# Patient Record
Sex: MALE | Race: WHITE | Employment: OTHER | URBAN - METROPOLITAN AREA
[De-identification: names, ages, dates, MRNs, and addresses within clinical notes are randomized per-mention and may not be internally consistent; named-entity substitution may affect disease eponyms.]

---

## 2020-10-19 ENCOUNTER — HOSPITAL ENCOUNTER (INPATIENT)
Age: 75
LOS: 16 days | Discharge: NURSING, REHAB OR LONG-TERM CARE | DRG: 870 | End: 2020-11-04
Attending: FAMILY MEDICINE | Admitting: FAMILY MEDICINE
Payer: MEDICARE

## 2020-10-19 ENCOUNTER — APPOINTMENT (OUTPATIENT)
Dept: CT IMAGING | Age: 75
DRG: 870 | End: 2020-10-19
Attending: FAMILY MEDICINE
Payer: MEDICARE

## 2020-10-19 DIAGNOSIS — R06.02 SHORTNESS OF BREATH: ICD-10-CM

## 2020-10-19 DIAGNOSIS — Z71.89 GOALS OF CARE, COUNSELING/DISCUSSION: ICD-10-CM

## 2020-10-19 DIAGNOSIS — R53.81 DEBILITY: ICD-10-CM

## 2020-10-19 DIAGNOSIS — E11.65 TYPE 2 DIABETES MELLITUS WITH HYPERGLYCEMIA, WITHOUT LONG-TERM CURRENT USE OF INSULIN (HCC): ICD-10-CM

## 2020-10-19 DIAGNOSIS — I21.4 NON-STEMI (NON-ST ELEVATED MYOCARDIAL INFARCTION) (HCC): ICD-10-CM

## 2020-10-19 PROBLEM — D72.829 LEUKOCYTOSIS: Status: ACTIVE | Noted: 2020-10-19

## 2020-10-19 PROBLEM — N39.0 UTI (URINARY TRACT INFECTION): Status: ACTIVE | Noted: 2020-10-19

## 2020-10-19 PROBLEM — N13.30 HYDRONEPHROSIS OF RIGHT KIDNEY: Status: ACTIVE | Noted: 2020-10-19

## 2020-10-19 PROBLEM — R00.0 TACHYCARDIA: Status: ACTIVE | Noted: 2020-10-19

## 2020-10-19 PROBLEM — A41.9 SEPSIS (HCC): Status: ACTIVE | Noted: 2020-10-19

## 2020-10-19 LAB
ABO + RH BLD: NORMAL
ALBUMIN SERPL-MCNC: 3.3 G/DL (ref 3.5–5)
ALBUMIN/GLOB SERPL: 0.8 {RATIO} (ref 1.1–2.2)
ALP SERPL-CCNC: 139 U/L (ref 45–117)
ALT SERPL-CCNC: 28 U/L (ref 12–78)
ANION GAP SERPL CALC-SCNC: 10 MMOL/L (ref 5–15)
APPEARANCE UR: ABNORMAL
APTT PPP: 27.3 SEC (ref 22.1–32)
AST SERPL-CCNC: 34 U/L (ref 15–37)
ATRIAL RATE: 120 BPM
BACTERIA URNS QL MICRO: NEGATIVE /HPF
BASOPHILS # BLD: 0 K/UL (ref 0–0.1)
BASOPHILS NFR BLD: 0 % (ref 0–1)
BILIRUB SERPL-MCNC: 0.6 MG/DL (ref 0.2–1)
BILIRUB UR QL: NEGATIVE
BLOOD GROUP ANTIBODIES SERPL: NORMAL
BUN SERPL-MCNC: 37 MG/DL (ref 6–20)
BUN/CREAT SERPL: 14 (ref 12–20)
CALCIUM SERPL-MCNC: 9.5 MG/DL (ref 8.5–10.1)
CALCULATED R AXIS, ECG10: 81 DEGREES
CALCULATED T AXIS, ECG11: -114 DEGREES
CHLORIDE SERPL-SCNC: 107 MMOL/L (ref 97–108)
CO2 SERPL-SCNC: 19 MMOL/L (ref 21–32)
COLOR UR: ABNORMAL
CREAT SERPL-MCNC: 2.59 MG/DL (ref 0.7–1.3)
DIAGNOSIS, 93000: NORMAL
DIFFERENTIAL METHOD BLD: ABNORMAL
EOSINOPHIL # BLD: 0 K/UL (ref 0–0.4)
EOSINOPHIL NFR BLD: 0 % (ref 0–7)
EPITH CASTS URNS QL MICRO: ABNORMAL /LPF
ERYTHROCYTE [DISTWIDTH] IN BLOOD BY AUTOMATED COUNT: 14.8 % (ref 11.5–14.5)
GLOBULIN SER CALC-MCNC: 4.1 G/DL (ref 2–4)
GLUCOSE BLD STRIP.AUTO-MCNC: 236 MG/DL (ref 65–100)
GLUCOSE BLD STRIP.AUTO-MCNC: 238 MG/DL (ref 65–100)
GLUCOSE BLD STRIP.AUTO-MCNC: 283 MG/DL (ref 65–100)
GLUCOSE BLD STRIP.AUTO-MCNC: 310 MG/DL (ref 65–100)
GLUCOSE SERPL-MCNC: 278 MG/DL (ref 65–100)
GLUCOSE UR STRIP.AUTO-MCNC: 500 MG/DL
HCT VFR BLD AUTO: 41.5 % (ref 36.6–50.3)
HGB BLD-MCNC: 13.5 G/DL (ref 12.1–17)
HGB UR QL STRIP: ABNORMAL
HYALINE CASTS URNS QL MICRO: ABNORMAL /LPF (ref 0–5)
IMM GRANULOCYTES # BLD AUTO: 0 K/UL
IMM GRANULOCYTES NFR BLD AUTO: 0 %
INR PPP: 1 (ref 0.9–1.1)
KETONES UR QL STRIP.AUTO: ABNORMAL MG/DL
LACTATE SERPL-SCNC: 2 MMOL/L (ref 0.4–2)
LEUKOCYTE ESTERASE UR QL STRIP.AUTO: ABNORMAL
LYMPHOCYTES # BLD: 1 K/UL (ref 0.8–3.5)
LYMPHOCYTES NFR BLD: 2 % (ref 12–49)
MAGNESIUM SERPL-MCNC: 1.7 MG/DL (ref 1.6–2.4)
MCH RBC QN AUTO: 27.6 PG (ref 26–34)
MCHC RBC AUTO-ENTMCNC: 32.5 G/DL (ref 30–36.5)
MCV RBC AUTO: 84.7 FL (ref 80–99)
MONOCYTES # BLD: 1.5 K/UL (ref 0–1)
MONOCYTES NFR BLD: 3 % (ref 5–13)
NEUTS BAND NFR BLD MANUAL: 1 % (ref 0–6)
NEUTS SEG # BLD: 46.6 K/UL (ref 1.8–8)
NEUTS SEG NFR BLD: 94 % (ref 32–75)
NITRITE UR QL STRIP.AUTO: NEGATIVE
NRBC # BLD: 0 K/UL (ref 0–0.01)
NRBC BLD-RTO: 0 PER 100 WBC
P-R INTERVAL, ECG05: 186 MS
PATH REV BLD -IMP: ABNORMAL
PH UR STRIP: 5.5 [PH] (ref 5–8)
PLATELET # BLD AUTO: 256 K/UL (ref 150–400)
PMV BLD AUTO: 10.8 FL (ref 8.9–12.9)
POTASSIUM SERPL-SCNC: 4.3 MMOL/L (ref 3.5–5.1)
PROT SERPL-MCNC: 7.4 G/DL (ref 6.4–8.2)
PROT UR STRIP-MCNC: 100 MG/DL
PROTHROMBIN TIME: 10.9 SEC (ref 9–11.1)
Q-T INTERVAL, ECG07: 288 MS
QRS DURATION, ECG06: 118 MS
QTC CALCULATION (BEZET), ECG08: 407 MS
RBC # BLD AUTO: 4.9 M/UL (ref 4.1–5.7)
RBC #/AREA URNS HPF: ABNORMAL /HPF (ref 0–5)
RBC MORPH BLD: ABNORMAL
SERVICE CMNT-IMP: ABNORMAL
SODIUM SERPL-SCNC: 136 MMOL/L (ref 136–145)
SP GR UR REFRACTOMETRY: 1.02 (ref 1–1.03)
SPECIMEN EXP DATE BLD: NORMAL
THERAPEUTIC RANGE,PTTT: NORMAL SECS (ref 58–77)
UROBILINOGEN UR QL STRIP.AUTO: 1 EU/DL (ref 0.2–1)
VENTRICULAR RATE, ECG03: 120 BPM
WBC # BLD AUTO: 49.1 K/UL (ref 4.1–11.1)
WBC URNS QL MICRO: >100 /HPF (ref 0–4)

## 2020-10-19 PROCEDURE — 87040 BLOOD CULTURE FOR BACTERIA: CPT

## 2020-10-19 PROCEDURE — 85610 PROTHROMBIN TIME: CPT

## 2020-10-19 PROCEDURE — 74011000250 HC RX REV CODE- 250: Performed by: NURSE PRACTITIONER

## 2020-10-19 PROCEDURE — 94664 DEMO&/EVAL PT USE INHALER: CPT

## 2020-10-19 PROCEDURE — 74011250636 HC RX REV CODE- 250/636: Performed by: FAMILY MEDICINE

## 2020-10-19 PROCEDURE — 82962 GLUCOSE BLOOD TEST: CPT

## 2020-10-19 PROCEDURE — 74011636637 HC RX REV CODE- 636/637: Performed by: FAMILY MEDICINE

## 2020-10-19 PROCEDURE — 74011000250 HC RX REV CODE- 250: Performed by: FAMILY MEDICINE

## 2020-10-19 PROCEDURE — 65660000000 HC RM CCU STEPDOWN

## 2020-10-19 PROCEDURE — 81001 URINALYSIS AUTO W/SCOPE: CPT

## 2020-10-19 PROCEDURE — 85730 THROMBOPLASTIN TIME PARTIAL: CPT

## 2020-10-19 PROCEDURE — 83605 ASSAY OF LACTIC ACID: CPT

## 2020-10-19 PROCEDURE — C9113 INJ PANTOPRAZOLE SODIUM, VIA: HCPCS | Performed by: NURSE PRACTITIONER

## 2020-10-19 PROCEDURE — 74176 CT ABD & PELVIS W/O CONTRAST: CPT

## 2020-10-19 PROCEDURE — 85025 COMPLETE CBC W/AUTO DIFF WBC: CPT

## 2020-10-19 PROCEDURE — 80053 COMPREHEN METABOLIC PANEL: CPT

## 2020-10-19 PROCEDURE — 74011250636 HC RX REV CODE- 250/636: Performed by: NURSE PRACTITIONER

## 2020-10-19 PROCEDURE — 74011250637 HC RX REV CODE- 250/637: Performed by: INTERNAL MEDICINE

## 2020-10-19 PROCEDURE — 74011250637 HC RX REV CODE- 250/637: Performed by: FAMILY MEDICINE

## 2020-10-19 PROCEDURE — 93005 ELECTROCARDIOGRAM TRACING: CPT

## 2020-10-19 PROCEDURE — 94640 AIRWAY INHALATION TREATMENT: CPT

## 2020-10-19 PROCEDURE — 87086 URINE CULTURE/COLONY COUNT: CPT

## 2020-10-19 PROCEDURE — 36415 COLL VENOUS BLD VENIPUNCTURE: CPT

## 2020-10-19 PROCEDURE — 86900 BLOOD TYPING SEROLOGIC ABO: CPT

## 2020-10-19 PROCEDURE — 74011250637 HC RX REV CODE- 250/637: Performed by: NURSE PRACTITIONER

## 2020-10-19 PROCEDURE — 83735 ASSAY OF MAGNESIUM: CPT

## 2020-10-19 PROCEDURE — 74011000258 HC RX REV CODE- 258: Performed by: FAMILY MEDICINE

## 2020-10-19 PROCEDURE — 99232 SBSQ HOSP IP/OBS MODERATE 35: CPT | Performed by: CLINICAL NURSE SPECIALIST

## 2020-10-19 RX ORDER — FENTANYL CITRATE 50 UG/ML
25 INJECTION, SOLUTION INTRAMUSCULAR; INTRAVENOUS
Status: DISCONTINUED | OUTPATIENT
Start: 2020-10-19 | End: 2020-10-19

## 2020-10-19 RX ORDER — INSULIN LISPRO 100 [IU]/ML
INJECTION, SOLUTION INTRAVENOUS; SUBCUTANEOUS EVERY 6 HOURS
Status: DISCONTINUED | OUTPATIENT
Start: 2020-10-19 | End: 2020-10-19

## 2020-10-19 RX ORDER — DOXYCYCLINE HYCLATE 100 MG
100 TABLET ORAL EVERY 12 HOURS
Status: DISCONTINUED | OUTPATIENT
Start: 2020-10-19 | End: 2020-10-24

## 2020-10-19 RX ORDER — SODIUM CHLORIDE 0.9 % (FLUSH) 0.9 %
5-40 SYRINGE (ML) INJECTION EVERY 8 HOURS
Status: DISCONTINUED | OUTPATIENT
Start: 2020-10-19 | End: 2020-10-30 | Stop reason: SDUPTHER

## 2020-10-19 RX ORDER — ASPIRIN 325 MG
325 TABLET ORAL DAILY
COMMUNITY

## 2020-10-19 RX ORDER — ACETAMINOPHEN 325 MG/1
650 TABLET ORAL
Status: DISCONTINUED | OUTPATIENT
Start: 2020-10-19 | End: 2020-11-04 | Stop reason: HOSPADM

## 2020-10-19 RX ORDER — DEXTROSE MONOHYDRATE 100 MG/ML
0-250 INJECTION, SOLUTION INTRAVENOUS AS NEEDED
Status: DISCONTINUED | OUTPATIENT
Start: 2020-10-19 | End: 2020-10-26

## 2020-10-19 RX ORDER — METOPROLOL SUCCINATE 50 MG/1
50 TABLET, EXTENDED RELEASE ORAL DAILY
COMMUNITY
End: 2020-11-04

## 2020-10-19 RX ORDER — TRAMADOL HYDROCHLORIDE 50 MG/1
50 TABLET ORAL
Status: DISCONTINUED | OUTPATIENT
Start: 2020-10-19 | End: 2020-11-04 | Stop reason: HOSPADM

## 2020-10-19 RX ORDER — ERGOCALCIFEROL 1.25 MG/1
50000 CAPSULE ORAL
COMMUNITY

## 2020-10-19 RX ORDER — SODIUM CHLORIDE 9 MG/ML
125 INJECTION, SOLUTION INTRAVENOUS CONTINUOUS
Status: DISCONTINUED | OUTPATIENT
Start: 2020-10-19 | End: 2020-10-20

## 2020-10-19 RX ORDER — OXYCODONE AND ACETAMINOPHEN 5; 325 MG/1; MG/1
1 TABLET ORAL
COMMUNITY

## 2020-10-19 RX ORDER — INSULIN GLARGINE 100 [IU]/ML
0.2 INJECTION, SOLUTION SUBCUTANEOUS
Status: DISCONTINUED | OUTPATIENT
Start: 2020-10-19 | End: 2020-11-04 | Stop reason: HOSPADM

## 2020-10-19 RX ORDER — METOPROLOL TARTRATE 25 MG/1
50 TABLET, FILM COATED ORAL EVERY 12 HOURS
Status: DISCONTINUED | OUTPATIENT
Start: 2020-10-19 | End: 2020-10-20

## 2020-10-19 RX ORDER — SODIUM CHLORIDE 0.9 % (FLUSH) 0.9 %
5-40 SYRINGE (ML) INJECTION AS NEEDED
Status: DISCONTINUED | OUTPATIENT
Start: 2020-10-19 | End: 2020-11-04 | Stop reason: HOSPADM

## 2020-10-19 RX ORDER — INSULIN LISPRO 100 [IU]/ML
INJECTION, SOLUTION INTRAVENOUS; SUBCUTANEOUS
Status: DISCONTINUED | OUTPATIENT
Start: 2020-10-19 | End: 2020-10-19

## 2020-10-19 RX ORDER — HYDRALAZINE HYDROCHLORIDE 20 MG/ML
10 INJECTION INTRAMUSCULAR; INTRAVENOUS
Status: DISCONTINUED | OUTPATIENT
Start: 2020-10-19 | End: 2020-11-04 | Stop reason: HOSPADM

## 2020-10-19 RX ORDER — MAGNESIUM SULFATE 100 %
4 CRYSTALS MISCELLANEOUS AS NEEDED
Status: DISCONTINUED | OUTPATIENT
Start: 2020-10-19 | End: 2020-11-04 | Stop reason: HOSPADM

## 2020-10-19 RX ORDER — ROSUVASTATIN CALCIUM 40 MG/1
40 TABLET, COATED ORAL
Status: DISCONTINUED | OUTPATIENT
Start: 2020-10-19 | End: 2020-11-04 | Stop reason: HOSPADM

## 2020-10-19 RX ORDER — INSULIN LISPRO 100 [IU]/ML
INJECTION, SOLUTION INTRAVENOUS; SUBCUTANEOUS
Status: DISCONTINUED | OUTPATIENT
Start: 2020-10-19 | End: 2020-10-21

## 2020-10-19 RX ORDER — ACETAMINOPHEN 650 MG/1
650 SUPPOSITORY RECTAL
Status: DISCONTINUED | OUTPATIENT
Start: 2020-10-19 | End: 2020-11-04 | Stop reason: HOSPADM

## 2020-10-19 RX ORDER — IPRATROPIUM BROMIDE AND ALBUTEROL SULFATE 2.5; .5 MG/3ML; MG/3ML
3 SOLUTION RESPIRATORY (INHALATION)
Status: DISCONTINUED | OUTPATIENT
Start: 2020-10-19 | End: 2020-10-20

## 2020-10-19 RX ORDER — AMLODIPINE BESYLATE 10 MG/1
10 TABLET ORAL DAILY
COMMUNITY

## 2020-10-19 RX ORDER — POLYETHYLENE GLYCOL 3350 17 G/17G
17 POWDER, FOR SOLUTION ORAL DAILY PRN
Status: DISCONTINUED | OUTPATIENT
Start: 2020-10-19 | End: 2020-10-26

## 2020-10-19 RX ORDER — FENTANYL CITRATE 50 UG/ML
12.5 INJECTION, SOLUTION INTRAMUSCULAR; INTRAVENOUS
Status: COMPLETED | OUTPATIENT
Start: 2020-10-19 | End: 2020-10-19

## 2020-10-19 RX ORDER — OXYCODONE HYDROCHLORIDE 5 MG/1
5 TABLET ORAL
Status: DISCONTINUED | OUTPATIENT
Start: 2020-10-19 | End: 2020-11-04 | Stop reason: HOSPADM

## 2020-10-19 RX ADMIN — IPRATROPIUM BROMIDE AND ALBUTEROL SULFATE 3 ML: .5; 3 SOLUTION RESPIRATORY (INHALATION) at 20:55

## 2020-10-19 RX ADMIN — IPRATROPIUM BROMIDE AND ALBUTEROL SULFATE 3 ML: .5; 3 SOLUTION RESPIRATORY (INHALATION) at 12:40

## 2020-10-19 RX ADMIN — INSULIN GLARGINE 17 UNITS: 100 INJECTION, SOLUTION SUBCUTANEOUS at 22:44

## 2020-10-19 RX ADMIN — SODIUM CHLORIDE 40 MG: 9 INJECTION, SOLUTION INTRAMUSCULAR; INTRAVENOUS; SUBCUTANEOUS at 09:16

## 2020-10-19 RX ADMIN — DOXYCYCLINE HYCLATE 100 MG: 100 TABLET, COATED ORAL at 21:09

## 2020-10-19 RX ADMIN — OXYCODONE 5 MG: 5 TABLET ORAL at 17:01

## 2020-10-19 RX ADMIN — INSULIN GLARGINE 17 UNITS: 100 INJECTION, SOLUTION SUBCUTANEOUS at 06:30

## 2020-10-19 RX ADMIN — ACETAMINOPHEN 650 MG: 325 TABLET ORAL at 02:23

## 2020-10-19 RX ADMIN — METOPROLOL TARTRATE 50 MG: 25 TABLET, FILM COATED ORAL at 13:07

## 2020-10-19 RX ADMIN — Medication 10 ML: at 02:23

## 2020-10-19 RX ADMIN — DILTIAZEM HYDROCHLORIDE 10 MG/HR: 5 INJECTION INTRAVENOUS at 16:29

## 2020-10-19 RX ADMIN — INSULIN LISPRO 4 UNITS: 100 INJECTION, SOLUTION INTRAVENOUS; SUBCUTANEOUS at 17:01

## 2020-10-19 RX ADMIN — ROSUVASTATIN 40 MG: 40 TABLET, FILM COATED ORAL at 21:09

## 2020-10-19 RX ADMIN — METOPROLOL TARTRATE 50 MG: 25 TABLET, FILM COATED ORAL at 21:09

## 2020-10-19 RX ADMIN — OXYCODONE 5 MG: 5 TABLET ORAL at 22:44

## 2020-10-19 RX ADMIN — FENTANYL CITRATE 12.5 MCG: 50 INJECTION, SOLUTION INTRAMUSCULAR; INTRAVENOUS at 08:21

## 2020-10-19 RX ADMIN — DOXYCYCLINE HYCLATE 100 MG: 100 TABLET, COATED ORAL at 11:43

## 2020-10-19 RX ADMIN — FENTANYL CITRATE 12.5 MCG: 50 INJECTION, SOLUTION INTRAMUSCULAR; INTRAVENOUS at 12:35

## 2020-10-19 RX ADMIN — OXYCODONE 5 MG: 5 TABLET ORAL at 13:06

## 2020-10-19 RX ADMIN — IPRATROPIUM BROMIDE AND ALBUTEROL SULFATE 3 ML: .5; 3 SOLUTION RESPIRATORY (INHALATION) at 15:33

## 2020-10-19 RX ADMIN — TRAMADOL HYDROCHLORIDE 50 MG: 50 TABLET ORAL at 09:57

## 2020-10-19 RX ADMIN — DILTIAZEM HYDROCHLORIDE 5 MG/HR: 5 INJECTION INTRAVENOUS at 06:10

## 2020-10-19 RX ADMIN — TRAMADOL HYDROCHLORIDE 50 MG: 50 TABLET ORAL at 15:20

## 2020-10-19 RX ADMIN — CEFTRIAXONE SODIUM 1 G: 1 INJECTION, POWDER, FOR SOLUTION INTRAMUSCULAR; INTRAVENOUS at 22:44

## 2020-10-19 RX ADMIN — INSULIN LISPRO 1 UNITS: 100 INJECTION, SOLUTION INTRAVENOUS; SUBCUTANEOUS at 22:44

## 2020-10-19 RX ADMIN — INSULIN LISPRO 3 UNITS: 100 INJECTION, SOLUTION INTRAVENOUS; SUBCUTANEOUS at 06:31

## 2020-10-19 RX ADMIN — TRAMADOL HYDROCHLORIDE 50 MG: 50 TABLET ORAL at 21:09

## 2020-10-19 RX ADMIN — INSULIN LISPRO 2 UNITS: 100 INJECTION, SOLUTION INTRAVENOUS; SUBCUTANEOUS at 12:35

## 2020-10-19 RX ADMIN — DILTIAZEM HYDROCHLORIDE 10 MG/HR: 5 INJECTION INTRAVENOUS at 07:06

## 2020-10-19 RX ADMIN — FENTANYL CITRATE 25 MCG: 50 INJECTION, SOLUTION INTRAMUSCULAR; INTRAVENOUS at 03:03

## 2020-10-19 RX ADMIN — SODIUM CHLORIDE 125 ML/HR: 900 INJECTION, SOLUTION INTRAVENOUS at 02:22

## 2020-10-19 NOTE — PROGRESS NOTES
10/19/20 0600   Vital Signs   Temp 99.4 °F (37.4 °C)   Temp Source Oral   Pulse (Heart Rate) (!) 115   Heart Rate Source Monitor   Cardiac Rhythm A Fib   Resp Rate 22   O2 Sat (%) (!) 89 %   Level of Consciousness Alert   /78   MAP (Calculated) 98   BP 1 Method Automatic   BP 1 Location Right arm   BP Patient Position At rest   MEWS Score 4     MD aware of HR. Patient on cardizem gtt now. Patient given 2L NC for O2 support.

## 2020-10-19 NOTE — ROUTINE PROCESS
Bedside shift change report given to [facility] (oncoming nurse) by Phillip Mota (offgoing nurse). Report included the following information SBAR, Kardex and Cardiac Rhythm Afib.

## 2020-10-19 NOTE — PROGRESS NOTES
Asked to see pt for preop cardiology consult. Pt reports that his cardiologist is Dr. Laury Lee with [facility]. Will redirect to [facility].

## 2020-10-19 NOTE — PROGRESS NOTES
Bedside and Verbal shift change report given to Michelle Leal RN (oncoming nurse) by Shay Jacobs RN (offgoing nurse). Report included the following information SBAR, Kardex, MAR, Recent Results, Cardiac Rhythm NSR and Dual Neuro Assessment.

## 2020-10-19 NOTE — PROGRESS NOTES
[facility] Adult  Hospitalist Group                                                                                          Hospitalist Progress Note  Jesu Khalil NP  Answering service: [phone] OR [phone] from in house phone        Date of Service:  10/19/2020  NAME:  Love Carrera  :  1945  MRN:  [id number]      Admission Summary:   Love Carrera is a 70 y.o. male with a PMH of CAD, CABG, HTN, AAA repair, Femoral-Femoral Bypass with Chronic LLE paralysis and HLD presented as a direct admission/ transfer from [facility] ED to [facility]) with chief complaint of pain in right lower abdomen. Symptom onset reportedly began 10/18 at 1 pm, remained constant, sharp and severe, currently rated 9/10 (after pain medication prior to transfer; was more intense), without specific aggravating / alleviating factors, non-radiating. Patient admits to some nausea but no vomiting. He is able to void urine and last bowel movement was yesterday. He initially went to [facility] ED where workup included CT abdomen/ pelvis without contrast showing right hydronephrosis. UA revealed UTI, WBC = 15.4. BUN = 33. Creatinine = 2.31. Per ED physician verbal report, patient was given a dose of Morphine but he became diaphoretic and tachycardic, no other doses were given. There were no reports of itching, rash, hives, SOB, or other signs of anaphylaxis. As there was no urologist at [facility], patient requested transfer to [facility], upon arrival to [facility] complaining of 9/10 RLQ pain. There were no reports of new onset syncope, loss of consciousness, headache, neck pain, visual disturbance, numbness, paresthesias, focal weakness, chest pain, palpitations, diarrhea, dysuria, hematuria, calf pain, increased leg swelling/ edema, fever, chills, rash, or known COVID 19 exposure. Interval history / Subjective:    Follow-up for Hydronephrosis of right kidney, UTI, Sepsis, ARF, RLQ pain, Leukocytosis,  Hx CAD/ s/p CABG, T2DM with hyperglycemia, Chronic LLE paralysis and Hypertension.   -Patient seen and examined, no c/o's. Assessment & Plan:     Hydronephrosis of right kidney:  -telemetry  -Consult urologist      UTI:  -Rocephin 1 gram IV q 24 hours  -UA and urine culture     Sepsis: with noted tachycardia, leukocytosis and UTI. -blood cultures. -IVF  -continue Rocephin  -EKG  -CT abd/pelv: Patchy airspace and interstitial opacities right lung base could be  infectious/inflammatory or asymmetric edema.  -Duonebs, Doxycycline added    ARF: elevated creatinine= 2.31, BUN= 33  -monitor renal funtion  -IVF  -CT abd/pelv: Right hydronephrosis and perinephric stranding with no obstructing calculus. Several punctate calculi in the right kidney but no ureteral calculus identified. Diffuse bladder wall thickening. Right lower quadrant pain:  -avoid Morphine due to reported diaphoresis and tachycardia.  -Fentanyl 12.5 mcg IV q 4 hours prn for pain  -NPO for now and continue IVF     Leukocytosis: monitor CBC. Hx CAD/ s/p CABG: Cardiology consult. T2DM with hyperglycemia: Basal and ISS, HgA1c, accuchecks. Chronic LLE paralysis: fall precautions. HTN: monitor BP, prn hydralazine.        DVTppx: SCDs  Gippx: Protonix  Code Status: Full Code  Diet: Cardiology  Activity: OOB to chair TID and PRN  Discharge: likely return home >48hrs       Hospital Problems  Never Reviewed          Codes Class Noted POA    Leukocytosis ICD-10-CM: J45.715  ICD-9-CM: 288.60  10/19/2020 Unknown        UTI (urinary tract infection) ICD-10-CM: N39.0  ICD-9-CM: 599.0  10/19/2020 Unknown        Tachycardia ICD-10-CM: R00.0  ICD-9-CM: 785.0  10/19/2020 Unknown        Sepsis ([address] ICD-10-CM: A41.9  ICD-9-CM: 038.9, 995.91  10/19/2020 Unknown        Hydronephrosis of right kidney ICD-10-CM: N13.30  ICD-9-CM: 898  10/19/2020 Unknown                Review of Systems:   A comprehensive review of systems was negative except for that written in the HPI. Vital Signs:    Last 24hrs VS reviewed since prior progress note. Most recent are:  Visit Vitals  /78 (BP 1 Location: Right arm, BP Patient Position: At rest)   Pulse (!) 115   Temp 99.4 °F (37.4 °C)   Resp 22   Wt 83.7 kg (184 lb 9.6 oz)   SpO2 (!) 89%       No intake or output data in the 24 hours ending 10/19/20 0736     Physical Examination:     Constitutional:  No acute distress, cooperative, pleasant    ENT:  Oral mucosa moist.    Resp:  CTA bilaterally. No wheezing/rhonchi/rales. No accessory muscle use. CV:  Regular rhythm, normal rate, no murmurs, gallops, rubs. /GI:  Soft, non distended, non tender, no guarding, BS present. Musculoskeletal:  No edema, warm, 2+ pulses throughout. Neurologic:  Moves all extremities. AAOx3, CN II-XII reviewed. Skin:  Good turgor, no rashes or ulcers  Psych:  Good insight, Not anxious nor agitated. Data Review:    Review and/or order of clinical lab test      Labs:     Recent Labs     10/19/20  0351   WBC 49.1*   HGB 13.5   HCT 41.5        Recent Labs     10/19/20  0351      K 4.3      CO2 19*   BUN 37*   CREA 2.59*   *   CA 9.5   MG 1.7     Recent Labs     10/19/20  0351   ALT 28   *   TBILI 0.6   TP 7.4   ALB 3.3*   GLOB 4.1*     Recent Labs     10/19/20  0351   INR 1.0   PTP 10.9   APTT 27.3      No results for input(s): FE, TIBC, PSAT, FERR in the last 72 hours. No results found for: FOL, RBCF   No results for input(s): PH, PCO2, PO2 in the last 72 hours. No results for input(s): CPK, CKNDX, TROIQ in the last 72 hours.     No lab exists for component: CPKMB  No results found for: CHOL, CHOLX, CHLST, CHOLV, HDL, HDLP, LDL, LDLC, DLDLP, TGLX, TRIGL, TRIGP, CHHD, CHHDX  Lab Results   Component Value Date/Time    Glucose (POC) 283 (H) 10/19/2020 06:22 AM     Lab Results   Component Value Date/Time    Color YELLOW/STRAW 10/19/2020 03:51 AM    Appearance CLOUDY (A) 10/19/2020 03:51 AM Specific gravity 1.017 10/19/2020 03:51 AM    pH (UA) 5.5 10/19/2020 03:51 AM    Protein 100 (A) 10/19/2020 03:51 AM    Glucose 500 (A) 10/19/2020 03:51 AM    Ketone TRACE (A) 10/19/2020 03:51 AM    Bilirubin Negative 10/19/2020 03:51 AM    Urobilinogen 1.0 10/19/2020 03:51 AM    Nitrites Negative 10/19/2020 03:51 AM    Leukocyte Esterase MODERATE (A) 10/19/2020 03:51 AM    Epithelial cells FEW 10/19/2020 03:51 AM    Bacteria Negative 10/19/2020 03:51 AM    WBC >100 (H) 10/19/2020 03:51 AM    RBC 5-10 10/19/2020 03:51 AM         Medications Reviewed:     Current Facility-Administered Medications   Medication Dose Route Frequency    sodium chloride (NS) flush 5-40 mL  5-40 mL IntraVENous Q8H    sodium chloride (NS) flush 5-40 mL  5-40 mL IntraVENous PRN    acetaminophen (TYLENOL) tablet 650 mg  650 mg Oral Q6H PRN    Or    acetaminophen (TYLENOL) suppository 650 mg  650 mg Rectal Q6H PRN    polyethylene glycol (MIRALAX) packet 17 g  17 g Oral DAILY PRN    cefTRIAXone (ROCEPHIN) 1 g in 0.9% sodium chloride (MBP/ADV) 50 mL  1 g IntraVENous Q24H    0.9% sodium chloride infusion  125 mL/hr IntraVENous CONTINUOUS    fentaNYL citrate (PF) injection 12.5 mcg  12.5 mcg IntraVENous Q4H PRN    dilTIAZem (CARDIZEM) 125 mg in dextrose 5% 125 mL infusion  0-15 mg/hr IntraVENous TITRATE    glucose chewable tablet 16 g  4 Tab Oral PRN    glucagon (GLUCAGEN) injection 1 mg  1 mg IntraMUSCular PRN    dextrose 10% infusion 0-250 mL  0-250 mL IntraVENous PRN    insulin glargine (LANTUS) injection 17 Units  0.2 Units/kg SubCUTAneous QHS    insulin lispro (HUMALOG) injection   SubCUTAneous Q6H     ______________________________________________________________________  EXPECTED LENGTH OF STAY: - - -  ACTUAL LENGTH OF STAY:          0                 Ingrid Bullock NP

## 2020-10-19 NOTE — CONSULTS
CARDIOLOGY CONSULT                  Subjective:    Date of  Admission: 10/19/2020  1:23 AM     Admission type:Urgent    Gustavo Gaitan is a 70 y.o. male admitted for Hydronephrosis of right kidney [N13.30]  UTI (urinary tract infection) [N39.0]  Sepsis ([address] [A41.9]  Tachycardia [R00.0]  Leukocytosis [D72.829]. Has a history of CAD s/p MI, CABG, HTN and multiple peripheral proceures. Seen by Dr. Sandra Jordan a few weeks and echo showed a moderately reduced LVEF of 40% and a stress test showed a mainly fixed defect related to his MI. Transferred here from [facility] with sepsis, pyelonephritis for urology evaluation. In AF per report and converted to NSR with diltiazem    Patient Active Problem List    Diagnosis Date Noted    Leukocytosis 10/19/2020    UTI (urinary tract infection) 10/19/2020    Tachycardia 10/19/2020    Sepsis ([facility] [address] 10/19/2020    Hydronephrosis of right kidney 10/19/2020      Unknown, Provider  No past medical history on file. No past surgical history on file. No Known Allergies   Reviewed all relevant histories    No family history on file.    Current Facility-Administered Medications   Medication Dose Route Frequency    sodium chloride (NS) flush 5-40 mL  5-40 mL IntraVENous Q8H    sodium chloride (NS) flush 5-40 mL  5-40 mL IntraVENous PRN    acetaminophen (TYLENOL) tablet 650 mg  650 mg Oral Q6H PRN    Or    acetaminophen (TYLENOL) suppository 650 mg  650 mg Rectal Q6H PRN    polyethylene glycol (MIRALAX) packet 17 g  17 g Oral DAILY PRN    cefTRIAXone (ROCEPHIN) 1 g in 0.9% sodium chloride (MBP/ADV) 50 mL  1 g IntraVENous Q24H    0.9% sodium chloride infusion  125 mL/hr IntraVENous CONTINUOUS    fentaNYL citrate (PF) injection 12.5 mcg  12.5 mcg IntraVENous Q4H PRN    dilTIAZem (CARDIZEM) 125 mg in dextrose 5% 125 mL infusion  0-15 mg/hr IntraVENous TITRATE    glucose chewable tablet 16 g  4 Tab Oral PRN    glucagon (GLUCAGEN) injection 1 mg  1 mg IntraMUSCular PRN    dextrose 10% infusion 0-250 mL  0-250 mL IntraVENous PRN    insulin glargine (LANTUS) injection 17 Units  0.2 Units/kg SubCUTAneous QHS    pantoprazole (PROTONIX) 40 mg in 0.9% sodium chloride 10 mL injection  40 mg IntraVENous DAILY    hydrALAZINE (APRESOLINE) 20 mg/mL injection 10 mg  10 mg IntraVENous Q6H PRN    albuterol-ipratropium (DUO-NEB) 2.5 MG-0.5 MG/3 ML  3 mL Nebulization QID RT    traMADoL (ULTRAM) tablet 50 mg  50 mg Oral Q4H PRN    doxycycline (VIBRA-TABS) tablet 100 mg  100 mg Oral Q12H    insulin lispro (HUMALOG) injection   SubCUTAneous AC&HS         Review of Symptoms:  Gen - no F/C/S  Eyes - no vision changes  ENT - no sore throat, rhinorrhea, otalgia  CV - no CP, no palpitations, no orthopnea, no PND, no KATHLEEN  Resp no cough, no SOB/MADERA  GI - + AP, no n/v/d/c   - no dysuria, no hematuria  MSK - no abnormal joint pains  Skin - no rashes  Neuro - no HA, no numbness, no weakness, no slurred speech  Psych - no change in mood         Physical Exam    Visit Vitals  BP (!) 151/67 (BP 1 Location: Right arm, BP Patient Position: Sitting)   Pulse 97   Temp 98.5 °F (36.9 °C)   Resp 22   Wt 184 lb 9.6 oz (83.7 kg)   SpO2 94%     Uncomfortable  Skin warm and dry  Nl conjunctiva  Oropharynx without exudate. Neck supple  Lungs clear  Irreg  Abdomen soft and non tender  Pulses 2+ radials  Neuro:  Grossly intact  Appropriate      Cardiographics    Telemetry: normal sinus rhythm  ECG: normal sinus rhythm  Echocardiogram: revewed    Labs:   Recent Results (from the past 24 hour(s))   LACTIC ACID    Collection Time: 10/19/20  3:50 AM   Result Value Ref Range    Lactic acid 2.0 0.4 - 2.0 MMOL/L   TYPE & SCREEN    Collection Time: 10/19/20  3:50 AM   Result Value Ref Range    Crossmatch Expiration 10/22/2020     ABO/Rh(D) Jorge Kumars POSITIVE     Antibody screen NEG    CBC WITH AUTOMATED DIFF    Collection Time: 10/19/20  3:51 AM   Result Value Ref Range    WBC 49.1 (H) 4.1 - 11.1 K/uL    RBC 4.90 4. 10 - 5.70 M/uL    HGB 13.5 12.1 - 17.0 g/dL    HCT 41.5 36.6 - 50.3 %    MCV 84.7 80.0 - 99.0 FL    MCH 27.6 26.0 - 34.0 PG    MCHC 32.5 30.0 - 36.5 g/dL    RDW 14.8 (H) 11.5 - 14.5 %    PLATELET 830 538 - 945 K/uL    MPV 10.8 8.9 - 12.9 FL    NRBC 0.0 0  WBC    ABSOLUTE NRBC 0.00 0.00 - 0.01 K/uL    NEUTROPHILS 94 (H) 32 - 75 %    BAND NEUTROPHILS 1 0 - 6 %    LYMPHOCYTES 2 (L) 12 - 49 %    MONOCYTES 3 (L) 5 - 13 %    EOSINOPHILS 0 0 - 7 %    BASOPHILS 0 0 - 1 %    IMMATURE GRANULOCYTES 0 %    ABS. NEUTROPHILS 46.6 (H) 1.8 - 8.0 K/UL    ABS. LYMPHOCYTES 1.0 0.8 - 3.5 K/UL    ABS. MONOCYTES 1.5 (H) 0.0 - 1.0 K/UL    ABS. EOSINOPHILS 0.0 0.0 - 0.4 K/UL    ABS. BASOPHILS 0.0 0.0 - 0.1 K/UL    ABS. IMM. GRANS. 0.0 K/UL    DF MANUAL      RBC COMMENTS NORMOCYTIC, NORMOCHROMIC     METABOLIC PANEL, COMPREHENSIVE    Collection Time: 10/19/20  3:51 AM   Result Value Ref Range    Sodium 136 136 - 145 mmol/L    Potassium 4.3 3.5 - 5.1 mmol/L    Chloride 107 97 - 108 mmol/L    CO2 19 (L) 21 - 32 mmol/L    Anion gap 10 5 - 15 mmol/L    Glucose 278 (H) 65 - 100 mg/dL    BUN 37 (H) 6 - 20 MG/DL    Creatinine 2.59 (H) 0.70 - 1.30 MG/DL    BUN/Creatinine ratio 14 12 - 20      GFR est AA 30 (L) >60 ml/min/1.73m2    GFR est non-AA 24 (L) >60 ml/min/1.73m2    Calcium 9.5 8.5 - 10.1 MG/DL    Bilirubin, total 0.6 0.2 - 1.0 MG/DL    ALT (SGPT) 28 12 - 78 U/L    AST (SGOT) 34 15 - 37 U/L    Alk.  phosphatase 139 (H) 45 - 117 U/L    Protein, total 7.4 6.4 - 8.2 g/dL    Albumin 3.3 (L) 3.5 - 5.0 g/dL    Globulin 4.1 (H) 2.0 - 4.0 g/dL    A-G Ratio 0.8 (L) 1.1 - 2.2     PROTHROMBIN TIME + INR    Collection Time: 10/19/20  3:51 AM   Result Value Ref Range    INR 1.0 0.9 - 1.1      Prothrombin time 10.9 9.0 - 11.1 sec   PTT    Collection Time: 10/19/20  3:51 AM   Result Value Ref Range    aPTT 27.3 22.1 - 32.0 sec    aPTT, therapeutic range     58.0 - 77.0 SECS   URINALYSIS W/ RFLX MICROSCOPIC    Collection Time: 10/19/20  3:51 AM   Result Value Ref Range    Color YELLOW/STRAW      Appearance CLOUDY (A) CLEAR      Specific gravity 1.017 1.003 - 1.030      pH (UA) 5.5 5.0 - 8.0      Protein 100 (A) NEG mg/dL    Glucose 500 (A) NEG mg/dL    Ketone TRACE (A) NEG mg/dL    Bilirubin Negative NEG      Blood SMALL (A) NEG      Urobilinogen 1.0 0.2 - 1.0 EU/dL    Nitrites Negative NEG      Leukocyte Esterase MODERATE (A) NEG      WBC >100 (H) 0 - 4 /hpf    RBC 5-10 0 - 5 /hpf    Epithelial cells FEW FEW /lpf    Bacteria Negative NEG /hpf    Hyaline cast 0-2 0 - 5 /lpf   MAGNESIUM    Collection Time: 10/19/20  3:51 AM   Result Value Ref Range    Magnesium 1.7 1.6 - 2.4 mg/dL   EKG, 12 LEAD, INITIAL    Collection Time: 10/19/20  5:37 AM   Result Value Ref Range    Ventricular Rate 120 BPM    Atrial Rate 120 BPM    P-R Interval 186 ms    QRS Duration 118 ms    Q-T Interval 288 ms    QTC Calculation (Bezet) 407 ms    Calculated R Axis 81 degrees    Calculated T Axis -114 degrees    Diagnosis       Sinus tachycardia  Incomplete left bundle branch block  ST & T wave abnormality, consider inferolateral ischemia  No previous ECGs available  Confirmed by Onel Bhandari M.D., Juancho Marrufo ([id number]) on 10/19/2020 10:28:08 AM     GLUCOSE, POC    Collection Time: 10/19/20  6:22 AM   Result Value Ref Range    Glucose (POC) 283 (H) 65 - 100 mg/dL    Performed by Danika Retana    GLUCOSE, POC    Collection Time: 10/19/20 11:52 AM   Result Value Ref Range    Glucose (POC) 236 (H) 65 - 100 mg/dL    Performed by Pablo Cardozo and Plan: This is a 70 yom with MMP here with sepsis, pyelonephritis and consulted for AF which has resolved and likely due to high adrenergic tone from admitting issues.     Started home metoprolol, statin  Holding home ASA in event surgical procedure required  Cont on dilitiazem for now, will have pt maintain for now to prevent recurrence  Labs in AM  No specific pre-operative cardiac evaluation needed given very recent echo and stress    Please call with questions     Assessment:

## 2020-10-19 NOTE — CONSULTS
Requesting Provider: Katiuska Yip MD - Reason for Consultation: \"right hydronephrosis\"  Pre-existing [facility] Patient:   No                Patient: Eddie Covarrubias MRN: [id number]  SSN: [id number]    YOB: 1945  Age: 70 y.o. Sex: male     Location: [facility] [place]       Code Status: Full Code   PCP: Unknown, Provider  - None   Emergency Contact:  Primary Emergency Contact: Claudia Rodriguez, Home Phone: [phone]   Race/Denominational/Language: [facility] / Britta Chen / Mary Jo Schaeffer   Payor: Payor: Manuel Laws / Plan: [address] / Product Type: Medicare /    Prior Admission Data:         Hospitalized:  Hospital Day: 1 - Admitted 10/19/2020  1:23 AM   POD # * No surgery found *  by * Surgery not found * - Blood Loss: * No surgery found * * Surgery not found *     CONSULTANTS  IP CONSULT TO UROLOGY  IP CONSULT TO CARDIOLOGY   ADMISSION DIAGNOSES  No diagnosis found. Assessment/Plan:       · Sepsis  · Pyelonephritis, Right  · Moderate hydroureteronephrosis, right   · Punctate renal calculi, Right    - Okay to eat  - Continue IV abx, follow urine culture and treat accordingly   - Monitor WBC and Cr, Labs in AM  - Pain management  - Will continue to follow    Supervising MD, Dr. Makenna Delgado      CC: No chief complaint on file. HPI: He is a 70 y.o. male past medical history of CAD, CABG, hypertension, AAA repair, femoral-femoral bypass with chronic left lower extremity paralysis, and hyperlipidemia presented as a direct admission/ transfer from [facility] ED to [facility]) on 10/19/20 with cc of pain in right lower abdomen. Symptom onset reportedly began yesterday at ~ 1 pm, remained constant, sharp, severe, currently rated 9/10 (after pain medication prior to transfer; was more intense), without specific aggravating / alleviating factors, non-radiating. Patient admits to some nausea but no vomiting.   He is able to void urine and last bowel movement was yesterday. Pt alert and oriented, sitting up in bed. Pt continues to report RLQ pain, 9/10. Denies fevers, nausea, or vomiting. He is voiding independently with frequency, denies dysuria or hematuria. Urine clear yellow. AF Tmax 99.4, , O2 sats 89% on 2L NC  WBC 49.1  Cr 2.59  UA +leuks, >100 WBCs, 5-10 RBCs  UCx pending  BCx pending   +rocephin  + diltiazem gtt     CT abd and pelvis W/o 10/19/20:    KIDNEYS: Right perinephric stranding with moderate hydroureteronephrosis but no  obstructing calculus identified. No left hydronephrosis. Several punctate right  renal calculi. URINARY BLADDER: Thickened but incompletely distended. IMPRESSION:     1. Right hydronephrosis and perinephric stranding with no obstructing calculus. Several punctate calculi in the right kidney but no ureteral calculus  identified. Diffuse bladder wall thickening. 2. Patchy airspace and interstitial opacities right lung base could be  infectious/inflammatory or asymmetric edema. Problem: flank pain, renal colic; Location: right; Quality:colicky, Severity: 0/67; Timing:constant, Context: see above in HPI; Better/Worse: unchanged, Associated s/s:frequency      Temp (24hrs), Av.1 °F (37.3 °C), Min:98.7 °F (37.1 °C), Max:99.4 °F (37.4 °C)       Creatinine   Date/Time Value Ref Range Status   10/19/2020 03:51 AM 2.59 (H) 0.70 - 1.30 MG/DL Final     Current Antimicrobial Therapy (168h ago, onward)    Ordered     Start Stop    10/19/20 0152  cefTRIAXone (ROCEPHIN) 1 g in 0.9% sodium chloride (MBP/ADV) 50 mL  1 g,   IntraVENous,   EVERY 24 HOURS      10/19/20 2200 10/29/20 2159        Key Anti-Platelet Anticoagulant Meds             aspirin (ASPIRIN) 325 mg tablet (Taking) Take 325 mg by mouth daily.         Diet: DIET CARDIAC Regular  DIET ONE TIME MESSAGE -       Labs     Lab Results   Component Value Date/Time    Lactic acid 2.0 10/19/2020 03:50 AM    WBC 49.1 (H) 10/19/2020 03:51 AM    HCT 41.5 10/19/2020 03:51 AM PLATELET 809 89/05/3052 03:51 AM    Sodium 136 10/19/2020 03:51 AM    Potassium 4.3 10/19/2020 03:51 AM    Chloride 107 10/19/2020 03:51 AM    CO2 19 (L) 10/19/2020 03:51 AM    BUN 37 (H) 10/19/2020 03:51 AM    Creatinine 2.59 (H) 10/19/2020 03:51 AM    Glucose 278 (H) 10/19/2020 03:51 AM    Calcium 9.5 10/19/2020 03:51 AM    Magnesium 1.7 10/19/2020 03:51 AM    INR 1.0 10/19/2020 03:51 AM     UA:   Lab Results   Component Value Date/Time    Color YELLOW/STRAW 10/19/2020 03:51 AM    Appearance CLOUDY (A) 10/19/2020 03:51 AM    Specific gravity 1.017 10/19/2020 03:51 AM    pH (UA) 5.5 10/19/2020 03:51 AM    Protein 100 (A) 10/19/2020 03:51 AM    Glucose 500 (A) 10/19/2020 03:51 AM    Ketone TRACE (A) 10/19/2020 03:51 AM    Bilirubin Negative 10/19/2020 03:51 AM    Urobilinogen 1.0 10/19/2020 03:51 AM    Nitrites Negative 10/19/2020 03:51 AM    Leukocyte Esterase MODERATE (A) 10/19/2020 03:51 AM    Epithelial cells FEW 10/19/2020 03:51 AM    Bacteria Negative 10/19/2020 03:51 AM    WBC >100 (H) 10/19/2020 03:51 AM    RBC 5-10 10/19/2020 03:51 AM     Imaging     Results for orders placed during the hospital encounter of 10/19/20   CT ABD PELV WO CONT    Narrative INDICATION: severe RLQ pain. right hydronephrosis; need further imaging. COMPARISON: None    TECHNIQUE:   Noncontrast thin axial images were obtained through the abdomen and pelvis. Coronal and sagittal reconstructions were generated. CT dose reduction was  achieved through use of a standardized protocol tailored for this examination  and automatic exposure control for dose modulation. FINDINGS:   LUNG BASES: Patchy airspace disease and interstitial thickening right lung base. Minimal atelectasis anterior left lower lobe partly visualized. LIVER: No mass or biliary dilatation. GALLBLADDER: Surgically absent. SPLEEN: No enlargement or lesion. PANCREAS: No mass or ductal dilatation. ADRENALS: No mass.   KIDNEYS: Right perinephric stranding with moderate hydroureteronephrosis but no  obstructing calculus identified. No left hydronephrosis. Several punctate right  renal calculi. GI TRACT:  No bowel obstruction. Difficult to assess bowel wall thickening given  lack of oral contrast material.  PERITONEUM: No free air or free fluid. APPENDIX: Unremarkable. RETROPERITONEUM: No aortic aneurysm. LYMPH NODES:  None enlarged. ADDITIONAL COMMENTS: N/A.    URINARY BLADDER: Thickened but incompletely distended. REPRODUCTIVE ORGANS: Unremarkable. LYMPH NODES:  None enlarged. FREE FLUID:  None. BONES: No destructive bone lesion. ADDITIONAL COMMENTS: Bypass graft from the aortic bifurcation to the right  common femoral artery and femoral to femoral bypass graft. Impression IMPRESSION:    1. Right hydronephrosis and perinephric stranding with no obstructing calculus. Several punctate calculi in the right kidney but no ureteral calculus  identified. Diffuse bladder wall thickening. 2. Patchy airspace and interstitial opacities right lung base could be  infectious/inflammatory or asymmetric edema. US Results (most recent):  No results found for this or any previous visit.     Cultures     All Micro Results     Procedure Component Value Units Date/Time    CULTURE, URINE [[id number]] Collected:  10/19/20 0351    Order Status:  Completed Specimen:  Voided Urine Updated:  10/19/20 0907    CULTURE, BLOOD, PAIRED [[id number]] Collected:  10/19/20 0350    Order Status:  Completed Specimen:  Blood Updated:  10/19/20 0645           Past History: (Complete 2+/3 areas)   No Known Allergies   Current Facility-Administered Medications   Medication Dose Route Frequency    sodium chloride (NS) flush 5-40 mL  5-40 mL IntraVENous Q8H    sodium chloride (NS) flush 5-40 mL  5-40 mL IntraVENous PRN    acetaminophen (TYLENOL) tablet 650 mg  650 mg Oral Q6H PRN    Or    acetaminophen (TYLENOL) suppository 650 mg  650 mg Rectal Q6H PRN    polyethylene glycol (MIRALAX) packet 17 g  17 g Oral DAILY PRN    cefTRIAXone (ROCEPHIN) 1 g in 0.9% sodium chloride (MBP/ADV) 50 mL  1 g IntraVENous Q24H    0.9% sodium chloride infusion  125 mL/hr IntraVENous CONTINUOUS    fentaNYL citrate (PF) injection 12.5 mcg  12.5 mcg IntraVENous Q4H PRN    dilTIAZem (CARDIZEM) 125 mg in dextrose 5% 125 mL infusion  0-15 mg/hr IntraVENous TITRATE    glucose chewable tablet 16 g  4 Tab Oral PRN    glucagon (GLUCAGEN) injection 1 mg  1 mg IntraMUSCular PRN    dextrose 10% infusion 0-250 mL  0-250 mL IntraVENous PRN    insulin glargine (LANTUS) injection 17 Units  0.2 Units/kg SubCUTAneous QHS    insulin lispro (HUMALOG) injection   SubCUTAneous Q6H    pantoprazole (PROTONIX) 40 mg in 0.9% sodium chloride 10 mL injection  40 mg IntraVENous DAILY    hydrALAZINE (APRESOLINE) 20 mg/mL injection 10 mg  10 mg IntraVENous Q6H PRN    Prior to Admission medications    Medication Sig Start Date End Date Taking? Authorizing Provider   aspirin (ASPIRIN) 325 mg tablet Take 325 mg by mouth daily. Yes Provider, Historical   amLODIPine (NORVASC) 10 mg tablet Take 10 mg by mouth daily. Indications: high blood pressure   Yes Provider, Historical   ergocalciferol (ERGOCALCIFEROL) 1,250 mcg (50,000 unit) capsule Take 50,000 Units by mouth every seven (7) days. Yes Provider, Historical   multivitamin, tx-iron-ca-min (THERA-M w/ IRON) 9 mg iron-400 mcg tab tablet Take 1 Tab by mouth daily. Yes Provider, Historical   oxyCODONE-acetaminophen (Percocet) 5-325 mg per tablet Take 1 Tab by mouth every six (6) hours as needed for Pain. Yes Provider, Historical   metoprolol succinate (TOPROL-XL) 50 mg XL tablet Take 50 mg by mouth daily. Indications: high blood pressure   Yes Provider, Historical        PMHx:  has no past medical history on file. PSurgHx:  has no past surgical history on file. PSocHx:         Physical Exam: (Comprehesive - 8+ 1995 Systems)     (1) Constitutional:  FIO2:   on SpO2: O2 Sat (%):  (!) 89 %  O2 Device: Nasal cannula O2 Flow Rate (L/min): 2 l/min  Patient Vitals for the past 24 hrs:   BP Temp Pulse Resp SpO2 Weight   10/19/20 0600 137/78 99.4 °F (37.4 °C) (!) 115 22 (!) 89 %    10/19/20 0200 (!) 160/86 98.7 °F (37.1 °C) (!) 117 14 97 % 83.7 kg (184 lb 9.6 oz)          (2)      (3)      (4)      (5)      (6)      (7)      (8)      (9)         Signed By: Rick Damon NP  - October 19, 2020

## 2020-10-19 NOTE — CONSULTS
[facility]  CLINICAL NURSE SPECIALIST CONSULT  [facility]    INITIAL NOTE    Presentation   Bharath Becerra is a 70 y.o. male admitted  with right lower abdominal quadrant pain. Initial work up revealed hydrornephorsis and UTI. HX: PMH: CAD w/ MI and subsequent CABG 1999/ s/p AAA repair with left leg nephropathy/ fem to fem bypass/ HLD/ HTN/ DM2-A1C pending    DX: CT scan-hydronephrosis      Current clinical course has been complicated by hyperglycemia. Diabetes: Patient has known Type 2 diabetes, treated with humalog per pauln report. No insulin or PO diabetic medications on PTA. Consulted by Provider for advanced diabetes nursing assessment and care, specifically related to   [] Transitioning off Philemon Valderrama   [x] Inpatient management strategy  [x] Home management assessment  [] Survival skill education    Diabetes-related medical history  Acute complications  hyperglycemia  Neurological complications  NONE  Microvascular disease  Nephropathy  Macrovascular disease  CAD and Myocardial infarction  Other associated conditions     HTN/ HLD/    Diabetes medication history  Drug class Currently in use Discontinued Never used   Biguanide  Metformin    DDP-4 inhibitor       Sulfonylurea      Thiazolidinedione      GLP-1 RA      SGLT-2 inhibitors      Basal insulin      Fixed Dose  Combinations      Bolus insulin  Humalog can't recall dosing, but took himself off due to symptoms of hypoglycemia      Subjective   Mr. Petty Turk reports having DM2 for \"awhile now\". Was previously on Metformin but was taken off because \"it was bad for my kidneys\". He stated he was also taking Humalog but it made him have \"chills and shaking\" and took himself off. He states his PCP, Dr. Mala Singh, just put him on \"something else\" but can't remember what it is. He has paralysis, neuropathy in left leg and gets around ok by himself with his ex wife who looks after him.    Patient reports the following home diabetes self-care practices:  Eating pattern    Physical activity pattern-gets along well. Monitoring pattern-TBD    Taking medications pattern  [] Consistent administration  [] Affordable      Objective   Physical exam  General Alert, oriented and inacute distress/reports abdominal pain . Conversant and cooperative. Vital Signs   Visit Vitals  BP (!) 151/67 (BP 1 Location: Right arm, BP Patient Position: Sitting)   Pulse 97   Temp 98.5 °F (36.9 °C)   Resp 22   Wt 83.7 kg (184 lb 9.6 oz)   SpO2 94%     Skin  Warm and dry. Heart   Regular rate and rhythm. No murmurs, rubs or gallops  Lungs  Clear to auscultation without rales or rhonchi  Extremities No foot wounds    Diabetic foot exam:    Left Foot     Visual Exam: normal    Pulse DP: 1+ (weak)   Filament test: absent sensation      Right Foot   Visual Exam: normal    Pulse DP: 2+ (normal)   Filament test: normal sensation    Vibratory sensation: normal DP & PT pulses +2. Laboratory  No results found for: HBA1C, HGBE8, WWM4YDFJ, YKT1CMFQ  No results found for: LDL, LDLC, DLDLP  Lab Results   Component Value Date/Time    Creatinine 2.59 (H) 10/19/2020 03:51 AM     Lab Results   Component Value Date/Time    Sodium 136 10/19/2020 03:51 AM    Potassium 4.3 10/19/2020 03:51 AM    Chloride 107 10/19/2020 03:51 AM    CO2 19 (L) 10/19/2020 03:51 AM    Anion gap 10 10/19/2020 03:51 AM    Glucose 278 (H) 10/19/2020 03:51 AM    BUN 37 (H) 10/19/2020 03:51 AM    Creatinine 2.59 (H) 10/19/2020 03:51 AM    BUN/Creatinine ratio 14 10/19/2020 03:51 AM    GFR est AA 30 (L) 10/19/2020 03:51 AM    GFR est non-AA 24 (L) 10/19/2020 03:51 AM    Calcium 9.5 10/19/2020 03:51 AM    Bilirubin, total 0.6 10/19/2020 03:51 AM    Alk.  phosphatase 139 (H) 10/19/2020 03:51 AM    Protein, total 7.4 10/19/2020 03:51 AM    Albumin 3.3 (L) 10/19/2020 03:51 AM    Globulin 4.1 (H) 10/19/2020 03:51 AM    A-G Ratio 0.8 (L) 10/19/2020 03:51 AM    ALT (SGPT) 28 10/19/2020 03:51 AM     Lab Results Component Value Date/Time    ALT (SGPT) 28 10/19/2020 03:51 AM       Factors affecting BG pattern  Factor Dose Comments   Nutrition:  Carb-controlled meals     60 grams/meal    Drugs:  Other: for atrial fib? Caredizem infusion    Pain Abdominal pain    Infection UTI/Hydronephrosis      Assessment and Plan   Nursing Diagnosis Risk for unstable blood glucose pattern   Nursing Intervention Domain 7481 Decision-making Support   Nursing Interventions Examined current inpatient diabetes control   Explored factors facilitating and impeding inpatient management  Identified self-management practices impeding diabetes control  Explored corrective strategies with patient and responsible inpatient provider   Informed patient of rational for insulin strategy while hospitalized     Evaluation   Mr. Roseline Tatum, has a long history  Type 2 diabetes- no recent A1C on file. Lab drawn this morning and result pending. He has co-morbid conditions in addition to his DM2. Since admission BG trends >200mg/dl which indicates the need for INITIATION of insulin subcutaneous order set (3639). Inpatient blood glucose management has been impacted by  [x] Kidney dysfunction -JAMAL   [x] Erratic meal consumption -poor appetite today      Recommendations   1. CONTINUE  Basal insulin   [x] 0.2 units/kg/D=17 units Lantus daily      2. CONTINUE Corrective insulin  [x] Normal sensitivity        3. Cardiac diet modified to ADD carbohydrate consistent diet options. Discharge Planning   1. Will make recs closer to discharge. Billing Code(s)   I personally reviewed chart, notes, data and current medications in the medical record, and examined the patient at bedside before making care recommendations. Thank you for including us in their care. I spent 40 minutes in direct patient care today for this patient.   Time includes chart review, face to face with patient and collaboration with interdisciplinary care team.     Harman Dawkins CNS  [facility]  Access via [facility]  [phone]

## 2020-10-19 NOTE — H&P
History & Physical    Date of admission: 10/19/2020    Patient name: Aileen Rivera  MRN: [id number]  YOB: 1945  Age: 70 y.o. Primary care provider:  Unknown, Provider     Source of Information: patient, ED and paper transfer medical records                                 Chief complaint:  Pain in right lower abdomen    History of present illness  Aileen Rivera is a 70 y.o. male with past medical history of CAD, CABG, hypertension, AAA repair, femoral-femoral bypass with chronic left lower extremity paralysis, and hyperlipidemia presented as a direct admission/ transfer from [facility] ED to [facility] with chief complaint of pain in right lower abdomen. Symptom onset reportedly began yesterday at ~ 1 pm, remained constant, sharp, severe, currently rated 9/10 (after pain medication prior to transfer; was more intense), without specific aggravating / alleviating factors, non-radiating. Patient admits to some nausea but no vomiting. He is able to void urine and last bowel movement was yesterday. He initially went to [facility] ED where workup included CT abdomen/ pelvis without contrast showing right hydronephrosis. UA revealed UTI. WBC = 15.4. BUN = 33. Creatinine = 2.31. Per ED physician verbal report, patient was given a dose of Morphine but he became diaphoretic and tachycardic; no other doses were given. There were no reports of itching, rash, hives, SOB, or other signs of anaphylaxis. As there was no urologist at [facility], patient requested transfer to [facility] facility. Patient is now seen on arrival to [address] [place] [address] complaining of 9/10 RLQ pain.   There were no reports of new onset syncope, loss of consciousness, headache, neck pain, visual disturbance, numbness, paresthesias, focal weakness, chest pain, palpitations, diarrhea, dysuria, hematuria, calf pain, increased leg swelling/ edema, fever, chills, rash, or known COVID 19 exposure. PAST MEDICAL HISTORY:  CAD  Myocardial infarction - in 1999  Type 2 diabetes mellitus  AAA  Chronic paralysis left lower extremity- s/p AAA   Gait abnormality  Hypertension  Hyperlipidemia    PAST SURGICAL HISTORY:  CABG  AAA repair  Femoral  Femoral bypass    MEDICATIONS:  Prior to Admission medications    Medication Sig Start Date End Date Taking? Authorizing Provider   aspirin (ASPIRIN) 325 mg tablet Take 325 mg by mouth daily. Yes Provider, Historical   amLODIPine (NORVASC) 10 mg tablet Take 10 mg by mouth daily. Indications: high blood pressure   Yes Provider, Historical   ergocalciferol (ERGOCALCIFEROL) 1,250 mcg (50,000 unit) capsule Take 50,000 Units by mouth every seven (7) days. Yes Provider, Historical   multivitamin, tx-iron-ca-min (THERA-M w/ IRON) 9 mg iron-400 mcg tab tablet Take 1 Tab by mouth daily. Yes Provider, Historical   oxyCODONE-acetaminophen (Percocet) 5-325 mg per tablet Take 1 Tab by mouth every six (6) hours as needed for Pain. Yes Provider, Historical   metoprolol succinate (TOPROL-XL) 50 mg XL tablet Take 50 mg by mouth daily. Indications: high blood pressure   Yes Provider, Historical     ALLERGIES:  NO KNOWN DRUG ALLERGIES     Social history  Patient resides  X  Independently                Ambulates  x  Independently    x  drags left leg to walk due to paralysis of left lower extremity            Alcohol history   x  denies           Smoking history  x  denies               Code status    Full code            Code status was discussed with the patient. Full Code    Review of systems  Pertinent positives as noted in HPI. All other systems were reviewed and were negative.     Physical Examination   Visit Vitals  BP (!) 160/86 (BP 1 Location: Right arm, BP Patient Position: At rest)   Pulse (!) 117   Temp 98.7 °F (37.1 °C)   Resp 14   Wt 83.7 kg (184 lb 9.6 oz)   SpO2 97%               General:  Patient in no acute respiratory distress   Head:  Normocephalic, without obvious abnormality, atraumatic   Eyes:  Conjunctivae/corneas clear. PERRL, EOMs intact   E/N/M/T: Nares normal. Septum midline. No nasal drainage or sinus tenderness  Lips, mucosa, and tongue normal   Clear oropharynx   Neck: Normal appearance and movements, symmetrical, trachea midline  No palpable adenopathy  No thyroid enlargement, tenderness or nodules  No carotid bruit   No JVD   Lungs:   Symmetrical chest expansion and respiratory effort  Clear to auscultation bilaterally   Chest wall:  No tenderness or deformity   Heart:  Tachycardia  Regular rhythm   Sounds normal; no murmur, click, rub or gallop   Abdomen:   Soft, tenderness in RLQ/ flank  Voluntary guarding   No rebound or rigidity  Bowel sounds normal  No masses or hepatosplenomegaly  No hernias present   Back: No CVA tenderness   Extremities: Extremities normal, atraumatic  No cyanosis or edema     Pulses 2+ radial/ 1+ DP bilateral pulses   Skin: No rashes or ulcers  Warm/ dry   Musculo-      skeletal: Gait not tested     Neuro: GCS 15. Moves BUE/ RLE and able to wiggles toes of left foot; unable to lift left leg. No slurred speech. No facial droop. Sensation grossly intact. Psych: Alert, oriented x 3         Data Review      24 Hour Results:  No results found for this or any previous visit (from the past 24 hour(s)). No results for input(s): WBC, HGB, HCT, PLT, HGBEXT, HCTEXT, PLTEXT in the last 72 hours. No results for input(s): NA, K, CL, CO2, GLU, BUN, CREA, CA, MG, PHOS, ALB, TBIL, TBILI, ALT, INR, INREXT in the last 72 hours. No lab exists for component: SGOT    Imaging  CT abdomen/ pelvis without IV contrast (Imaging disk is not available for review.   I reviewed radiologist written report, summarized as follows):  - moderate right hydronephrosis  - right perinephric fat stranding  - tiny left kidney stones; tiny right kidney stone  - mildly dilated ureter  - bladder wall thickening      Assessment and Plan   1. Hydronephrosis of right kidney        - Admit to telemetry        - consult urologist for further recommendations / treatments        - keep NPO pending any possible surgery    2.  UTI (urinary tract infection)       - Rocephin 1 gram IV q 24 hours       - check UA and urine culture. Will request records from [facility]    3. Sepsis       - with noted tachycardia, leukocytosis and UTI       - order blood cultures. - ordered IV fluids but not fluid challenge as patient had some fluids at transferring hospital       - continue Rocephin for now    4. Right lower quadrant pain       - will avoid Morphine due to reported diaphoresis and tachycardia reported by ER physician       - ordered Fentanyl 12.5 mcg IV q 4 hours prn for pain       - keep NPO for now and continue IV fluid hydration    5. Leukocytosis       - repeat CBC this a.m.    6.  Tachycardia        - order 12 lead EKG       - continue telemetry monitoring    7. History of CAD/ s/p CABG       - plan as above. 8.  Type 2 DM with hyperglycemia       - Order Humalog insulin correctional coverage, scheduled blood glucose checks and check hemoglobin A1c level. 9.  Chronic paralysis of left lower extremity       - place on fall precautions    10. Hypertension        - monitor BP closely and provide anti-hypertensive therapy as needed. Currently NPO as such, no oral medications were ordered.     11.  Acute renal insufficiency        - with elevated creatinine = 2.31; BUN = 33        - ordered repeat renal panel this a.m.        - continue IV fluids    VTE prophylaxis:  SCDs to BLEs    CODE STATUS:  FULL CODE             Signed by: Jacquelyn Cerda MD    October 19, 2020 at 3:04 AM

## 2020-10-19 NOTE — PROGRESS NOTES
Problem: Falls - Risk of  Goal: *Absence of Falls  Description: Document Reyna Robles Fall Risk and appropriate interventions in the flowsheet.   Outcome: Progressing Towards Goal  Note: Fall Risk Interventions:  Mobility Interventions: Patient to call before getting OOB         Medication Interventions: Patient to call before getting OOB                   Problem: Urinary Tract Infection - Adult  Goal: *Absence of infection signs and symptoms  Outcome: Progressing Towards Goal     Problem: Patient Education: Go to Patient Education Activity  Goal: Patient/Family Education  Outcome: Progressing Towards Goal     Problem: Pain  Goal: *Control of Pain  Outcome: Progressing Towards Goal     Problem: Patient Education: Go to Patient Education Activity  Goal: Patient/Family Education  Outcome: Progressing Towards Goal

## 2020-10-20 ENCOUNTER — APPOINTMENT (OUTPATIENT)
Dept: ULTRASOUND IMAGING | Age: 75
DRG: 870 | End: 2020-10-20
Attending: NURSE PRACTITIONER
Payer: MEDICARE

## 2020-10-20 LAB
ALBUMIN SERPL-MCNC: 2.9 G/DL (ref 3.5–5)
ALBUMIN/GLOB SERPL: 0.6 {RATIO} (ref 1.1–2.2)
ALP SERPL-CCNC: 125 U/L (ref 45–117)
ALT SERPL-CCNC: 24 U/L (ref 12–78)
ANION GAP SERPL CALC-SCNC: 15 MMOL/L (ref 5–15)
AST SERPL-CCNC: 39 U/L (ref 15–37)
BACTERIA SPEC CULT: NORMAL
BASOPHILS # BLD: 0 K/UL (ref 0–0.1)
BASOPHILS NFR BLD: 0 % (ref 0–1)
BILIRUB SERPL-MCNC: 0.5 MG/DL (ref 0.2–1)
BUN SERPL-MCNC: 50 MG/DL (ref 6–20)
BUN/CREAT SERPL: 14 (ref 12–20)
CALCIUM SERPL-MCNC: 9.3 MG/DL (ref 8.5–10.1)
CHLORIDE SERPL-SCNC: 101 MMOL/L (ref 97–108)
CO2 SERPL-SCNC: 17 MMOL/L (ref 21–32)
CREAT SERPL-MCNC: 3.51 MG/DL (ref 0.7–1.3)
DIFFERENTIAL METHOD BLD: ABNORMAL
EOSINOPHIL # BLD: 0 K/UL (ref 0–0.4)
EOSINOPHIL NFR BLD: 0 % (ref 0–7)
ERYTHROCYTE [DISTWIDTH] IN BLOOD BY AUTOMATED COUNT: 15.4 % (ref 11.5–14.5)
EST. AVERAGE GLUCOSE BLD GHB EST-MCNC: 157 MG/DL
GLOBULIN SER CALC-MCNC: 4.5 G/DL (ref 2–4)
GLUCOSE BLD STRIP.AUTO-MCNC: 191 MG/DL (ref 65–100)
GLUCOSE BLD STRIP.AUTO-MCNC: 193 MG/DL (ref 65–100)
GLUCOSE BLD STRIP.AUTO-MCNC: 200 MG/DL (ref 65–100)
GLUCOSE BLD STRIP.AUTO-MCNC: 214 MG/DL (ref 65–100)
GLUCOSE BLD STRIP.AUTO-MCNC: 220 MG/DL (ref 65–100)
GLUCOSE BLD STRIP.AUTO-MCNC: 222 MG/DL (ref 65–100)
GLUCOSE SERPL-MCNC: 245 MG/DL (ref 65–100)
HBA1C MFR BLD: 7.1 % (ref 4–5.6)
HCT VFR BLD AUTO: 40.5 % (ref 36.6–50.3)
HGB BLD-MCNC: 12.9 G/DL (ref 12.1–17)
IMM GRANULOCYTES # BLD AUTO: 0.4 K/UL (ref 0–0.04)
IMM GRANULOCYTES NFR BLD AUTO: 1 % (ref 0–0.5)
LYMPHOCYTES # BLD: 1.1 K/UL (ref 0.8–3.5)
LYMPHOCYTES NFR BLD: 3 % (ref 12–49)
MAGNESIUM SERPL-MCNC: 1.9 MG/DL (ref 1.6–2.4)
MCH RBC QN AUTO: 27.3 PG (ref 26–34)
MCHC RBC AUTO-ENTMCNC: 31.9 G/DL (ref 30–36.5)
MCV RBC AUTO: 85.8 FL (ref 80–99)
MONOCYTES # BLD: 1.4 K/UL (ref 0–1)
MONOCYTES NFR BLD: 4 % (ref 5–13)
NEUTS SEG # BLD: 33 K/UL (ref 1.8–8)
NEUTS SEG NFR BLD: 92 % (ref 32–75)
NRBC # BLD: 0 K/UL (ref 0–0.01)
NRBC BLD-RTO: 0 PER 100 WBC
PLATELET # BLD AUTO: 235 K/UL (ref 150–400)
PMV BLD AUTO: 11.5 FL (ref 8.9–12.9)
POTASSIUM SERPL-SCNC: 4.8 MMOL/L (ref 3.5–5.1)
PROT SERPL-MCNC: 7.4 G/DL (ref 6.4–8.2)
RBC # BLD AUTO: 4.72 M/UL (ref 4.1–5.7)
RBC MORPH BLD: ABNORMAL
SERVICE CMNT-IMP: ABNORMAL
SERVICE CMNT-IMP: NORMAL
SODIUM SERPL-SCNC: 133 MMOL/L (ref 136–145)
WBC # BLD AUTO: 35.9 K/UL (ref 4.1–11.1)

## 2020-10-20 PROCEDURE — 36415 COLL VENOUS BLD VENIPUNCTURE: CPT

## 2020-10-20 PROCEDURE — 74011250636 HC RX REV CODE- 250/636: Performed by: NURSE PRACTITIONER

## 2020-10-20 PROCEDURE — 74011000258 HC RX REV CODE- 258: Performed by: FAMILY MEDICINE

## 2020-10-20 PROCEDURE — C9113 INJ PANTOPRAZOLE SODIUM, VIA: HCPCS | Performed by: NURSE PRACTITIONER

## 2020-10-20 PROCEDURE — 5A1955Z RESPIRATORY VENTILATION, GREATER THAN 96 CONSECUTIVE HOURS: ICD-10-PCS | Performed by: INTERNAL MEDICINE

## 2020-10-20 PROCEDURE — 83036 HEMOGLOBIN GLYCOSYLATED A1C: CPT

## 2020-10-20 PROCEDURE — 74011000250 HC RX REV CODE- 250: Performed by: FAMILY MEDICINE

## 2020-10-20 PROCEDURE — 74011250637 HC RX REV CODE- 250/637: Performed by: INTERNAL MEDICINE

## 2020-10-20 PROCEDURE — 82962 GLUCOSE BLOOD TEST: CPT

## 2020-10-20 PROCEDURE — 0BH17EZ INSERTION OF ENDOTRACHEAL AIRWAY INTO TRACHEA, VIA NATURAL OR ARTIFICIAL OPENING: ICD-10-PCS | Performed by: INTERNAL MEDICINE

## 2020-10-20 PROCEDURE — 74011250636 HC RX REV CODE- 250/636: Performed by: FAMILY MEDICINE

## 2020-10-20 PROCEDURE — 94640 AIRWAY INHALATION TREATMENT: CPT

## 2020-10-20 PROCEDURE — 74011636637 HC RX REV CODE- 636/637: Performed by: FAMILY MEDICINE

## 2020-10-20 PROCEDURE — 74011000250 HC RX REV CODE- 250: Performed by: NURSE PRACTITIONER

## 2020-10-20 PROCEDURE — 83735 ASSAY OF MAGNESIUM: CPT

## 2020-10-20 PROCEDURE — 99231 SBSQ HOSP IP/OBS SF/LOW 25: CPT | Performed by: CLINICAL NURSE SPECIALIST

## 2020-10-20 PROCEDURE — 85025 COMPLETE CBC W/AUTO DIFF WBC: CPT

## 2020-10-20 PROCEDURE — 74011250637 HC RX REV CODE- 250/637: Performed by: NURSE PRACTITIONER

## 2020-10-20 PROCEDURE — 74011250636 HC RX REV CODE- 250/636

## 2020-10-20 PROCEDURE — 92950 HEART/LUNG RESUSCITATION CPR: CPT

## 2020-10-20 PROCEDURE — 65610000006 HC RM INTENSIVE CARE

## 2020-10-20 PROCEDURE — 80053 COMPREHEN METABOLIC PANEL: CPT

## 2020-10-20 PROCEDURE — 31500 INSERT EMERGENCY AIRWAY: CPT

## 2020-10-20 PROCEDURE — 94002 VENT MGMT INPAT INIT DAY: CPT

## 2020-10-20 PROCEDURE — 76770 US EXAM ABDO BACK WALL COMP: CPT

## 2020-10-20 RX ORDER — SODIUM CHLORIDE 0.9 % (FLUSH) 0.9 %
5-40 SYRINGE (ML) INJECTION AS NEEDED
Status: DISCONTINUED | OUTPATIENT
Start: 2020-10-20 | End: 2020-10-30 | Stop reason: SDUPTHER

## 2020-10-20 RX ORDER — POLYETHYLENE GLYCOL 3350 17 G/17G
17 POWDER, FOR SOLUTION ORAL DAILY PRN
Status: DISCONTINUED | OUTPATIENT
Start: 2020-10-20 | End: 2020-11-04 | Stop reason: HOSPADM

## 2020-10-20 RX ORDER — ALBUTEROL SULFATE 0.83 MG/ML
5 SOLUTION RESPIRATORY (INHALATION)
Status: ACTIVE | OUTPATIENT
Start: 2020-10-20 | End: 2020-10-21

## 2020-10-20 RX ORDER — METOPROLOL TARTRATE 25 MG/1
100 TABLET, FILM COATED ORAL EVERY 12 HOURS
Status: DISCONTINUED | OUTPATIENT
Start: 2020-10-20 | End: 2020-10-24

## 2020-10-20 RX ORDER — FUROSEMIDE 10 MG/ML
80 INJECTION INTRAMUSCULAR; INTRAVENOUS ONCE
Status: COMPLETED | OUTPATIENT
Start: 2020-10-21 | End: 2020-10-20

## 2020-10-20 RX ORDER — SODIUM CHLORIDE 0.9 % (FLUSH) 0.9 %
5-40 SYRINGE (ML) INJECTION EVERY 8 HOURS
Status: DISCONTINUED | OUTPATIENT
Start: 2020-10-21 | End: 2020-11-04 | Stop reason: HOSPADM

## 2020-10-20 RX ORDER — PROPOFOL 10 MG/ML
0-50 VIAL (ML) INTRAVENOUS
Status: DISCONTINUED | OUTPATIENT
Start: 2020-10-21 | End: 2020-10-26

## 2020-10-20 RX ORDER — PROPOFOL 10 MG/ML
INJECTION, EMULSION INTRAVENOUS
Status: COMPLETED
Start: 2020-10-20 | End: 2020-10-20

## 2020-10-20 RX ORDER — IPRATROPIUM BROMIDE AND ALBUTEROL SULFATE 2.5; .5 MG/3ML; MG/3ML
3 SOLUTION RESPIRATORY (INHALATION)
Status: DISCONTINUED | OUTPATIENT
Start: 2020-10-20 | End: 2020-11-04 | Stop reason: HOSPADM

## 2020-10-20 RX ORDER — ONDANSETRON 2 MG/ML
4 INJECTION INTRAMUSCULAR; INTRAVENOUS
Status: DISCONTINUED | OUTPATIENT
Start: 2020-10-20 | End: 2020-11-04 | Stop reason: HOSPADM

## 2020-10-20 RX ORDER — FUROSEMIDE 10 MG/ML
INJECTION INTRAMUSCULAR; INTRAVENOUS
Status: COMPLETED
Start: 2020-10-20 | End: 2020-10-20

## 2020-10-20 RX ADMIN — FUROSEMIDE 80 MG: 10 INJECTION, SOLUTION INTRAMUSCULAR; INTRAVENOUS at 23:40

## 2020-10-20 RX ADMIN — SODIUM CHLORIDE 40 MG: 9 INJECTION, SOLUTION INTRAMUSCULAR; INTRAVENOUS; SUBCUTANEOUS at 09:02

## 2020-10-20 RX ADMIN — CEFTRIAXONE SODIUM 1 G: 1 INJECTION, POWDER, FOR SOLUTION INTRAMUSCULAR; INTRAVENOUS at 22:21

## 2020-10-20 RX ADMIN — METOPROLOL TARTRATE 100 MG: 25 TABLET, FILM COATED ORAL at 22:23

## 2020-10-20 RX ADMIN — IPRATROPIUM BROMIDE AND ALBUTEROL SULFATE 3 ML: .5; 3 SOLUTION RESPIRATORY (INHALATION) at 19:40

## 2020-10-20 RX ADMIN — FUROSEMIDE 80 MG: 10 INJECTION INTRAMUSCULAR; INTRAVENOUS at 23:40

## 2020-10-20 RX ADMIN — METOPROLOL TARTRATE 100 MG: 25 TABLET, FILM COATED ORAL at 09:04

## 2020-10-20 RX ADMIN — IPRATROPIUM BROMIDE AND ALBUTEROL SULFATE 3 ML: .5; 3 SOLUTION RESPIRATORY (INHALATION) at 08:26

## 2020-10-20 RX ADMIN — DOXYCYCLINE HYCLATE 100 MG: 100 TABLET, COATED ORAL at 09:02

## 2020-10-20 RX ADMIN — DILTIAZEM HYDROCHLORIDE 2.5 MG/HR: 5 INJECTION INTRAVENOUS at 02:01

## 2020-10-20 RX ADMIN — TRAMADOL HYDROCHLORIDE 50 MG: 50 TABLET ORAL at 02:00

## 2020-10-20 RX ADMIN — PROPOFOL 20 MCG/KG/MIN: 10 INJECTION, EMULSION INTRAVENOUS at 23:35

## 2020-10-20 RX ADMIN — IPRATROPIUM BROMIDE AND ALBUTEROL SULFATE 3 ML: .5; 3 SOLUTION RESPIRATORY (INHALATION) at 17:28

## 2020-10-20 RX ADMIN — EPINEPHRINE 1 MG: 0.1 INJECTION INTRACARDIAC; INTRAVENOUS at 22:57

## 2020-10-20 RX ADMIN — INSULIN LISPRO 2 UNITS: 100 INJECTION, SOLUTION INTRAVENOUS; SUBCUTANEOUS at 22:21

## 2020-10-20 RX ADMIN — Medication 10 ML: at 02:00

## 2020-10-20 RX ADMIN — INSULIN GLARGINE 17 UNITS: 100 INJECTION, SOLUTION SUBCUTANEOUS at 22:00

## 2020-10-20 RX ADMIN — SODIUM BICARBONATE 50 MEQ: 84 INJECTION, SOLUTION INTRAVENOUS at 22:57

## 2020-10-20 RX ADMIN — DOXYCYCLINE HYCLATE 100 MG: 100 TABLET, COATED ORAL at 22:21

## 2020-10-20 RX ADMIN — ROSUVASTATIN 40 MG: 40 TABLET, FILM COATED ORAL at 22:21

## 2020-10-20 RX ADMIN — ONDANSETRON 4 MG: 2 INJECTION INTRAMUSCULAR; INTRAVENOUS at 18:45

## 2020-10-20 RX ADMIN — INSULIN LISPRO 2 UNITS: 100 INJECTION, SOLUTION INTRAVENOUS; SUBCUTANEOUS at 12:54

## 2020-10-20 RX ADMIN — Medication 10 ML: at 07:25

## 2020-10-20 RX ADMIN — SODIUM CHLORIDE 125 ML/HR: 900 INJECTION, SOLUTION INTRAVENOUS at 18:30

## 2020-10-20 RX ADMIN — IPRATROPIUM BROMIDE AND ALBUTEROL SULFATE 3 ML: .5; 3 SOLUTION RESPIRATORY (INHALATION) at 12:42

## 2020-10-20 RX ADMIN — Medication 10 ML: at 14:00

## 2020-10-20 NOTE — PROGRESS NOTES
[address] [facility] Adult  Hospitalist Group                                                                                          Hospitalist Progress Note  Lina Donato NP  Answering service: [phone] from in house phone        Date of Service:  10/20/2020  NAME:  Ortiz Fallon  :  1945  MRN:  [id number]      Admission Summary:   Emilio Funez a 70 y. o. male with a PMH of CAD, CABG, HTN, AAA repair, Femoral-Femoral Bypass with Chronic LLE paralysis and HLD presented as a direct admission/ transfer from [facility] ED to [facility]) with chief complaint of pain in right lower abdomen. Symptom onset reportedly began 10/18 at 1 pm, remained constant, sharp and severe, currently rated 9/10 (after pain medication prior to transfer; was more intense), without specific aggravating / alleviating factors, non-radiating. Patient admits to some nausea but no vomiting. He is able to void urine and last bowel movement was yesterday. He initially went to [facility] ED where workup included CT abdomen/ pelvis without contrast showing right hydronephrosis. UA revealed UTI, WBC = 15.4.  BUN = 33.  Creatinine = 2.31. Per ED physician verbal report, patient was given a dose of Morphine but he became diaphoretic and tachycardic, no other doses were given. There were no reports of itching, rash, hives, SOB, or other signs of anaphylaxis. As there was no urologist at [facility] patient requested transfer to [facility] facility, upon arrival to [facility] complaining of 9/10 RLQ pain. There were no reports of new onset syncope, loss of consciousness, headache, neck pain, visual disturbance, numbness, paresthesias, focal weakness, chest pain, palpitations, diarrhea, dysuria, hematuria, calf pain, increased leg swelling/ edema, fever, chills, rash, or known COVID 19 exposure. Interval history / Subjective:    Follow-up for Hydronephrosis of right kidney, UTI, Sepsis, ARF, RLQ pain, Leukocytosis,  Hx CAD/ s/p CABG, T2DM with hyperglycemia, Chronic LLE paralysis and Hypertension.   -Patient seen and examined, no c/o's. Noted worsening renal function, updated patient and his wife on treatment plan. Assessment & Plan:     Hydronephrosis of right kidney:  -telemetry  -Consult urologist   -Nephrology consult  -US kidneys    UTI:  -Rocephin 1 gram IV q 24 hours  -UA and urine culture     Sepsis: with noted tachycardia, leukocytosis and UTI. -blood cultures.    -IVF  -continue Rocephin  -EKG  -CT abd/pelv: Patchy airspace and interstitial opacities right lung base could be  infectious/inflammatory or asymmetric edema.  -Duonebs, Doxycycline added     ARF: elevated creatinine  -monitor renal function  -Nephrology consult  -US kidneys  -IVF  -CT abd/pelv: Right hydronephrosis and perinephric stranding with no obstructing calculus. Several punctate calculi in the right kidney but no ureteral calculus identified. Diffuse bladder wall thickening.     Right lower quadrant pain:  -avoid Morphine due to reported diaphoresis and tachycardia.  -Fentanyl 12.5 mcg IV q 4 hours prn for pain  -NPO for now and continue IVF     Leukocytosis: monitor CBC. Hx CAD/ s/p CABG: Cardiology following: (tachycardia-resolved) cardizem gt d/c'd, home meds resumed  T2DM with hyperglycemia: Basal and ISS, HgA1c, accuchecks. Chronic LLE paralysis: fall precautions.   HTN: monitor BP, prn hydralazine.         DVTppx: SCDs  Gippx: Protonix  Code Status: Full Code  Diet: Cardiology  Activity: OOB to chair TID and PRN  Discharge: likely return home >48hrs     Hospital Problems  Never Reviewed          Codes Class Noted POA    Leukocytosis ICD-10-CM: B96.656  ICD-9-CM: 288.60  10/19/2020 Unknown        UTI (urinary tract infection) ICD-10-CM: N39.0  ICD-9-CM: 599.0  10/19/2020 Unknown        Tachycardia ICD-10-CM: R00.0  ICD-9-CM: 785.0  10/19/2020 Unknown        Sepsis ([address] ICD-10-CM: A41.9  ICD-9-CM: 038.9, 995.91 10/19/2020 Unknown        Hydronephrosis of right kidney ICD-10-CM: N13.30  ICD-9-CM: 497  10/19/2020 Unknown                Review of Systems:   A comprehensive review of systems was negative except for that written in the HPI. Vital Signs:    Last 24hrs VS reviewed since prior progress note. Most recent are:  Visit Vitals  BP (!) 127/56 (BP 1 Location: Right arm, BP Patient Position: At rest)   Pulse 75   Temp 97.9 °F (36.6 °C)   Resp 19   Wt 84 kg (185 lb 3.2 oz)   SpO2 (!) 88%         Intake/Output Summary (Last 24 hours) at 10/20/2020 1402  Last data filed at 10/20/2020 0445  Gross per 24 hour   Intake 240.17 ml   Output    Net 240.17 ml        Physical Examination:             Constitutional:  No acute distress, cooperative, pleasant    ENT:  Oral mucosa moist.    Resp:  CTA bilaterally. No wheezing/rhonchi/rales. No accessory muscle use. CV:  Regular rhythm, normal rate, no murmurs, gallops, rubs. /GI:  Soft, non distended, non tender, no guarding, BS present. Musculoskeletal:  No edema, warm, 2+ pulses throughout. Neurologic:  Moves all extremities. AAOx3, CN II-XII reviewed. Skin:  Good turgor, no rashes or ulcers  Psych:  Good insight, Not anxious nor agitated. Data Review:    Review and/or order of clinical lab test      Labs:     Recent Labs     10/20/20  0202 10/19/20  0351   WBC 35.9* 49.1*   HGB 12.9 13.5   HCT 40.5 41.5    256     Recent Labs     10/20/20  0202 10/19/20  0351   * 136   K 4.8 4.3    107   CO2 17* 19*   BUN 50* 37*   CREA 3.51* 2.59*   * 278*   CA 9.3 9.5   MG 1.9 1.7     Recent Labs     10/20/20  0202 10/19/20  0351   ALT 24 28   * 139*   TBILI 0.5 0.6   TP 7.4 7.4   ALB 2.9* 3.3*   GLOB 4.5* 4.1*     Recent Labs     10/19/20  0351   INR 1.0   PTP 10.9   APTT 27.3      No results for input(s): FE, TIBC, PSAT, FERR in the last 72 hours.    No results found for: FOL, RBCF   No results for input(s): PH, PCO2, PO2 in the last 72 hours. No results for input(s): CPK, CKNDX, TROIQ in the last 72 hours.     No lab exists for component: CPKMB  No results found for: CHOL, CHOLX, CHLST, CHOLV, HDL, HDLP, LDL, LDLC, DLDLP, TGLX, TRIGL, TRIGP, CHHD, CHHDX  Lab Results   Component Value Date/Time    Glucose (POC) 200 (H) 10/20/2020 12:40 PM    Glucose (POC) 193 (H) 10/20/2020 07:23 AM    Glucose (POC) 238 (H) 10/19/2020 09:55 PM    Glucose (POC) 310 (H) 10/19/2020 04:31 PM    Glucose (POC) 236 (H) 10/19/2020 11:52 AM     Lab Results   Component Value Date/Time    Color YELLOW/STRAW 10/19/2020 03:51 AM    Appearance CLOUDY (A) 10/19/2020 03:51 AM    Specific gravity 1.017 10/19/2020 03:51 AM    pH (UA) 5.5 10/19/2020 03:51 AM    Protein 100 (A) 10/19/2020 03:51 AM    Glucose 500 (A) 10/19/2020 03:51 AM    Ketone TRACE (A) 10/19/2020 03:51 AM    Bilirubin Negative 10/19/2020 03:51 AM    Urobilinogen 1.0 10/19/2020 03:51 AM    Nitrites Negative 10/19/2020 03:51 AM    Leukocyte Esterase MODERATE (A) 10/19/2020 03:51 AM    Epithelial cells FEW 10/19/2020 03:51 AM    Bacteria Negative 10/19/2020 03:51 AM    WBC >100 (H) 10/19/2020 03:51 AM    RBC 5-10 10/19/2020 03:51 AM         Medications Reviewed:     Current Facility-Administered Medications   Medication Dose Route Frequency    metoprolol tartrate (LOPRESSOR) tablet 100 mg  100 mg Oral Q12H    sodium chloride (NS) flush 5-40 mL  5-40 mL IntraVENous Q8H    sodium chloride (NS) flush 5-40 mL  5-40 mL IntraVENous PRN    acetaminophen (TYLENOL) tablet 650 mg  650 mg Oral Q6H PRN    Or    acetaminophen (TYLENOL) suppository 650 mg  650 mg Rectal Q6H PRN    polyethylene glycol (MIRALAX) packet 17 g  17 g Oral DAILY PRN    cefTRIAXone (ROCEPHIN) 1 g in 0.9% sodium chloride (MBP/ADV) 50 mL  1 g IntraVENous Q24H    0.9% sodium chloride infusion  125 mL/hr IntraVENous CONTINUOUS    glucose chewable tablet 16 g  4 Tab Oral PRN    glucagon (GLUCAGEN) injection 1 mg  1 mg IntraMUSCular PRN    dextrose 10% infusion 0-250 mL  0-250 mL IntraVENous PRN    insulin glargine (LANTUS) injection 17 Units  0.2 Units/kg SubCUTAneous QHS    pantoprazole (PROTONIX) 40 mg in 0.9% sodium chloride 10 mL injection  40 mg IntraVENous DAILY    hydrALAZINE (APRESOLINE) 20 mg/mL injection 10 mg  10 mg IntraVENous Q6H PRN    albuterol-ipratropium (DUO-NEB) 2.5 MG-0.5 MG/3 ML  3 mL Nebulization QID RT    traMADoL (ULTRAM) tablet 50 mg  50 mg Oral Q4H PRN    doxycycline (VIBRA-TABS) tablet 100 mg  100 mg Oral Q12H    insulin lispro (HUMALOG) injection   SubCUTAneous AC&HS    rosuvastatin (CRESTOR) tablet 40 mg  40 mg Oral QHS    oxyCODONE IR (ROXICODONE) tablet 5 mg  5 mg Oral Q4H PRN    influenza vaccine 2020-21 (6 mos+)(PF) (FLUARIX/FLULAVAL/FLUZONE QUAD) injection 0.5 mL  0.5 mL IntraMUSCular PRIOR TO DISCHARGE     ______________________________________________________________________  EXPECTED LENGTH OF STAY: 3d 14h  ACTUAL LENGTH OF STAY:          1                 Ingrid Bullock NP

## 2020-10-20 NOTE — DIABETES MGMT
[facility]  CLINICAL NURSE SPECIALIST CONSULT  [facility] DI[facility]ES HEALTH    FOLLOW UP NOTE    Presentation   El Mode is a 70 y.o. male admitted  with right lower abdominal quadrant pain. Initial work up revealed hydrornephorsis and UTI. HX: PMH: CAD w/ MI and subsequent CABG 1999/ s/p AAA repair with left leg nephropathy/ fem to fem bypass/ HLD/ HTN/ DM2-A1C pending    DX: CT scan-hydronephrosis      Current clinical course has been complicated by hyperglycemia. Diabetes: Patient has known Type 2 diabetes, treated with humalog per pauln report. No insulin or PO diabetic medications on PTA. Consulted by Provider for advanced diabetes nursing assessment and care, specifically related to   [] Transitioning off [employer]   [x] Inpatient management strategy  [x] Home management assessment  [] Survival skill education    Diabetes-related medical history  Acute complications  hyperglycemia  Neurological complications  NONE  Microvascular disease  Nephropathy  Macrovascular disease  CAD and Myocardial infarction  Other associated conditions     HTN/ HLD/    Diabetes medication history  Drug class Currently in use Discontinued Never used   Biguanide  Metformin    DDP-4 inhibitor       Sulfonylurea Glipizide 5mg daily     Thiazolidinedione      GLP-1 RA      SGLT-2 inhibitors      Basal insulin      Fixed Dose  Combinations      Bolus insulin  Humalog can't recall dosing, but took himself off due to symptoms of hypoglycemia      Subjective   Mr. Zeb Ruiz reports having DM2 for \"awhile now\". Was previously on Metformin but was taken off because \"it was bad for my kidneys\". He stated he was also taking Humalog but it made him have \"chills and shaking\" and took himself off. He states his PCP, Dr. Rosa Elena Stout, just put him on \"something else\" but can't remember what it is. He was recently put on Glipizide 5mg daily.     He has paralysis, neuropathy in left leg and gets around ok by himself with his ex wife who looks after him. Wife at bedside today. Patient stated he had emesis x1 today and no appetite. Patient reports the following home diabetes self-care practices:  Eating pattern    Physical activity pattern-gets along well. Monitoring pattern-checked BG once daily-    Taking medications pattern  [] Consistent administration  [] Affordable      Objective   Physical exam  General Alert, oriented and inacute distress/reports abdominal pain . Conversant and cooperative. Vital Signs   Visit Vitals  BP (!) 127/56 (BP 1 Location: Right arm, BP Patient Position: At rest)   Pulse 75   Temp 97.9 °F (36.6 °C)   Resp 19   Wt 84 kg (185 lb 3.2 oz)   SpO2 (!) 88%     Skin  Warm and dry. Heart   Regular rate and rhythm. No murmurs, rubs or gallops  Lungs  Clear to auscultation without rales or rhonchi  Extremities No foot wounds    Diabetic foot exam:    Left Foot     Visual Exam: normal    Pulse DP: 1+ (weak)   Filament test: absent sensation      Right Foot   Visual Exam: normal    Pulse DP: 2+ (normal)   Filament test: normal sensation    Vibratory sensation: normal DP & PT pulses +2. Laboratory  No results found for: HBA1C, HGBE8, ERY1VXAP, XBL9USXR, KTU7ELIH  No results found for: LDL, LDLC, DLDLP  Lab Results   Component Value Date/Time    Creatinine 3.51 (H) 10/20/2020 02:02 AM     Lab Results   Component Value Date/Time    Sodium 133 (L) 10/20/2020 02:02 AM    Potassium 4.8 10/20/2020 02:02 AM    Chloride 101 10/20/2020 02:02 AM    CO2 17 (L) 10/20/2020 02:02 AM    Anion gap 15 10/20/2020 02:02 AM    Glucose 245 (H) 10/20/2020 02:02 AM    BUN 50 (H) 10/20/2020 02:02 AM    Creatinine 3.51 (H) 10/20/2020 02:02 AM    BUN/Creatinine ratio 14 10/20/2020 02:02 AM    GFR est AA 21 (L) 10/20/2020 02:02 AM    GFR est non-AA 17 (L) 10/20/2020 02:02 AM    Calcium 9.3 10/20/2020 02:02 AM    Bilirubin, total 0.5 10/20/2020 02:02 AM    Alk.  phosphatase 125 (H) 10/20/2020 02:02 AM    Protein, total 7.4 10/20/2020 02:02 AM    Albumin 2.9 (L) 10/20/2020 02:02 AM    Globulin 4.5 (H) 10/20/2020 02:02 AM    A-G Ratio 0.6 (L) 10/20/2020 02:02 AM    ALT (SGPT) 24 10/20/2020 02:02 AM     Lab Results   Component Value Date/Time    ALT (SGPT) 24 10/20/2020 02:02 AM       Factors affecting BG pattern  Factor Dose Comments   Nutrition:  Carb-controlled meals     60 grams/meal    Drugs:  Other: for atrial fib? Caredizem infusion    Pain Abdominal pain    Infection UTI/Hydronephrosis  WBC 35.9   Lactic 2.0      Assessment and Plan   Nursing Diagnosis Risk for unstable blood glucose pattern   Nursing Intervention Domain 5255 Decision-making Support   Nursing Interventions Examined current inpatient diabetes control   Explored factors facilitating and impeding inpatient management  Identified self-management practices impeding diabetes control  Explored corrective strategies with patient and responsible inpatient provider   Informed patient of rational for insulin strategy while hospitalized     Evaluation   Mr. Paulette Joseph, has a long history  Type 2 diabetes- no recent A1C on file. Lab drawn this morning and result pending. He has co-morbid conditions in addition to his DM2. Since admission BG trends >200mg/dl which indicates the need for INITIATION of insulin subcutaneous order set (8000). Per recent medication list it looks as if he was started on low dose Glipizide by his PCP on 10/14/2020. In light of his low GFR/ and elevated Cr+, would recommend NOT re-starting this medication at discharge until JAMAL resolves. Inpatient blood glucose management has been impacted by  [x] Kidney dysfunction -JAMAL / GFR 17/ Cr+3.51 -nephrology consulted. [x] Erratic meal consumption -poor appetite today  Recommendations   1. CONTINUE  Basal insulin   [x] 0.2 units/kg/D=17 units Lantus daily    2. CONTINUE Corrective insulin  [x] Normal sensitivity     3. No A1C resulted or drawn. Order is active.  Called lab to add to blood already in lab from this morning. Discharge Planning   1. Will make recs closer to discharge. Billing Code(s)   I personally reviewed chart, notes, data and current medications in the medical record, and examined the patient at bedside before making care recommendations. Thank you for including us in their care. I spent 15 minutes in direct patient care today for this patient.   Time includes chart review, face to face with patient and collaboration with interdisciplinary care team.     TONNY Michele  [facility] Diabetes [facility]  Access via [address] [place] [address]  [phone]

## 2020-10-20 NOTE — PROGRESS NOTES
Problem: Falls - Risk of  Goal: *Absence of Falls  Description: Document Albino Messing Fall Risk and appropriate interventions in the flowsheet.   Outcome: Progressing Towards Goal  Note: Fall Risk Interventions:  Mobility Interventions: Communicate number of staff needed for ambulation/transfer, Patient to call before getting OOB         Medication Interventions: Patient to call before getting OOB                   Problem: Breathing Pattern - Ineffective  Goal: *Use of effective breathing techniques  Outcome: Progressing Towards Goal

## 2020-10-20 NOTE — PROGRESS NOTES
Cardiology Progress Note  10/20/2020     Admit Date: 10/19/2020  Admit Diagnosis: Hydronephrosis of right kidney [N13.30]  UTI (urinary tract infection) [N39.0]  Sepsis ([address] [A41.9]  Tachycardia [R00.0]  Leukocytosis [D72.829]  CC: none currently    Assessment:   Active Problems:    Leukocytosis (10/19/2020)      UTI (urinary tract infection) (10/19/2020)      Tachycardia (10/19/2020)      Sepsis ([address] (10/19/2020)      Hydronephrosis of right kidney (10/19/2020)      Plan:     Increased metoprolol  Recent echo and stress test reviewed  Stop IV diltiazem  No further IP cardiac plans, please call with questions    Subjective:      Evin Sandhu remains in NSR, no c/o; feels much better    Objective:    Physical Exam:  Overall VSSAF;    Visit Vitals  BP (!) 127/56 (BP 1 Location: Right arm, BP Patient Position: At rest)   Pulse 75   Temp 97.9 °F (36.6 °C)   Resp 19   Wt 185 lb 3.2 oz (84 kg)   SpO2 99%     Temp (24hrs), Av °F (36.7 °C), Min:97.4 °F (36.3 °C), Max:98.5 °F (36.9 °C)    Patient Vitals for the past 8 hrs:   Pulse   10/20/20 1002 75   10/20/20 0904 74   10/20/20 0600 67    Patient Vitals for the past 8 hrs:   Resp   10/20/20 1002 19   10/20/20 0600 14    Patient Vitals for the past 8 hrs:   BP   10/20/20 1002 (!) 127/56   10/20/20 0904 125/60   10/20/20 0600 (!) 131/55      10/18 190 - 10/20 0700  In: 240.2 [I.V.:240.2]  Out: -       General Appearance: Well developed, well nourished, no acute distress. Ears/Nose/Mouth/Throat:   Normal MM; anicteric. JVP: WNL   Resp:   Lungs clear to auscultation bilaterally. Nl resp effort. Cardiovascular:  RRR, S1, S2 normal, no new murmur. No gallop or rub. Abdomen:   Soft, non-tender, bowel sounds are present. Extremities: No edema bilaterally. Skin:  Neuro: Warm and dry.   A/O x3, grossly nonfocal                         Data Review:     Telemetry independently reviewed :   normal sinus rhythm         Labs:   Recent Results (from the past 24 hour(s))   GLUCOSE, POC    Collection Time: 10/19/20 11:52 AM   Result Value Ref Range    Glucose (POC) 236 (H) 65 - 100 mg/dL    Performed by Dwayne Zhu    GLUCOSE, POC    Collection Time: 10/19/20  4:31 PM   Result Value Ref Range    Glucose (POC) 310 (H) 65 - 100 mg/dL    Performed by Kaila Witt    GLUCOSE, POC    Collection Time: 10/19/20  9:55 PM   Result Value Ref Range    Glucose (POC) 238 (H) 65 - 100 mg/dL    Performed by Kaila Witt    METABOLIC PANEL, COMPREHENSIVE    Collection Time: 10/20/20  2:02 AM   Result Value Ref Range    Sodium 133 (L) 136 - 145 mmol/L    Potassium 4.8 3.5 - 5.1 mmol/L    Chloride 101 97 - 108 mmol/L    CO2 17 (L) 21 - 32 mmol/L    Anion gap 15 5 - 15 mmol/L    Glucose 245 (H) 65 - 100 mg/dL    BUN 50 (H) 6 - 20 MG/DL    Creatinine 3.51 (H) 0.70 - 1.30 MG/DL    BUN/Creatinine ratio 14 12 - 20      GFR est AA 21 (L) >60 ml/min/1.73m2    GFR est non-AA 17 (L) >60 ml/min/1.73m2    Calcium 9.3 8.5 - 10.1 MG/DL    Bilirubin, total 0.5 0.2 - 1.0 MG/DL    ALT (SGPT) 24 12 - 78 U/L    AST (SGOT) 39 (H) 15 - 37 U/L    Alk.  phosphatase 125 (H) 45 - 117 U/L    Protein, total 7.4 6.4 - 8.2 g/dL    Albumin 2.9 (L) 3.5 - 5.0 g/dL    Globulin 4.5 (H) 2.0 - 4.0 g/dL    A-G Ratio 0.6 (L) 1.1 - 2.2     MAGNESIUM    Collection Time: 10/20/20  2:02 AM   Result Value Ref Range    Magnesium 1.9 1.6 - 2.4 mg/dL   CBC WITH AUTOMATED DIFF    Collection Time: 10/20/20  2:02 AM   Result Value Ref Range    WBC 35.9 (H) 4.1 - 11.1 K/uL    RBC 4.72 4.10 - 5.70 M/uL    HGB 12.9 12.1 - 17.0 g/dL    HCT 40.5 36.6 - 50.3 %    MCV 85.8 80.0 - 99.0 FL    MCH 27.3 26.0 - 34.0 PG    MCHC 31.9 30.0 - 36.5 g/dL    RDW 15.4 (H) 11.5 - 14.5 %    PLATELET 298 226 - 740 K/uL    MPV 11.5 8.9 - 12.9 FL    NRBC 0.0 0  WBC    ABSOLUTE NRBC 0.00 0.00 - 0.01 K/uL    NEUTROPHILS 92 (H) 32 - 75 %    LYMPHOCYTES 3 (L) 12 - 49 %    MONOCYTES 4 (L) 5 - 13 %    EOSINOPHILS 0 0 - 7 %    BASOPHILS 0 0 - 1 %    IMMATURE GRANULOCYTES 1 (H) 0.0 - 0.5 %    ABS. NEUTROPHILS 33.0 (H) 1.8 - 8.0 K/UL    ABS. LYMPHOCYTES 1.1 0.8 - 3.5 K/UL    ABS. MONOCYTES 1.4 (H) 0.0 - 1.0 K/UL    ABS. EOSINOPHILS 0.0 0.0 - 0.4 K/UL    ABS. BASOPHILS 0.0 0.0 - 0.1 K/UL    ABS. IMM.  GRANS. 0.4 (H) 0.00 - 0.04 K/UL    DF SMEAR SCANNED      RBC COMMENTS ANISOCYTOSIS  1+       GLUCOSE, POC    Collection Time: 10/20/20  7:23 AM   Result Value Ref Range    Glucose (POC) 193 (H) 65 - 100 mg/dL    Performed by Sidra Vernon       Current medications reviewed       Vinay Magdaleno MD

## 2020-10-20 NOTE — CONSULTS
Came to see patient who is off the floor. At Radiology dept for [address]  D/w patient's wife his medical history. Chart reviewed. Patient will be seen when back.

## 2020-10-20 NOTE — CONSULTS
[address]    Name:  Helena Colon  MR#:  [id number]  :  1945  ACCOUNT #:  [de-identified]  DATE OF SERVICE:  10/20/2020      IN-HOSPITAL NEPHROLOGY CONSULTATION    REFERRING PROVIDER:  Enrico Velázquez, nurse practitioner. REASON FOR CONSULTATION:  Advanced chronic kidney disease for management. HISTORY OF PRESENT ILLNESS:  The patient is a very pleasant 68-year-old  man who has an extensive past medical history including coronary artery disease, status post coronary artery bypass grafting surgery; hypertension; abdominal aortic aneurysm repair; femoral bypass; chronic left lower extremity paralysis; and hyperlipidemia. The patient was initially presented at [facility] with chief complaint of pain in the right lower abdomen. The symptoms onset reported to begin on . The pain was constant, sharp, and severe. The patient's pain was non-radiating. He did not have any gross hematuria. He believes he did not pass a kidney stone. The patient was taking a high amount of ibuprofen, up to five pills a day for the pain, and he was taking ibuprofen before at the same dose for back pain. The patient reports nocturia one to two times per night. In the emergency room at [facility], he had a CT scan done, which revealed right hydronephrosis. Due to the lack of Urology services, the [facility]  He is being already evaluated by Urology for potential blockage. The patient admits that he was notified in  by Dr. Massiel Luther of Cardiology that his renal function is abnormal, but he never saw a nephrologist, and he was never evaluated formally. PAST MEDICAL HISTORY:  As outlined in history of present illness. Additionally:  1. Type 2 diabetes mellitus. 2.  Gait abnormality. PAST SURGICAL HISTORY:  1. Femoral-femoral bypass. 2.  AAA repair. 3.  CABG.     ALLERGIES:  NO KNOWN MEDICAL ALLERGIES. MEDICATION LIST PRIOR TO ADMISSION:  Includes:  1. Aspirin 321 once a day. 2.  Norvasc 10.  3.  Ergocalciferol 50,000 units. 4.  Multivitamins. 5.  Percocet. 6.  Metoprolol. 7.  He takes over-the-counter ibuprofen five tablets a day. It is not clear if this ibuprofen is 200 mg or 800 mg. SOCIAL HISTORY:  The patient lives with his family. He was independent in his daily life activities. He denies alcohol, tobacco, or illicit drug abuse. FAMILY HISTORY:  Negative for renal disease. REVIEW OF SYSTEMS:  CONSTITUTIONAL:  No chills or fever. INTEGUMENT:  No pruritus. HEENT:  No visual or auditory disturbances. NECK:  No stiffness or odynophagia. RESPIRATORY:  The patient reports shortness of breath on exertion. No cough, no sputum production, hemoptysis. GASTROINTESTINAL:  No nausea, vomiting, abdominal pain, diarrhea. ENDOCRINOLOGY:  No heat or cold intolerance. PHYSICAL EXAMINATION:  GENERAL:  Elderly white man in no apparent distress. He is awake, alert, oriented, pleasant. VITAL SIGNS:  Blood pressure is 127/56, heart rate is 75, temperature is 97.9. HEENT:  Head is normocephalic. Eyes with anicteric sclerae. The pupils are reactive. Ears and nose without abnormal discharge. Mouth with moist oral mucosa. NECK:  Supple with no increased JVP, carotid bruit, or thyromegaly. CHEST:  Symmetrical.  LUNGS:  Fairly clear to auscultation with no wheezes, rales, or rhonchi. HEART:  With S1 and S2 with regular rate and rhythm. ABDOMEN:  Protuberant, not tender, soft with positive bowel sounds. No guarding. No CVA tenderness. EXTREMITIES:  With no edema, cyanosis, or clubbing. Joints are without effusion. NEUROLOGIC:  The patient is awake, alert, oriented. LABORATORY DATA:  Sodium is 133, potassium is 4.8, CO2 is 17, BUN is 50, creatinine is 5.31. White blood count is 35.9, hemoglobin 12.9. Urinalysis with glucose and protein. IMPRESSION:  1.   Chronic kidney disease with unknown baseline GFR. The patient is aware of having chronic kidney disease for at least nine years. The etiology most probably is somewhat related to diabetic kidney disease or hypertensive nephrosclerosis. 2.  Acute kidney injury of multifactorial region. The patient is taking a large dose of nonsteroidal anti-inflammatory medications. He has evidence of obstruction on the right side and urinary tract infection with possibly urosepsis. In support of that is the increased white blood count to 35.9 and appearance of his urine. The patient may have obstructive stone. Urology is managing. 3.  History of hypertension. Blood pressure is at target. 4.  Diabetes mellitus with unknown control. RECOMMENDATIONS:  1. No immediate need for renal replacement therapy at this time, but the patient is at risk of further deterioration of his GFR. It is noted that his serum creatinine has been rising gradually. 2.  Monitor closely GFR. Monitor urine output on a daily basis. 3.  Avoid any unnecessary nephrotoxic agents and adjust all new medications for GFR of less than 20 mL/minute. 4.  Screen for secondary hyperparathyroidism with PTH, phosphorus, and calcium level. 5.  Treat urinary tract infection empirically and wait for results of cultures. 6.  Manage acidosis of acute kidney injury on chronic kidney disease. Start sodium bicarbonate. 7.  Check urine protein-to-creatinine ratio when the infection is completely resolved because during UTI the patient may have pseudoproteinuria. Thanks very much for the opportunity to be a part of this patient's care. Our service will follow up with you closely. Case discussed with the patient and his wife.       Kevan Rivera MD      [id number]  D:  10/20/2020 16:02  T:  10/20/2020 19:28  JOB #:  [id number]

## 2020-10-20 NOTE — PROGRESS NOTES
Physician Progress Note      PATIENT:               Elena Mayorga  CSN #:                  [id number]  :                       1945  ADMIT DATE:       10/19/2020 1:23 AM  DISCH DATE:  RESPONDING  PROVIDER #:        [id number] [facility] NP          QUERY TEXT:    Dear Attending,    Pt admitted with Sepsis, UTI and hydronephrosis  of R kidney. Patient is on antibiotics. Pt noted to have CT abd and pelvis finding of patchy airspace and interstitial opacities right lung base could be infectious/inflammatory or asymmetric edema and had RA saturation of 89% that responded to 2 L O2 via NC. Patient has ordered neb treatments q 4 hours. If possible, please document in the progress notes and discharge summary if you are evaluating and/or treating any of the following: The medical record reflects the following:    Risk Factors: 75M pmhx CAD s/p CABG, HTN, s/p AA repair, s/p Fem-Fem Bypass, Chronic LLE paralysis, HLD being treated for sepsis, hydronephrosis R kidney, UTI    Clinical Indicators:    10/19 CT Abd and Pelvis  2. Patchy airspace and interstitial opacities right lung base could be  infectious/inflammatory or asymmetric edema.     10/19 WBC 49.1 Lactic Acid 2.0  10/20 WBC 35.9    10/19 99.4F 115 22 137/78 89% RA  10/19 98.5 97 22 151/67 94% 2L O2 NC  10/19 97.5 64 17 115/59 91% 2L O2 NC  10/20 97.9 67 14 131/55 93% RA    Treatment: Rocephin 1g IV q 24hrs 10/19-10/29, Doxycycline 100mg PO q 12 hours 10/19-10/29, Duo-Neb 4 times daily 10/19-10/24, NS  ml/hr 10/19, supplemental O2 prn    Thank you    Angeles Guerra BSN RN CRCR  Clinical Documentation Improvement  Office Phone   Options provided:  -- Gram negative pneumonia  -- Gram positive pneumonia  -- Bacterial pneumonia  -- Viral pneumonia  -- Aspiration pneumonia  -- Other - I will add my own diagnosis  -- Disagree - Not applicable / Not valid  -- Disagree - Clinically unable to determine / Unknown  -- Refer to Clinical Documentation Reviewer    PROVIDER RESPONSE TEXT:    This patient has bacterial pneumonia.     Query created by: Ksenia Li on 10/20/2020 9:33 AM      Electronically signed by:  Blayne Gauthier NP 10/20/2020 11:03 AM

## 2020-10-20 NOTE — PROGRESS NOTES
Patient: aPtel Sandoval MRN: [id number]  SSN: [id number]    YOB: 1945  Age: 70 y.o. Sex: male        ADMITTED: 10/19/2020 to Rachelle Xiao MD by Trinity Washington MD for Hydronephrosis of right kidney [N13.30]  UTI (urinary tract infection) [N39.0]  Sepsis ([address] [A41.9]  Tachycardia [R00.0]  Leukocytosis [D72.829]    Patient alert, sitting in bed. Denies fevers or chills. He reports that his pain is resolved. Admits abdominal bloating, slight distension noted, last BM 10/18/20, denies N/V. He reports urinary frequency with minimal output per void, dysuria resolved. Bladder scan ordered. Clear yellow urine. He is on O2 NC today with mild SOB at rest. He states that he only requires oxygen when in the hospital.     AFVSS  WBC 35.9 from 49.1 on 10/19  Hgb 12.9  Cr 3.51 from 2.59 on 10/19, Nephrology consulted   UA: >100 WBC, mod leuks  UCx NGTD   BCx NGTD  + rocephin  + doxycycline  + diltiazem, NSR on monitor, Cardiology following. Vitals: Temp (24hrs), Av °F (36.7 °C), Min:97.4 °F (36.3 °C), Max:98.5 °F (36.9 °C)    Blood pressure (!) 131/55, pulse 67, temperature 97.9 °F (36.6 °C), resp. rate 14, weight 84 kg (185 lb 3.2 oz), SpO2 93 %. Intake and Output:  10/18 1901 - 10/20 0700  In: 240.2 [I.V.:240.2]  Out: -   No intake/output data recorded.       Labs:  CBC:   Lab Results   Component Value Date/Time    WBC 35.9 (H) 10/20/2020 02:02 AM    HCT 40.5 10/20/2020 02:02 AM    PLATELET 231  02:02 AM     BMP:   Lab Results   Component Value Date/Time    Glucose 245 (H) 10/20/2020 02:02 AM    Sodium 133 (L) 10/20/2020 02:02 AM    Potassium 4.8 10/20/2020 02:02 AM    Chloride 101 10/20/2020 02:02 AM    CO2 17 (L) 10/20/2020 02:02 AM    BUN 50 (H) 10/20/2020 02:02 AM    Creatinine 3.51 (H) 10/20/2020 02:02 AM    Calcium 9.3 10/20/2020 02:02 AM       Assessment/Plan:     · Sepsis  · Leukocytosis  · Pyelonephritis, Right  · Moderate hydroureteronephrosis, right · JAMAL  · Punctate renal calculi, Right    - Agree with nephrology consult   - Bladder scan and document findings please, inset Erickson if >350 ml.   - Strict I/Os   - Continue IV abx, follow urine culture and treat accordingly   - Monitor WBC and Cr, Labs in AM  - Pain management  - Recommend stool softeners PRN for constipation   - Will continue to follow     Supervising MD, Dr. Isis West     Signed By: Omar Ramirez NP - October 20, 2020

## 2020-10-21 ENCOUNTER — APPOINTMENT (OUTPATIENT)
Dept: NON INVASIVE DIAGNOSTICS | Age: 75
DRG: 870 | End: 2020-10-21
Attending: INTERNAL MEDICINE
Payer: MEDICARE

## 2020-10-21 ENCOUNTER — APPOINTMENT (OUTPATIENT)
Dept: GENERAL RADIOLOGY | Age: 75
DRG: 870 | End: 2020-10-21
Attending: NURSE PRACTITIONER
Payer: MEDICARE

## 2020-10-21 LAB
ALBUMIN SERPL-MCNC: 2.4 G/DL (ref 3.5–5)
ALBUMIN SERPL-MCNC: 2.5 G/DL (ref 3.5–5)
ALBUMIN SERPL-MCNC: 2.8 G/DL (ref 3.5–5)
ALBUMIN/GLOB SERPL: 0.7 {RATIO} (ref 1.1–2.2)
ALP SERPL-CCNC: 159 U/L (ref 45–117)
ALT SERPL-CCNC: 82 U/L (ref 12–78)
ANION GAP SERPL CALC-SCNC: 12 MMOL/L (ref 5–15)
ANION GAP SERPL CALC-SCNC: 12 MMOL/L (ref 5–15)
ANION GAP SERPL CALC-SCNC: 15 MMOL/L (ref 5–15)
APPEARANCE UR: ABNORMAL
APTT PPP: 30.2 SEC (ref 22.1–32)
APTT PPP: 44.4 SEC (ref 22.1–32)
AST SERPL-CCNC: 130 U/L (ref 15–37)
ATRIAL RATE: 78 BPM
BACTERIA URNS QL MICRO: NEGATIVE /HPF
BASOPHILS # BLD: 0 K/UL (ref 0–0.1)
BASOPHILS NFR BLD: 0 % (ref 0–1)
BILIRUB SERPL-MCNC: 0.7 MG/DL (ref 0.2–1)
BILIRUB UR QL: NEGATIVE
BNP SERPL-MCNC: ABNORMAL PG/ML
BUN SERPL-MCNC: 53 MG/DL (ref 6–20)
BUN SERPL-MCNC: 67 MG/DL (ref 6–20)
BUN SERPL-MCNC: 71 MG/DL (ref 6–20)
BUN/CREAT SERPL: 16 (ref 12–20)
BUN/CREAT SERPL: 17 (ref 12–20)
BUN/CREAT SERPL: 18 (ref 12–20)
CALCIUM SERPL-MCNC: 8.7 MG/DL (ref 8.5–10.1)
CALCIUM SERPL-MCNC: 8.8 MG/DL (ref 8.5–10.1)
CALCIUM SERPL-MCNC: 8.9 MG/DL (ref 8.5–10.1)
CALCIUM SERPL-MCNC: 9.1 MG/DL (ref 8.5–10.1)
CALCULATED P AXIS, ECG09: 72 DEGREES
CALCULATED R AXIS, ECG10: 74 DEGREES
CALCULATED T AXIS, ECG11: 152 DEGREES
CHLORIDE SERPL-SCNC: 100 MMOL/L (ref 97–108)
CHLORIDE SERPL-SCNC: 102 MMOL/L (ref 97–108)
CHLORIDE SERPL-SCNC: 99 MMOL/L (ref 97–108)
CO2 SERPL-SCNC: 17 MMOL/L (ref 21–32)
CO2 SERPL-SCNC: 20 MMOL/L (ref 21–32)
CO2 SERPL-SCNC: 24 MMOL/L (ref 21–32)
COLOR UR: ABNORMAL
CREAT SERPL-MCNC: 2.98 MG/DL (ref 0.7–1.3)
CREAT SERPL-MCNC: 4.29 MG/DL (ref 0.7–1.3)
CREAT SERPL-MCNC: 4.32 MG/DL (ref 0.7–1.3)
DIAGNOSIS, 93000: NORMAL
DIFFERENTIAL METHOD BLD: ABNORMAL
ECHO AV PEAK GRADIENT: 9.52 MMHG
ECHO AV PEAK VELOCITY: 154.29 CM/S
ECHO LVOT PEAK GRADIENT: 5.16 MMHG
ECHO LVOT PEAK VELOCITY: 113.59 CM/S
ECHO MV A VELOCITY: 85.28 CM/S
ECHO MV E VELOCITY: 81.62 CM/S
ECHO MV E/A RATIO: 0.96
EOSINOPHIL # BLD: 0 K/UL (ref 0–0.4)
EOSINOPHIL NFR BLD: 0 % (ref 0–7)
EPITH CASTS URNS QL MICRO: ABNORMAL /LPF
ERYTHROCYTE [DISTWIDTH] IN BLOOD BY AUTOMATED COUNT: 15.6 % (ref 11.5–14.5)
ERYTHROCYTE [DISTWIDTH] IN BLOOD BY AUTOMATED COUNT: 15.9 % (ref 11.5–14.5)
GLOBULIN SER CALC-MCNC: 4.3 G/DL (ref 2–4)
GLUCOSE BLD STRIP.AUTO-MCNC: 116 MG/DL (ref 65–100)
GLUCOSE BLD STRIP.AUTO-MCNC: 121 MG/DL (ref 65–100)
GLUCOSE BLD STRIP.AUTO-MCNC: 128 MG/DL (ref 65–100)
GLUCOSE BLD STRIP.AUTO-MCNC: 131 MG/DL (ref 65–100)
GLUCOSE BLD STRIP.AUTO-MCNC: 143 MG/DL (ref 65–100)
GLUCOSE BLD STRIP.AUTO-MCNC: 161 MG/DL (ref 65–100)
GLUCOSE BLD STRIP.AUTO-MCNC: 184 MG/DL (ref 65–100)
GLUCOSE SERPL-MCNC: 180 MG/DL (ref 65–100)
GLUCOSE SERPL-MCNC: 200 MG/DL (ref 65–100)
GLUCOSE SERPL-MCNC: 252 MG/DL (ref 65–100)
GLUCOSE UR STRIP.AUTO-MCNC: NEGATIVE MG/DL
HCT VFR BLD AUTO: 35.2 % (ref 36.6–50.3)
HCT VFR BLD AUTO: 36.3 % (ref 36.6–50.3)
HGB BLD-MCNC: 11.7 G/DL (ref 12.1–17)
HGB BLD-MCNC: 11.8 G/DL (ref 12.1–17)
HGB UR QL STRIP: ABNORMAL
IMM GRANULOCYTES # BLD AUTO: 0.5 K/UL (ref 0–0.04)
IMM GRANULOCYTES NFR BLD AUTO: 2 % (ref 0–0.5)
INR PPP: 1.1 (ref 0.9–1.1)
INR PPP: 1.1 (ref 0.9–1.1)
KETONES UR QL STRIP.AUTO: NEGATIVE MG/DL
LACTATE SERPL-SCNC: 1.5 MMOL/L (ref 0.4–2)
LACTATE SERPL-SCNC: 2.5 MMOL/L (ref 0.4–2)
LACTATE SERPL-SCNC: 4.6 MMOL/L (ref 0.4–2)
LEUKOCYTE ESTERASE UR QL STRIP.AUTO: ABNORMAL
LYMPHOCYTES # BLD: 0.8 K/UL (ref 0.8–3.5)
LYMPHOCYTES NFR BLD: 3 % (ref 12–49)
MAGNESIUM SERPL-MCNC: 2 MG/DL (ref 1.6–2.4)
MAGNESIUM SERPL-MCNC: 2.4 MG/DL (ref 1.6–2.4)
MAGNESIUM SERPL-MCNC: 2.4 MG/DL (ref 1.6–2.4)
MCH RBC QN AUTO: 27.5 PG (ref 26–34)
MCH RBC QN AUTO: 27.6 PG (ref 26–34)
MCHC RBC AUTO-ENTMCNC: 32.5 G/DL (ref 30–36.5)
MCHC RBC AUTO-ENTMCNC: 33.2 G/DL (ref 30–36.5)
MCV RBC AUTO: 83 FL (ref 80–99)
MCV RBC AUTO: 84.6 FL (ref 80–99)
MONOCYTES # BLD: 1 K/UL (ref 0–1)
MONOCYTES NFR BLD: 4 % (ref 5–13)
NEUTS SEG # BLD: 23.7 K/UL (ref 1.8–8)
NEUTS SEG NFR BLD: 91 % (ref 32–75)
NITRITE UR QL STRIP.AUTO: NEGATIVE
NRBC # BLD: 0 K/UL (ref 0–0.01)
NRBC # BLD: 0 K/UL (ref 0–0.01)
NRBC BLD-RTO: 0 PER 100 WBC
NRBC BLD-RTO: 0 PER 100 WBC
P-R INTERVAL, ECG05: 264 MS
PH UR STRIP: 5 [PH] (ref 5–8)
PHOSPHATE SERPL-MCNC: 3.9 MG/DL (ref 2.6–4.7)
PHOSPHATE SERPL-MCNC: 6.7 MG/DL (ref 2.6–4.7)
PHOSPHATE SERPL-MCNC: 8.6 MG/DL (ref 2.6–4.7)
PLATELET # BLD AUTO: 169 K/UL (ref 150–400)
PLATELET # BLD AUTO: 228 K/UL (ref 150–400)
PLATELET COMMENTS,PCOM: ABNORMAL
PMV BLD AUTO: 11.1 FL (ref 8.9–12.9)
PMV BLD AUTO: 11.2 FL (ref 8.9–12.9)
POTASSIUM SERPL-SCNC: 3.5 MMOL/L (ref 3.5–5.1)
POTASSIUM SERPL-SCNC: 4.7 MMOL/L (ref 3.5–5.1)
POTASSIUM SERPL-SCNC: 6.5 MMOL/L (ref 3.5–5.1)
PROT SERPL-MCNC: 7.1 G/DL (ref 6.4–8.2)
PROT UR STRIP-MCNC: 100 MG/DL
PROTHROMBIN TIME: 11.4 SEC (ref 9–11.1)
PROTHROMBIN TIME: 11.5 SEC (ref 9–11.1)
PTH-INTACT SERPL-MCNC: 399.8 PG/ML (ref 18.4–88)
Q-T INTERVAL, ECG07: 386 MS
QRS DURATION, ECG06: 112 MS
QTC CALCULATION (BEZET), ECG08: 440 MS
RBC # BLD AUTO: 4.24 M/UL (ref 4.1–5.7)
RBC # BLD AUTO: 4.29 M/UL (ref 4.1–5.7)
RBC #/AREA URNS HPF: ABNORMAL /HPF (ref 0–5)
RBC MORPH BLD: ABNORMAL
SERVICE CMNT-IMP: ABNORMAL
SODIUM SERPL-SCNC: 128 MMOL/L (ref 136–145)
SODIUM SERPL-SCNC: 135 MMOL/L (ref 136–145)
SODIUM SERPL-SCNC: 138 MMOL/L (ref 136–145)
SP GR UR REFRACTOMETRY: 1.02 (ref 1–1.03)
THERAPEUTIC RANGE,PTTT: ABNORMAL SECS (ref 58–77)
THERAPEUTIC RANGE,PTTT: NORMAL SECS (ref 58–77)
TROPONIN I SERPL-MCNC: 10.3 NG/ML
TROPONIN I SERPL-MCNC: 14.5 NG/ML
TROPONIN I SERPL-MCNC: 18 NG/ML
TROPONIN I SERPL-MCNC: 9.86 NG/ML
UROBILINOGEN UR QL STRIP.AUTO: 0.2 EU/DL (ref 0.2–1)
VENTRICULAR RATE, ECG03: 78 BPM
WBC # BLD AUTO: 21.7 K/UL (ref 4.1–11.1)
WBC # BLD AUTO: 26 K/UL (ref 4.1–11.1)
WBC URNS QL MICRO: ABNORMAL /HPF (ref 0–4)

## 2020-10-21 PROCEDURE — 74011000250 HC RX REV CODE- 250: Performed by: NURSE PRACTITIONER

## 2020-10-21 PROCEDURE — 74011000250 HC RX REV CODE- 250: Performed by: INTERNAL MEDICINE

## 2020-10-21 PROCEDURE — 74011636637 HC RX REV CODE- 636/637: Performed by: NURSE PRACTITIONER

## 2020-10-21 PROCEDURE — 80069 RENAL FUNCTION PANEL: CPT

## 2020-10-21 PROCEDURE — 74018 RADEX ABDOMEN 1 VIEW: CPT

## 2020-10-21 PROCEDURE — 85025 COMPLETE CBC W/AUTO DIFF WBC: CPT

## 2020-10-21 PROCEDURE — 85730 THROMBOPLASTIN TIME PARTIAL: CPT

## 2020-10-21 PROCEDURE — 83735 ASSAY OF MAGNESIUM: CPT

## 2020-10-21 PROCEDURE — 74011000258 HC RX REV CODE- 258: Performed by: FAMILY MEDICINE

## 2020-10-21 PROCEDURE — 74011000250 HC RX REV CODE- 250

## 2020-10-21 PROCEDURE — 84484 ASSAY OF TROPONIN QUANT: CPT

## 2020-10-21 PROCEDURE — 81001 URINALYSIS AUTO W/SCOPE: CPT

## 2020-10-21 PROCEDURE — 99231 SBSQ HOSP IP/OBS SF/LOW 25: CPT | Performed by: CLINICAL NURSE SPECIALIST

## 2020-10-21 PROCEDURE — 2709999900 HC NON-CHARGEABLE SUPPLY

## 2020-10-21 PROCEDURE — 74011250636 HC RX REV CODE- 250/636: Performed by: FAMILY MEDICINE

## 2020-10-21 PROCEDURE — 74011250636 HC RX REV CODE- 250/636

## 2020-10-21 PROCEDURE — C8929 TTE W OR WO FOL WCON,DOPPLER: HCPCS

## 2020-10-21 PROCEDURE — 74011250636 HC RX REV CODE- 250/636: Performed by: NURSE PRACTITIONER

## 2020-10-21 PROCEDURE — 84100 ASSAY OF PHOSPHORUS: CPT

## 2020-10-21 PROCEDURE — 83970 ASSAY OF PARATHORMONE: CPT

## 2020-10-21 PROCEDURE — 80053 COMPREHEN METABOLIC PANEL: CPT

## 2020-10-21 PROCEDURE — 05H633Z INSERTION OF INFUSION DEVICE INTO LEFT SUBCLAVIAN VEIN, PERCUTANEOUS APPROACH: ICD-10-PCS | Performed by: INTERNAL MEDICINE

## 2020-10-21 PROCEDURE — 5A1D90Z PERFORMANCE OF URINARY FILTRATION, CONTINUOUS, GREATER THAN 18 HOURS PER DAY: ICD-10-PCS | Performed by: INTERNAL MEDICINE

## 2020-10-21 PROCEDURE — 83605 ASSAY OF LACTIC ACID: CPT

## 2020-10-21 PROCEDURE — C9113 INJ PANTOPRAZOLE SODIUM, VIA: HCPCS | Performed by: NURSE PRACTITIONER

## 2020-10-21 PROCEDURE — 74011250637 HC RX REV CODE- 250/637: Performed by: INTERNAL MEDICINE

## 2020-10-21 PROCEDURE — 85610 PROTHROMBIN TIME: CPT

## 2020-10-21 PROCEDURE — 74011000258 HC RX REV CODE- 258: Performed by: NURSE PRACTITIONER

## 2020-10-21 PROCEDURE — 74011000250 HC RX REV CODE- 250: Performed by: EMERGENCY MEDICINE

## 2020-10-21 PROCEDURE — 90945 DIALYSIS ONE EVALUATION: CPT

## 2020-10-21 PROCEDURE — 87086 URINE CULTURE/COLONY COUNT: CPT

## 2020-10-21 PROCEDURE — 85027 COMPLETE CBC AUTOMATED: CPT

## 2020-10-21 PROCEDURE — 74011250636 HC RX REV CODE- 250/636: Performed by: INTERNAL MEDICINE

## 2020-10-21 PROCEDURE — 71045 X-RAY EXAM CHEST 1 VIEW: CPT

## 2020-10-21 PROCEDURE — 83880 ASSAY OF NATRIURETIC PEPTIDE: CPT

## 2020-10-21 PROCEDURE — 65610000006 HC RM INTENSIVE CARE

## 2020-10-21 PROCEDURE — 82962 GLUCOSE BLOOD TEST: CPT

## 2020-10-21 PROCEDURE — 74011250637 HC RX REV CODE- 250/637: Performed by: NURSE PRACTITIONER

## 2020-10-21 PROCEDURE — 82803 BLOOD GASES ANY COMBINATION: CPT

## 2020-10-21 PROCEDURE — 74011250636 HC RX REV CODE- 250/636: Performed by: EMERGENCY MEDICINE

## 2020-10-21 PROCEDURE — 36415 COLL VENOUS BLD VENIPUNCTURE: CPT

## 2020-10-21 PROCEDURE — 77030018798 HC PMP KT ENTRL FED COVD -A

## 2020-10-21 PROCEDURE — 93005 ELECTROCARDIOGRAM TRACING: CPT

## 2020-10-21 PROCEDURE — 74011250637 HC RX REV CODE- 250/637: Performed by: EMERGENCY MEDICINE

## 2020-10-21 RX ORDER — SODIUM POLYSTYRENE SULFONATE 15 G/60ML
30 SUSPENSION ORAL; RECTAL EVERY 4 HOURS
Status: COMPLETED | OUTPATIENT
Start: 2020-10-21 | End: 2020-10-21

## 2020-10-21 RX ORDER — SODIUM BICARBONATE 84 MG/ML
200 INJECTION, SOLUTION INTRAVENOUS
Status: DISCONTINUED | OUTPATIENT
Start: 2020-10-21 | End: 2020-10-21

## 2020-10-21 RX ORDER — ALBUTEROL SULFATE 0.83 MG/ML
20 SOLUTION RESPIRATORY (INHALATION) ONCE
Status: DISPENSED | OUTPATIENT
Start: 2020-10-21 | End: 2020-10-21

## 2020-10-21 RX ORDER — HEPARIN SODIUM 1000 [USP'U]/ML
3400 INJECTION, SOLUTION INTRAVENOUS; SUBCUTANEOUS
Status: DISCONTINUED | OUTPATIENT
Start: 2020-10-21 | End: 2020-11-04 | Stop reason: HOSPADM

## 2020-10-21 RX ORDER — DEXTROSE MONOHYDRATE 100 MG/ML
250 INJECTION, SOLUTION INTRAVENOUS ONCE
Status: COMPLETED | OUTPATIENT
Start: 2020-10-21 | End: 2020-10-21

## 2020-10-21 RX ORDER — FENTANYL CITRATE 50 UG/ML
50-100 INJECTION, SOLUTION INTRAMUSCULAR; INTRAVENOUS
Status: DISCONTINUED | OUTPATIENT
Start: 2020-10-21 | End: 2020-10-26

## 2020-10-21 RX ORDER — MIDAZOLAM HYDROCHLORIDE 1 MG/ML
INJECTION, SOLUTION INTRAMUSCULAR; INTRAVENOUS
Status: COMPLETED
Start: 2020-10-21 | End: 2020-10-21

## 2020-10-21 RX ORDER — DEXTROSE MONOHYDRATE 100 MG/ML
125-250 INJECTION, SOLUTION INTRAVENOUS AS NEEDED
Status: DISCONTINUED | OUTPATIENT
Start: 2020-10-21 | End: 2020-11-04 | Stop reason: HOSPADM

## 2020-10-21 RX ORDER — HEPARIN SODIUM 1000 [USP'U]/ML
2000 INJECTION, SOLUTION INTRAVENOUS; SUBCUTANEOUS ONCE
Status: COMPLETED | OUTPATIENT
Start: 2020-10-21 | End: 2020-10-21

## 2020-10-21 RX ORDER — SODIUM BICARBONATE 84 MG/ML
50 INJECTION, SOLUTION INTRAVENOUS
Status: COMPLETED | OUTPATIENT
Start: 2020-10-21 | End: 2020-10-21

## 2020-10-21 RX ORDER — SODIUM BICARBONATE 1 MEQ/ML
SYRINGE (ML) INTRAVENOUS
Status: COMPLETED | OUTPATIENT
Start: 2020-10-20 | End: 2020-10-20

## 2020-10-21 RX ORDER — POTASSIUM CHLORIDE 750 MG/1
20 TABLET, FILM COATED, EXTENDED RELEASE ORAL
Status: COMPLETED | OUTPATIENT
Start: 2020-10-21 | End: 2020-10-21

## 2020-10-21 RX ORDER — HEPARIN SODIUM 10000 [USP'U]/100ML
11-25 INJECTION, SOLUTION INTRAVENOUS
Status: DISCONTINUED | OUTPATIENT
Start: 2020-10-21 | End: 2020-10-24

## 2020-10-21 RX ORDER — MIDODRINE HYDROCHLORIDE 5 MG/1
10 TABLET ORAL EVERY 8 HOURS
Status: DISCONTINUED | OUTPATIENT
Start: 2020-10-21 | End: 2020-10-22

## 2020-10-21 RX ORDER — MIDAZOLAM HYDROCHLORIDE 1 MG/ML
2 INJECTION, SOLUTION INTRAMUSCULAR; INTRAVENOUS ONCE
Status: ACTIVE | OUTPATIENT
Start: 2020-10-21 | End: 2020-10-21

## 2020-10-21 RX ORDER — INSULIN LISPRO 100 [IU]/ML
INJECTION, SOLUTION INTRAVENOUS; SUBCUTANEOUS EVERY 6 HOURS
Status: DISCONTINUED | OUTPATIENT
Start: 2020-10-21 | End: 2020-10-28

## 2020-10-21 RX ORDER — EPINEPHRINE 0.1 MG/ML
INJECTION INTRACARDIAC; INTRAVENOUS
Status: COMPLETED | OUTPATIENT
Start: 2020-10-20 | End: 2020-10-20

## 2020-10-21 RX ORDER — NOREPINEPHRINE BITARTRATE/D5W 8 MG/250ML
.5-3 PLASTIC BAG, INJECTION (ML) INTRAVENOUS
Status: DISCONTINUED | OUTPATIENT
Start: 2020-10-21 | End: 2020-10-26

## 2020-10-21 RX ORDER — SODIUM BICARBONATE 84 MG/ML
100 INJECTION, SOLUTION INTRAVENOUS
Status: COMPLETED | OUTPATIENT
Start: 2020-10-21 | End: 2020-10-21

## 2020-10-21 RX ORDER — BUMETANIDE 0.25 MG/ML
2 INJECTION INTRAMUSCULAR; INTRAVENOUS ONCE
Status: COMPLETED | OUTPATIENT
Start: 2020-10-21 | End: 2020-10-21

## 2020-10-21 RX ORDER — SODIUM BICARBONATE 84 MG/ML
INJECTION, SOLUTION INTRAVENOUS
Status: COMPLETED
Start: 2020-10-21 | End: 2020-10-21

## 2020-10-21 RX ORDER — ALBUTEROL SULFATE 0.83 MG/ML
20 SOLUTION RESPIRATORY (INHALATION) ONCE
Status: DISCONTINUED | OUTPATIENT
Start: 2020-10-21 | End: 2020-10-21

## 2020-10-21 RX ORDER — SODIUM BICARBONATE 84 MG/ML
150 INJECTION, SOLUTION INTRAVENOUS
Status: COMPLETED | OUTPATIENT
Start: 2020-10-21 | End: 2020-10-21

## 2020-10-21 RX ORDER — ALBUMIN HUMAN 50 G/1000ML
25 SOLUTION INTRAVENOUS ONCE
Status: DISPENSED | OUTPATIENT
Start: 2020-10-21 | End: 2020-10-21

## 2020-10-21 RX ORDER — MIDAZOLAM HYDROCHLORIDE 1 MG/ML
2 INJECTION, SOLUTION INTRAMUSCULAR; INTRAVENOUS ONCE
Status: COMPLETED | OUTPATIENT
Start: 2020-10-21 | End: 2020-10-21

## 2020-10-21 RX ORDER — ASPIRIN 325 MG
325 TABLET ORAL
Status: COMPLETED | OUTPATIENT
Start: 2020-10-21 | End: 2020-10-21

## 2020-10-21 RX ORDER — GUAIFENESIN 100 MG/5ML
81 LIQUID (ML) ORAL DAILY
Status: DISCONTINUED | OUTPATIENT
Start: 2020-10-22 | End: 2020-11-04 | Stop reason: HOSPADM

## 2020-10-21 RX ADMIN — BUMETANIDE 2 MG: 0.25 INJECTION INTRAMUSCULAR; INTRAVENOUS at 04:52

## 2020-10-21 RX ADMIN — CALCIUM CHLORIDE, MAGNESIUM CHLORIDE, DEXTROSE MONOHYDRATE, LACTIC ACID, SODIUM CHLORIDE, SODIUM BICARBONATE AND POTASSIUM CHLORIDE 2275 ML/HR: 5.15; 2.03; 22; 5.4; 6.46; 3.09; .157 INJECTION INTRAVENOUS at 13:47

## 2020-10-21 RX ADMIN — HEPARIN SODIUM 2000 UNITS: 1000 INJECTION INTRAVENOUS; SUBCUTANEOUS at 18:09

## 2020-10-21 RX ADMIN — METOPROLOL TARTRATE 100 MG: 25 TABLET, FILM COATED ORAL at 21:32

## 2020-10-21 RX ADMIN — CALCIUM CHLORIDE, MAGNESIUM CHLORIDE, DEXTROSE MONOHYDRATE, LACTIC ACID, SODIUM CHLORIDE, SODIUM BICARBONATE AND POTASSIUM CHLORIDE 2275 ML/HR: 5.15; 2.03; 22; 5.4; 6.46; 3.09; .157 INJECTION INTRAVENOUS at 20:49

## 2020-10-21 RX ADMIN — MIDAZOLAM HYDROCHLORIDE 2 MG: 1 INJECTION, SOLUTION INTRAMUSCULAR; INTRAVENOUS at 06:30

## 2020-10-21 RX ADMIN — DOXYCYCLINE HYCLATE 100 MG: 100 TABLET, COATED ORAL at 08:59

## 2020-10-21 RX ADMIN — CALCIUM CHLORIDE, MAGNESIUM CHLORIDE, DEXTROSE MONOHYDRATE, LACTIC ACID, SODIUM CHLORIDE, SODIUM BICARBONATE AND POTASSIUM CHLORIDE 2275 ML/HR: 5.15; 2.03; 22; 5.4; 6.46; 3.09; .157 INJECTION INTRAVENOUS at 22:51

## 2020-10-21 RX ADMIN — Medication 10 ML: at 05:26

## 2020-10-21 RX ADMIN — Medication 10 ML: at 21:55

## 2020-10-21 RX ADMIN — CALCIUM CHLORIDE, MAGNESIUM CHLORIDE, DEXTROSE MONOHYDRATE, LACTIC ACID, SODIUM CHLORIDE, SODIUM BICARBONATE AND POTASSIUM CHLORIDE 2275 ML/HR: 5.15; 2.03; 22; 5.4; 6.46; 3.09; .157 INJECTION INTRAVENOUS at 11:17

## 2020-10-21 RX ADMIN — PROPOFOL 25 MCG/KG/MIN: 10 INJECTION, EMULSION INTRAVENOUS at 16:09

## 2020-10-21 RX ADMIN — SODIUM BICARBONATE 150 MEQ: 84 INJECTION, SOLUTION INTRAVENOUS at 04:18

## 2020-10-21 RX ADMIN — Medication 10 ML: at 01:00

## 2020-10-21 RX ADMIN — MIDODRINE HYDROCHLORIDE 10 MG: 5 TABLET ORAL at 08:59

## 2020-10-21 RX ADMIN — ROSUVASTATIN 40 MG: 40 TABLET, FILM COATED ORAL at 21:33

## 2020-10-21 RX ADMIN — SODIUM BICARBONATE 50 MEQ: 84 INJECTION, SOLUTION INTRAVENOUS at 02:49

## 2020-10-21 RX ADMIN — DEXTROSE MONOHYDRATE 7 MCG/MIN: 5 INJECTION, SOLUTION INTRAVENOUS at 03:30

## 2020-10-21 RX ADMIN — DOXYCYCLINE HYCLATE 100 MG: 100 TABLET, COATED ORAL at 21:32

## 2020-10-21 RX ADMIN — HUMAN INSULIN 10 UNITS: 100 INJECTION, SOLUTION SUBCUTANEOUS at 02:47

## 2020-10-21 RX ADMIN — INSULIN LISPRO 4 UNITS: 100 INJECTION, SOLUTION INTRAVENOUS; SUBCUTANEOUS at 06:44

## 2020-10-21 RX ADMIN — PROPOFOL 25 MCG/KG/MIN: 10 INJECTION, EMULSION INTRAVENOUS at 21:48

## 2020-10-21 RX ADMIN — SODIUM CHLORIDE 1.5 ML: 9 INJECTION INTRAMUSCULAR; INTRAVENOUS; SUBCUTANEOUS at 14:00

## 2020-10-21 RX ADMIN — SODIUM BICARBONATE 100 MEQ: 84 INJECTION, SOLUTION INTRAVENOUS at 08:58

## 2020-10-21 RX ADMIN — SODIUM POLYSTYRENE SULFONATE 30 G: 15 SUSPENSION ORAL; RECTAL at 09:34

## 2020-10-21 RX ADMIN — MIDAZOLAM 2 MG: 1 INJECTION INTRAMUSCULAR; INTRAVENOUS at 06:30

## 2020-10-21 RX ADMIN — POTASSIUM CHLORIDE 20 MEQ: 750 TABLET, FILM COATED, EXTENDED RELEASE ORAL at 21:33

## 2020-10-21 RX ADMIN — CALCIUM CHLORIDE, MAGNESIUM CHLORIDE, DEXTROSE MONOHYDRATE, LACTIC ACID, SODIUM CHLORIDE, SODIUM BICARBONATE AND POTASSIUM CHLORIDE 2275 ML/HR: 5.15; 2.03; 22; 5.4; 6.46; 3.09; .157 INJECTION INTRAVENOUS at 15:33

## 2020-10-21 RX ADMIN — Medication 10 ML: at 21:56

## 2020-10-21 RX ADMIN — DEXTROSE MONOHYDRATE 250 ML: 10 INJECTION, SOLUTION INTRAVENOUS at 02:48

## 2020-10-21 RX ADMIN — Medication 50 MCG/HR: at 21:38

## 2020-10-21 RX ADMIN — ASPIRIN 325 MG: 325 TABLET, FILM COATED ORAL at 09:41

## 2020-10-21 RX ADMIN — Medication 10 ML: at 14:54

## 2020-10-21 RX ADMIN — CALCIUM CHLORIDE, MAGNESIUM CHLORIDE, DEXTROSE MONOHYDRATE, LACTIC ACID, SODIUM CHLORIDE, SODIUM BICARBONATE AND POTASSIUM CHLORIDE 2275 ML/HR: 5.15; 2.03; 22; 5.4; 6.46; 3.09; .157 INJECTION INTRAVENOUS at 17:14

## 2020-10-21 RX ADMIN — FENTANYL CITRATE 100 MCG: 50 INJECTION, SOLUTION INTRAMUSCULAR; INTRAVENOUS at 06:24

## 2020-10-21 RX ADMIN — CEFTRIAXONE SODIUM 1 G: 1 INJECTION, POWDER, FOR SOLUTION INTRAMUSCULAR; INTRAVENOUS at 21:33

## 2020-10-21 RX ADMIN — CALCIUM GLUCONATE 1 G: 94 INJECTION, SOLUTION INTRAVENOUS at 02:51

## 2020-10-21 RX ADMIN — PROPOFOL 30 MCG/KG/MIN: 10 INJECTION, EMULSION INTRAVENOUS at 06:12

## 2020-10-21 RX ADMIN — BUMETANIDE 2 MG: 0.25 INJECTION INTRAMUSCULAR; INTRAVENOUS at 02:49

## 2020-10-21 RX ADMIN — SODIUM POLYSTYRENE SULFONATE 30 G: 15 SUSPENSION ORAL; RECTAL at 04:25

## 2020-10-21 RX ADMIN — CALCIUM CHLORIDE, MAGNESIUM CHLORIDE, DEXTROSE MONOHYDRATE, LACTIC ACID, SODIUM CHLORIDE, SODIUM BICARBONATE AND POTASSIUM CHLORIDE 2275 ML/HR: 5.15; 2.03; 22; 5.4; 6.46; 3.09; .157 INJECTION INTRAVENOUS at 19:08

## 2020-10-21 RX ADMIN — SODIUM CHLORIDE 40 MG: 9 INJECTION, SOLUTION INTRAMUSCULAR; INTRAVENOUS; SUBCUTANEOUS at 08:46

## 2020-10-21 RX ADMIN — HEPARIN SODIUM 11 UNITS/KG/HR: 10000 INJECTION, SOLUTION INTRAVENOUS at 09:31

## 2020-10-21 NOTE — PROGRESS NOTES
Patient was seen again today after the CRT was started. Patient is asleep, remains on vent  BP- 105/57 The rest of the PE is unchanged    1.  JAMAL- on CRT- the treatment is progressing well  Will monitor electrolytes and urine output

## 2020-10-21 NOTE — PROGRESS NOTES
[facility]    ICU TEAM Progress Note    Name: Jose Ugarte   : 1945   MRN: [id number]   Date: 10/21/2020      Assessment:     - Cardiac arrest  - JAMAL on CKD  - R sided hydronephrosis  - hyperglycemia  - Acute hypoxic respiratory failure  - leukocytosis    ICU Comprehensive Plan of Care:   Analgesia/sedation with opioids and propofol as needed, early mobility as tolerated, delirium prevention strategies    Came off pressors this morning, keep maps above 65, continue midodrine therapy, lactate levels better, impressively elevated troponins-Per cardiology?   ACS-now on aspirin and heparin therapy, continue Crestor, will resume beta-blockade once blood pressure is better, follow-up cardiology recommendations    Continue lung protective strategies on the ventilator, inhomogenous bilateral opacities on chest x-ray, daily weaning, hopefully we can extubate within the next 24 to 48 hours    Transaminitis-likely shock liver-trend, Protonix for GI prophylaxis, start trickle feeds    Now on CRRT, follow-up nephrology recommendations, follow-up urology recommendations for right hydronephrosis, monitor urine output closely, dose medication renally, avoid nephrotoxic agents, correct electrolyte derangements as needed    Monitor for bleeding while on a heparin drip    As a UTI-send urine culture, perinephric stranding-use ceftriaxone for now, continue doxycycline for now, white count getting better    Keep glucose less than 180 with subcutaneous insulin as needed      F - Feeding:  Yes   A - Analgesia: Fentanyl  S - Sedation: Propofol  T - DVT Prophylaxis: Heparin   H - Head of Bed: > 30 Degrees  U - Ulcer Prophylaxis: Protonix (pantoprazole)   G - Glycemic Control: Insulin  S - Spontaneous Breathing Trial: Yes  B - Bowel Regimen: Senna  I - Indwelling Catheter:   Tubes: ETT  Lines: Peripheral IV  Drains: Erickson Catheter  D - De-escalation of Antibiotics: n    Subjective:     Overnight events    Still intubated, came off pressors this morning, now on CRRT    Active Problem List:     Problem List  Never Reviewed          Codes Class    Leukocytosis ICD-10-CM: Q45.798  ICD-9-CM: 288.60         UTI (urinary tract infection) ICD-10-CM: N39.0  ICD-9-CM: 599.0         Tachycardia ICD-10-CM: R00.0  ICD-9-CM: 785.0         Sepsis ([address] ICD-10-CM: A41.9  ICD-9-CM: 038.9, 995.91         Hydronephrosis of right kidney ICD-10-CM: N13.30  ICD-9-CM: 615               Past Medical History:      has no past medical history on file. Past Surgical History:      has no past surgical history on file. Home Medications:     Prior to Admission medications    Medication Sig Start Date End Date Taking? Authorizing Provider   aspirin (ASPIRIN) 325 mg tablet Take 325 mg by mouth daily. Yes Provider, Historical   amLODIPine (NORVASC) 10 mg tablet Take 10 mg by mouth daily. Indications: high blood pressure   Yes Provider, Historical   ergocalciferol (ERGOCALCIFEROL) 1,250 mcg (50,000 unit) capsule Take 50,000 Units by mouth every seven (7) days. Yes Provider, Historical   multivitamin, tx-iron-ca-min (THERA-M w/ IRON) 9 mg iron-400 mcg tab tablet Take 1 Tab by mouth daily. Yes Provider, Historical   oxyCODONE-acetaminophen (Percocet) 5-325 mg per tablet Take 1 Tab by mouth every six (6) hours as needed for Pain. Yes Provider, Historical   metoprolol succinate (TOPROL-XL) 50 mg XL tablet Take 50 mg by mouth daily. Indications: high blood pressure   Yes Provider, Historical       Allergies/Social/Family History:     No Known Allergies   Social History     Tobacco Use    Smoking status: Not on file   Substance Use Topics    Alcohol use: Not on file      No family history on file.            Objective:   Vital Signs:  Visit Vitals  BP (!) 113/59   Pulse 79   Temp 99.2 °F (37.3 °C)   Resp 15   Ht 5' 10\" (1.778 m)   Wt 90.9 kg (200 lb 6.4 oz)   SpO2 95%   BMI 28.75 kg/m²    O2 Flow Rate (L/min): 15 l/min O2 Device: Endotracheal tube, Ventilator Temp (24hrs), Av.6 °F (36.4 °C), Min:96.1 °F (35.6 °C), Max:99.2 °F (37.3 °C)           Intake/Output:     Intake/Output Summary (Last 24 hours) at 10/21/2020 1522  Last data filed at 10/21/2020 1500  Gross per 24 hour   Intake 496.42 ml   Output 990 ml   Net -493.58 ml       Physical Exam:  General-intubated, sedated  Neuro-following simple commands  Cardiac-RRR  Lungs-clear  Abdomen-soft, nontender, nondistended  Extremities-warm    LABS AND  DATA: Personally reviewed  Recent Labs     10/21/20  0601 10/20/20  020   WBC 26.0* 35.9*   HGB 11.8* 12.9   HCT 36.3* 40.5    235     Recent Labs     10/21/20  0602 10/21/20  0601 10/21/20  005   NA  --  135* 128*   K  --  4.7 6.5*   CL  --  100 99   CO2  --  20* 17*   BUN  --  71* 67*   CREA  --  4.29* 4.32*   GLU  --  200* 252*   CA 8.9 8.8 8.7   MG  --  2.4 2.4   PHOS  --  6.7* 8.6*     Recent Labs     10/21/20  0601 10/21/20  0059 10/20/20  0202   AP  --  159* 125*   TP  --  7.1 7.4   ALB 2.4* 2.8* 2.9*   GLOB  --  4.3* 4.5*     Recent Labs     10/21/20  0926 10/21/20  0601 10/21/20  0059 10/19/20  0351   INR  --  1.1 1.1 1.0   PTP  --  11.5* 11.4* 10.9   APTT 30.2  --   --  27.3      No results for input(s): PHI, PCO2I, PO2I, FIO2I in the last 72 hours. Recent Labs     10/21/20  1249 10/21/20  06   TROIQ 14.50* 10.30*       Hemodynamics:   PAP:   CO:     Wedge:   CI:     CVP:    SVR:       PVR:       Ventilator Settings:  Mode Rate Tidal Volume Pressure FiO2 PEEP   Assist control   550 ml    50 % 8 cm H20     Peak airway pressure: 22 cm H2O    Minute ventilation: 12.6 l/min        MEDS: Reviewed    Chest X-Ray:  CXR Results  (Last 48 hours)               10/21/20 0806  XR CHEST PORT Final result    Impression:  IMPRESSION:    1. Right IJ hemodialysis catheter terminates at the confluence of the   brachiocephalic veins. 2. Diffuse airspace disease may represent edema or pneumonia.        Narrative:  PORTABLE CHEST RADIOGRAPH/S: 10/21/2020 8:06 AM       INDICATION: Dialysis catheter placement. COMPARISON: 10/21/2020. TECHNIQUE: Portable frontal semiupright radiograph/s of the chest.       FINDINGS:    A right IJ hemodialysis catheter and left subclavian central line terminates at   the confluence of the cephalic veins. An ET tube and NG tube are in appropriate   position. The lungs are hypoinflated. Diffuse patchy airspace disease may represent edema   or pneumonia. The central airways are patent. No pneumothorax or pleural   effusion. 10/21/20 0406  XR CHEST PORT Final result    Impression:  IMPRESSION:   Life-support lines and tubes as described. Diffuse bilateral airspace disease. No pneumothorax. Narrative:  INDICATION: central line placement       EXAMINATION:  AP CHEST, PORTABLE       COMPARISON: None       FINDINGS: Single AP portable view of the chest demonstrates endotracheal tube   tip at the thoracic inlet satisfactory position. Nasogastric tube tip overlies   the stomach. Left subclavian catheter tip overlies the mid superior vena cava. Prior median sternotomy. Heart size upper normal. Patchy bilateral, diffuse   airspace disease with elevation left hemidiaphragm. No pneumothorax. Lucency   left upper quadrant likely represents air in the dilated stomach. There is no   new airspace disease. Multidisciplinary Rounds Completed:  y    ABCDEF Bundle/Checklist Completed:  Yes    CRITICAL CARE CONSULTANT NOTE  I had a face to face encounter with the patient, reviewed and interpreted patient data including clinical events, labs, images, vital signs, I/O's, and examined patient.   I have discussed the case and the plan and management of the patient's care with the consulting services, the bedside nurses and the respiratory therapist.      NOTE OF PERSONAL INVOLVEMENT IN CARE   This patient has a high probability of imminent, clinically significant deterioration, which requires the highest level of preparedness to intervene urgently. I participated in the decision-making and personally managed or directed the management of the following life and organ supporting interventions that required my frequent assessment to treat or prevent imminent deterioration. I personally spent 30 minutes of critical care time. This is time spentactively involved in patient care as well as the coordination of care. This does not include any procedural time.     Signed By: Franca Herrera MD     October 21, 2020

## 2020-10-21 NOTE — DIABETES MGMT
TERESA DELUCA  CLINICAL NURSE SPECIALIST CONSULT  PROGRAM FOR DIABETES HEALTH    FOLLOW UP NOTE    Presentation   Franny Watson is a 70 y.o. male admitted  with right lower abdominal quadrant pain. Initial work up revealed hydrornephorsis and UTI. HX: PMH: CAD w/ MI and subsequent CABG 1999/ s/p AAA repair with left leg nephropathy/ fem to fem bypass/ HLD/ HTN/ DM2-A1C pending    DX: CT scan-hydronephrosis      Current clinical course has been complicated by hyperglycemia. Diabetes: Patient has known Type 2 diabetes, treated with humalog per silvino report. No insulin or PO diabetic medications on PTA. Consulted by Provider for advanced diabetes nursing assessment and care, specifically related to   [] Transitioning off Excell [facility]   [x] Inpatient management strategy  [x] Home management assessment  [] Survival skill education    Diabetes-related medical history  Acute complications  hyperglycemia  Neurological complications  NONE  Microvascular disease  Nephropathy  Macrovascular disease  CAD and Myocardial infarction  Other associated conditions     HTN/ HLD/    Diabetes medication history  Drug class Currently in use Discontinued Never used   Biguanide  Metformin    DDP-4 inhibitor       Sulfonylurea Glipizide 5mg daily     Thiazolidinedione      GLP-1 RA      SGLT-2 inhibitors      Basal insulin      Fixed Dose  Combinations      Bolus insulin  Humalog can't recall dosing, but took himself off due to symptoms of hypoglycemia      Subjective   Mr. Rayne Greene deteriorated last evening with worsening hypoxia followed by a code blue with CPR. ROSC and subsequently intubated and transferred to a higher level of care. CRRT required due to worsening kidney function and no urine output. Troponin elevated 9.86  NT pro-BNP grossly elevated  CXR results : Diffuse bilateral airspace disease. No pneumothorax.     Patient reports the following home diabetes self-care practices:  Eating pattern    Physical activity pattern-gets along well. Monitoring pattern-checked BG once daily-    Taking medications pattern  [] Consistent administration  [] Affordable      Objective   Physical exam  General Intubated on ventilator  Vital Signs   Visit Vitals  BP (!) 108/56   Pulse 86   Temp 99 °F (37.2 °C)   Resp 18   Wt 90.9 kg (200 lb 6.4 oz)   SpO2 96%     Skin  Warm and dry. Heart   Regular rate and rhythm. No murmurs, rubs or gallops  Lungs  Clear to auscultation without rales or rhonchi  Extremities No foot wounds    Diabetic foot exam:    Left Foot     Visual Exam: normal    Pulse DP: 1+ (weak)   Filament test: absent sensation      Right Foot   Visual Exam: normal    Pulse DP: 2+ (normal)   Filament test: normal sensation    Vibratory sensation: normal DP & PT pulses +2. Laboratory  Lab Results   Component Value Date/Time    Hemoglobin A1c 7.1 (H) 10/20/2020 02:02 AM     No results found for: LDL, LDLC, DLDLP  Lab Results   Component Value Date/Time    Creatinine 4.29 (H) 10/21/2020 06:01 AM     Lab Results   Component Value Date/Time    Sodium 135 (L) 10/21/2020 06:01 AM    Potassium 4.7 10/21/2020 06:01 AM    Chloride 100 10/21/2020 06:01 AM    CO2 20 (L) 10/21/2020 06:01 AM    Anion gap 15 10/21/2020 06:01 AM    Glucose 200 (H) 10/21/2020 06:01 AM    BUN 71 (H) 10/21/2020 06:01 AM    Creatinine 4.29 (H) 10/21/2020 06:01 AM    BUN/Creatinine ratio 17 10/21/2020 06:01 AM    GFR est AA 17 (L) 10/21/2020 06:01 AM    GFR est non-AA 14 (L) 10/21/2020 06:01 AM    Calcium 8.9 10/21/2020 06:02 AM    Bilirubin, total 0.7 10/21/2020 12:59 AM    Alk.  phosphatase 159 (H) 10/21/2020 12:59 AM    Protein, total 7.1 10/21/2020 12:59 AM    Albumin 2.4 (L) 10/21/2020 06:01 AM    Globulin 4.3 (H) 10/21/2020 12:59 AM    A-G Ratio 0.7 (L) 10/21/2020 12:59 AM    ALT (SGPT) 82 (H) 10/21/2020 12:59 AM     Lab Results   Component Value Date/Time    ALT (SGPT) 82 (H) 10/21/2020 12:59 AM       Factors affecting BG pattern  Factor Dose Comments   Nutrition:  Carb-controlled meals     60 grams/meal    Drugs:  Other: for atrial fib? Heparin/Propofol/bicarb    Pain Abdominal pain    Infection UTI/Hydronephrosis  WBC 26   Lactic 4.6--> 1.5      Assessment and Plan   Nursing Diagnosis Risk for unstable blood glucose pattern   Nursing Intervention Domain 0802 Decision-making Support   Nursing Interventions Examined current inpatient diabetes control   Explored factors facilitating and impeding inpatient management  Identified self-management practices impeding diabetes control  Explored corrective strategies with patient and responsible inpatient provider   Informed patient of rational for insulin strategy while hospitalized     Evaluation   Mr. Cristian Andujar, has a long history  Type 2 diabetes- no recent A1C on file. Lab drawn this morning and result pending. He has co-morbid conditions in addition to his DM2. Since admission BG trends >200mg/dl which indicates the need for INITIATION of insulin subcutaneous order set (8360). BG trends during code were stable without lows. No changes made to basal dosing. Inpatient blood glucose management has been impacted by  [x] Kidney dysfunction -JAMAL / GFR 17/ Cr+3.51 -nephrology consulted. [x] Erratic meal consumption -poor appetite today  Recommendations   1. CONTINUE  Basal insulin   [x] 0.2 units/kg/D=17 units Lantus daily: IF AM BG <100mg/dl, consider decreasing basal dose by 10-20%. 2.  CONTINUE Corrective insulin  [x] Normal sensitivity    Discharge Planning   1. Will make recs closer to discharge. Billing Code(s)   I personally reviewed chart, notes, data and current medications in the medical record, and examined the patient at bedside before making care recommendations. Thank you for including us in their care. I spent 15 minutes in direct patient care today for this patient.   Time includes chart review, face to face with patient and collaboration with interdisciplinary care team.     Willis Molina, [facility]  Program for Diabetes Health  Access via [address] [place] [address]  [phone]

## 2020-10-21 NOTE — PROCEDURES
[facility]      Procedure Note - Intubation:   Performed by Dana Law MD .     Immediately prior to the procedure, the patient was reevaluated and found suitable for the planned procedure and any planned medications. Immediately prior to the procedure a time out was called to verify the correct patient, procedure, equipment, staff, and marking as appropriate. Medications given were None. A number 7.5 cuffed   ETT was placed to 22 cm at the teeth. Placement was evaluated by noting bilateral, symmetric breath sounds, good end-tidal CO2 detector color change , no breath sounds over stomach and bulb aspirator expands promptly. Attempts required: 1. Complications: none. Dentures were removed prior to procedure. .  The procedure was tolerated well.

## 2020-10-21 NOTE — PROGRESS NOTES
Rapid Response for desats and hypotension. Unable to get a SAT reading. Attempted ABG, but pt became pulseless CPR was initiated. BMV started. Intubated by MD.  After ROSC achieved, pt was transported to ICU via [facility] and placed on Ventilator.

## 2020-10-21 NOTE — PROGRESS NOTES
Cardiology Progress Note  10/21/2020     Admit Date: 10/19/2020  Admit Diagnosis: Hydronephrosis of right kidney [N13.30]  UTI (urinary tract infection) [N39.0]  Sepsis ([address] [A41.9]  Tachycardia [R00.0]  Leukocytosis [D72.829]  CC: none currently    Assessment:   Active Problems:    Leukocytosis (10/19/2020)      UTI (urinary tract infection) (10/19/2020)      Tachycardia (10/19/2020)      Sepsis ([address] (10/19/2020)      Hydronephrosis of right kidney (10/19/2020)      Plan:     Start heparin drip, restart ASA  Echo ordered  Cont to trend troponin  Holding metoprolol as on vasopressors, may start dobutamine if unable to wean given ischemic CM  May need ischemic evaluation at some point    Subjective:      Angella Fleeting events noted.  Currently sedated, on ventilator    Objective:    Physical Exam:  Overall VSSAF;    Visit Vitals  /61   Pulse 92   Temp 98.5 °F (36.9 °C)   Resp 24   Wt 200 lb 6.4 oz (90.9 kg)   SpO2 97%     Temp (24hrs), Av.3 °F (36.3 °C), Min:96.1 °F (35.6 °C), Max:98.5 °F (36.9 °C)    Patient Vitals for the past 8 hrs:   Pulse   10/21/20 0715 92   10/21/20 0700 90   10/21/20 0645 95   10/21/20 0630 82   10/21/20 0615 84   10/21/20 0600 84   10/21/20 0545 84   10/21/20 0530 84   10/21/20 0515 90   10/21/20 0500 83   10/21/20 0445 82   10/21/20 0430 83   10/21/20 0415 66   10/21/20 0400 63   10/21/20 0345 63   10/21/20 0330 (!) 58   10/21/20 0315 60   10/21/20 0300 (!) 56   10/21/20 0245 (!) 54   10/21/20 0230 (!) 56   10/21/20 0215 (!) 57   10/21/20 0200 62   10/21/20 0145 63    Patient Vitals for the past 8 hrs:   Resp   10/21/20 0841 24   10/21/20 0715 20   10/21/20 0700 22   10/21/20 0645 22   10/21/20 0630 22   10/21/20 0615 19   10/21/20 0600 22   10/21/20 0545 19   10/21/20 0530 21   10/21/20 0515 15   10/21/20 0500 21   10/21/20 0445 19   10/21/20 0430 21   10/21/20 0415 16   10/21/20 0400 18   10/21/20 0345 18   10/21/20 0330 19   10/21/20 0315 18   10/21/20 0300 20   10/21/20 0245 23   10/21/20 0230 21   10/21/20 0215 20   10/21/20 0200 21   10/21/20 0145 21    Patient Vitals for the past 8 hrs:   BP   10/21/20 0715 111/61   10/21/20 0700 105/62   10/21/20 0645 (!) 99/58   10/21/20 0630 (!) 99/55   10/21/20 0615 113/61   10/21/20 0600 107/64   10/21/20 0545 105/62   10/21/20 0530 114/60   10/21/20 0500 99/70   10/21/20 0445 98/62   10/21/20 0430 106/63   10/21/20 0415 96/78   10/21/20 0400 98/62   10/21/20 0315 (!) 93/48   10/21/20 0300 (!) 100/50   10/21/20 0245 (!) 91/54   10/21/20 0230 (!) 96/47   10/21/20 0200 101/61   10/21/20 0145 (!) 107/50      10/19 1901 - 10/21 0700  In: 567.4 [I.V.:567.4]  Out: 50 [Urine:50]      General Appearance: Intubated   Ears/Nose/Mouth/Throat:   Normal MM; anicteric. JVP: WNL   Resp:   Lungs clear to auscultation bilaterally. Nl resp effort. Cardiovascular:  RRR, S1, S2 normal, no new murmur. No gallop or rub. Abdomen:   Soft, non-tender, bowel sounds are present. Extremities: No edema bilaterally. Skin:  Neuro: Warm and dry.   Sedated                         Data Review:     Telemetry independently reviewed :   normal sinus rhythm         Labs:   Recent Results (from the past 24 hour(s))   GLUCOSE, POC    Collection Time: 10/20/20 12:40 PM   Result Value Ref Range    Glucose (POC) 200 (H) 65 - 100 mg/dL    Performed by Dwayne Zhu    GLUCOSE, POC    Collection Time: 10/20/20  5:06 PM   Result Value Ref Range    Glucose (POC) 191 (H) 65 - 100 mg/dL    Performed by Unique AMIN    GLUCOSE, POC    Collection Time: 10/20/20  9:07 PM   Result Value Ref Range    Glucose (POC) 222 (H) 65 - 100 mg/dL    Performed by Allyssa Segura    GLUCOSE, POC    Collection Time: 10/20/20 10:44 PM   Result Value Ref Range    Glucose (POC) 214 (H) 65 - 100 mg/dL    Performed by Allyssa Segura    GLUCOSE, POC    Collection Time: 10/20/20 10:52 PM   Result Value Ref Range    Glucose (POC) 220 (H) 65 - 100 mg/dL    Performed by Allyssa Segura    METABOLIC PANEL, COMPREHENSIVE    Collection Time: 10/21/20 12:59 AM   Result Value Ref Range    Sodium 128 (L) 136 - 145 mmol/L    Potassium 6.5 (H) 3.5 - 5.1 mmol/L    Chloride 99 97 - 108 mmol/L    CO2 17 (L) 21 - 32 mmol/L    Anion gap 12 5 - 15 mmol/L    Glucose 252 (H) 65 - 100 mg/dL    BUN 67 (H) 6 - 20 MG/DL    Creatinine 4.32 (H) 0.70 - 1.30 MG/DL    BUN/Creatinine ratio 16 12 - 20      GFR est AA 16 (L) >60 ml/min/1.73m2    GFR est non-AA 14 (L) >60 ml/min/1.73m2    Calcium 8.7 8.5 - 10.1 MG/DL    Bilirubin, total 0.7 0.2 - 1.0 MG/DL    ALT (SGPT) 82 (H) 12 - 78 U/L    AST (SGOT) 130 (H) 15 - 37 U/L    Alk.  phosphatase 159 (H) 45 - 117 U/L    Protein, total 7.1 6.4 - 8.2 g/dL    Albumin 2.8 (L) 3.5 - 5.0 g/dL    Globulin 4.3 (H) 2.0 - 4.0 g/dL    A-G Ratio 0.7 (L) 1.1 - 2.2     TROPONIN I    Collection Time: 10/21/20 12:59 AM   Result Value Ref Range    Troponin-I, Qt. 9.86 (H) <0.05 ng/mL   LACTIC ACID    Collection Time: 10/21/20 12:59 AM   Result Value Ref Range    Lactic acid 4.6 (HH) 0.4 - 2.0 MMOL/L   NT-PRO BNP    Collection Time: 10/21/20 12:59 AM   Result Value Ref Range    NT pro-BNP >35,000 (H) <125 PG/ML   PHOSPHORUS    Collection Time: 10/21/20 12:59 AM   Result Value Ref Range    Phosphorus 8.6 (H) 2.6 - 4.7 MG/DL   MAGNESIUM    Collection Time: 10/21/20 12:59 AM   Result Value Ref Range    Magnesium 2.4 1.6 - 2.4 mg/dL   PROTHROMBIN TIME + INR    Collection Time: 10/21/20 12:59 AM   Result Value Ref Range    INR 1.1 0.9 - 1.1      Prothrombin time 11.4 (H) 9.0 - 11.1 sec   URINALYSIS W/ RFLX MICROSCOPIC    Collection Time: 10/21/20 12:59 AM   Result Value Ref Range    Color YELLOW/STRAW      Appearance CLOUDY (A) CLEAR      Specific gravity 1.018 1.003 - 1.030      pH (UA) 5.0 5.0 - 8.0      Protein 100 (A) NEG mg/dL    Glucose Negative NEG mg/dL    Ketone Negative NEG mg/dL    Bilirubin Negative NEG      Blood SMALL (A) NEG      Urobilinogen 0.2 0.2 - 1.0 EU/dL    Nitrites Negative NEG      Leukocyte Esterase MODERATE (A) NEG      WBC 10-20 0 - 4 /hpf    RBC 0-5 0 - 5 /hpf    Epithelial cells FEW FEW /lpf    Bacteria Negative NEG /hpf   GLUCOSE, POC    Collection Time: 10/21/20  3:35 AM   Result Value Ref Range    Glucose (POC) 161 (H) 65 - 100 mg/dL    Performed by [address] [place] [address], POC    Collection Time: 10/21/20  4:59 AM   Result Value Ref Range    Glucose (POC) 143 (H) 65 - 100 mg/dL    Performed by Niki England    RENAL FUNCTION PANEL    Collection Time: 10/21/20  6:01 AM   Result Value Ref Range    Sodium 135 (L) 136 - 145 mmol/L    Potassium 4.7 3.5 - 5.1 mmol/L    Chloride 100 97 - 108 mmol/L    CO2 20 (L) 21 - 32 mmol/L    Anion gap 15 5 - 15 mmol/L    Glucose 200 (H) 65 - 100 mg/dL    BUN 71 (H) 6 - 20 MG/DL    Creatinine 4.29 (H) 0.70 - 1.30 MG/DL    BUN/Creatinine ratio 17 12 - 20      GFR est AA 17 (L) >60 ml/min/1.73m2    GFR est non-AA 14 (L) >60 ml/min/1.73m2    Calcium 8.8 8.5 - 10.1 MG/DL    Phosphorus 6.7 (H) 2.6 - 4.7 MG/DL    Albumin 2.4 (L) 3.5 - 5.0 g/dL   CBC WITH AUTOMATED DIFF    Collection Time: 10/21/20  6:01 AM   Result Value Ref Range    WBC 26.0 (H) 4.1 - 11.1 K/uL    RBC 4.29 4. 10 - 5.70 M/uL    HGB 11.8 (L) 12.1 - 17.0 g/dL    HCT 36.3 (L) 36.6 - 50.3 %    MCV 84.6 80.0 - 99.0 FL    MCH 27.5 26.0 - 34.0 PG    MCHC 32.5 30.0 - 36.5 g/dL    RDW 15.9 (H) 11.5 - 14.5 %    PLATELET 903 527 - 065 K/uL    MPV 11.1 8.9 - 12.9 FL    NRBC 0.0 0  WBC    ABSOLUTE NRBC 0.00 0.00 - 0.01 K/uL    NEUTROPHILS 91 (H) 32 - 75 %    LYMPHOCYTES 3 (L) 12 - 49 %    MONOCYTES 4 (L) 5 - 13 %    EOSINOPHILS 0 0 - 7 %    BASOPHILS 0 0 - 1 %    IMMATURE GRANULOCYTES 2 (H) 0.0 - 0.5 %    ABS. NEUTROPHILS 23.7 (H) 1.8 - 8.0 K/UL    ABS. LYMPHOCYTES 0.8 0.8 - 3.5 K/UL    ABS. MONOCYTES 1.0 0.0 - 1.0 K/UL    ABS. EOSINOPHILS 0.0 0.0 - 0.4 K/UL    ABS. BASOPHILS 0.0 0.0 - 0.1 K/UL    ABS. IMM.  GRANS. 0.5 (H) 0.00 - 0.04 K/UL    DF SMEAR SCANNED      PLATELET COMMENTS Large Platelets      RBC COMMENTS ANISOCYTOSIS  1+        RBC COMMENTS POLYCHROMASIA  1+        RBC COMMENTS TOXIC GRANULATION     MAGNESIUM    Collection Time: 10/21/20  6:01 AM   Result Value Ref Range    Magnesium 2.4 1.6 - 2.4 mg/dL   LACTIC ACID    Collection Time: 10/21/20  6:01 AM   Result Value Ref Range    Lactic acid 2.5 (HH) 0.4 - 2.0 MMOL/L   TROPONIN I    Collection Time: 10/21/20  6:01 AM   Result Value Ref Range    Troponin-I, Qt. 10.30 (H) <0.05 ng/mL   PROTHROMBIN TIME + INR    Collection Time: 10/21/20  6:01 AM   Result Value Ref Range    INR 1.1 0.9 - 1.1      Prothrombin time 11.5 (H) 9.0 - 11.1 sec   PTH INTACT    Collection Time: 10/21/20  6:02 AM   Result Value Ref Range    Calcium 8.9 8.5 - 10.1 MG/DL    PTH, Intact 399.8 (H) 18.4 - 88.0 pg/mL   GLUCOSE, POC    Collection Time: 10/21/20  6:38 AM   Result Value Ref Range    Glucose (POC) 184 (H) 65 - 100 mg/dL    Performed by Roscoe Fishman       Current medications reviewed       Owen Madera MD

## 2020-10-21 NOTE — PROGRESS NOTES
[facility]    ICU TEAM Progress Note    Name: Anusha Farias   : 1945   MRN: [id number]   Date: 10/20/2020      Subjective:   Progress Note: 10/20/2020      Reason for ICU Admission: Cardiac arrest      HPI: 69M w/ hx of CAD, s/p CABG, Fem/Fem bypass presented with lower abdominal pain, transferred to [facility] for urology services, patient was admitted under the hospitalist service, on 10/20/2020 evening, RRT called for acute hypoxia, patient arrested shortly after the RRT, code blue called, ACLS started, patient received 2 rounds of epinephrine, intubated. Prior to hypoxic episode patient was alert and awake. Wife at bedside, updated, patient transferring to ICU. POD:* No surgery found *    S/P:     Active Problem List:     Problem List  Never Reviewed          Codes Class    Leukocytosis ICD-10-CM: Z96.268  ICD-9-CM: 288.60         UTI (urinary tract infection) ICD-10-CM: N39.0  ICD-9-CM: 599.0         Tachycardia ICD-10-CM: R00.0  ICD-9-CM: 785.0         Sepsis ([address] ICD-10-CM: A41.9  ICD-9-CM: 038.9, 995.91         Hydronephrosis of right kidney ICD-10-CM: N13.30  ICD-9-CM: 818               Past Medical History:      has no past medical history on file. Past Surgical History:      has no past surgical history on file. Home Medications:     Prior to Admission medications    Medication Sig Start Date End Date Taking? Authorizing Provider   aspirin (ASPIRIN) 325 mg tablet Take 325 mg by mouth daily. Yes Provider, Historical   amLODIPine (NORVASC) 10 mg tablet Take 10 mg by mouth daily. Indications: high blood pressure   Yes Provider, Historical   ergocalciferol (ERGOCALCIFEROL) 1,250 mcg (50,000 unit) capsule Take 50,000 Units by mouth every seven (7) days. Yes Provider, Historical   multivitamin, tx-iron-ca-min (THERA-M w/ IRON) 9 mg iron-400 mcg tab tablet Take 1 Tab by mouth daily.    Yes Provider, Historical   oxyCODONE-acetaminophen (Percocet) 5-325 mg per tablet Take 1 Tab by mouth every six (6) hours as needed for Pain. Yes Provider, Historical   metoprolol succinate (TOPROL-XL) 50 mg XL tablet Take 50 mg by mouth daily. Indications: high blood pressure   Yes Provider, Historical       Allergies/Social/Family History:     No Known Allergies   Social History     Tobacco Use    Smoking status: Not on file   Substance Use Topics    Alcohol use: Not on file      No family history on file. Review of Systems:     Review of systems not obtained due to patient factors. Objective:   Vital Signs:  Visit Vitals  BP (!) 97/57   Pulse (!) 54   Temp 98.3 °F (36.8 °C)   Resp 19   Wt 84 kg (185 lb 3.2 oz)   SpO2 98%    O2 Flow Rate (L/min): 3 l/min O2 Device: Nasal cannula Temp (24hrs), Av.9 °F (36.6 °C), Min:97.1 °F (36.2 °C), Max:98.5 °F (36.9 °C)           Intake/Output:     Intake/Output Summary (Last 24 hours) at 10/20/2020 2312  Last data filed at 10/20/2020 0445  Gross per 24 hour   Intake 190.17 ml   Output    Net 190.17 ml       Physical Exam:    General:  Unresponsive    Eyes:  Sclera anicteric. Pupils equal, round, reactive to light. Mouth/Throat: Orotracheal tube in place. Neck: Supple. Lungs:   Diminished breath sounds bilaterally    Cardiovascular:  RR, sinus    Abdomen:   Soft, non-tender, bowel sounds normal, non-distended. Extremities: No cyanosis or edema. Skin: No acute rash or lesions.    Musculoskeletal:  No swelling or deformity   Psychiatric: Unresponsive          LABS AND  DATA: Personally reviewed  Recent Labs     10/20/20  0202 10/19/20  0351   WBC 35.9* 49.1*   HGB 12.9 13.5   HCT 40.5 41.5    256     Recent Labs     10/20/20  0202 10/19/20  0351   * 136   K 4.8 4.3    107   CO2 17* 19*   BUN 50* 37*   CREA 3.51* 2.59*   * 278*   CA 9.3 9.5   MG 1.9 1.7     Recent Labs     10/20/20  0202 10/19/20  0351   * 139*   TP 7.4 7.4   ALB 2.9* 3.3*   GLOB 4.5* 4.1*     Recent Labs     10/19/20  0351   INR 1.0   PTP 10.9   APTT 27.3      No results for input(s): PHI, PCO2I, PO2I, FIO2I in the last 72 hours. No results for input(s): CPK, CKMB, TROIQ, BNPP in the last 72 hours. Hemodynamics:   PAP:   CO:     Wedge:   CI:     CVP:    SVR:       PVR:       Ventilator Settings:  Mode Rate Tidal Volume Pressure FiO2 PEEP                    Peak airway pressure:      Minute ventilation:          MEDS: Reviewed    Chest X-Ray:  CXR Results  (Last 48 hours)    None          Assessment:     ICU Problems:  - Cardiac arrest  - JAMAL on CKD  - R sided hydronephrosis  - hyperglycemia  - Acute hypoxic respiratory failure  - leukocytosis    ICU Comprehensive Plan of Care:   Plans for this Shift:   1. Propofol for sedation, maintain RASS -1 to -2  2. Fentanyl PRN for pain  3. Continue on mechanical ventilation with PEEP, suspect hypoxic respiratory failure as reason that contributed to cardiac arrest  4. HFrEF, poor EF on POCUS, formal ECHO in AM, suspect volume overload leading to decompensated HF, diurese with Furosemide, will increase dose if ineffective, up to 1.5mg/kg   5. Cardiology consult in AM  6. Monitor UOP  7. Continue abx at this time  8. Labs Q6h for 24 hrs  9. Plan for diuresing, awakening trial in AM and SBT if able to achieve effective diuresis, patient may require CRRT for  Volume removal if unable to diurese  10. Nephrology following    Multidisciplinary Rounds Completed: Yes    ABCDEF Bundle/Checklist  Pain Medications: Acetaminophen  Target RASS: -2 - Light Sedation - Briefly awakens to voice (eyes open & contact <10 sec)  Sedation Medications: Propofol  CAM-ICU:  unable to assess  Mobility: Bedrest  PT/OT: N/A   Restraints: Soft wrist restraints  Discussed Plan of Care (goals of care):  Yes  Addressed Code Status: Full Code    CARDIOVASCULAR  Cardiac Gtts: None  SBP Goal of: > 90 mmHg  MAP Goal of: > 65 mmHg  Transfusion Trigger (Hgb): <7 g/dL    RESPIRATORY  Vent Goals:   Chlorhexidine   Optimize PEEP/Ventilation/Oxygenation  Goal Tidal Volume 6 cc/kg based on IBW  Aim for lung protective ventilation  Head of bed > 30 degrees  Aggressive bronchopulmonary hygiene  Incentive spirometry  DVT Prophylaxis (if no, list reason): Heparin   SPO2 Goal: > 92%  Pulmonary toilet: Incentive Spirometry     GI/  Erickson Catheter Present: Yes  GI Prophylaxis: Protonix (pantoprazole)   Nutrition: No NPO  IVFs: None  Bowel Movement: No  Bowel Regimen: None needed at this time  Insulin: Sliding Scale    ANTIBIOTICS  Antibiotics:  Ceftriaxone    T/L/D  Tubes: ETT  Lines: Peripheral IV  Drains: Erickson Catheter    SPECIAL EQUIPMENT  None    DISPOSITION  Stay in ICU    CRITICAL CARE CONSULTANT NOTE  I had a face to face encounter with the patient, reviewed and interpreted patient data including clinical events, labs, images, vital signs, I/O's, and examined patient. I have discussed the case and the plan and management of the patient's care with the consulting services, the bedside nurses and the respiratory therapist.      NOTE OF PERSONAL INVOLVEMENT IN CARE   This patient has a high probability of imminent, clinically significant deterioration, which requires the highest level of preparedness to intervene urgently. I participated in the decision-making and personally managed or directed the management of the following life and organ supporting interventions that required my frequent assessment to treat or prevent imminent deterioration. I personally spent 90 minutes of critical care time. This is time spent at this critically ill patient's bedside actively involved in patient care as well as the coordination of care and discussions with the patient's family. This does not include any procedural time which has been billed separately.     Favio Alex MD  Staff [facility]  10/20/2020

## 2020-10-21 NOTE — PROGRESS NOTES
Problem: Falls - Risk of  Goal: *Absence of Falls  Description: Document Adalberto Andino Fall Risk and appropriate interventions in the flowsheet. Outcome: Progressing Towards Goal  Note: Fall Risk Interventions:  Mobility Interventions: Communicate number of staff needed for ambulation/transfer, Patient to call before getting OOB         Medication Interventions: Patient to call before getting OOB, Teach patient to arise slowly    Elimination Interventions: Bed/chair exit alarm, Call light in reach, Toileting schedule/hourly rounds              Problem: Urinary Tract Infection - Adult  Goal: *Absence of infection signs and symptoms  Outcome: Progressing Towards Goal     Problem: Pain  Goal: *Control of Pain  Outcome: Progressing Towards Goal     Problem: Breathing Pattern - Ineffective  Goal: *Absence of hypoxia  Outcome: Progressing Towards Goal  Goal: *Use of effective breathing techniques  Outcome: Progressing Towards Goal     Problem: Ventilator Management  Goal: *Adequate oxygenation and ventilation  Outcome: Progressing Towards Goal  Goal: *Patient maintains clear airway/free of aspiration  Outcome: Progressing Towards Goal  Goal: *Absence of infection signs and symptoms  Outcome: Progressing Towards Goal  Goal: *Normal spontaneous ventilation  Outcome: Progressing Towards Goal     Problem: Pressure Injury - Risk of  Goal: *Prevention of pressure injury  Description: Document Luis Felipe Scale and appropriate interventions in the flowsheet. Outcome: Progressing Towards Goal  Note: Pressure Injury Interventions:  Sensory Interventions: Assess changes in LOC, Assess need for specialty bed, Check visual cues for pain, Discuss PT/OT consult with provider, Float heels, Keep linens dry and wrinkle-free, Minimize linen layers, Maintain/enhance activity level, Monitor skin under medical devices, Pad between skin to skin, Pressure redistribution bed/mattress (bed type), Turn and reposition approx.  every two hours (pillows and wedges if needed)    Moisture Interventions: Absorbent underpads, Check for incontinence Q2 hours and as needed, Assess need for specialty bed, Apply protective barrier, creams and emollients, Internal/External urinary devices, Minimize layers, Moisture barrier    Activity Interventions: Assess need for specialty bed, Pressure redistribution bed/mattress(bed type), PT/OT evaluation    Mobility Interventions: Assess need for specialty bed, Float heels, Pressure redistribution bed/mattress (bed type), Turn and reposition approx.  every two hours(pillow and wedges), PT/OT evaluation    Nutrition Interventions: Document food/fluid/supplement intake, Discuss nutritional consult with provider    Friction and Shear Interventions: Apply protective barrier, creams and emollients, Lift sheet, Minimize layers                Problem: Non-Violent Restraints  Goal: *Removal from restraints as soon as assessed to be safe  Outcome: Progressing Towards Goal  Goal: *No harm/injury to patient while restraints in use  Outcome: Progressing Towards Goal  Goal: *Patient's dignity will be maintained  Outcome: Progressing Towards Goal  Goal: Non-violent Restaints:Standard Interventions  Outcome: Progressing Towards Goal  Goal: Non-violent Restraints:Patient Interventions  Outcome: Progressing Towards Goal  Goal: Patient/Family Education  Outcome: Progressing Towards Goal

## 2020-10-21 NOTE — DIALYSIS
Davita Acutes: CRRT     Order:  Mode:CVVHD  BFR:200mL/min  Temp:37C  Dose:25mL/kg/hr  Priming solution:NS  Factor:50mL/hr  Prismasol:2K;3.5Ca     Order,labs,consent,hep B status,code status, and notes reviewed.     Hep B unknown-ordered     Access: RIJ CVC (10/21/20) Dressing C/D/I No s/s of infection. Each catheter limb disinfected per p&p, caps removed, hubs disinfected per p&p. Aspirated and discarded 2mL of fluid from each port. Lines flushed well with NS. Arterial pressure difficulties. Lines reversed.  Tx initiated without further incident.      BP:133/62  HR:81  Start time:1145  BFR:200mL/min  Dialysate:2250mL/hr  AP:-73  RP:45  PD:52  TMP:11

## 2020-10-21 NOTE — PROGRESS NOTES
RN received in report that the patient had periodic SOB and that they were struggling to get a good oxygen saturation reading all day. 2221: RN went into pts room to give night medications. Pt took medications. Then sat up on the edge of the bed to use the urinal. Pt suddenly became short of breath. RN bumped up the oxygen from 3L to 6L nasal cannula. RN still unable to get a good oxygen reading, pt placed on nonrebreather 15L, RR called @ 2241. During RR pt began to decline and having difficulty maintaining his airway. Code blue called @ 61-41-66-40. CCU RN began bagging pt. Pt then loss pulses. CPR started.

## 2020-10-21 NOTE — PROCEDURES
[facility]      Procedure Note - Arterial Access:   Performed by Taisha Freeman NP . Immediately prior to the procedure, the patient was reevaluated and found suitable for the planned procedure and any planned medications. Immediately prior to the procedure a time out was called to verify the correct patient, procedure, equipment, staff, and marking as appropriate. Insertion Date: 10/21/20 [id number]   Procedure Location:  ICU. Condition: Emergency. Consent:  YES. Method: Seldinger technique. Site Prep: ChloraPrep. Procedure: Arterial Catheter Insertion in Left, Radial Artery   Catheter inserted into a new site. Number of Attempts:  1 Indication: Monitoring and Blood Drawing. Complication None. Performed By:performed the above procedure myself. The procedure was tolerated well.

## 2020-10-21 NOTE — PROGRESS NOTES
[facility] Adult  Hospitalist Group     ICU Transfer/Accept Summary     This patient is being transferred INTO THE ICU  DATE OF TRANSFER: 10/20/2020       PATIENT ID: Mike Andre  MRN: [id number]   YOB: 1945    PRIMARY CARE PROVIDER: Unknown, Provider   DATE OF ADMISSION: 10/19/2020  1:23 AM    ATTENDING PHYSICIAN: Bee Trinidad NP  CONSULTATIONS:   IP CONSULT TO UROLOGY  IP CONSULT TO NEPHROLOGY  IP CONSULT TO CARDIOLOGY    PROCEDURES/SURGERIES:   * No surgery found *    REASON FOR ADMISSION: <principal problem not specified>     HOSPITAL PROBLEM LIST:  Patient Active Problem List   Diagnosis Code    Leukocytosis D72.829    UTI (urinary tract infection) N39.0    Tachycardia R00.0    Sepsis ([address] A41.9    Hydronephrosis of right kidney N13.30         Brief HPI and Hospital Course:    Mike Andre is a 70 y.o. male with a past medical history significant for CAD, DM, CABG, HTN, AAA repair, fem-fem bypass with chronic left lower extremity paralysis and hyperlipidemia admitted 10/19/2020 for pain in the right lower abdomen, UTI, acute renal insufficiency and sepsis. CT of the abdomen and pelvis showed moderate right hydronephrosis bilateral nephrolithiasis. He was doing well until approximately 10:30 PM tonight when he developed shortness of breath while at rest.  Initially, he was found on 15 L via NRB, but he quickly became unresponsive and pulseless. A CODE BLUE was called for intubation. CPR was initiated and ROSC was achieved. Assessment and Plan:    Right hydroureteronephrosis  · Urology following    Sepsis  UTI  · CT abd/pelv: Patchy airspace and interstitial opacities right lung base could be infectious/inflammatory or asymmetric edema   · Rocephin, doxy, DuoNebs  · Track cultures    Acute renal failure  · CT abd/pelv: Right hydronephrosis and perinephric stranding with no obstructing calculus.   Several punctate calculi in the right kidney but no ureteral calculus identified. Diffuse bladder wall thickening. · Nephrology following    RLQ pain  · Avoid morphine due to reported history of diaphoresis and tachycardia  · As needed fentanyl  · N.p.o.    PMH: CAD, status post CABG, T2DM, chronic LLE paralysis, HTN                        PHYSICAL EXAMINATION:  Visit Vitals  BP (!) 154/69   Pulse 84   Temp 98.3 °F (36.8 °C)   Resp 30   Wt 90.9 kg (200 lb 6.4 oz)   SpO2 98%       General:           Somnolent difficult to arouse  HEENT:           Atraumatic, MMM            Neck:               Supple, symmetrical,  thyroid: non tender  Lungs:              Diminished throughout. No Wheezing or Rhonchi. No rales. Heart:              Regular  rhythm,  No  murmur   No edema  Abdomen:       Soft, Not distended. Bowel sounds normal  Extremities:     No cyanosis. No clubbing,  +2 distal pulses  Skin:                Not pale.   Not Jaundiced  No rashes   Psych:           Unable to assess  Neurologic:       PERRLA    CODE STATUS:  x Full Code    DNR    Partial    Comfort Care     d/w Dr. Mignon Lilly       Signed:   Mary Carmen Mcdaniel NP  Date of Service:  10/20/2020  11:40 PM

## 2020-10-21 NOTE — PROGRESS NOTES
0200: Bedside and Verbal shift change report given to Garo De Leon RN (oncoming nurse) by Victor Manuel Belcher RN (offgoing nurse). Report included the following information SBAR, Kardex, Intake/Output, MAR, Accordion, Recent Results, Cardiac Rhythm NSR, afib, cardiac arrest and Alarm Parameters . Primary Nurse Kamille Giordano and Shruthi Brooke RN performed a dual skin assessment on this patient No impairment noted  Luis Felipe score is 10      Shift Summary: Pt received total of 4 mg Bumex, 80 mg Lasix yielding 50 cc UOP. K 6.5, Lactic 4.6, troponin 9.86. Intensivist notified. Received orders for 10 units IV insulin, 250cc D10, kayexalate. Repeat K 7.7. Lactic repeat 2.5. Upon ABG, pH 7.05, CO2 57.9, HCO3 15.8. Received orders for 3 amps bicarb, RT to titrate vent rate to 22 from 14 per intensivist.  L subclavian central line, R IJ dialysis catheter, and L radial art line placed by intensivist. Pt on levo gtt, propofol gtt.  Received

## 2020-10-21 NOTE — PROGRESS NOTES
Music Therapy Assessment  Alfredo Brennan [id number]     1945  70 y.o.  male    Patient Telephone Number: [phone] (home)   Religion Affiliation: Maurilio Mathur   Language: English   Patient Active Problem List    Diagnosis Date Noted    Leukocytosis 10/19/2020    UTI (urinary tract infection) 10/19/2020    Tachycardia 10/19/2020    Sepsis ([address] 10/19/2020    Hydronephrosis of right kidney 10/19/2020        Date: 10/21/2020            Total Time (in minutes): 34          [facility] 7S2 INTENSIVE CARE    Mental Status:   [  ] Alert [  ] Jones Knows [  ]  Confused  [x] Minimally responsive  [  ] Sleeping    Communication Status: [  ] Impaired Speech [  ] Nonverbal -N/A    Physical Status:  [x] Oxygen in use - Ventilator [  ] Hard of Hearing [  ] Vision Impaired  [  ] Ambulatory  [  ] Ambulatory with assistance [  ] Non-ambulatory     Music Preferences, Background: [address], especially [employer].      Clinical Problem addressed: Support healthy pt/family coping and relaxation    Goal(s) met in session:  Physical/Pain management (Scale of 1-10):    Pre-session rating: Pt couldn't report on pain  Post-session rating: Pt couldn't report on pain  [  ] Increased relaxation   [  ] Affected breathing patterns  [  ] Decreased muscle tension   [  ] Decreased agitation  [  ] Affected heart rate    [  ] Increased alertness     Emotional/Psychological:  [  ] Increased self-expression   [  ] Decreased aggressive behavior   [  ] Decreased feelings of stress  [  ] Discussed healthy coping skills     [  ] Improved mood    [  ] Decreased withdrawn behavior     Social:  [  ] Decreased feelings of isolation/loneliness [x] Positive social interaction   [x] Provided support and/or comfort for family/friends    Spiritual:  [  ] Spiritual support    [  ] Expressed peace  [  ] Expressed malcom    [  ] Discussed beliefs    Techniques Utilized (Check all that apply):   [  ] Procedural support MT      [x] Music for relaxation [x] Patient preferred music  [  ] Carmen analysis  [  ] Flaquita Patches choice  [  ] Music for validation  [  ] Entrainment  [x] Movement to music [  ] Guided visualization  [  ] Tommie Naguabo  [  ] Patient instrument playing [  ] Flaquita Patches writing  [  ] Alma Rosa Galindo along   [  ] Erin Danger  [  ] Sensory stimulation  [x] Active Listening  [  ] Music for spiritual support [  ] Making of CDs as gifts    Session Observations:  Referred by Chaplain Laurie. Patient (pt) was lying in bed with his eyes closed, and his spouse Tong Ruizman) was at bedside. This music therapy intern and music therapist (MT Team) asked the pt's spouse how they were both doing. Pt's spouse said they were not doing well and shared about how quickly the pt's health has declined. MT Team provided active listening. MT Team asked the pt's spouse what the pt's favorite kinds of music are and she shared these. MT Team softly sang and played the [employer] with guitar at a slow tempo. MT Team asked questions about the pt's life. Pt's spouse shared about his work and his need to retire early due to his health. MT Team softly sang and played the Ross Satchel with guitar at a slow tempo. Pt's spouse increased self-expression as evidenced by (AEB) sharing about a musician of interest to her. She went on to share about all of her feelings around the pt's health decline and became briefly tearful. MT Team provided words of validation. Team sang and played the [employer] song Your Cheatin' Heart with guitar. Pt's spouse expressed gratitude for the music, saying it helped take her mind off things. Will follow as able. Mekhi Dobbins, Music Therapy Intern  And   Christelle Jefferson, MT-BC (Music Therapist-Board Certified) [facility].    Referral-based service

## 2020-10-21 NOTE — PROGRESS NOTES
RUTHY  Patient presented to [address] [facility] with c/o abdominal pain. CT: Right nephrosis. Transferred to [facility] as [facility] does not have an urologist.   RUR: 14%  Disposition: TBD  Plan for utilizing home health:    TBD      PCP: First and Last name:  Dr Snehal Ryan   Name of Practice:    Are you a current patient: Yes/No: Yes   Approximate date of last visit: 2 weeks ago   Can you participate in a virtual visit with your PCP: Yes                    Current Advanced Directive/Advance Care Plan: Not on file, patient currently intubated                         Care manager met with patient's wife Torito Tinococh: [phone] to introduce self and explain role. Patient was independent prior to admission , no previous HH or equipment needs. Wife confirmed his PCP to be Dr Snehal Ryan and he last saw him 2 weeks ago and [facility] [place] [facility] [place] [facility]. Patient to start on [facility] today. Care management will follow for transitions of care  Kalyan Patterson RN,Care Management     .  Care Management Interventions  PCP Verified by CM: Yes(Dr Snehal Ryan)  MyChart Signup: No  Discharge Durable Medical Equipment: No  Physical Therapy Consult: No  Occupational Therapy Consult: No  Speech Therapy Consult: No  Current Support Network: (wife Torito Tinococh: [phone])

## 2020-10-21 NOTE — PROGRESS NOTES
ADULT PROTOCOL: JET AEROSOL ASSESSMENT    Patient  Jose Ugarte     70 y.o.   male     10/20/2020  8:45 PM    Breath Sounds Pre Procedure: Right Breath Sounds: Diminished                               Left Breath Sounds: Diminished    Breath Sounds Post Procedure: Right Breath Sounds: Diminished                                 Left Breath Sounds: Diminished    Breathing pattern: Pre procedure Breathing Pattern: Regular          Post procedure Breathing Pattern: Regular    Heart Rate: Pre procedure Pulse: 85           Post procedure Pulse: 78    Resp Rate: Pre procedure Respirations: 18           Post procedure Respirations: 18    Peak Flow: Pre bronchodilator             Post bronchodilator       FVC/FEV1:      Incentive Spirometry:             Cough: Pre procedure Cough: Congested               Post procedure Cough: Congested    Suctioned: NO    Sputum: Pre procedure                   Post procedure      Oxygen: O2 Device: Nasal cannula   3 lpm     Changed: NO    SpO2: Pre procedure SpO2: 94 %   with oxygen              Post procedure SpO2: 95 %  with oxygen    Nebulizer Therapy: Current medications Aerosolized Medications: DuoNeb      Changed: YES, changed to q4 PRN    Smoking History: never, denies having smoked    Problem List:   Patient Active Problem List   Diagnosis Code    Leukocytosis D72.829    UTI (urinary tract infection) N39.0    Tachycardia R00.0    Sepsis ([address] A41.9    Hydronephrosis of right kidney N13.30       Respiratory Therapist: Criss Elizabeth, RT

## 2020-10-21 NOTE — PROGRESS NOTES
Spiritual Care Assessment/Progress Note  [facility]      NAME: Patel Sandoval      MRN: [id number]  AGE: 70 y.o. SEX: male  Holiness Affiliation: Hoahaoism   Language: English     10/21/2020     Total Time (in minutes): 228     Spiritual Assessment begun in [address] through conversation with:         []Patient        [x] Family    [] Friend(s)        Reason for Consult: Code Blue/99     Spiritual beliefs: (Please include comment if needed)     [x] Identifies with a malcom tradition: per wife         [] Supported by a malcom community:            [] Claims no spiritual orientation:           [] Seeking spiritual identity:                [] Adheres to an individual form of spirituality:           [] Not able to assess:                           Identified resources for coping:      [] Prayer                               [] Music                  [] Guided Imagery     [] Family/friends                 [] Pet visits     [] Devotional reading                         [x] Unknown     [] Other                                           Interventions offered during this visit: (See comments for more details)          Family/Friend(s):  Affirmation of emotions/emotional suffering, Catharsis/review of pertinent events in supportive environment, Affirmation of malcom, Iconic (affirming the presence of God/Higher Power), Guidance concerning next steps/process to be expected, Initial Assessment, Normalization of emotional/spiritual concerns, Prayer (actual), Prayer (assurance of)     Plan of Care:     [x] Support spiritual and/or cultural needs    [] Support AMD and/or advance care planning process      [] Support grieving process   [] Coordinate Rites and/or Rituals    [] Coordination with community clergy   [] No spiritual needs identified at this time   [] Detailed Plan of Care below (See Comments)  [] Make referral to Music Therapy  [] Make referral to Pet Therapy     [] Make referral to Addiction services  [] Make referral to [facility]  [] Make referral to Spiritual Care Partner  [] No future visits requested        [] Follow up visits as needed     Comments:  responded to Code Blue and to request from staff as pt's wife was present.  offered support to Lima Islas outside pt's room as staff offered care and shared a spoken prayer. Escorted Lima Islas to ICU waiting area when pt was transferred. Accompanied her to visit patient when she was able to go to bedside. Lima Islas reached out to her son by phone and expected him to arrive at the hospital, but son was delayed. With assistance from pt's nurse, Lima Islas was able to get a room at the guest house.  escorted Lima Islas to the guest house and her son arrived a short time later. Pt's wife shared that they are connected with a community of [employer]. She attempted to reach her , but did not have the correct number for him.  offered emotional and spiritual support to pt's wife throughout visit as she continued to process events of this evening and her feelings of uncertainty. Assured her of ongoing  support as able/needed.      Sonal Edmondson, [address] [place] [address] Road

## 2020-10-21 NOTE — PROGRESS NOTES
Bedside and Verbal shift change report given to Dion Beck (oncoming nurse) by Radha Louis (offgoing nurse). Report included the following information SBAR, Kardex, MAR, Recent Results, Cardiac Rhythm NSR and Dual Neuro Assessment.

## 2020-10-21 NOTE — PROGRESS NOTES
0805: Dr Manish Barnes paged re: overnight events. 1415: VCS paged re: overnight events. 0345: Dr Manish Barnes returned page. Up to date on recent overnight events, will be at bedside shortly. 7242: D/w Dr Trent Fly via telephone. Per MD, give 325 ASA now, 81 mg daily after, and rec's heparinizing pt. This info relayed to Dr Leopold Golds. 1030: Dr Manish Barnes at bedside, will start CRRT.    1400: Troponin resulted at 14, Donaldo Alfredo NP aware. EKG ordered. Continue to trend troponin    1415: Update provided to pt's daughter Yonatan Christina via telephone with permission from wife at bedside. Initially, Jovanna Henderson did not want information given to Dustin Gay because \"Genesis will lie and try to obtain rights. \" Shortly after, Jovanna Henderson stated it is OK to given Dustin Gay health updates but does not want her to be consenting for any procedures/care. Jovanna Henderson at bedside identifies herself as the pt's wife. Daughter Yonatan Christina states Jovanna Henderson and pt, although amicable and still living together, are legally , making Dustin Gay the [facility]. Dustin Gay expresses concern that Jovanna Henderson has recently had memory issues. When asked, Dustin Gay states she has a brother and a sister but Yohana Obrien do not speak to him\" (the patient). This info relayed to charge nurse and ICU director. 1730: Heparin gtt increased and bolus ordered per protocol. Repeat PTT at 2330 tonight.

## 2020-10-21 NOTE — PROCEDURES
[facility]      Procedure Note - Central Venous Access:   Performed by Milka Saleh NP    Obtained emergent Consent. Immediately prior to the procedure, the patient was reevaluated and found suitable for the planned procedure and any planned medications. Immediately prior to the procedure a time out was called to verify the correct patient, procedure, equipment, staff, and marking as appropriate. The site was prepped with ChloraPrep. Using Seldinger technique a Triple Lumen CVC was placed in the Left, Subclavian Vein via direct cannulation with 1 number of attempts for Monitoring, Blood Drawing and IV Access. Ultrasound Guidance was utilized. There was good blood return. The following complications were encountered: None. A follow-up chest x-ray was ordered post procedure. The procedure was tolerated well.

## 2020-10-21 NOTE — PROGRESS NOTES
Follow-up visit with pt's wife Nava Landry who was present at bedside. Nava Landry was tearful at times as she reflected on events of last night and her sense of uncertainty at this time. Normalized her feelings and offered emotional and spiritual support. Shared a spoken prayer at bedside. Pt's stepson Duane (Claudia's son) is staying close by and family may be an extra layer of support as needed as wife receives information.  consulted with pt's nurse prior to leaving the unit.     Sonal Kaur, [address] [place] [address]

## 2020-10-21 NOTE — DIALYSIS
[facility]      Procedure Note - Central Venous Access:   Performed by Elvis Riley NP    Obtained emergent Consent. Immediately prior to the procedure, the patient was reevaluated and found suitable for the planned procedure and any planned medications. Immediately prior to the procedure a time out was called to verify the correct patient, procedure, equipment, staff, and marking as appropriate. The site was prepped with ChloraPrep. Using Seldinger technique a Hemodialysis Catheter was placed in the Right, Internal Jugular Vein via direct cannulation with 1 number of attempts for Dialysis. Ultrasound Guidance was utilized. There was good blood return. The following complications were encountered: None. A follow-up chest x-ray was ordered post procedure. The procedure was tolerated well.

## 2020-10-21 NOTE — PROGRESS NOTES
Name: Luanne Pratt MRN: [id number]   : 1945 Hospital: . [address]   Date: 10/21/2020        IMPRESSION:   · CKD stage 4. The etiology is not clear, but patient is aware of having CKD for at least 9 years. · JAMAL- anuric, after the Cardiac arrest. Patient is hypotensive. Has low glomerular pressure. · Hypervolemia with hypoxia- on Vent  · Hypervolemia- pulmonary edema, poorly responsive to diuretics. · Hyperkalemia- resolved after conservative measures. · UTI- on AB's  · Right hydronephrosis- urology is evaluating. PLAN:   · Patient is critically ill. He will require RRT as a life saving procedure. Due to his recent hemodynamic event and continued hypotension patient will be started on CRT. If he becomes more stable and still requires RRT- will transition to [facility]. · SADIA Melton after an informed consent from wife. · Monitor the GFR very cloesy- I's and O's, daily electrolytes. ·  Continue AB therapy  · Continue full support, including pressors. · Urology following for right ureteral obstruction. Subjective/Interval History:   I have reviewed the flowsheet and previous days notes. ROS:Review of systems not obtained due to patient factors. Objective:   Vital Signs:    Visit Vitals  BP (!) 104/55   Pulse 85   Temp 99 °F (37.2 °C)   Resp 16   Wt 90.9 kg (200 lb 6.4 oz)   SpO2 97%       O2 Device: Endotracheal tube, Ventilator   O2 Flow Rate (L/min): 15 l/min   Temp (24hrs), Av.4 °F (36.3 °C), Min:96.1 °F (35.6 °C), Max:99 °F (37.2 °C)       Intake/Output:   Last shift:      No intake/output data recorded. Last 3 shifts: 10/19 1901 - 10/21 0700  In: 567.4 [I.V.:567.4]  Out: 50 [Urine:50]    Intake/Output Summary (Last 24 hours) at 10/21/2020 1035  Last data filed at 10/21/2020 0700  Gross per 24 hour   Intake 327.23 ml   Output 50 ml   Net 277.23 ml        Physical Exam:  General:    Alert, awake,cooperative, on vent.   Head:   Normocephalic, without obvious abnormality, atraumatic. Eyes:   Conjunctivae/corneas clear. Nose:  Nares normal. No drainage or sinus tenderness. Throat:    ET tube in place. Neck:  Supple, symmetrical,  no adenopathy, thyroid: non tender    no carotid bruit and no JVD. Lungs:   Diminished to auscultation bilaterally. No Wheezing or Rhonchi. + rales. Chest wall:  No tenderness or deformity. No Accessory muscle use. Heart:   Regular rate and rhythm,  no murmur, rub or gallop. Abdomen:   Soft, non-tender. Not distended. Bowel sounds normal. No masses  Extremities: Extremities normal, atraumatic, No cyanosis. 1+ edema. No clubbing  Skin:     Texture, turgor normal. No rashes or lesions. Not Jaundiced  Psych:  Fair insight. Not depressed. Not anxious or agitated. Neurologic: Seems oriented. Blinking in answer to my questions, follows simple commands. DATA:  Labs:  Recent Labs     10/21/20  0602 10/21/20  0601 10/21/20  0059 10/20/20  0202   NA  --  135* 128* 133*   K  --  4.7 6.5* 4.8   CL  --  100 99 101   CO2  --  20* 17* 17*   BUN  --  71* 67* 50*   CREA  --  4.29* 4.32* 3.51*   CA 8.9 8.8 8.7 9.3   ALB  --  2.4* 2.8* 2.9*   PHOS  --  6.7* 8.6*  --    MG  --  2.4 2.4 1.9     Recent Labs     10/21/20  0601 10/20/20  0202 10/19/20  0351   WBC 26.0* 35.9* 49.1*   HGB 11.8* 12.9 13.5   HCT 36.3* 40.5 41.5    235 256     No results for input(s): RASHMI, KU, CLU, CREAU in the last 72 hours. No lab exists for component: PROU    Total time spent with patient:  55 minutes    [x] Critical Care Provided   I came to see patient at 9.50 a . My visit was completed at 10.45 including coordination of care, discussion with family, obtaining informed consent and review of findings.     Care Plan discussed with:   Family, Colleague, Nursing, Medical Team    Linda Min MD

## 2020-10-21 NOTE — PROGRESS NOTES
Patient: Ruthie Duval MRN: [id number]  SSN: [id number]    YOB: 1945  Age: 70 y.o. Sex: male        ADMITTED: 10/19/2020 to Efren Emerson MD by Trace Ballard MD for Hydronephrosis of right kidney [N13.30]  UTI (urinary tract infection) [N39.0]  Sepsis ([address] [A41.9]  Tachycardia [R00.0]  Leukocytosis [D72.829]     Per RN note, last evening code blue called. Pt then lost pulses. CPR started. Patient transferred to ICU. On CRT     99.2, +levophed  WBC: 26  Bcx: NGTD  Hgb: 11.8  Cr: 4.29, from 4.32, 3.51, 2.59  10/21 UA: 10-20 WBCs  10/19 UA: >100 WBC, mod leuks  UCx: NGTD   Lactic acid: 1.5  + rocephin  + doxycycline  Erickson in place, low UOP documented    10/20/2020  Renal US: IMPRESSION:   Stable mild right hydronephrosis. 10/19/2020  CT A/P wo:  IMPRESSION:     1. Right hydronephrosis and perinephric stranding with no obstructing calculus. Several punctate calculi in the right kidney but no ureteral calculus  identified. Diffuse bladder wall thickening. 2. Patchy airspace and interstitial opacities right lung base could be  infectious/inflammatory or asymmetric edema.          Vitals: Temp (24hrs), Av.4 °F (36.3 °C), Min:96.1 °F (35.6 °C), Max:99 °F (37.2 °C)    Blood pressure (!) 104/54, pulse 84, temperature 99 °F (37.2 °C), resp. rate 16, weight 90.9 kg (200 lb 6.4 oz), SpO2 97 %. Intake and Output:  10/19 1901 - 10/21 0700  In: 567.4 [I.V.:567.4]  Out: 50 [Urine:50]  No intake/output data recorded. JORDY Output lats 24 hrs: No data found. JORDY Output last 8 hrs: No data found. BM over last 24 hrs: No data found.     Labs:  CBC:   Lab Results   Component Value Date/Time    WBC 26.0 (H) 10/21/2020 06:01 AM    HCT 36.3 (L) 10/21/2020 06:01 AM    PLATELET 256  06:01 AM     BMP:   Lab Results   Component Value Date/Time    Glucose 200 (H) 10/21/2020 06:01 AM    Sodium 135 (L) 10/21/2020 06:01 AM    Potassium 4.7 10/21/2020 06:01 AM    Chloride 100 10/21/2020 06:01 AM    CO2 20 (L) 10/21/2020 06:01 AM    BUN 71 (H) 10/21/2020 06:01 AM    Creatinine 4.29 (H) 10/21/2020 06:01 AM    Calcium 8.9 10/21/2020 06:02 AM     Assessment/Plan:     · Sepsis  · Leukocytosis  · Pyelonephritis, Right  · Mild hydronephrosis right   · JAMAL  · Punctate renal calculi, Right     - agree with hicks, strict I/Os   - IV abx and supportive care  - Monitor WBC and Cr  - nephrology following, on CRT  - will continue to follow     Supervising MD, Dr. Stas Sharma    Signed By: Adiel Gutiérrez NP - October 21, 2020

## 2020-10-21 NOTE — PROGRESS NOTES
Transported to ICU via 2025 [facility]. Placed on Vent. Tolerating well. 10/20/20 2310   Vent Settings   FIO2 (%) 100 %   CMV Rate Set 20   Back-Up Rate 20   Vt Set (ml) 550 ml   PEEP/VENT (cm H2O) 8 cm H20   Vent Method/Mode   Ventilation Method Conventional   Ventilator Mode Assist control;Volume control   $$ Ventilator Initial Initial Vent Day   Will continue to monitor.

## 2020-10-22 ENCOUNTER — APPOINTMENT (OUTPATIENT)
Dept: GENERAL RADIOLOGY | Age: 75
DRG: 870 | End: 2020-10-22
Attending: EMERGENCY MEDICINE
Payer: MEDICARE

## 2020-10-22 LAB
ALBUMIN SERPL-MCNC: 2.4 G/DL (ref 3.5–5)
ALBUMIN SERPL-MCNC: 2.4 G/DL (ref 3.5–5)
ALBUMIN SERPL-MCNC: 2.5 G/DL (ref 3.5–5)
ALBUMIN SERPL-MCNC: 2.8 G/DL (ref 3.5–5)
ANION GAP SERPL CALC-SCNC: 8 MMOL/L (ref 5–15)
ANION GAP SERPL CALC-SCNC: 9 MMOL/L (ref 5–15)
APTT PPP: 51.7 SEC (ref 22.1–32)
APTT PPP: 52.7 SEC (ref 22.1–32)
ARTERIAL PATENCY WRIST A: ABNORMAL
ARTERIAL PATENCY WRIST A: ABNORMAL
BACTERIA SPEC CULT: NORMAL
BASE DEFICIT BLDA-SCNC: 14.9 MMOL/L
BASE DEFICIT BLDA-SCNC: 8.4 MMOL/L
BASOPHILS # BLD: 0 K/UL (ref 0–0.1)
BASOPHILS NFR BLD: 0 % (ref 0–1)
BDY SITE: ABNORMAL
BDY SITE: ABNORMAL
BUN SERPL-MCNC: 28 MG/DL (ref 6–20)
BUN SERPL-MCNC: 30 MG/DL (ref 6–20)
BUN SERPL-MCNC: 34 MG/DL (ref 6–20)
BUN SERPL-MCNC: 44 MG/DL (ref 6–20)
BUN/CREAT SERPL: 17 (ref 12–20)
BUN/CREAT SERPL: 18 (ref 12–20)
CALCIUM SERPL-MCNC: 8.9 MG/DL (ref 8.5–10.1)
CALCIUM SERPL-MCNC: 9.1 MG/DL (ref 8.5–10.1)
CALCIUM SERPL-MCNC: 9.1 MG/DL (ref 8.5–10.1)
CALCIUM SERPL-MCNC: 9.5 MG/DL (ref 8.5–10.1)
CHLORIDE SERPL-SCNC: 102 MMOL/L (ref 97–108)
CHLORIDE SERPL-SCNC: 103 MMOL/L (ref 97–108)
CHLORIDE SERPL-SCNC: 104 MMOL/L (ref 97–108)
CHLORIDE SERPL-SCNC: 104 MMOL/L (ref 97–108)
CO2 SERPL-SCNC: 24 MMOL/L (ref 21–32)
CO2 SERPL-SCNC: 25 MMOL/L (ref 21–32)
CO2 SERPL-SCNC: 25 MMOL/L (ref 21–32)
CO2 SERPL-SCNC: 26 MMOL/L (ref 21–32)
CREAT SERPL-MCNC: 1.59 MG/DL (ref 0.7–1.3)
CREAT SERPL-MCNC: 1.8 MG/DL (ref 0.7–1.3)
CREAT SERPL-MCNC: 1.93 MG/DL (ref 0.7–1.3)
CREAT SERPL-MCNC: 2.43 MG/DL (ref 0.7–1.3)
DIFFERENTIAL METHOD BLD: ABNORMAL
EOSINOPHIL # BLD: 0 K/UL (ref 0–0.4)
EOSINOPHIL NFR BLD: 0 % (ref 0–7)
EPAP/CPAP/PEEP, PAPEEP: 8
ERYTHROCYTE [DISTWIDTH] IN BLOOD BY AUTOMATED COUNT: 15.7 % (ref 11.5–14.5)
ERYTHROCYTE [DISTWIDTH] IN BLOOD BY AUTOMATED COUNT: 16 % (ref 11.5–14.5)
FIO2 ON VENT: 100 %
FIO2 ON VENT: 100 %
GAS FLOW.O2 SETTING OXYMISER: 14 L/MIN
GAS FLOW.O2 SETTING OXYMISER: 22 L/MIN
GLUCOSE BLD STRIP.AUTO-MCNC: 121 MG/DL (ref 65–100)
GLUCOSE BLD STRIP.AUTO-MCNC: 58 MG/DL (ref 65–100)
GLUCOSE BLD STRIP.AUTO-MCNC: 79 MG/DL (ref 65–100)
GLUCOSE BLD STRIP.AUTO-MCNC: 99 MG/DL (ref 65–100)
GLUCOSE BLD STRIP.AUTO-MCNC: 99 MG/DL (ref 65–100)
GLUCOSE SERPL-MCNC: 111 MG/DL (ref 65–100)
GLUCOSE SERPL-MCNC: 119 MG/DL (ref 65–100)
GLUCOSE SERPL-MCNC: 137 MG/DL (ref 65–100)
GLUCOSE SERPL-MCNC: 145 MG/DL (ref 65–100)
HBV SURFACE AB SER QL: NONREACTIVE
HBV SURFACE AB SER-ACNC: <3.1 MIU/ML
HBV SURFACE AG SER QL: <0.1 INDEX
HBV SURFACE AG SER QL: NEGATIVE
HCO3 BLDA-SCNC: 16 MMOL/L (ref 22–26)
HCO3 BLDA-SCNC: 17 MMOL/L (ref 22–26)
HCT VFR BLD AUTO: 30.7 % (ref 36.6–50.3)
HCT VFR BLD AUTO: 32.5 % (ref 36.6–50.3)
HGB BLD-MCNC: 10.1 G/DL (ref 12.1–17)
HGB BLD-MCNC: 10.7 G/DL (ref 12.1–17)
IMM GRANULOCYTES # BLD AUTO: 0.2 K/UL (ref 0–0.04)
IMM GRANULOCYTES NFR BLD AUTO: 1 % (ref 0–0.5)
LYMPHOCYTES # BLD: 0.7 K/UL (ref 0.8–3.5)
LYMPHOCYTES NFR BLD: 4 % (ref 12–49)
MAGNESIUM SERPL-MCNC: 1.9 MG/DL (ref 1.6–2.4)
MAGNESIUM SERPL-MCNC: 2 MG/DL (ref 1.6–2.4)
MCH RBC QN AUTO: 27.5 PG (ref 26–34)
MCH RBC QN AUTO: 27.6 PG (ref 26–34)
MCHC RBC AUTO-ENTMCNC: 32.9 G/DL (ref 30–36.5)
MCHC RBC AUTO-ENTMCNC: 32.9 G/DL (ref 30–36.5)
MCV RBC AUTO: 83.7 FL (ref 80–99)
MCV RBC AUTO: 83.8 FL (ref 80–99)
MONOCYTES # BLD: 0.5 K/UL (ref 0–1)
MONOCYTES NFR BLD: 3 % (ref 5–13)
NEUTS SEG # BLD: 16.9 K/UL (ref 1.8–8)
NEUTS SEG NFR BLD: 92 % (ref 32–75)
NRBC # BLD: 0 K/UL (ref 0–0.01)
NRBC # BLD: 0.02 K/UL (ref 0–0.01)
NRBC BLD-RTO: 0 PER 100 WBC
NRBC BLD-RTO: 0.1 PER 100 WBC
PCO2 BLDA: 35 MMHG (ref 35–45)
PCO2 BLDA: 58 MMHG (ref 35–45)
PH BLDA: 7.05 [PH] (ref 7.35–7.45)
PH BLDA: 7.31 [PH] (ref 7.35–7.45)
PHOSPHATE SERPL-MCNC: 2 MG/DL (ref 2.6–4.7)
PHOSPHATE SERPL-MCNC: 2 MG/DL (ref 2.6–4.7)
PHOSPHATE SERPL-MCNC: 2.4 MG/DL (ref 2.6–4.7)
PHOSPHATE SERPL-MCNC: 2.5 MG/DL (ref 2.6–4.7)
PHOSPHATE SERPL-MCNC: 3.1 MG/DL (ref 2.6–4.7)
PLATELET # BLD AUTO: 141 K/UL (ref 150–400)
PLATELET # BLD AUTO: 152 K/UL (ref 150–400)
PMV BLD AUTO: 11.3 FL (ref 8.9–12.9)
PMV BLD AUTO: 11.7 FL (ref 8.9–12.9)
PO2 BLDA: 108 MMHG (ref 80–100)
PO2 BLDA: 437 MMHG (ref 80–100)
POTASSIUM SERPL-SCNC: 3.3 MMOL/L (ref 3.5–5.1)
POTASSIUM SERPL-SCNC: 3.3 MMOL/L (ref 3.5–5.1)
POTASSIUM SERPL-SCNC: 3.4 MMOL/L (ref 3.5–5.1)
POTASSIUM SERPL-SCNC: 3.8 MMOL/L (ref 3.5–5.1)
RBC # BLD AUTO: 3.67 M/UL (ref 4.1–5.7)
RBC # BLD AUTO: 3.88 M/UL (ref 4.1–5.7)
RBC MORPH BLD: ABNORMAL
SAO2 % BLD: 100 % (ref 92–97)
SAO2 % BLD: 96 % (ref 92–97)
SAO2% DEVICE SAO2% SENSOR NAME: ABNORMAL
SAO2% DEVICE SAO2% SENSOR NAME: ABNORMAL
SERVICE CMNT-IMP: ABNORMAL
SERVICE CMNT-IMP: NORMAL
SODIUM SERPL-SCNC: 135 MMOL/L (ref 136–145)
SODIUM SERPL-SCNC: 137 MMOL/L (ref 136–145)
SPECIMEN SITE: ABNORMAL
SPECIMEN SITE: ABNORMAL
THERAPEUTIC RANGE,PTTT: ABNORMAL SECS (ref 58–77)
THERAPEUTIC RANGE,PTTT: ABNORMAL SECS (ref 58–77)
TROPONIN I SERPL-MCNC: 15.7 NG/ML
TROPONIN I SERPL-MCNC: 16.8 NG/ML
VENTILATION MODE VENT: ABNORMAL
VENTILATION MODE VENT: ABNORMAL
VT SETTING VENT: 550 ML
VT SETTING VENT: 550 ML
WBC # BLD AUTO: 13.4 K/UL (ref 4.1–11.1)
WBC # BLD AUTO: 18.3 K/UL (ref 4.1–11.1)

## 2020-10-22 PROCEDURE — 74011000258 HC RX REV CODE- 258: Performed by: FAMILY MEDICINE

## 2020-10-22 PROCEDURE — 83735 ASSAY OF MAGNESIUM: CPT

## 2020-10-22 PROCEDURE — P9045 ALBUMIN (HUMAN), 5%, 250 ML: HCPCS | Performed by: NURSE PRACTITIONER

## 2020-10-22 PROCEDURE — 90945 DIALYSIS ONE EVALUATION: CPT

## 2020-10-22 PROCEDURE — 36415 COLL VENOUS BLD VENIPUNCTURE: CPT

## 2020-10-22 PROCEDURE — 86706 HEP B SURFACE ANTIBODY: CPT

## 2020-10-22 PROCEDURE — C9113 INJ PANTOPRAZOLE SODIUM, VIA: HCPCS | Performed by: NURSE PRACTITIONER

## 2020-10-22 PROCEDURE — C1892 INTRO/SHEATH,FIXED,PEEL-AWAY: HCPCS | Performed by: INTERNAL MEDICINE

## 2020-10-22 PROCEDURE — 74011250636 HC RX REV CODE- 250/636: Performed by: INTERNAL MEDICINE

## 2020-10-22 PROCEDURE — 85027 COMPLETE CBC AUTOMATED: CPT

## 2020-10-22 PROCEDURE — 65610000006 HC RM INTENSIVE CARE

## 2020-10-22 PROCEDURE — 71045 X-RAY EXAM CHEST 1 VIEW: CPT

## 2020-10-22 PROCEDURE — 82962 GLUCOSE BLOOD TEST: CPT

## 2020-10-22 PROCEDURE — 74011250636 HC RX REV CODE- 250/636: Performed by: NURSE PRACTITIONER

## 2020-10-22 PROCEDURE — 82803 BLOOD GASES ANY COMBINATION: CPT

## 2020-10-22 PROCEDURE — 74011000258 HC RX REV CODE- 258: Performed by: NURSE PRACTITIONER

## 2020-10-22 PROCEDURE — 94003 VENT MGMT INPAT SUBQ DAY: CPT

## 2020-10-22 PROCEDURE — 74011250637 HC RX REV CODE- 250/637: Performed by: NURSE PRACTITIONER

## 2020-10-22 PROCEDURE — 77030013744: Performed by: INTERNAL MEDICINE

## 2020-10-22 PROCEDURE — 99231 SBSQ HOSP IP/OBS SF/LOW 25: CPT | Performed by: CLINICAL NURSE SPECIALIST

## 2020-10-22 PROCEDURE — 87340 HEPATITIS B SURFACE AG IA: CPT

## 2020-10-22 PROCEDURE — 77030004532 HC CATH ANGI DX IMP BSC -A: Performed by: INTERNAL MEDICINE

## 2020-10-22 PROCEDURE — 74011000636 HC RX REV CODE- 636: Performed by: INTERNAL MEDICINE

## 2020-10-22 PROCEDURE — 74011250636 HC RX REV CODE- 250/636: Performed by: FAMILY MEDICINE

## 2020-10-22 PROCEDURE — B2131ZZ FLUOROSCOPY OF MULTIPLE CORONARY ARTERY BYPASS GRAFTS USING LOW OSMOLAR CONTRAST: ICD-10-PCS | Performed by: INTERNAL MEDICINE

## 2020-10-22 PROCEDURE — 74011250636 HC RX REV CODE- 250/636: Performed by: EMERGENCY MEDICINE

## 2020-10-22 PROCEDURE — 85730 THROMBOPLASTIN TIME PARTIAL: CPT

## 2020-10-22 PROCEDURE — 74011250637 HC RX REV CODE- 250/637: Performed by: INTERNAL MEDICINE

## 2020-10-22 PROCEDURE — 93458 L HRT ARTERY/VENTRICLE ANGIO: CPT | Performed by: INTERNAL MEDICINE

## 2020-10-22 PROCEDURE — 84100 ASSAY OF PHOSPHORUS: CPT

## 2020-10-22 PROCEDURE — 74011000250 HC RX REV CODE- 250: Performed by: INTERNAL MEDICINE

## 2020-10-22 PROCEDURE — 77030018798 HC PMP KT ENTRL FED COVD -A

## 2020-10-22 PROCEDURE — 77030004538 HC CATH ANGI DX MP BSC -A: Performed by: INTERNAL MEDICINE

## 2020-10-22 PROCEDURE — 85025 COMPLETE CBC W/AUTO DIFF WBC: CPT

## 2020-10-22 PROCEDURE — 80069 RENAL FUNCTION PANEL: CPT

## 2020-10-22 PROCEDURE — C1894 INTRO/SHEATH, NON-LASER: HCPCS | Performed by: INTERNAL MEDICINE

## 2020-10-22 PROCEDURE — 4A023N7 MEASUREMENT OF CARDIAC SAMPLING AND PRESSURE, LEFT HEART, PERCUTANEOUS APPROACH: ICD-10-PCS | Performed by: INTERNAL MEDICINE

## 2020-10-22 PROCEDURE — B2111ZZ FLUOROSCOPY OF MULTIPLE CORONARY ARTERIES USING LOW OSMOLAR CONTRAST: ICD-10-PCS | Performed by: INTERNAL MEDICINE

## 2020-10-22 PROCEDURE — 74011250637 HC RX REV CODE- 250/637: Performed by: EMERGENCY MEDICINE

## 2020-10-22 RX ORDER — POTASSIUM CHLORIDE 29.8 MG/ML
20 INJECTION INTRAVENOUS
Status: COMPLETED | OUTPATIENT
Start: 2020-10-22 | End: 2020-10-22

## 2020-10-22 RX ORDER — ALBUMIN HUMAN 50 G/1000ML
25 SOLUTION INTRAVENOUS ONCE
Status: COMPLETED | OUTPATIENT
Start: 2020-10-22 | End: 2020-10-22

## 2020-10-22 RX ORDER — POTASSIUM CHLORIDE 29.8 MG/ML
20 INJECTION INTRAVENOUS ONCE
Status: DISCONTINUED | OUTPATIENT
Start: 2020-10-22 | End: 2020-10-22

## 2020-10-22 RX ORDER — LIDOCAINE HYDROCHLORIDE 10 MG/ML
INJECTION INFILTRATION; PERINEURAL AS NEEDED
Status: DISCONTINUED | OUTPATIENT
Start: 2020-10-22 | End: 2020-10-22 | Stop reason: HOSPADM

## 2020-10-22 RX ORDER — METOPROLOL TARTRATE 25 MG/1
25 TABLET, FILM COATED ORAL EVERY 12 HOURS
Status: DISCONTINUED | OUTPATIENT
Start: 2020-10-22 | End: 2020-10-22

## 2020-10-22 RX ADMIN — HEPARIN SODIUM 15 UNITS/KG/HR: 10000 INJECTION, SOLUTION INTRAVENOUS at 20:33

## 2020-10-22 RX ADMIN — PROPOFOL 30 MCG/KG/MIN: 10 INJECTION, EMULSION INTRAVENOUS at 21:35

## 2020-10-22 RX ADMIN — ALBUMIN (HUMAN) 25 G: 12.5 INJECTION, SOLUTION INTRAVENOUS at 04:12

## 2020-10-22 RX ADMIN — MIDODRINE HYDROCHLORIDE 10 MG: 5 TABLET ORAL at 05:40

## 2020-10-22 RX ADMIN — DEXTROSE MONOHYDRATE 250 ML: 100 INJECTION, SOLUTION INTRAVENOUS at 13:07

## 2020-10-22 RX ADMIN — CALCIUM CHLORIDE, MAGNESIUM CHLORIDE, DEXTROSE MONOHYDRATE, LACTIC ACID, SODIUM CHLORIDE, SODIUM BICARBONATE AND POTASSIUM CHLORIDE 2275 ML/HR: 5.15; 2.03; 22; 5.4; 6.46; 3.09; .157 INJECTION INTRAVENOUS at 07:49

## 2020-10-22 RX ADMIN — PROPOFOL 30 MCG/KG/MIN: 10 INJECTION, EMULSION INTRAVENOUS at 16:10

## 2020-10-22 RX ADMIN — SODIUM CHLORIDE 40 MG: 9 INJECTION, SOLUTION INTRAMUSCULAR; INTRAVENOUS; SUBCUTANEOUS at 08:31

## 2020-10-22 RX ADMIN — CALCIUM CHLORIDE, MAGNESIUM CHLORIDE, DEXTROSE MONOHYDRATE, LACTIC ACID, SODIUM CHLORIDE, SODIUM BICARBONATE AND POTASSIUM CHLORIDE 2275 ML/HR: 5.15; 2.03; 22; 5.4; 6.46; 3.09; .157 INJECTION INTRAVENOUS at 21:43

## 2020-10-22 RX ADMIN — DOXYCYCLINE HYCLATE 100 MG: 100 TABLET, COATED ORAL at 08:31

## 2020-10-22 RX ADMIN — POTASSIUM CHLORIDE 20 MEQ: 400 INJECTION, SOLUTION INTRAVENOUS at 01:50

## 2020-10-22 RX ADMIN — Medication 10 ML: at 05:47

## 2020-10-22 RX ADMIN — CALCIUM CHLORIDE, MAGNESIUM CHLORIDE, DEXTROSE MONOHYDRATE, LACTIC ACID, SODIUM CHLORIDE, SODIUM BICARBONATE AND POTASSIUM CHLORIDE 2275 ML/HR: 5.15; 2.03; 22; 5.4; 6.46; 3.09; .157 INJECTION INTRAVENOUS at 06:04

## 2020-10-22 RX ADMIN — CALCIUM CHLORIDE, MAGNESIUM CHLORIDE, DEXTROSE MONOHYDRATE, LACTIC ACID, SODIUM CHLORIDE, SODIUM BICARBONATE AND POTASSIUM CHLORIDE 2275 ML/HR: 5.15; 2.03; 22; 5.4; 6.46; 3.09; .157 INJECTION INTRAVENOUS at 19:48

## 2020-10-22 RX ADMIN — Medication 10 ML: at 14:00

## 2020-10-22 RX ADMIN — METOPROLOL TARTRATE 100 MG: 25 TABLET, FILM COATED ORAL at 21:30

## 2020-10-22 RX ADMIN — POTASSIUM CHLORIDE 20 MEQ: 400 INJECTION, SOLUTION INTRAVENOUS at 03:16

## 2020-10-22 RX ADMIN — CALCIUM CHLORIDE, MAGNESIUM CHLORIDE, DEXTROSE MONOHYDRATE, LACTIC ACID, SODIUM CHLORIDE, SODIUM BICARBONATE AND POTASSIUM CHLORIDE 2275 ML/HR: 5.15; 2.03; 22; 5.4; 6.46; 3.09; .157 INJECTION INTRAVENOUS at 04:14

## 2020-10-22 RX ADMIN — ROSUVASTATIN 40 MG: 40 TABLET, FILM COATED ORAL at 21:30

## 2020-10-22 RX ADMIN — CEFTRIAXONE SODIUM 1 G: 1 INJECTION, POWDER, FOR SOLUTION INTRAMUSCULAR; INTRAVENOUS at 21:29

## 2020-10-22 RX ADMIN — ASPIRIN 81 MG: 81 TABLET, CHEWABLE ORAL at 08:31

## 2020-10-22 RX ADMIN — CALCIUM CHLORIDE, MAGNESIUM CHLORIDE, DEXTROSE MONOHYDRATE, LACTIC ACID, SODIUM CHLORIDE, SODIUM BICARBONATE AND POTASSIUM CHLORIDE 2275 ML/HR: 5.15; 2.03; 22; 5.4; 6.46; 3.09; .157 INJECTION INTRAVENOUS at 11:37

## 2020-10-22 RX ADMIN — CALCIUM CHLORIDE, MAGNESIUM CHLORIDE, DEXTROSE MONOHYDRATE, LACTIC ACID, SODIUM CHLORIDE, SODIUM BICARBONATE AND POTASSIUM CHLORIDE 2275 ML/HR: 5.15; 2.03; 22; 5.4; 6.46; 3.09; .157 INJECTION INTRAVENOUS at 09:00

## 2020-10-22 RX ADMIN — PROPOFOL 30 MCG/KG/MIN: 10 INJECTION, EMULSION INTRAVENOUS at 10:08

## 2020-10-22 RX ADMIN — Medication 10 ML: at 21:32

## 2020-10-22 RX ADMIN — CALCIUM CHLORIDE, MAGNESIUM CHLORIDE, DEXTROSE MONOHYDRATE, LACTIC ACID, SODIUM CHLORIDE, SODIUM BICARBONATE AND POTASSIUM CHLORIDE 2275 ML/HR: 5.15; 2.03; 22; 5.4; 6.46; 3.09; .157 INJECTION INTRAVENOUS at 23:37

## 2020-10-22 RX ADMIN — CALCIUM CHLORIDE, MAGNESIUM CHLORIDE, DEXTROSE MONOHYDRATE, LACTIC ACID, SODIUM CHLORIDE, SODIUM BICARBONATE AND POTASSIUM CHLORIDE 2275 ML/HR: 5.15; 2.03; 22; 5.4; 6.46; 3.09; .157 INJECTION INTRAVENOUS at 02:24

## 2020-10-22 RX ADMIN — CALCIUM CHLORIDE, MAGNESIUM CHLORIDE, DEXTROSE MONOHYDRATE, LACTIC ACID, SODIUM CHLORIDE, SODIUM BICARBONATE AND POTASSIUM CHLORIDE 2275 ML/HR: 5.15; 2.03; 22; 5.4; 6.46; 3.09; .157 INJECTION INTRAVENOUS at 00:33

## 2020-10-22 RX ADMIN — HEPARIN SODIUM 14 UNITS/KG/HR: 10000 INJECTION, SOLUTION INTRAVENOUS at 04:21

## 2020-10-22 RX ADMIN — PROPOFOL 30 MCG/KG/MIN: 10 INJECTION, EMULSION INTRAVENOUS at 05:18

## 2020-10-22 RX ADMIN — METOPROLOL TARTRATE 100 MG: 25 TABLET, FILM COATED ORAL at 08:31

## 2020-10-22 RX ADMIN — DOXYCYCLINE HYCLATE 100 MG: 100 TABLET, COATED ORAL at 21:31

## 2020-10-22 RX ADMIN — Medication 10 ML: at 21:31

## 2020-10-22 NOTE — DIABETES MGMT
[facility]  CLINICAL NURSE SPECIALIST CONSULT  PROGRAM FOR DIABETES HEALTH    FOLLOW UP NOTE    Presentation   Calin Lawler is a 70 y.o. male admitted  with right lower abdominal quadrant pain. Initial work up revealed hydrornephorsis and UTI. HX: PMH: CAD w/ MI and subsequent CABG 1999/ s/p AAA repair with left leg nephropathy/ fem to fem bypass/ HLD/ HTN/ DM2-A1C pending    DX: CT scan-hydronephrosis      Current clinical course has been complicated by hyperglycemia. Diabetes: Patient has known Type 2 diabetes, treated with humalog per pauln report. No insulin or PO diabetic medications on PTA. Consulted by Provider for advanced diabetes nursing assessment and care, specifically related to   [] Transitioning off [place]   [x] Inpatient management strategy  [x] Home management assessment  [] Survival skill education    Diabetes-related medical history  Acute complications  hyperglycemia  Neurological complications  NONE  Microvascular disease  Nephropathy  Macrovascular disease  CAD and Myocardial infarction  Other associated conditions     HTN/ HLD/    Diabetes medication history  Drug class Currently in use Discontinued Never used   Biguanide  Metformin    DDP-4 inhibitor       Sulfonylurea Glipizide 5mg daily     Thiazolidinedione      GLP-1 RA      SGLT-2 inhibitors      Basal insulin      Fixed Dose  Combinations      Bolus insulin  Humalog can't recall dosing, but took himself off due to symptoms of hypoglycemia      Subjective   Mr. Tej Juan deteriorated last evening with worsening hypoxia followed by a code blue with CPR. ROSC and subsequently intubated and transferred to a higher level of care. CRRT required due to worsening kidney function and no urine output. Troponin elevated to 18.00 today, may go to cath lab today. 12 lead ECG suggests anterolateral ischemia. NPO  NT pro-BNP grossly elevated  CXR results : Diffuse bilateral airspace disease. No pneumothorax.     Patient reports the following home diabetes self-care practices:  Eating pattern    Physical activity pattern-gets along well. Monitoring pattern-checked BG once daily-    Taking medications pattern  [] Consistent administration  [] Affordable      Objective   Physical exam  General Intubated on ventilator  Vital Signs   Visit Vitals  BP (!) 125/47   Pulse 64   Temp 97.1 °F (36.2 °C)   Resp 16   Ht 5' 10\" (1.778 m)   Wt 89 kg (196 lb 3.4 oz)   SpO2 99%   BMI 28.15 kg/m²     Skin  Warm and dry. Heart   Regular rate and rhythm. No murmurs, rubs or gallops  Lungs  Clear to auscultation without rales or rhonchi  Extremities No foot wounds    Diabetic foot exam:    Left Foot     Visual Exam: normal    Pulse DP: 1+ (weak)   Filament test: absent sensation      Right Foot   Visual Exam: normal    Pulse DP: 2+ (normal)   Filament test: normal sensation    Vibratory sensation: normal DP & PT pulses +2. Laboratory  Lab Results   Component Value Date/Time    Hemoglobin A1c 7.1 (H) 10/20/2020 02:02 AM     No results found for: LDL, LDLC, DLDLP  Lab Results   Component Value Date/Time    Creatinine 1.93 (H) 10/22/2020 05:32 AM     Lab Results   Component Value Date/Time    Sodium 137 10/22/2020 05:32 AM    Potassium 3.8 10/22/2020 05:32 AM    Chloride 104 10/22/2020 05:32 AM    CO2 25 10/22/2020 05:32 AM    Anion gap 8 10/22/2020 05:32 AM    Glucose 111 (H) 10/22/2020 05:32 AM    BUN 34 (H) 10/22/2020 05:32 AM    Creatinine 1.93 (H) 10/22/2020 05:32 AM    BUN/Creatinine ratio 18 10/22/2020 05:32 AM    GFR est AA 42 (L) 10/22/2020 05:32 AM    GFR est non-AA 34 (L) 10/22/2020 05:32 AM    Calcium 9.1 10/22/2020 05:32 AM    Bilirubin, total 0.7 10/21/2020 12:59 AM    Alk.  phosphatase 159 (H) 10/21/2020 12:59 AM    Protein, total 7.1 10/21/2020 12:59 AM    Albumin 2.8 (L) 10/22/2020 05:32 AM    Globulin 4.3 (H) 10/21/2020 12:59 AM    A-G Ratio 0.7 (L) 10/21/2020 12:59 AM    ALT (SGPT) 82 (H) 10/21/2020 12:59 AM     Lab Results Component Value Date/Time    ALT (SGPT) 82 (H) 10/21/2020 12:59 AM       Factors affecting BG pattern  Factor Dose Comments   Nutrition:  Carb-controlled meals     60 grams/meal    Drugs:   Heparin/Propofol/bicarb    Pain Abdominal pain    Infection UTI/Hydronephrosis  WBC 26   Lactic 4.6--> 1.5      Assessment and Plan   Nursing Diagnosis Risk for unstable blood glucose pattern   Nursing Intervention Domain 5257 Decision-making Support   Nursing Interventions Examined current inpatient diabetes control   Explored factors facilitating and impeding inpatient management  Identified self-management practices impeding diabetes control  Explored corrective strategies with patient and responsible inpatient provider   Informed patient of rational for insulin strategy while hospitalized     Evaluation   Mr. Lamont Travis, has a long history  Type 2 diabetes- no recent A1C on file. Lab drawn this morning and result pending. He has co-morbid conditions in addition to his DM2. Since admission BG trends >200mg/dl which indicates the need for INITIATION of insulin subcutaneous order set (2239). BG trends during code were stable without lows. No changes made to basal dosing. Inpatient blood glucose management has been impacted by  [x] Kidney dysfunction -JAMAL / GFR 17/ Cr+3.51 -nephrology consulted. [x] Erratic meal consumption -poor appetite today  Recommendations   1. CONTINUE  Basal insulin   [x] 0.2 units/kg/D=17 units Lantus daily: IF AM BG <100mg/dl, consider decreasing basal dose by 10-20%. 2.  CONTINUE Corrective insulin  [x] Normal sensitivity    Discharge Planning   1. Will make recs closer to discharge. Billing Code(s)   I personally reviewed chart, notes, data and current medications in the medical record, and examined the patient at bedside before making care recommendations. Thank you for including us in their care. I spent 15 minutes in direct patient care today for this patient.   Time includes chart review, face to face with patient and collaboration with interdisciplinary care team.     Ayanna Nickerson, [facility]  Program for Diabetes Health  Access via [address] [place] [address]  [phone]

## 2020-10-22 NOTE — PROGRESS NOTES
Cardiology Note  Called about the patient whose troponin level is rising. Now at 18.00. He is sedated but in no distress and does not complain of chest pain according to his nurse. He is hemodynamically stable at this time. On exam his lungs are clear and heart sounds are normal.   Will leave him NPO after MN. Dr Jonathan Wetzel will assess him tomorrow and may elect to take him to the cath lab. Discussed with the patient's wife and the patient's nurse at the bedside.   Eli Mendez MD  Interventional Cardiology  [facility]

## 2020-10-22 NOTE — PROGRESS NOTES
Physical Therapy 10/22/2020    Orders received and chart reviewed up to date. Pt with uptrending troponin (15.7 to 16.8) and planned for possible cardiac cath. Will hold and follow-up as appropriate. Thank you.   Holly Wood, PT, DPT

## 2020-10-22 NOTE — PROGRESS NOTES
1530: Bedside shift change report given to Tonja Yeager RN (oncoming nurse) by Zina Brown RN (offgoing nurse). Report included the following information SBAR, Kardex, ED Summary, Procedure Summary, Intake/Output, MAR, Accordion, Recent Results and Cardiac Rhythm NSR w/ slight ST depression. 32 61 16: Junito paged. 1555: Pt arrived to ICU 16 from cath lab. VSS. R groin site assessed, C/D/I, no hematoma or bleeding. Per cath lab RN, heparin gtt has been stopped and is not to be restarted. 1600: Junito WEN notified of need to restart CRRT.     1729: Per Dr. Wallace Winslow, do not restart heparin gtt. 1930: Per Dr. Wallace Winslow, restart heparin gtt at previous dose which is 15units/kg/hr. R groin site C/D/I, no hematoma or bleeding. Bedside shift change report given to BEHZAD Chapman (oncoming nurse) by Tonja Yeager RN (offgoing nurse). Report included the following information SBAR, Kardex, Procedure Summary, Intake/Output, MAR, Accordion, Recent Results and Cardiac Rhythm NSR with frequent PVCs.

## 2020-10-22 NOTE — PROGRESS NOTES
Cardiology Progress Note  10/22/2020     Admit Date: 10/19/2020  Admit Diagnosis: Hydronephrosis of right kidney [N13.30]  UTI (urinary tract infection) [N39.0]  Sepsis ([address] [A41.9]  Tachycardia [R00.0]  Leukocytosis [D72.829]  CC: none currently    Assessment:   Active Problems:    Leukocytosis (10/19/2020)      UTI (urinary tract infection) (10/19/2020)      Tachycardia (10/19/2020)      Sepsis ([address] (10/19/2020)      Hydronephrosis of right kidney (10/19/2020)      Plan:     Cont heparin and ASA  Echo reviwed  No need to check further troponins  Restart other cardiac meds when able    Subjective:      Evin Sandhu did well with [facility]. No interventions needed    Objective:    Physical Exam:  Overall VSSAF;    Visit Vitals  /60   Pulse 74   Temp (!) 96.5 °F (35.8 °C)   Resp 12   Ht 5' 10\" (1.778 m)   Wt 196 lb 3.4 oz (89 kg)   SpO2 100%   BMI 28.15 kg/m²     Temp (24hrs), Av.3 °F (36.3 °C), Min:96.5 °F (35.8 °C), Max:97.6 °F (36.4 °C)    Patient Vitals for the past 8 hrs:   Pulse   10/22/20 1530 74   10/22/20 1100 (!) 59   10/22/20 1015 61   10/22/20 1000 60   10/22/20 0940 61   10/22/20 0900 64   10/22/20 0826 66   10/22/20 0800 66    Patient Vitals for the past 8 hrs:   Resp   10/22/20 1530 12   10/22/20 1100 17   10/22/20 1015 18   10/22/20 1000 19   10/22/20 0940 20   10/22/20 0900 16   10/22/20 0800 18    Patient Vitals for the past 8 hrs:   BP   10/22/20 1530 129/60   10/22/20 1100 (!) 108/49   10/22/20 1000 (!) 113/45   10/22/20 0900 (!) 120/52   10/22/20 0826 (!) 125/47   10/22/20 0800 (!) 110/50      10/20 1901 - 10/22 07  In: 1778.2 [I.V.:1598.2]  Out: 2997 [Urine:1522]      General Appearance: Intubated   Ears/Nose/Mouth/Throat:   Normal MM; anicteric. JVP: WNL   Resp:   Lungs clear to auscultation bilaterally. Nl resp effort. Cardiovascular:  RRR, S1, S2 normal, no new murmur. No gallop or rub. Abdomen:   Soft, non-tender, bowel sounds are present.    Extremities: No edema bilaterally. Skin:  Neuro: Warm and dry.   Sedated                         Data Review:     Telemetry independently reviewed :   normal sinus rhythm         Labs:   Recent Results (from the past 24 hour(s))   RENAL FUNCTION PANEL    Collection Time: 10/21/20  6:12 PM   Result Value Ref Range    Sodium 138 136 - 145 mmol/L    Potassium 3.5 3.5 - 5.1 mmol/L    Chloride 102 97 - 108 mmol/L    CO2 24 21 - 32 mmol/L    Anion gap 12 5 - 15 mmol/L    Glucose 180 (H) 65 - 100 mg/dL    BUN 53 (H) 6 - 20 MG/DL    Creatinine 2.98 (H) 0.70 - 1.30 MG/DL    BUN/Creatinine ratio 18 12 - 20      GFR est AA 25 (L) >60 ml/min/1.73m2    GFR est non-AA 21 (L) >60 ml/min/1.73m2    Calcium 9.1 8.5 - 10.1 MG/DL    Phosphorus 3.9 2.6 - 4.7 MG/DL    Albumin 2.5 (L) 3.5 - 5.0 g/dL   MAGNESIUM    Collection Time: 10/21/20  6:12 PM   Result Value Ref Range    Magnesium 2.0 1.6 - 2.4 mg/dL   TROPONIN I    Collection Time: 10/21/20  6:12 PM   Result Value Ref Range    Troponin-I, Qt. 18.00 (H) <0.05 ng/mL   GLUCOSE, POC    Collection Time: 10/21/20  6:15 PM   Result Value Ref Range    Glucose (POC) 116 (H) 65 - 100 mg/dL    Performed by Olivia Galvez    CBC W/O DIFF    Collection Time: 10/21/20  7:02 PM   Result Value Ref Range    WBC 21.7 (H) 4.1 - 11.1 K/uL    RBC 4.24 4.10 - 5.70 M/uL    HGB 11.7 (L) 12.1 - 17.0 g/dL    HCT 35.2 (L) 36.6 - 50.3 %    MCV 83.0 80.0 - 99.0 FL    MCH 27.6 26.0 - 34.0 PG    MCHC 33.2 30.0 - 36.5 g/dL    RDW 15.6 (H) 11.5 - 14.5 %    PLATELET 018 290 - 427 K/uL    MPV 11.2 8.9 - 12.9 FL    NRBC 0.0 0  WBC    ABSOLUTE NRBC 0.00 0.00 - 0.01 K/uL   GLUCOSE, POC    Collection Time: 10/21/20 10:00 PM   Result Value Ref Range    Glucose (POC) 128 (H) 65 - 100 mg/dL    Performed by Martha Kirk    PTT    Collection Time: 10/21/20 11:23 PM   Result Value Ref Range    aPTT 51.7 (H) 22.1 - 32.0 sec    aPTT, therapeutic range     58.0 - 77.0 SECS   RENAL FUNCTION PANEL    Collection Time: 10/21/20 11:23 PM   Result Value Ref Range    Sodium 137 136 - 145 mmol/L    Potassium 3.3 (L) 3.5 - 5.1 mmol/L    Chloride 104 97 - 108 mmol/L    CO2 25 21 - 32 mmol/L    Anion gap 8 5 - 15 mmol/L    Glucose 137 (H) 65 - 100 mg/dL    BUN 44 (H) 6 - 20 MG/DL    Creatinine 2.43 (H) 0.70 - 1.30 MG/DL    BUN/Creatinine ratio 18 12 - 20      GFR est AA 32 (L) >60 ml/min/1.73m2    GFR est non-AA 26 (L) >60 ml/min/1.73m2    Calcium 8.9 8.5 - 10.1 MG/DL    Phosphorus 3.1 2.6 - 4.7 MG/DL    Albumin 2.4 (L) 3.5 - 5.0 g/dL   MAGNESIUM    Collection Time: 10/21/20 11:23 PM   Result Value Ref Range    Magnesium 2.0 1.6 - 2.4 mg/dL   TROPONIN I    Collection Time: 10/21/20 11:23 PM   Result Value Ref Range    Troponin-I, Qt. 15.70 (H) <0.05 ng/mL   GLUCOSE, POC    Collection Time: 10/21/20 11:48 PM   Result Value Ref Range    Glucose (POC) 121 (H) 65 - 100 mg/dL    Performed by Desi Ramirez    GLUCOSE, POC    Collection Time: 10/22/20  5:15 AM   Result Value Ref Range    Glucose (POC) 99 65 - 100 mg/dL    Performed by Desi Ramirez    RENAL FUNCTION PANEL    Collection Time: 10/22/20  5:32 AM   Result Value Ref Range    Sodium 137 136 - 145 mmol/L    Potassium 3.8 3.5 - 5.1 mmol/L    Chloride 104 97 - 108 mmol/L    CO2 25 21 - 32 mmol/L    Anion gap 8 5 - 15 mmol/L    Glucose 111 (H) 65 - 100 mg/dL    BUN 34 (H) 6 - 20 MG/DL    Creatinine 1.93 (H) 0.70 - 1.30 MG/DL    BUN/Creatinine ratio 18 12 - 20      GFR est AA 42 (L) >60 ml/min/1.73m2    GFR est non-AA 34 (L) >60 ml/min/1.73m2    Calcium 9.1 8.5 - 10.1 MG/DL    Phosphorus 2.5 (L) 2.6 - 4.7 MG/DL    Albumin 2.8 (L) 3.5 - 5.0 g/dL   MAGNESIUM    Collection Time: 10/22/20  5:32 AM   Result Value Ref Range    Magnesium 2.0 1.6 - 2.4 mg/dL   HEP B SURFACE AB    Collection Time: 10/22/20  5:32 AM   Result Value Ref Range    Hepatitis B surface Ab <3.10 mIU/mL    Hep B surface Ab Interp.  NONREACTIVE NR     HEP B SURFACE AG    Collection Time: 10/22/20  5:32 AM   Result Value Ref Range    Hepatitis B surface Ag <0.10 Index    Hep B surface Ag Interp. Negative NEG     TROPONIN I    Collection Time: 10/22/20  5:36 AM   Result Value Ref Range    Troponin-I, Qt. 16.80 (H) <0.05 ng/mL   CBC WITH AUTOMATED DIFF    Collection Time: 10/22/20  6:27 AM   Result Value Ref Range    WBC 18.3 (H) 4.1 - 11.1 K/uL    RBC 3.88 (L) 4.10 - 5.70 M/uL    HGB 10.7 (L) 12.1 - 17.0 g/dL    HCT 32.5 (L) 36.6 - 50.3 %    MCV 83.8 80.0 - 99.0 FL    MCH 27.6 26.0 - 34.0 PG    MCHC 32.9 30.0 - 36.5 g/dL    RDW 15.7 (H) 11.5 - 14.5 %    PLATELET 188 041 - 900 K/uL    MPV 11.7 8.9 - 12.9 FL    NRBC 0.0 0  WBC    ABSOLUTE NRBC 0.00 0.00 - 0.01 K/uL    NEUTROPHILS 92 (H) 32 - 75 %    LYMPHOCYTES 4 (L) 12 - 49 %    MONOCYTES 3 (L) 5 - 13 %    EOSINOPHILS 0 0 - 7 %    BASOPHILS 0 0 - 1 %    IMMATURE GRANULOCYTES 1 (H) 0.0 - 0.5 %    ABS. NEUTROPHILS 16.9 (H) 1.8 - 8.0 K/UL    ABS. LYMPHOCYTES 0.7 (L) 0.8 - 3.5 K/UL    ABS. MONOCYTES 0.5 0.0 - 1.0 K/UL    ABS. EOSINOPHILS 0.0 0.0 - 0.4 K/UL    ABS. BASOPHILS 0.0 0.0 - 0.1 K/UL    ABS. IMM.  GRANS. 0.2 (H) 0.00 - 0.04 K/UL    DF SMEAR SCANNED      RBC COMMENTS ANISOCYTOSIS  1+       PTT    Collection Time: 10/22/20  9:11 AM   Result Value Ref Range    aPTT 52.7 (H) 22.1 - 32.0 sec    aPTT, therapeutic range     58.0 - 77.0 SECS   GLUCOSE, POC    Collection Time: 10/22/20  1:02 PM   Result Value Ref Range    Glucose (POC) 58 (L) 65 - 100 mg/dL    Performed by John Petjaci    RENAL FUNCTION PANEL    Collection Time: 10/22/20  1:11 PM   Result Value Ref Range    Sodium 137 136 - 145 mmol/L    Potassium 3.4 (L) 3.5 - 5.1 mmol/L    Chloride 103 97 - 108 mmol/L    CO2 26 21 - 32 mmol/L    Anion gap 8 5 - 15 mmol/L    Glucose 145 (H) 65 - 100 mg/dL    BUN 28 (H) 6 - 20 MG/DL    Creatinine 1.59 (H) 0.70 - 1.30 MG/DL    BUN/Creatinine ratio 18 12 - 20      GFR est AA 52 (L) >60 ml/min/1.73m2    GFR est non-AA 43 (L) >60 ml/min/1.73m2    Calcium 9.5 8.5 - 10.1 MG/DL    Phosphorus 2.4 (L) 2.6 - 4.7 MG/DL Albumin 2.5 (L) 3.5 - 5.0 g/dL   MAGNESIUM    Collection Time: 10/22/20  1:11 PM   Result Value Ref Range    Magnesium 1.9 1.6 - 2.4 mg/dL   GLUCOSE, POC    Collection Time: 10/22/20  1:55 PM   Result Value Ref Range    Glucose (POC) 121 (H) 65 - 100 mg/dL    Performed by Alejandra Silva       Current medications reviewed       Jewell Larson MD

## 2020-10-22 NOTE — PROGRESS NOTES
1945: Bedside and Verbal shift change report given to Dipak Nelson RN (oncoming nurse) by Duglas Alberto RN (offgoing nurse). Report included the following information SBAR, Kardex, ED Summary, Procedure Summary, Intake/Output, MAR, Recent Results, Cardiac Rhythm NSR and Alarm Parameters . 2000: Shift assessment completed; documented per flowsheet. Pt is resting in bed with wife at bedside on ventilator on AC 18, 550 TV, 8 PEEP and 50% FiO2 sedated on Propofol at 25 mcg. Pt opens eyes to voice and able to follow commands. NSR; BP stable at this time. CRRT with factor of 50 continuing. Heparin currently running at 13 units/kg/hr. Afebrile. Will continue to monitor. 2040:  Cardiology paged per Swetha Fine NP for increased troponin. 2052: Dr. Lemus Duty with Cardiology at bedside at this time. RN updated him on pt's current condition. MD ordered RN to hold any procedures that may be done in the morning and hold TF at midnight. MD informed RN to not call with next troponin results unless pt becomes unstable. RN will continue to monitor. 2110Severo LEONARDO Oneil updated on pt's condition. Ordered RN to hold Glargine and Midodrine. Keep pt sedated and start Fentanyl gtt for comfort while on ventilator. Will continue to monitor. 0252: Prop decreased to 25 mcg due to decreasing BP.     0305: RN informed LEONARDO Perez of cuff pressure and ART line pressure. NP ordered RN to go by ART line for pressures. NP ordered RN to keep pt sedated and increase Prop to 30 mL/hr due to pt awakening and becoming asynchronous with vent and titrate as needed. Also, NP ordered albumin at this time to help increase BP with increase in sedation. 0602: RN informed NP of decreasing UOP. Will continue to monitor. 0730: Bedside and Verbal shift change report given to Jelly George RN (oncoming nurse) by Dipak Nelson RN (offgoing nurse).  Report included the following information SBAR, Kardex, Procedure Summary, Intake/Output, MAR, Recent Results, Cardiac Rhythm NSR, Alarm Parameters  and Quality Measures.

## 2020-10-22 NOTE — ROUTINE PROCESS
Primary Nurse Chris Niño, RN and Kirsten Delvalle RN, RN performed a dual skin assessment on this patient Impairment noted- see wound doc flow sheet Luis Felipe score is 13

## 2020-10-22 NOTE — PROGRESS NOTES
Name: Mike Andre MRN: [id number]   : 1945 Hospital: [address]   Date: 10/22/2020        IMPRESSION:   · CKD stage 4. The etiology is not clear, but patient is aware of having CKD for at least 9 years. · JAMAL- oligo-anuric, after the Cardiac arrest. Patient is hypotensive. Has low glomerular pressure. OnCRT now. · Hypervolemia with hypoxia- on Vent, volume status is improved. · Hypervolemia- pulmonary edema, poorly responsive to diuretics. · Hyperkalemia- resolved after conservative measures. · UTI- on AB's  · Increased troponin- card cath is scheduled for today 2.30 PM  · Right hydronephrosis- urology is evaluating. PLAN:   · Patient is critically ill. He will be left on RRT for now. If he becomes more stable and still requires RRT- will transition to [facility]. · Monitor the GFR very cloesy- I's and O's, daily electrolytes. ·  Continue AB therapy  · Will follow with results from card octavio. · Continue full support, including pressors. · Urology following for right ureteral obstruction. Subjective/Interval History:   I have reviewed the flowsheet and previous days notes. ROS:Review of systems not obtained due to patient factors.     Objective:   Vital Signs:    Visit Vitals  BP (!) 108/49   Pulse (!) 59   Temp 97.1 °F (36.2 °C)   Resp 17   Ht 5' 10\" (1.778 m)   Wt 89 kg (196 lb 3.4 oz)   SpO2 99%   BMI 28.15 kg/m²       O2 Device: Endotracheal tube   O2 Flow Rate (L/min): 15 l/min   Temp (24hrs), Av.7 °F (36.5 °C), Min:97.1 °F (36.2 °C), Max:99.2 °F (37.3 °C)       Intake/Output:   Last shift:      10/22 0701 - 10/22 1900  In: 80.1 [I.V.:30.1]  Out: 374 [Urine:45]  Last 3 shifts: 10/20 1901 - 10/22 0700  In: 1778.2 [I.V.:1598.2]  Out: 7011 [Urine:1522]    Intake/Output Summary (Last 24 hours) at 10/22/2020 1150  Last data filed at 10/22/2020 1100  Gross per 24 hour   Intake 1424.55 ml   Output 2886 ml   Net -1461.45 ml        Physical Exam:  General:    Sedated, on vent. Wife at bed side. Head:   Normocephalic, without obvious abnormality, atraumatic. Eyes:   Conjunctivae/corneas clear. Nose:  Nares normal. No drainage or sinus tenderness. Throat:    ET tube in place. Neck:  Supple, symmetrical,  no adenopathy, thyroid: non tender    no carotid bruit and no JVD. Lungs:   Diminished to auscultation bilaterally. No Wheezing or Rhonchi. + rales. Chest wall:  No tenderness or deformity. No Accessory muscle use. Heart:   Regular rate and rhythm,  no murmur, rub or gallop. Abdomen:   Soft, non-tender. Not distended. Bowel sounds normal. No masses  Extremities: Extremities normal, atraumatic, No cyanosis. 1+ edema. No clubbing  Skin:     Texture, turgor normal. No rashes or lesions. Not Jaundiced  Psych:  Unable to assess. Neurologic: Sedated. DATA:  Labs:  Recent Labs     10/22/20  0532 10/21/20  2323 10/21/20  1812    137 138   K 3.8 3.3* 3.5    104 102   CO2 25 25 24   BUN 34* 44* 53*   CREA 1.93* 2.43* 2.98*   CA 9.1 8.9 9.1   ALB 2.8* 2.4* 2.5*   PHOS 2.5* 3.1 3.9   MG 2.0 2.0 2.0     Recent Labs     10/22/20  0627 10/21/20  1902 10/21/20  0601   WBC 18.3* 21.7* 26.0*   HGB 10.7* 11.7* 11.8*   HCT 32.5* 35.2* 36.3*    169 228     No results for input(s): RASHMI, KU, CLU, CREAU in the last 72 hours.     No lab exists for component: PROU    Total time spent with patient:  35 minutes    []       Care Plan discussed with:   Family, Colleague, Nursing, Medical Team    Lucian Jean Baptiste MD

## 2020-10-22 NOTE — DIALYSIS
[facility]          [phone]      Start time:1800     Orders CVVH    Mode: CVVHD   Factor: 50mL/hr   UFR: 2250ml/hr   Blood Flow Rate: 200            Metrics   BP / HR: 136/51 70   Blood Flow Rate: 200   AP:                         -61   RP: 54   TMP: 27   PD: 23   FP: 108   UFR: 50ml/hr      Comments / Plan:   Orders, labs, consent, code status and notes reviewed. Each catheter limb disinfected per p&p, caps removed, hubs disinfected per p&p. Aspirated and discarded 2mL of fluid from each port. Lines flushed with NS. Lines reveresed. Arterial line does not aspirate well but continues to flush well. Tx initiated without incident. Lines visible and secured with warmer. Wife at bedside. Pre and post report with primary RN.

## 2020-10-22 NOTE — PROGRESS NOTES
TRANSFER - IN REPORT:    Verbal report received from BEHZAD SNIDER(name) on Adolfo Chaudhary  being received from ICU(unit) for routine progression of care      Report consisted of patients Situation, Background, Assessment and   Recommendations(SBAR). Information from the following report(s) SBAR and Kardex was reviewed with the receiving nurse. Opportunity for questions and clarification was provided. Assessment completed upon patients arrival to unit and care assumed. TRANSFER - OUT REPORT:    Verbal report given to BEHZAD ROMERO(name) on Adolfo Chaudhary  being transferred to ICU 16(unit) for CONTINUED CARE    Report consisted of patients Situation, Background, Assessment and   Recommendations(SBAR). Information from the following report(s) Procedure Summary was reviewed with the receiving nurse. Lines:   Quad Lumen 10/21/20 Left Subclavian (Active)   Central Line Being Utilized Yes 10/22/20 1200   Criteria for Appropriate Use Hemodynamically unstable, requiring monitoring lines, vasopressors, or volume resuscitation 10/22/20 1200   Site Assessment Clean, dry, & intact 10/22/20 1200   Infiltration Assessment 0 10/22/20 1200   Affected Extremity/Extremities Pulses palpable;Range of motion performed; Color distal to insertion site pink (or appropriate for race) 10/22/20 1200   Date of Last Dressing Change 10/21/20 10/22/20 1200   Dressing Status Clean, dry, & intact 10/22/20 1200   Dressing Type Disk with Chlorhexadine gluconate (CHG) 10/22/20 1200   Action Taken Open ports on tubing capped 10/22/20 1200   Proximal Hub Color/Line Status White 10/22/20 1200   Positive Blood Return (Medial Site) Yes 10/22/20 1200   Medial 1 Hub Color/Line Status Gray 10/22/20 1200   Positive Blood Return (Lateral Site) Yes 10/22/20 1200   Medial 2 Hub Color/Line Status Blue 10/22/20 1200   Positive Blood Return (Site #3) Yes 10/22/20 1200   Distal Hub Color/Line Status Brown 10/22/20 1200   Positive Blood Return (Site #4) Yes 10/22/20 1200   Alcohol Cap Used Yes 10/22/20 1200       Peripheral IV 10/19/20 Anterior;Left;Proximal Forearm (Active)   Site Assessment Clean, dry, & intact 10/22/20 1200   Phlebitis Assessment 0 10/22/20 1200   Infiltration Assessment 0 10/22/20 1200   Dressing Status Clean, dry, & intact 10/22/20 1200   Dressing Type Transparent;Tape 10/22/20 1200   Hub Color/Line Status Blue 10/22/20 1200   Action Taken Open ports on tubing capped 10/22/20 1200   Alcohol Cap Used Yes 10/22/20 1200       Peripheral IV 10/20/20 Right;Mid Wrist (Active)   Site Assessment Clean, dry, & intact 10/22/20 1200   Phlebitis Assessment 0 10/22/20 1200   Infiltration Assessment 0 10/22/20 1200   Dressing Status Clean, dry, & intact 10/22/20 1200   Dressing Type Transparent;Tape 10/22/20 1200   Hub Color/Line Status Pink 10/22/20 1200   Action Taken Open ports on tubing capped 10/22/20 1200   Alcohol Cap Used Yes 10/22/20 1200       Arterial Line 10/21/20 Left Radial artery (Active)   Site Assessment Clean, dry, & intact 10/22/20 1200   Dressing Status Clean, dry, & intact 10/22/20 1200   Dressing Type Transparent;Tape 10/22/20 1200   Line Status Intact and in place 10/22/20 1200   Treatment Arm board on 10/22/20 1200   Affected Extremity/Extremities Color distal to insertion site pink (or appropriate for race); Pulses palpable;Range of motion performed 10/22/20 1200        Opportunity for questions and clarification was provided.       Patient transported with:   Monitor  Registered Nurse  Tech

## 2020-10-22 NOTE — PROGRESS NOTES
Bedside, Verbal and Written shift change report given to [address] [place] [address] Chacho (oncoming nurse) by Lazarus Cradle RN (offgoing nurse). Report included the following information SBAR, Kardex, ED Summary, Procedure Summary, Intake/Output, MAR, Accordion and Recent Results. 3314. Spoke with the lab regarding the patient's PTT that was drawn this morning at 6am by the off-going RN. Lab states they do not have the blood sample and are requesting another sample - will redraw PTT.    1300. BG 58 - prn dextrose given. Will re check    1340. CRRT rinsed back.

## 2020-10-22 NOTE — PROGRESS NOTES
Occupational Therapy    Acknowledge OT orders and completed chart review for OT eval.  Patient with up trending troponins up to 16.8. Will hold for OT eval and continue to follow as medically appropriate.        Thank you,    Colletta Posner, OT

## 2020-10-22 NOTE — DIALYSIS
[address] [facility]          [phone]         Orders CVVH    Mode: CVVHD   Factor: 50mL/hr   UFR: 2250ml/hr   Blood Flow Rate: 200          Metrics   BP / HR: 125/47 66   Blood Flow Rate: 200   AP:                         -95   RP: 110   TMP: 33   PD: 2147   FP: 189   UFR: 2000ml/hr      Comments / Plan:   Filter pressure has slight increase. Per primary RN Lines positional and pt may go to CT this morning and will need restarted at that time. Primary RN to keep Marnie Norwood 1154 informed. As of now, no acute need to change circuit. Will continue to monitor. Orders, labs, consent, code status and notes review.

## 2020-10-22 NOTE — ROUTINE PROCESS
TRANSFER - OUT REPORT: 
 
Verbal report given to Amarilis Andrews RN(name) on Smith Saini  being transferred to cath lab (unit) for ordered procedure Report consisted of patients Situation, Background, Assessment and  
Recommendations(SBAR). Information from the following report(s) SBAR, Kardex, ED Summary, Procedure Summary, Intake/Output, MAR, Accordion and Recent Results was reviewed with the receiving nurse. Lines:  
Quad Lumen 10/21/20 Left Subclavian (Active) Central Line Being Utilized Yes 10/22/20 1200 Criteria for Appropriate Use Hemodynamically unstable, requiring monitoring lines, vasopressors, or volume resuscitation 10/22/20 1200 Site Assessment Clean, dry, & intact 10/22/20 1200 Infiltration Assessment 0 10/22/20 1200 Affected Extremity/Extremities Pulses palpable;Range of motion performed; Color distal to insertion site pink (or appropriate for race) 10/22/20 1200 Date of Last Dressing Change 10/21/20 10/22/20 1200 Dressing Status Clean, dry, & intact 10/22/20 1200 Dressing Type Disk with Chlorhexadine gluconate (CHG) 10/22/20 1200 Action Taken Open ports on tubing capped 10/22/20 1200 Proximal Hub Color/Line Status White 10/22/20 1200 Positive Blood Return (Medial Site) Yes 10/22/20 1200 Medial 1 Hub Color/Line Status Gray 10/22/20 1200 Positive Blood Return (Lateral Site) Yes 10/22/20 1200 Medial 2 Hub Color/Line Status Blue 10/22/20 1200 Positive Blood Return (Site #3) Yes 10/22/20 1200 Distal Hub Color/Line Status Brown 10/22/20 1200 Positive Blood Return (Site #4) Yes 10/22/20 1200 Alcohol Cap Used Yes 10/22/20 1200 Peripheral IV 10/19/20 Anterior;Left;Proximal Forearm (Active) Site Assessment Clean, dry, & intact 10/22/20 1200 Phlebitis Assessment 0 10/22/20 1200 Infiltration Assessment 0 10/22/20 1200 Dressing Status Clean, dry, & intact 10/22/20 1200 Dressing Type Transparent;Tape 10/22/20 1200 Hub Color/Line Status Blue 10/22/20 1200  
Action Taken Open ports on tubing capped 10/22/20 1200 Alcohol Cap Used Yes 10/22/20 1200 Peripheral IV 10/20/20 Right;Mid Wrist (Active) Site Assessment Clean, dry, & intact 10/22/20 1200 Phlebitis Assessment 0 10/22/20 1200 Infiltration Assessment 0 10/22/20 1200 Dressing Status Clean, dry, & intact 10/22/20 1200 Dressing Type Transparent;Tape 10/22/20 1200 Hub Color/Line Status Pink 10/22/20 1200 Action Taken Open ports on tubing capped 10/22/20 1200 Alcohol Cap Used Yes 10/22/20 1200 Arterial Line 10/21/20 Left Radial artery (Active) Site Assessment Clean, dry, & intact 10/22/20 1200 Dressing Status Clean, dry, & intact 10/22/20 1200 Dressing Type Transparent;Tape 10/22/20 1200 Line Status Intact and in place 10/22/20 1200 Treatment Arm board on 10/22/20 1200 Affected Extremity/Extremities Color distal to insertion site pink (or appropriate for race); Pulses palpable;Range of motion performed 10/22/20 1200 Opportunity for questions and clarification was provided.    
 
Patient transported with:

## 2020-10-22 NOTE — PROGRESS NOTES
Patient: Eddie Covarrubias MRN: [id number]  SSN: [id number]    YOB: 1945  Age: 70 y.o. Sex: male        ADMITTED: 10/19/2020 to Jorge Mercado MD by Carmen Rivas MD for Hydronephrosis of right kidney [N13.30]  UTI (urinary tract infection) [N39.0]  Sepsis ([address] [A41.9]  Tachycardia [R00.0]  Leukocytosis [D72.829]    S/p cardiac arrest on 10/20/20. Rising troponin, possible cardiac cath today, on heparin gtt. On CRRT, nephrology following. Intubated    Right hydronephrosis and perinephric stranding w/o obstruction. Bladder wall thickening. Strict I/Os. Erickson in place, 1507 cc UOP documented     AFVSS, +levophed  WBC: 26 --> 18.3  Bcx: NGTD  Hgb: 11.8 --> 10.7  Cr: 2.98 --> 1.93  10/21 UA: 10-20 WBCs  10/19 UA: >100 WBC, mod leuks  UCx: NGTD   Lactic acid: 1.5  Trop 16.8  + rocephin  + doxycycline  Erickson in place, 1507 cc UOP documented     10/20/2020  Renal US: IMPRESSION:   Stable mild right hydronephrosis.     10/19/2020  CT A/P wo:  IMPRESSION:     1. Right hydronephrosis and perinephric stranding with no obstructing calculus. Several punctate calculi in the right kidney but no ureteral calculus  identified. Diffuse bladder wall thickening. 2. Patchy airspace and interstitial opacities right lung base could be  infectious/inflammatory or asymmetric edema.       Vitals: Temp (24hrs), Av.7 °F (36.5 °C), Min:97.1 °F (36.2 °C), Max:99.2 °F (37.3 °C)    Blood pressure (!) 125/47, pulse 64, temperature 97.1 °F (36.2 °C), resp. rate 16, height 5' 10\" (1.778 m), weight 89 kg (196 lb 3.4 oz), SpO2 99 %. Intake and Output:  10/20 1901 - 10/22 0700  In: 1778.2 [I.V.:1598.2]  Out: 3906 [KWGAO:4849]  10/22 0701 - 10/22 1900  In: -   Out: 91 [Urine:10]  JORDY Output lats 24 hrs: No data found. JORDY Output last 8 hrs: No data found.   BM over last 24 hrs:   Patient Vitals for the past 24 hrs:   Stool Occurrence(s)   10/22/20 0700 2   10/22/20 0145 1   10/22/20 0000 1       Labs:  CBC: Lab Results   Component Value Date/Time    WBC 18.3 (H) 10/22/2020 06:27 AM    HCT 32.5 (L) 10/22/2020 06:27 AM    PLATELET 298 04/31/0625 06:27 AM     BMP:   Lab Results   Component Value Date/Time    Glucose 111 (H) 10/22/2020 05:32 AM    Sodium 137 10/22/2020 05:32 AM    Potassium 3.8 10/22/2020 05:32 AM    Chloride 104 10/22/2020 05:32 AM    CO2 25 10/22/2020 05:32 AM    BUN 34 (H) 10/22/2020 05:32 AM    Creatinine 1.93 (H) 10/22/2020 05:32 AM    Calcium 9.1 10/22/2020 05:32 AM       Assessment/Plan:   · Sepsis  · Leukocytosis  · Pyelonephritis, Right  · Mild hydronephrosis right   · JAMAL  · Punctate renal calculi, Right     - agree with hicks, strict I/Os   - IV abx and supportive care  - Monitor WBC and Cr  - nephrology following, on CRT  - will continue to follow     Supervising MD, Dr. Shoaib Brower    Signed By: Jania Vuong NP - October 22, 2020

## 2020-10-22 NOTE — PROCEDURES
Cardiac Catheterization Procedure Note   Patient: Angella Abbott  MRN: [id number]  SSN: [id number]   YOB: 1945 Age: 70 y.o.   Sex: male    Date of Procedure: 10/22/2020   Pre-procedure Diagnosis: NSTEMI  Post-procedure Diagnosis: Coronary Artery Disease  Procedure: Coronary artery and bypass graft angio, [facility], moderate sedation  :  Dr. Mae Mack MD    Assistant(s):  None  Anesthesia: Moderate Sedation   Estimated Blood Loss: Less than 10 mL   Specimens Removed: None  Findings: Severe native 3V CAD, patent LIMA-LAD, known occluded SVG-RCA and SVG-LCx; moderately elevated LVEDP  Complications: None   Implants:  None  Signed by:  Mae Mack MD  10/22/2020  3:48 PM

## 2020-10-23 LAB
ALBUMIN SERPL-MCNC: 2.3 G/DL (ref 3.5–5)
ALBUMIN SERPL-MCNC: 2.4 G/DL (ref 3.5–5)
ALBUMIN/GLOB SERPL: 0.6 {RATIO} (ref 1.1–2.2)
ALP SERPL-CCNC: 334 U/L (ref 45–117)
ALT SERPL-CCNC: 685 U/L (ref 12–78)
ANION GAP SERPL CALC-SCNC: 9 MMOL/L (ref 5–15)
APTT PPP: 51.8 SEC (ref 22.1–32)
APTT PPP: 60.2 SEC (ref 22.1–32)
APTT PPP: 66.8 SEC (ref 22.1–32)
ARTERIAL PATENCY WRIST A: ABNORMAL
AST SERPL-CCNC: 537 U/L (ref 15–37)
BASE DEFICIT BLDA-SCNC: 1.5 MMOL/L
BDY SITE: ABNORMAL
BILIRUB DIRECT SERPL-MCNC: 0.9 MG/DL (ref 0–0.2)
BILIRUB SERPL-MCNC: 1.2 MG/DL (ref 0.2–1)
BUN SERPL-MCNC: 21 MG/DL (ref 6–20)
BUN SERPL-MCNC: 22 MG/DL (ref 6–20)
BUN SERPL-MCNC: 23 MG/DL (ref 6–20)
BUN SERPL-MCNC: 25 MG/DL (ref 6–20)
BUN/CREAT SERPL: 15 (ref 12–20)
BUN/CREAT SERPL: 16 (ref 12–20)
BUN/CREAT SERPL: 16 (ref 12–20)
BUN/CREAT SERPL: 17 (ref 12–20)
CALCIUM SERPL-MCNC: 9.1 MG/DL (ref 8.5–10.1)
CALCIUM SERPL-MCNC: 9.3 MG/DL (ref 8.5–10.1)
CALCIUM SERPL-MCNC: 9.3 MG/DL (ref 8.5–10.1)
CALCIUM SERPL-MCNC: 9.4 MG/DL (ref 8.5–10.1)
CHLORIDE SERPL-SCNC: 101 MMOL/L (ref 97–108)
CHLORIDE SERPL-SCNC: 102 MMOL/L (ref 97–108)
CHLORIDE SERPL-SCNC: 103 MMOL/L (ref 97–108)
CHLORIDE SERPL-SCNC: 104 MMOL/L (ref 97–108)
CO2 SERPL-SCNC: 24 MMOL/L (ref 21–32)
CO2 SERPL-SCNC: 25 MMOL/L (ref 21–32)
CO2 SERPL-SCNC: 25 MMOL/L (ref 21–32)
CO2 SERPL-SCNC: 26 MMOL/L (ref 21–32)
CREAT SERPL-MCNC: 1.31 MG/DL (ref 0.7–1.3)
CREAT SERPL-MCNC: 1.39 MG/DL (ref 0.7–1.3)
CREAT SERPL-MCNC: 1.42 MG/DL (ref 0.7–1.3)
CREAT SERPL-MCNC: 1.58 MG/DL (ref 0.7–1.3)
EPAP/CPAP/PEEP, PAPEEP: 8
ERYTHROCYTE [DISTWIDTH] IN BLOOD BY AUTOMATED COUNT: 16.2 % (ref 11.5–14.5)
FIO2 ON VENT: 50 %
GAS FLOW.O2 SETTING OXYMISER: 18 L/MIN
GLOBULIN SER CALC-MCNC: 3.7 G/DL (ref 2–4)
GLUCOSE BLD STRIP.AUTO-MCNC: 100 MG/DL (ref 65–100)
GLUCOSE BLD STRIP.AUTO-MCNC: 120 MG/DL (ref 65–100)
GLUCOSE BLD STRIP.AUTO-MCNC: 72 MG/DL (ref 65–100)
GLUCOSE BLD STRIP.AUTO-MCNC: 92 MG/DL (ref 65–100)
GLUCOSE BLD STRIP.AUTO-MCNC: 92 MG/DL (ref 65–100)
GLUCOSE SERPL-MCNC: 100 MG/DL (ref 65–100)
GLUCOSE SERPL-MCNC: 101 MG/DL (ref 65–100)
GLUCOSE SERPL-MCNC: 154 MG/DL (ref 65–100)
GLUCOSE SERPL-MCNC: 83 MG/DL (ref 65–100)
HCO3 BLDA-SCNC: 24 MMOL/L (ref 22–26)
HCT VFR BLD AUTO: 32.9 % (ref 36.6–50.3)
HGB BLD-MCNC: 10.4 G/DL (ref 12.1–17)
MAGNESIUM SERPL-MCNC: 1.9 MG/DL (ref 1.6–2.4)
MAGNESIUM SERPL-MCNC: 1.9 MG/DL (ref 1.6–2.4)
MAGNESIUM SERPL-MCNC: 2.3 MG/DL (ref 1.6–2.4)
MCH RBC QN AUTO: 27.2 PG (ref 26–34)
MCHC RBC AUTO-ENTMCNC: 31.6 G/DL (ref 30–36.5)
MCV RBC AUTO: 85.9 FL (ref 80–99)
NRBC # BLD: 0 K/UL (ref 0–0.01)
NRBC BLD-RTO: 0 PER 100 WBC
PCO2 BLDA: 44 MMHG (ref 35–45)
PH BLDA: 7.36 [PH] (ref 7.35–7.45)
PHOSPHATE SERPL-MCNC: 2.3 MG/DL (ref 2.6–4.7)
PHOSPHATE SERPL-MCNC: 2.3 MG/DL (ref 2.6–4.7)
PHOSPHATE SERPL-MCNC: 2.4 MG/DL (ref 2.6–4.7)
PHOSPHATE SERPL-MCNC: 3.3 MG/DL (ref 2.6–4.7)
PHOSPHATE SERPL-MCNC: 3.3 MG/DL (ref 2.6–4.7)
PLATELET # BLD AUTO: 155 K/UL (ref 150–400)
PMV BLD AUTO: 11.2 FL (ref 8.9–12.9)
PO2 BLDA: 133 MMHG (ref 80–100)
POTASSIUM SERPL-SCNC: 3 MMOL/L (ref 3.5–5.1)
POTASSIUM SERPL-SCNC: 3.3 MMOL/L (ref 3.5–5.1)
POTASSIUM SERPL-SCNC: 3.4 MMOL/L (ref 3.5–5.1)
POTASSIUM SERPL-SCNC: 3.6 MMOL/L (ref 3.5–5.1)
PROT SERPL-MCNC: 6.1 G/DL (ref 6.4–8.2)
RBC # BLD AUTO: 3.83 M/UL (ref 4.1–5.7)
SAO2 % BLD: 98 % (ref 92–97)
SAO2% DEVICE SAO2% SENSOR NAME: ABNORMAL
SERVICE CMNT-IMP: ABNORMAL
SERVICE CMNT-IMP: NORMAL
SODIUM SERPL-SCNC: 136 MMOL/L (ref 136–145)
SODIUM SERPL-SCNC: 136 MMOL/L (ref 136–145)
SODIUM SERPL-SCNC: 137 MMOL/L (ref 136–145)
SODIUM SERPL-SCNC: 137 MMOL/L (ref 136–145)
SPECIMEN SITE: ABNORMAL
THERAPEUTIC RANGE,PTTT: ABNORMAL SECS (ref 58–77)
VENTILATION MODE VENT: ABNORMAL
VT SETTING VENT: 550 ML
WBC # BLD AUTO: 17.3 K/UL (ref 4.1–11.1)

## 2020-10-23 PROCEDURE — 82962 GLUCOSE BLOOD TEST: CPT

## 2020-10-23 PROCEDURE — 74011000250 HC RX REV CODE- 250: Performed by: INTERNAL MEDICINE

## 2020-10-23 PROCEDURE — 85730 THROMBOPLASTIN TIME PARTIAL: CPT

## 2020-10-23 PROCEDURE — 80069 RENAL FUNCTION PANEL: CPT

## 2020-10-23 PROCEDURE — 85027 COMPLETE CBC AUTOMATED: CPT

## 2020-10-23 PROCEDURE — 84100 ASSAY OF PHOSPHORUS: CPT

## 2020-10-23 PROCEDURE — 94760 N-INVAS EAR/PLS OXIMETRY 1: CPT

## 2020-10-23 PROCEDURE — 80076 HEPATIC FUNCTION PANEL: CPT

## 2020-10-23 PROCEDURE — 74011250637 HC RX REV CODE- 250/637: Performed by: INTERNAL MEDICINE

## 2020-10-23 PROCEDURE — 74011250636 HC RX REV CODE- 250/636: Performed by: NURSE PRACTITIONER

## 2020-10-23 PROCEDURE — 94003 VENT MGMT INPAT SUBQ DAY: CPT

## 2020-10-23 PROCEDURE — 74011250636 HC RX REV CODE- 250/636: Performed by: INTERNAL MEDICINE

## 2020-10-23 PROCEDURE — 74011000250 HC RX REV CODE- 250: Performed by: NURSE PRACTITIONER

## 2020-10-23 PROCEDURE — 74011000258 HC RX REV CODE- 258: Performed by: FAMILY MEDICINE

## 2020-10-23 PROCEDURE — C9113 INJ PANTOPRAZOLE SODIUM, VIA: HCPCS | Performed by: NURSE PRACTITIONER

## 2020-10-23 PROCEDURE — 74011250636 HC RX REV CODE- 250/636: Performed by: FAMILY MEDICINE

## 2020-10-23 PROCEDURE — 83735 ASSAY OF MAGNESIUM: CPT

## 2020-10-23 PROCEDURE — 74011250636 HC RX REV CODE- 250/636: Performed by: EMERGENCY MEDICINE

## 2020-10-23 PROCEDURE — 74011000258 HC RX REV CODE- 258: Performed by: EMERGENCY MEDICINE

## 2020-10-23 PROCEDURE — 36415 COLL VENOUS BLD VENIPUNCTURE: CPT

## 2020-10-23 PROCEDURE — 93005 ELECTROCARDIOGRAM TRACING: CPT

## 2020-10-23 PROCEDURE — 74011250637 HC RX REV CODE- 250/637: Performed by: NURSE PRACTITIONER

## 2020-10-23 PROCEDURE — 65610000006 HC RM INTENSIVE CARE

## 2020-10-23 PROCEDURE — 90945 DIALYSIS ONE EVALUATION: CPT

## 2020-10-23 PROCEDURE — 74011636637 HC RX REV CODE- 636/637: Performed by: FAMILY MEDICINE

## 2020-10-23 RX ORDER — MAGNESIUM SULFATE HEPTAHYDRATE 40 MG/ML
2 INJECTION, SOLUTION INTRAVENOUS ONCE
Status: COMPLETED | OUTPATIENT
Start: 2020-10-23 | End: 2020-10-23

## 2020-10-23 RX ORDER — POTASSIUM CHLORIDE 29.8 MG/ML
20 INJECTION INTRAVENOUS
Status: COMPLETED | OUTPATIENT
Start: 2020-10-23 | End: 2020-10-23

## 2020-10-23 RX ORDER — LABETALOL HYDROCHLORIDE 5 MG/ML
10 INJECTION, SOLUTION INTRAVENOUS
Status: DISCONTINUED | OUTPATIENT
Start: 2020-10-23 | End: 2020-11-04 | Stop reason: HOSPADM

## 2020-10-23 RX ADMIN — Medication 50 MCG/HR: at 01:45

## 2020-10-23 RX ADMIN — POTASSIUM CHLORIDE 20 MEQ: 29.8 INJECTION, SOLUTION INTRAVENOUS at 14:33

## 2020-10-23 RX ADMIN — POTASSIUM CHLORIDE 20 MEQ: 29.8 INJECTION, SOLUTION INTRAVENOUS at 15:43

## 2020-10-23 RX ADMIN — PROPOFOL 15 MCG/KG/MIN: 10 INJECTION, EMULSION INTRAVENOUS at 06:48

## 2020-10-23 RX ADMIN — POTASSIUM CHLORIDE 20 MEQ: 29.8 INJECTION, SOLUTION INTRAVENOUS at 13:27

## 2020-10-23 RX ADMIN — CALCIUM CHLORIDE, MAGNESIUM CHLORIDE, DEXTROSE MONOHYDRATE, LACTIC ACID, SODIUM CHLORIDE, SODIUM BICARBONATE AND POTASSIUM CHLORIDE 2275 ML/HR: 5.15; 2.03; 22; 5.4; 6.46; 3.09; .157 INJECTION INTRAVENOUS at 05:44

## 2020-10-23 RX ADMIN — MAGNESIUM SULFATE IN WATER 2 G: 40 INJECTION, SOLUTION INTRAVENOUS at 13:23

## 2020-10-23 RX ADMIN — CALCIUM CHLORIDE, MAGNESIUM CHLORIDE, DEXTROSE MONOHYDRATE, LACTIC ACID, SODIUM CHLORIDE, SODIUM BICARBONATE AND POTASSIUM CHLORIDE 2275 ML/HR: 5.15; 2.03; 22; 5.4; 6.46; 3.09; .157 INJECTION INTRAVENOUS at 11:33

## 2020-10-23 RX ADMIN — CALCIUM GLUCONATE 1 G: 94 INJECTION, SOLUTION INTRAVENOUS at 14:33

## 2020-10-23 RX ADMIN — CALCIUM CHLORIDE, MAGNESIUM CHLORIDE, DEXTROSE MONOHYDRATE, LACTIC ACID, SODIUM CHLORIDE, SODIUM BICARBONATE AND POTASSIUM CHLORIDE 2275 ML/HR: 5.15; 2.03; 22; 5.4; 6.46; 3.09; .157 INJECTION INTRAVENOUS at 13:28

## 2020-10-23 RX ADMIN — CALCIUM CHLORIDE, MAGNESIUM CHLORIDE, DEXTROSE MONOHYDRATE, LACTIC ACID, SODIUM CHLORIDE, SODIUM BICARBONATE AND POTASSIUM CHLORIDE 2275 ML/HR: 5.15; 2.03; 22; 5.4; 6.46; 3.09; .157 INJECTION INTRAVENOUS at 03:34

## 2020-10-23 RX ADMIN — ASPIRIN 81 MG: 81 TABLET, CHEWABLE ORAL at 09:04

## 2020-10-23 RX ADMIN — Medication 10 ML: at 23:17

## 2020-10-23 RX ADMIN — Medication 10 ML: at 13:26

## 2020-10-23 RX ADMIN — SODIUM CHLORIDE 40 MG: 9 INJECTION, SOLUTION INTRAMUSCULAR; INTRAVENOUS; SUBCUTANEOUS at 09:04

## 2020-10-23 RX ADMIN — CALCIUM CHLORIDE, MAGNESIUM CHLORIDE, DEXTROSE MONOHYDRATE, LACTIC ACID, SODIUM CHLORIDE, SODIUM BICARBONATE AND POTASSIUM CHLORIDE 2275 ML/HR: 5.15; 2.03; 22; 5.4; 6.46; 3.09; .157 INJECTION INTRAVENOUS at 17:23

## 2020-10-23 RX ADMIN — CALCIUM CHLORIDE, MAGNESIUM CHLORIDE, DEXTROSE MONOHYDRATE, LACTIC ACID, SODIUM CHLORIDE, SODIUM BICARBONATE AND POTASSIUM CHLORIDE 2275 ML/HR: 5.15; 2.03; 22; 5.4; 6.46; 3.09; .157 INJECTION INTRAVENOUS at 23:05

## 2020-10-23 RX ADMIN — CALCIUM CHLORIDE, MAGNESIUM CHLORIDE, DEXTROSE MONOHYDRATE, LACTIC ACID, SODIUM CHLORIDE, SODIUM BICARBONATE AND POTASSIUM CHLORIDE 2275 ML/HR: 5.15; 2.03; 22; 5.4; 6.46; 3.09; .157 INJECTION INTRAVENOUS at 07:43

## 2020-10-23 RX ADMIN — PROPOFOL 25 MCG/KG/MIN: 10 INJECTION, EMULSION INTRAVENOUS at 16:19

## 2020-10-23 RX ADMIN — SODIUM CHLORIDE: 900 INJECTION, SOLUTION INTRAVENOUS at 02:44

## 2020-10-23 RX ADMIN — HEPARIN SODIUM 16 UNITS/KG/HR: 10000 INJECTION, SOLUTION INTRAVENOUS at 12:55

## 2020-10-23 RX ADMIN — ROSUVASTATIN 40 MG: 40 TABLET, FILM COATED ORAL at 21:50

## 2020-10-23 RX ADMIN — POTASSIUM CHLORIDE 20 MEQ: 29.8 INJECTION, SOLUTION INTRAVENOUS at 17:05

## 2020-10-23 RX ADMIN — CALCIUM CHLORIDE, MAGNESIUM CHLORIDE, DEXTROSE MONOHYDRATE, LACTIC ACID, SODIUM CHLORIDE, SODIUM BICARBONATE AND POTASSIUM CHLORIDE 2275 ML/HR: 5.15; 2.03; 22; 5.4; 6.46; 3.09; .157 INJECTION INTRAVENOUS at 21:08

## 2020-10-23 RX ADMIN — CEFTRIAXONE SODIUM 1 G: 1 INJECTION, POWDER, FOR SOLUTION INTRAMUSCULAR; INTRAVENOUS at 21:49

## 2020-10-23 RX ADMIN — CALCIUM CHLORIDE, MAGNESIUM CHLORIDE, DEXTROSE MONOHYDRATE, LACTIC ACID, SODIUM CHLORIDE, SODIUM BICARBONATE AND POTASSIUM CHLORIDE 2275 ML/HR: 5.15; 2.03; 22; 5.4; 6.46; 3.09; .157 INJECTION INTRAVENOUS at 09:38

## 2020-10-23 RX ADMIN — DOXYCYCLINE HYCLATE 100 MG: 100 TABLET, COATED ORAL at 09:04

## 2020-10-23 RX ADMIN — HYDRALAZINE HYDROCHLORIDE 10 MG: 20 INJECTION INTRAMUSCULAR; INTRAVENOUS at 11:05

## 2020-10-23 RX ADMIN — AMIODARONE HYDROCHLORIDE 1 MG/MIN: 50 INJECTION, SOLUTION INTRAVENOUS at 13:02

## 2020-10-23 RX ADMIN — INSULIN GLARGINE 17 UNITS: 100 INJECTION, SOLUTION SUBCUTANEOUS at 22:57

## 2020-10-23 RX ADMIN — DOXYCYCLINE HYCLATE 100 MG: 100 TABLET, COATED ORAL at 21:50

## 2020-10-23 RX ADMIN — METOPROLOL TARTRATE 100 MG: 25 TABLET, FILM COATED ORAL at 21:50

## 2020-10-23 RX ADMIN — AMIODARONE HYDROCHLORIDE 0.5 MG/MIN: 50 INJECTION, SOLUTION INTRAVENOUS at 19:16

## 2020-10-23 RX ADMIN — PROPOFOL 20 MCG/KG/MIN: 10 INJECTION, EMULSION INTRAVENOUS at 22:58

## 2020-10-23 RX ADMIN — Medication 10 ML: at 06:13

## 2020-10-23 RX ADMIN — METOPROLOL TARTRATE 100 MG: 25 TABLET, FILM COATED ORAL at 09:04

## 2020-10-23 RX ADMIN — AMIODARONE HYDROCHLORIDE 150 MG: 50 INJECTION, SOLUTION INTRAVENOUS at 13:00

## 2020-10-23 RX ADMIN — CALCIUM CHLORIDE, MAGNESIUM CHLORIDE, DEXTROSE MONOHYDRATE, LACTIC ACID, SODIUM CHLORIDE, SODIUM BICARBONATE AND POTASSIUM CHLORIDE 2275 ML/HR: 5.15; 2.03; 22; 5.4; 6.46; 3.09; .157 INJECTION INTRAVENOUS at 01:40

## 2020-10-23 RX ADMIN — CALCIUM CHLORIDE, MAGNESIUM CHLORIDE, DEXTROSE MONOHYDRATE, LACTIC ACID, SODIUM CHLORIDE, SODIUM BICARBONATE AND POTASSIUM CHLORIDE 2275 ML/HR: 5.15; 2.03; 22; 5.4; 6.46; 3.09; .157 INJECTION INTRAVENOUS at 19:15

## 2020-10-23 NOTE — PROGRESS NOTES
Name: Luanne Pratt MRN: [id number]   : 1945 Hospital: [address]   Date: 10/23/2020        IMPRESSION:   · CKD stage 4. The etiology is not clear, but patient is aware of having CKD for at least 9 years. · JAMAL- oligo-anuric, after the Cardiac arrest. Patient is hypotensive. Has low glomerular pressure. OnCRT now. · Hypervolemia with hypoxia- on Vent, volume status is improved. · Hypervolemia- pulmonary edema, poorly responsive to diuretics. · Hyperkalemia- resolved after conservative measures. · UTI- on AB's  · S/P card cath  · Increased troponin- card cath is scheduled for today 2.30 PM  · Right hydronephrosis- urology is evaluating. PLAN:   · Patient is critically ill. He will be left on RRT for now. If he becomes more stable and still requires RRT- will transition to [facility] [place] by [facility]. · Monitor the GFR very cloesy- I's and O's, daily electrolytes. ·  Continue AB therapy  · Will follow with results from card cath. · Continue full support, including pressors. · Urology following for right ureteral obstruction. Subjective/Interval History:   I have reviewed the flowsheet and previous days notes. ROS:Review of systems not obtained due to patient factors.     Objective:   Vital Signs:    Visit Vitals  BP (!) 158/51 Comment: A-LINE   Pulse 74   Temp 98.9 °F (37.2 °C)   Resp 18   Ht 5' 10\" (1.778 m)   Wt 89.5 kg (197 lb 5 oz)   SpO2 98%   BMI 28.31 kg/m²       O2 Device: Endotracheal tube, Ventilator   O2 Flow Rate (L/min): 15 l/min   Temp (24hrs), Av.5 °F (36.9 °C), Min:96.5 °F (35.8 °C), Max:99.7 °F (37.6 °C)       Intake/Output:   Last shift:      10/23 0701 - 10/23 1900  In: 217.4 [I.V.:47.4]  Out: 227 [Urine:20]  Last 3 shifts: 10/21 1901 - 10/23 0700  In: 2714.4 [I.V.:2000.4]  Out: 9201 [Urine:787]    Intake/Output Summary (Last 24 hours) at 10/23/2020 0956  Last data filed at 10/23/2020 0900  Gross per 24 hour   Intake 1685.16 ml   Output 1990 ml   Net -304.84 ml Physical Exam:  General:    Sedated, on vent. Wife at bed side. Head:   Normocephalic, without obvious abnormality, atraumatic. Eyes:   Conjunctivae/corneas clear. Nose:  Nares normal. No drainage or sinus tenderness. Throat:    ET tube in place. Neck:  Supple, symmetrical,  no adenopathy, thyroid: non tender    no carotid bruit and no JVD. Lungs:   Diminished to auscultation bilaterally. No Wheezing or Rhonchi. + rales. Chest wall:  No tenderness or deformity. No Accessory muscle use. Heart:   Regular rate and rhythm,  no murmur, rub or gallop. Abdomen:   Soft, non-tender. Not distended. Bowel sounds normal. No masses  Extremities: Extremities normal, atraumatic, No cyanosis. 1+ edema. No clubbing  Skin:     Texture, turgor normal. No rashes or lesions. Not Jaundiced  Psych:  Unable to assess. Neurologic: Sedated. DATA:  Labs:  Recent Labs     10/23/20  0620 10/23/20  0619 10/22/20  2359 10/22/20  1805 10/22/20  1311     --  136 135* 137   K 3.4*  --  3.3* 3.3* 3.4*     --  102 102 103   CO2 25  --  25 24 26   BUN 23*  --  25* 30* 28*   CREA 1.39*  --  1.58* 1.80* 1.59*   CA 9.1  --  9.3 9.1 9.5   ALB 2.4* 2.4* 2.4* 2.4* 2.5*   PHOS 3.3 3.3 2.4* 2.0*  2.0* 2.4*   MG  --   --  1.9 2.0 1.9     Recent Labs     10/23/20  0619 10/22/20  1805 10/22/20  0627   WBC 17.3* 13.4* 18.3*   HGB 10.4* 10.1* 10.7*   HCT 32.9* 30.7* 32.5*    141* 152     No results for input(s): RASHMI, KU, CLU, CREAU in the last 72 hours.     No lab exists for component: PROU    Total time spent with patient:  35 minutes    []       Care Plan discussed with:   Family, Colleague, Nursing, Medical Team    Raymond Torres MD

## 2020-10-23 NOTE — PROGRESS NOTES
RUTHY  Patient presented to [address] [facility] with c/o abdominal pain. CT: Right nephrosis. Transferred to [facility] as [facility] not have an urologist.   RUR: 14%  Disposition: TBD    Patient remains in the ICU intubated on CVVH. Care management continuing to follow for transitions of care.  Villa Cedillo RN,Care Mangement

## 2020-10-23 NOTE — PROGRESS NOTES
0730 Bedside and Verbal shift change report given to MARCELINO LING RN (oncoming nurse) by Stephanie Goff RN (offgoing nurse). Report included the following information SBAR, Kardex, ED Summary, OR Summary, Procedure Summary, Intake/Output, MAR, Accordion, Recent Results, Med Rec Status, Cardiac Rhythm NSR;PVC, Alarm Parameters  and Dual Neuro Assessment. Assumed care of pt. Assessment complete. 8593 aPTT 60.2, which is therapeutic. Will redraw aPTT in 6 hrs, per protocol. 1000 Updated pt's daughter, Asad Peterson, via phone. Updated pt's visitor [place] Clonts (okay per Asad Peterson to update Peace Clonts). 1044 Propofol stopped for SAT/SBT    1148 Pt placed on SBT by RT    1226 12 L EKG for rhythm changes. Pt having runs of Vtach and going in and out of bigeminy while on SBT. Dr. Shady Matute notified. Orders for amiodarone bolus and drip. 1546 aPTT therapeutic at 66.8. Next aPTT in 24 hrs per protocol. 1900 Updated pt's daughter, Asad Peterson, via phone. 2000 Bedside and Verbal shift change report given to [facility], RN and [place], RN (oncoming nurse) by Andrew Villar. BEHZAD LING (offgoing nurse). Report included the following information SBAR, Kardex, ED Summary, OR Summary, Procedure Summary, Intake/Output, MAR, Accordion, Recent Results, Med Rec Status, Cardiac Rhythm afib/PVCs and Alarm Parameters . Shift Summery: Pt received 2g Mg, 4 runs of 20 mEq K+, and 1g of calcium gluconate. Geronimo very positional. Minimal output from Erickson. Plan is for SBT in AM, per Dr. Shady Matute. Propofol 25 mcg/kg/hr, fentanyl 50 mcg/hr, heparin 16 units/kg/hr,  And amiodarone 0.5 mg/min.

## 2020-10-23 NOTE — WOUND CARE
Wound Consult:  New Patient Visit. Chart reviewed. Consulted for sacral area. Spoke with patients nurse,  Marylu Silva and we were at bedside to turn/reposition/assess. Patient is resting on a [facility] In Touch bed. He is intubated; on CVVHD. Assessment: 
Sacral area - peeled back sacral foam dressing to assess; no redness, sacrum has a dimple which appears as small recessed crease - skin within is intact, no redness. This may have appeared as a tear. Re-secured sacral foam dressing. No redness to buttocks, back or heels. Heels being floated. Skin Care Recommendations: 1. Minimize friction/shear: minimize layers of linen/pads under patient. Would continue preventative sacral dressing per ICU protocol. 2. Off load pressure/reposition: continue to turn and reposition approximately every 2 hours; float heels. 3. Manage Moisture - keep skin folds dry; incontinence skin care as needed; hicks in place. 4. Continue to monitor nutrition, pain, and skin risk scale, and skin assessment. Plan: Will continue to reassess routinely and as needed. Leonie Springer RN,[facility] Wound Healing Office [phone] Pager [phone]

## 2020-10-23 NOTE — PROGRESS NOTES
Music Therapy Assessment  [facility] [address] [id number]     1945  70 y.o.  male    Patient Telephone Number: [phone] (home)   Jewish Affiliation: [facility]   Language: English   Patient Active Problem List    Diagnosis Date Noted    Leukocytosis 10/19/2020    UTI (urinary tract infection) 10/19/2020    Tachycardia 10/19/2020    Sepsis ([address] 10/19/2020    Hydronephrosis of right kidney 10/19/2020        Date: 10/23/2020            Total Time (in minutes): 15          [facility] 7S2 INTENSIVE CARE    Mental Status:   [  ] Alert [  ] Sherine Gibes [  ]  Confused  [x] Minimally responsive  [  ] Sleeping    Communication Status: [  ] Impaired Speech [  ] Nonverbal -N/A    Physical Status:   [x] Oxygen in use - Ventilator [  ] Hard of Hearing [  ] Vision Impaired  [  ] Ambulatory  [  ] Ambulatory with assistance [  ] Non-ambulatory     Music Preferences, Background: [address], especially Denise Rangel.      Clinical Problem addressed: Support healthy pt/family coping and relaxation    Goal(s) met in session:  Physical/Pain management (Scale of 1-10):    Pre-session rating: Pt couldn't report on pain  Post-session rating: N/A: Please see Session Observations below. [x] Increased relaxation   [  ] Affected breathing patterns  [  ] Decreased muscle tension   [  ] Decreased agitation  [  ] Affected heart rate    [  ] Increased alertness     Emotional/Psychological:  [  ] Increased self-expression   [  ] Decreased aggressive behavior   [  ] Decreased feelings of stress  [  ] Discussed healthy coping skills     [  ] Improved mood    [  ] Decreased withdrawn behavior     Social:  [  ] Decreased feelings of isolation/loneliness [x] Positive social interaction   [x] Provided support and/or comfort for family/friends    Spiritual:  [x] Spiritual support    [  ] Expressed peace  [  ] Expressed malcom    [  ] Discussed beliefs    Techniques Utilized (Check all that apply): Sky Kellogg   [  ] Procedural support MT [x] Music for relaxation [x] Patient preferred music  [  ] Carmen analysis  [x] Rosalba Gurinder choice  [  ] Music for validation  [  ] Entrainment  [  ] Movement to music [  ] Guided visualization  [  ] De Redder  [  ] Patient instrument playing [  ] Rosalba Gurinder writing  [  ] Allan Medrano along   [  ] Bettie Hdz  [  ] Sensory stimulation  [x] Active Listening  [x] Music for spiritual support [  ] Making of CDs as gifts    Session Observations:  F/up visit; Patient (pt) was lying in bed with eyes closed and his spouse at bedside. This music therapy intern and music therapist (MT Team) asked how they were doing. Pt's spouse shared that she and pt have had a lot of ups and downs today and she's feeling very tired. MT Team provided active listening and words of validation. MT Team offered music therapy and pt's spouse said yes to this, requesting traditional hymns for further support. MT Team sang and played How Great Thou Art with guitar. Pt's wife shared more about the pt's health, her own personal struggles, and the support she has received from various family members. MT Team sang and played Radonna Ficks with guitar and harmonies. Pt appeared to purse his lips during this song. MT Team offered additional hymns and pt's spouse declined , saying she was feeling tired. Pt's spouse expressed gratitude for the visit and requested prayers from the Team. Will follow as able. Juan Callaway, Music Therapy Intern  And   Aman Morgan MT-BC (Music Therapist-Board Certified) [facility].    Referral-based service

## 2020-10-23 NOTE — DIABETES MGMT
[facility]  CLINICAL NURSE SPECIALIST CONSULT  PROGRAM FOR DIABETES HEALTH    FOLLOW UP NOTE    Presentation   Sharonda Ceja is a 70 y.o. male admitted  with right lower abdominal quadrant pain. Initial work up revealed hydrornephorsis and UTI. HX: PMH: CAD w/ MI and subsequent CABG 1999/ s/p AAA repair with left leg nephropathy/ fem to fem bypass/ HLD/ HTN/ DM2-A1C pending    DX: CT scan-hydronephrosis; cardiac cath revealed severe CAD disease. Current clinical course has been complicated by hyperglycemia. Diabetes: Patient has known Type 2 diabetes, treated with humalog per silvino report. No insulin or PO diabetic medications on PTA. Consulted by Provider for advanced diabetes nursing assessment and care, specifically related to   [] Transitioning off Janel Bless   [x] Inpatient management strategy  [x] Home management assessment  [] Survival skill education    Diabetes-related medical history  Acute complications  hyperglycemia  Neurological complications  NONE  Microvascular disease  Nephropathy  Macrovascular disease  CAD and Myocardial infarction  Other associated conditions     HTN/ HLD/    Diabetes medication history  Drug class Currently in use Discontinued Never used   Biguanide  Metformin    DDP-4 inhibitor       Sulfonylurea Glipizide 5mg daily     Thiazolidinedione      GLP-1 RA      SGLT-2 inhibitors      Basal insulin      Fixed Dose  Combinations      Bolus insulin  Humalog can't recall dosing, but took himself off due to symptoms of hypoglycemia      Subjective   Mr. Bernarda Nielsen remains critically ill and intubated. NSTEMI -->cardiac cath completed-->severe CAD disease with occlusion in SVG-RCA and SVG -LCx. No stents placed   Receiving CVVH for CKD  BG trends with gradual decline due to NPO status over past couple days. All insulins on hold for now.    TF st    Patient reports the following home diabetes self-care practices:  Eating pattern    Physical activity pattern-gets along well.    Monitoring pattern-checked BG once daily-    Taking medications pattern  [] Consistent administration  [] Affordable      Objective   Physical exam  General Intubated on ventilator  Vital Signs   Visit Vitals  BP (!) 130/51   Pulse 73   Temp 98.9 °F (37.2 °C)   Resp 18   Ht 5' 10\" (1.778 m)   Wt 89.5 kg (197 lb 5 oz)   SpO2 98%   BMI 28.31 kg/m²     Skin  Warm and dry. Heart   Regular rate and rhythm. No murmurs, rubs or gallops  Lungs  Clear to auscultation without rales or rhonchi  Extremities No foot wounds    Diabetic foot exam:    Left Foot     Visual Exam: normal    Pulse DP: 1+ (weak)   Filament test: absent sensation      Right Foot   Visual Exam: normal    Pulse DP: 2+ (normal)   Filament test: normal sensation    Vibratory sensation: normal DP & PT pulses +2. Laboratory  Lab Results   Component Value Date/Time    Hemoglobin A1c 7.1 (H) 10/20/2020 02:02 AM     No results found for: LDL, LDLC, DLDLP  Lab Results   Component Value Date/Time    Creatinine 1.39 (H) 10/23/2020 06:20 AM     Lab Results   Component Value Date/Time    Sodium 137 10/23/2020 06:20 AM    Potassium 3.4 (L) 10/23/2020 06:20 AM    Chloride 103 10/23/2020 06:20 AM    CO2 25 10/23/2020 06:20 AM    Anion gap 9 10/23/2020 06:20 AM    Glucose 83 10/23/2020 06:20 AM    BUN 23 (H) 10/23/2020 06:20 AM    Creatinine 1.39 (H) 10/23/2020 06:20 AM    BUN/Creatinine ratio 17 10/23/2020 06:20 AM    GFR est AA >60 10/23/2020 06:20 AM    GFR est non-AA 50 (L) 10/23/2020 06:20 AM    Calcium 9.1 10/23/2020 06:20 AM    Bilirubin, total 1.2 (H) 10/23/2020 06:19 AM    Alk.  phosphatase 334 (H) 10/23/2020 06:19 AM    Protein, total 6.1 (L) 10/23/2020 06:19 AM    Albumin 2.4 (L) 10/23/2020 06:20 AM    Globulin 3.7 10/23/2020 06:19 AM    A-G Ratio 0.6 (L) 10/23/2020 06:19 AM    ALT (SGPT) 685 (H) 10/23/2020 06:19 AM     Lab Results   Component Value Date/Time    ALT (SGPT) 685 (H) 10/23/2020 06:19 AM       Factors affecting BG pattern  Factor Dose Comments   Nutrition:  TF via NGT   Nepro @ 20cc/hr Anticipate eventual hyperglycemia. Will require basal insulin to cover for the rise in BG. Drugs:   Heparin/Propofol/bicarb    Pain Abdominal pain    Infection UTI/Hydronephrosis  WBC 17.3   Lactic 4.6--> 1.5    CVVH-CKD stage 4 Improvement noted in GFR 50/Cr+1.39      Assessment and Plan   Nursing Diagnosis Risk for unstable blood glucose pattern   Nursing Intervention Domain 6172 Decision-making Support   Nursing Interventions Examined current inpatient diabetes control   Explored factors facilitating and impeding inpatient management  Identified self-management practices impeding diabetes control  Explored corrective strategies with patient and responsible inpatient provider   Informed patient of rational for insulin strategy while hospitalized       Evaluation   Mr. Dion Wallace, has a long history  Type 2 diabetes- no recent A1C on file. Lab drawn this morning and result pending. He has co-morbid conditions in addition to his DM2. S/p code blue on NSTU with ROSC and transferred up to ICU level of care on 10/21/2020. 12 lead ECG revealed  NSTEMI necessitating cardiac cath which revealed severe CAD vessel disease. Renal status was also declining and patient was placed on CVVH. Renal status improving with CVVH. Overall BG trends decreased over past 2 days since patient now NPO. Had one hypoglycemic event yesterday, 58mg/dl and given D10. All insulins currently on hold. TF initiated this morning -Nepro @ 20cc/hr. Anticipate BG will trend up with TF. When BG trends >200mg/dl consistently, consider re-starting basal insulin. Inpatient blood glucose management has been impacted by  [x] Kidney dysfunction =CKD stage 4  [x] Erratic meal consumption -poor appetite today  Recommendations   1. Once BG trends increase back up to >200mg/dl consistently, consider re-starting basal insulin -17units daily.   IF AM BG remains >200 after restarting, INCREASE basal dose up to 20units daily. .    2.  CONTINUE Corrective insulin  [x] Normal sensitivity    Discharge Planning   TBD    Billing Code(s)   I personally reviewed chart, notes, data and current medications in the medical record, and examined the patient at bedside before making care recommendations. Thank you for including us in their care. I spent 20 minutes in direct patient care today for this patient.   Time includes chart review, face to face with patient and collaboration with interdisciplinary care team.     Ness Barker, [facility]  Program for Diabetes Health  Access via [address] [address]  [phone]

## 2020-10-23 NOTE — PROGRESS NOTES
Comprehensive Nutrition Assessment    Type and Reason for Visit: Initial, Consult    Nutrition Recommendations/Plan:      Modify tube feeding: Nepro @ 35 ml/hr with 2 packets Prosource bid and 50 ml water flush q 4 hr    Nutrition Assessment:    Pt admitted with Leukocytosis. PMHx: CAD, MI, DM 2, AAA, HTN, HLD. Sepsis, UTI, pyelonephritis on admission. Cardiac arrest 10/20-intubated. JAMAL on CKD 4, started CRRT 10/21. ?NSTEMI 10/22-s/p (L) heart cath. Undergoing SBT's today. Trophic tube feeding ordered for Mr Gama Moody. Will increase to meet nutrient needs until able to extubate and advance diet. MST negative on admission. Potassium, magnesium and phosphorus replaced today. Diprivan @ current rate of 13.6 ml/hr will provide 359 lipid calories per day. Recommended goal feeding: Nepro @ 35 ml/hr with 2 packets Prosource bid and 50 ml water flush q 4 hr. This will provide 840 ml, 1730 calories (2085 including Diprivan), 127 gm protein and 1140 ml free water (tube feeding/flush) per day to meet estimated needs. Once off Diprivan increase tube feeding to 45 ml/hr with 3 packets Prosource daily and 100 ml water flush q 4 hr.     Malnutrition Assessment:  Malnutrition Status:  No malnutrition      Nutritionally Significant Medications:   Diprivan, Fentanyl, calcium gluconate x 1, Lantus, correction scale insulin, magnesium sulfate, Protonix, KCL, Crestor, K Phosphate    Estimated Daily Nutrient Needs:  Energy (kcal):  2115  Protein (g):  CRRT: 150 (2g/kg IBW); HD: 108-126 gm      Fluid (ml/day):  1 ml/kcal    Nutrition Related Findings:       BM: 10/22  Edema: Trace BUE and BLE  Wounds:  None       Current Nutrition Therapies:   Diet: NPO  Tube Feeding: Nepro @ 20 ml/hr with 50 ml water flush q 4 hr  Additional Caloric Sources:  Diprivan    Anthropometric Measures:  · Height:  5' 10\" (177.8 cm)  · Current Body Wt:  89.5 kg (197 lb 5 oz)   · Admission Body Wt:  185 lb 3 oz     · Ideal Body Wt: 166#  :  118.9 % · BMI Categories:  Overweight (BMI 25.0-29. 9)     Wt Readings from Last 10 Encounters:   10/23/20 89.5 kg (197 lb 5 oz)       Nutrition Diagnosis:   · Inadequate oral intake related to impaired respiratory function as evidenced by NPO or clear liquid status due to medical condition, intubation    Nutrition Interventions:   Food and/or Nutrient Delivery: Modify tube feeding  Nutrition Education and Counseling: No recommendations at this time  Coordination of Nutrition Care: Continued inpatient monitoring, Interdisciplinary rounds    Goals: Tolerate tube feeding at goal in next 24 hr.       Nutrition Monitoring and Evaluation:   Food/Nutrient Intake Outcomes: Enteral nutrition intake/tolerance  Physical Signs/Symptoms Outcomes: Fluid status or edema, Weight, Hemodynamic status, Biochemical data, GI status    Discharge Planning:     Too soon to determine     Maulik ROMI Calderon CNSC  Contact: Perfect Serve

## 2020-10-23 NOTE — PROGRESS NOTES
Cardiology Progress Note  10/23/2020     Admit Date: 10/19/2020  Admit Diagnosis: Hydronephrosis of right kidney [N13.30]  UTI (urinary tract infection) [N39.0]  Sepsis ([address] [A41.9]  Tachycardia [R00.0]  Leukocytosis [D72.829]  CC: none currently    Assessment:   Active Problems:    Leukocytosis (10/19/2020)      UTI (urinary tract infection) (10/19/2020)      Tachycardia (10/19/2020)      Sepsis ([address] (10/19/2020)      Hydronephrosis of right kidney (10/19/2020)      Plan:     Cont heparin and ASA; heparin for 48 hours after peak of troponin  Echo reviwed  No need to check further troponins  Agree with amiodarone for ventricular ectopy    Subjective:      Kota Jacksonliliana had no events o/n.  Ventricular ectopy this AM     Objective:    Physical Exam:  Overall VSSAF;    Visit Vitals  BP (!) 123/46   Pulse 94   Temp 98.9 °F (37.2 °C)   Resp 18   Ht 5' 10\" (1.778 m)   Wt 89.5 kg (197 lb 5 oz)   SpO2 99%   BMI 28.31 kg/m²     Temp (24hrs), Av.6 °F (37 °C), Min:97.2 °F (36.2 °C), Max:99.7 °F (37.6 °C)    Patient Vitals for the past 8 hrs:   Pulse   10/23/20 1300 94   10/23/20 1200 (!) 107   10/23/20 1148 (!) 113   10/23/20 1105 83   10/23/20 1100 83   10/23/20 1000 73   10/23/20 0932 74   10/23/20 0904 77   10/23/20 0900 83   10/23/20 0854 73   10/23/20 0800 72   10/23/20 0700 82   10/23/20 0600 64    Patient Vitals for the past 8 hrs:   Resp   10/23/20 1300 18   10/23/20 1200 14   10/23/20 1148 20   10/23/20 1100 20   10/23/20 1000 18   10/23/20 0932 18   10/23/20 0900 21   10/23/20 0800 18   10/23/20 0700 15   10/23/20 0600 17    Patient Vitals for the past 8 hrs:   BP   10/23/20 1300 (!) 123/46   10/23/20 1200 (!) 159/45   10/23/20 1105 (!) 167/56   10/23/20 1100 (!) 151/61   10/23/20 1000 (!) 130/51   10/23/20 0904 (!) 158/51   10/23/20 0900 105/81   10/23/20 0854 (!) 140/42   10/23/20 0800 (!) 121/53   10/23/20 0700 134/67   10/23/20 0600 121/60      10/21 1901 - 10/23 07  In: 2714.4 [I.V.:2000.4]  Out: 3496 [Urine:787]      General Appearance: Intubated   Ears/Nose/Mouth/Throat:   Normal MM; anicteric. JVP: WNL   Resp:   Lungs clear to auscultation bilaterally. Nl resp effort. Cardiovascular:  RRR, S1, S2 normal, no new murmur. No gallop or rub. Abdomen:   Soft, non-tender, bowel sounds are present. Extremities: No edema bilaterally. Skin:  Neuro: Warm and dry.   Sedated                         Data Review:     Telemetry independently reviewed :   normal sinus rhythm         Labs:   Recent Results (from the past 24 hour(s))   GLUCOSE, POC    Collection Time: 10/22/20  1:55 PM   Result Value Ref Range    Glucose (POC) 121 (H) 65 - 100 mg/dL    Performed by Manda Luciano    PHOSPHORUS    Collection Time: 10/22/20  6:05 PM   Result Value Ref Range    Phosphorus 2.0 (L) 2.6 - 4.7 MG/DL   CBC W/O DIFF    Collection Time: 10/22/20  6:05 PM   Result Value Ref Range    WBC 13.4 (H) 4.1 - 11.1 K/uL    RBC 3.67 (L) 4.10 - 5.70 M/uL    HGB 10.1 (L) 12.1 - 17.0 g/dL    HCT 30.7 (L) 36.6 - 50.3 %    MCV 83.7 80.0 - 99.0 FL    MCH 27.5 26.0 - 34.0 PG    MCHC 32.9 30.0 - 36.5 g/dL    RDW 16.0 (H) 11.5 - 14.5 %    PLATELET 765 (L) 309 - 400 K/uL    MPV 11.3 8.9 - 12.9 FL    NRBC 0.1 (H) 0  WBC    ABSOLUTE NRBC 0.02 (H) 0.00 - 0.01 K/uL   RENAL FUNCTION PANEL    Collection Time: 10/22/20  6:05 PM   Result Value Ref Range    Sodium 135 (L) 136 - 145 mmol/L    Potassium 3.3 (L) 3.5 - 5.1 mmol/L    Chloride 102 97 - 108 mmol/L    CO2 24 21 - 32 mmol/L    Anion gap 9 5 - 15 mmol/L    Glucose 119 (H) 65 - 100 mg/dL    BUN 30 (H) 6 - 20 MG/DL    Creatinine 1.80 (H) 0.70 - 1.30 MG/DL    BUN/Creatinine ratio 17 12 - 20      GFR est AA 45 (L) >60 ml/min/1.73m2    GFR est non-AA 37 (L) >60 ml/min/1.73m2    Calcium 9.1 8.5 - 10.1 MG/DL    Phosphorus 2.0 (L) 2.6 - 4.7 MG/DL    Albumin 2.4 (L) 3.5 - 5.0 g/dL   MAGNESIUM    Collection Time: 10/22/20  6:05 PM   Result Value Ref Range    Magnesium 2.0 1.6 - 2.4 mg/dL GLUCOSE, POC    Collection Time: 10/22/20  7:49 PM   Result Value Ref Range    Glucose (POC) 99 65 - 100 mg/dL    Performed by [facility], POC    Collection Time: 10/22/20 10:00 PM   Result Value Ref Range    Glucose (POC) 79 65 - 100 mg/dL    Performed by [facility]    RENAL FUNCTION PANEL    Collection Time: 10/22/20 11:59 PM   Result Value Ref Range    Sodium 136 136 - 145 mmol/L    Potassium 3.3 (L) 3.5 - 5.1 mmol/L    Chloride 102 97 - 108 mmol/L    CO2 25 21 - 32 mmol/L    Anion gap 9 5 - 15 mmol/L    Glucose 101 (H) 65 - 100 mg/dL    BUN 25 (H) 6 - 20 MG/DL    Creatinine 1.58 (H) 0.70 - 1.30 MG/DL    BUN/Creatinine ratio 16 12 - 20      GFR est AA 52 (L) >60 ml/min/1.73m2    GFR est non-AA 43 (L) >60 ml/min/1.73m2    Calcium 9.3 8.5 - 10.1 MG/DL    Phosphorus 2.4 (L) 2.6 - 4.7 MG/DL    Albumin 2.4 (L) 3.5 - 5.0 g/dL   MAGNESIUM    Collection Time: 10/22/20 11:59 PM   Result Value Ref Range    Magnesium 1.9 1.6 - 2.4 mg/dL   GLUCOSE, POC    Collection Time: 10/23/20 12:03 AM   Result Value Ref Range    Glucose (POC) 92 65 - 100 mg/dL    Performed by [facility]    PTT    Collection Time: 10/23/20  2:38 AM   Result Value Ref Range    aPTT 51.8 (H) 22.1 - 32.0 sec    aPTT, therapeutic range     58.0 - 77.0 SECS   GLUCOSE, POC    Collection Time: 10/23/20  6:16 AM   Result Value Ref Range    Glucose (POC) 72 65 - 100 mg/dL    Performed by [address]    PHOSPHORUS    Collection Time: 10/23/20  6:19 AM   Result Value Ref Range    Phosphorus 3.3 2.6 - 4.7 MG/DL   CBC W/O DIFF    Collection Time: 10/23/20  6:19 AM   Result Value Ref Range    WBC 17.3 (H) 4.1 - 11.1 K/uL    RBC 3.83 (L) 4.10 - 5.70 M/uL    HGB 10.4 (L) 12.1 - 17.0 g/dL    HCT 32.9 (L) 36.6 - 50.3 %    MCV 85.9 80.0 - 99.0 FL    MCH 27.2 26.0 - 34.0 PG    MCHC 31.6 30.0 - 36.5 g/dL    RDW 16.2 (H) 11.5 - 14.5 %    PLATELET 898 605 - 015 K/uL    MPV 11.2 8.9 - 12.9 FL    NRBC 0.0 0  WBC    ABSOLUTE NRBC 0.00 0.00 - 0.01 K/uL   HEPATIC FUNCTION PANEL    Collection Time: 10/23/20  6:19 AM   Result Value Ref Range    Protein, total 6.1 (L) 6.4 - 8.2 g/dL    Albumin 2.4 (L) 3.5 - 5.0 g/dL    Globulin 3.7 2.0 - 4.0 g/dL    A-G Ratio 0.6 (L) 1.1 - 2.2      Bilirubin, total 1.2 (H) 0.2 - 1.0 MG/DL    Bilirubin, direct 0.9 (H) 0.0 - 0.2 MG/DL    Alk.  phosphatase 334 (H) 45 - 117 U/L    AST (SGOT) 537 (H) 15 - 37 U/L    ALT (SGPT) 685 (H) 12 - 78 U/L   RENAL FUNCTION PANEL    Collection Time: 10/23/20  6:20 AM   Result Value Ref Range    Sodium 137 136 - 145 mmol/L    Potassium 3.4 (L) 3.5 - 5.1 mmol/L    Chloride 103 97 - 108 mmol/L    CO2 25 21 - 32 mmol/L    Anion gap 9 5 - 15 mmol/L    Glucose 83 65 - 100 mg/dL    BUN 23 (H) 6 - 20 MG/DL    Creatinine 1.39 (H) 0.70 - 1.30 MG/DL    BUN/Creatinine ratio 17 12 - 20      GFR est AA >60 >60 ml/min/1.73m2    GFR est non-AA 50 (L) >60 ml/min/1.73m2    Calcium 9.1 8.5 - 10.1 MG/DL    Phosphorus 3.3 2.6 - 4.7 MG/DL    Albumin 2.4 (L) 3.5 - 5.0 g/dL   PTT    Collection Time: 10/23/20  9:24 AM   Result Value Ref Range    aPTT 60.2 (H) 22.1 - 32.0 sec    aPTT, therapeutic range     58.0 - 77.0 SECS   EKG, 12 LEAD, INITIAL    Collection Time: 10/23/20 12:26 PM   Result Value Ref Range    Ventricular Rate 114 BPM    Atrial Rate 125 BPM    P-R Interval 224 ms    QRS Duration 124 ms    Q-T Interval 336 ms    QTC Calculation (Bezet) 463 ms    Calculated R Axis 75 degrees    Calculated T Axis -124 degrees    Diagnosis       Sinus tachycardia with 1st degree AV block with frequent and consecutive   premature ventricular complexes and fusion complexes  Nonspecific intraventricular conduction delay  Marked ST abnormality, possible anterolateral subendocardial injury  When compared with ECG of 21-OCT-2020 14:59,  fusion complexes are now present  ST now depressed in Inferior leads  T wave inversion less evident in Anterolateral leads     GLUCOSE, POC    Collection Time: 10/23/20 12:53 PM   Result Value Ref Range    Glucose (POC) 92 65 - 100 mg/dL    Performed by Estefania Mcdonough       Current medications reviewed       Marysol Valente MD

## 2020-10-23 NOTE — DIALYSIS
[facility]       [phone]        Orders  Mode: CVVHD   Prismasol Bath: 2K/3.5Ca   Blood Flow Rate: 200ml/min   Prismasol Dose: 25ml/kg/hr   Factor: 50ml/hr     Metrics  BP/HR: 140/42 73   Access Pressure: -75   Filter Pressure: 157   Return Pressure: 67   TMP: 28   PD: 57   Dialysate Flow Rate: 2250ml/hr     Comments / Plan:   Patient, orders, line and consent verified. Labs, notes and code status reviewed. [id number] filter running well with no indication for change at this time. RIJ non-tunneled CVC intact transparent dressing with old drainage. Lines visible/secure/reversed with blood warmer attached to the return line and set to 37*C. Education and Pre/Post with primary RN.

## 2020-10-23 NOTE — PROGRESS NOTES
Physical Therapy Note  10/23/2020    Orders acknowledged and chart reviewed. Spoke with OT/RN who report pt is not appropriate for skilled PT at this time - remains intubated and on CVVH. Possible plans for extubation later this PM. Will defer and continue to follow for formal PT eval as appropriate. Recommend nursing to complete with patient, as able, in order to promote cardiopulmonary systems, maintain strength, endurance and independence:   -bed in chair position with foot board on 3x/day 30-60 mins max each and/OR reverse trendelenburg with foot board on and non-skid footwear  -passive ROM B UEs and LEs during bathing to prevent contractures  -positioning to prevent edema and contractures. Thank you for your assistance.      Partha Friedman, PT, DPT

## 2020-10-23 NOTE — PROGRESS NOTES
Follow up visit with pt and wife. Pt's wife Minnie Siddiqui shared an update on pt's condition and her hopes and concerns. She has been in contact with their . Assured her of ongoing prayers and offered a spoken prayer. Minnie Siddiqui expressed appreciation for music therapist's visit on Wednesday.  did not attempt to interact with Mr. Joanne Hazel at this time as he remains intubated. Chaplains are available for continued support as needed.     Nadia Davidson

## 2020-10-23 NOTE — PROGRESS NOTES
Nursing Shift Note 1945-0800  1950: Bedside and Verbal shift change report given to Bennie Syed Rn and [place], RN (oncoming nurse) by Karol Morales RN (offgoing nurse). Report included the following information SBAR, Kardex, Procedure Summary, Intake/Output, MAR, Accordion, Recent Results and Cardiac Rhythm NSR, PVCs. Patient has CVVHD running. 2033: Per cardiologist instruction/clarification, restarted heprin gtt    2200: Bleeding noted from [facility] dressing; will continue to monitor. 0030: Patient not opening eyes or withdrawing; beginning to wean down propofol    Dialysis [facility] highly positional    0400: Patient now opens eyes to voice and withdraws in all extremities. R foot cooler to touch than L; pulses palpable. 0730: Bedside and Verbal shift change report given to Anum Ceballos RN (oncoming nurse) by Bennie Syed RN (offgoing nurse). Report included the following information SBAR, Kardex, Procedure Summary, Intake/Output, MAR, Accordion, Recent Results and Cardiac Rhythm NSR, PVCs. Shift Summary  Patient on CVVHD throughout the night. Tolerating factor of 50. Propofol weaned from 30mcg/kg/min to 15mc/kg/min, fentanyl at 50mcg/hr, heparin at 16units/kg/hr. Dialysis [place] cath remains highly positional; RN frequently needing to reposition catheter d/t highly negative access pressures. Patient opening eyes to voice, withdraws in all extremities, no command following. Care Plan Summary  Problem: Falls - Risk of  Goal: *Absence of Falls  Description: Document Gayle Solano Fall Risk and appropriate interventions in the flowsheet.   Outcome: Progressing Towards Goal  Note: Fall Risk Interventions:  Mobility Interventions: Communicate number of staff needed for ambulation/transfer    Mentation Interventions: Door open when patient unattended, Adequate sleep, hydration, pain control, Room close to nurse's station, Reorient patient, More frequent rounding    Medication Interventions: Evaluate medications/consider consulting pharmacy    Elimination Interventions: Toileting schedule/hourly rounds              Problem: Patient Education: Go to Patient Education Activity  Goal: Patient/Family Education  Outcome: Progressing Towards Goal     Problem: Urinary Tract Infection - Adult  Goal: *Absence of infection signs and symptoms  Outcome: Progressing Towards Goal     Problem: Patient Education: Go to Patient Education Activity  Goal: Patient/Family Education  Outcome: Progressing Towards Goal     Problem: Pain  Goal: *Control of Pain  Outcome: Progressing Towards Goal     Problem: Patient Education: Go to Patient Education Activity  Goal: Patient/Family Education  Outcome: Progressing Towards Goal     Problem: Breathing Pattern - Ineffective  Goal: *Absence of hypoxia  Outcome: Progressing Towards Goal  Goal: *Use of effective breathing techniques  Outcome: Progressing Towards Goal     Problem: Patient Education: Go to Patient Education Activity  Goal: Patient/Family Education  Outcome: Progressing Towards Goal     Problem: Diabetes Self-Management  Goal: *Disease process and treatment process  Description: Define diabetes and identify own type of diabetes; list 3 options for treating diabetes. Outcome: Progressing Towards Goal  Goal: *Incorporating nutritional management into lifestyle  Description: Describe effect of type, amount and timing of food on blood glucose; list 3 methods for planning meals. Outcome: Progressing Towards Goal  Goal: *Incorporating physical activity into lifestyle  Description: State effect of exercise on blood glucose levels. Outcome: Progressing Towards Goal  Goal: *Developing strategies to promote health/change behavior  Description: Define the ABC's of diabetes; identify appropriate screenings, schedule and personal plan for screenings.   Outcome: Progressing Towards Goal  Goal: *Using medications safely  Description: State effect of diabetes medications on diabetes; name diabetes medication taking, action and side effects. Outcome: Progressing Towards Goal  Goal: *Monitoring blood glucose, interpreting and using results  Description: Identify recommended blood glucose targets  and personal targets. Outcome: Progressing Towards Goal  Goal: *Prevention, detection, treatment of acute complications  Description: List symptoms of hyper- and hypoglycemia; describe how to treat low blood sugar and actions for lowering  high blood glucose level. Outcome: Progressing Towards Goal  Goal: *Prevention, detection and treatment of chronic complications  Description: Define the natural course of diabetes and describe the relationship of blood glucose levels to long term complications of diabetes. Outcome: Progressing Towards Goal  Goal: *Developing strategies to address psychosocial issues  Description: Describe feelings about living with diabetes; identify support needed and support network  Outcome: Progressing Towards Goal  Goal: *Sick day guidelines  Outcome: Progressing Towards Goal     Problem: Patient Education: Go to Patient Education Activity  Goal: Patient/Family Education  Outcome: Progressing Towards Goal     Problem: Ventilator Management  Goal: *Adequate oxygenation and ventilation  Outcome: Progressing Towards Goal  Goal: *Patient maintains clear airway/free of aspiration  Outcome: Progressing Towards Goal  Goal: *Absence of infection signs and symptoms  Outcome: Progressing Towards Goal  Goal: *Normal spontaneous ventilation  Outcome: Progressing Towards Goal     Problem: Patient Education: Go to Patient Education Activity  Goal: Patient/Family Education  Outcome: Progressing Towards Goal     Problem: Pressure Injury - Risk of  Goal: *Prevention of pressure injury  Description: Document Luis Felipe Scale and appropriate interventions in the flowsheet.   Outcome: Progressing Towards Goal  Note: Pressure Injury Interventions:  Sensory Interventions: Assess changes in LOC, Check visual cues for pain, Discuss PT/OT consult with provider, Float heels, Keep linens dry and wrinkle-free, Minimize linen layers, Turn and reposition approx. every two hours (pillows and wedges if needed)    Moisture Interventions: Absorbent underpads, Internal/External urinary devices    Activity Interventions: Pressure redistribution bed/mattress(bed type)    Mobility Interventions: Float heels, HOB 30 degrees or less, Pressure redistribution bed/mattress (bed type), Turn and reposition approx.  every two hours(pillow and wedges)    Nutrition Interventions: Document food/fluid/supplement intake, Offer support with meals,snacks and hydration    Friction and Shear Interventions: Apply protective barrier, creams and emollients, Lift sheet, Lift team/patient mobility team, Minimize layers                Problem: Patient Education: Go to Patient Education Activity  Goal: Patient/Family Education  Outcome: Progressing Towards Goal     Problem: Non-Violent Restraints  Goal: *Removal from restraints as soon as assessed to be safe  Outcome: Progressing Towards Goal  Goal: *No harm/injury to patient while restraints in use  Outcome: Progressing Towards Goal  Goal: *Patient's dignity will be maintained  Outcome: Progressing Towards Goal  Goal: *Patient Specific Goal (EDIT GOAL, INSERT TEXT)  Outcome: Progressing Towards Goal  Goal: Non-violent Restaints:Standard Interventions  Outcome: Progressing Towards Goal  Goal: Non-violent Restraints:Patient Interventions  Outcome: Progressing Towards Goal  Goal: Patient/Family Education  Outcome: Progressing Towards Goal

## 2020-10-23 NOTE — PROGRESS NOTES
[facility]    ICU TEAM Progress Note    Name: Gayle Orellana   : 1945   MRN: [id number]   Date: 10/23/2020      Assessment:     - Cardiac arrest  - JAMAL on CKD  - R sided hydronephrosis  - hyperglycemia  - Acute hypoxic respiratory failure  - leukocytosis  -Arrhythmia    ICU Comprehensive Plan of Care:   Analgesia/sedation with opioids and propofol as needed, early mobility as tolerated, delirium prevention strategies    This morning/afternoon developed a very irregular rhythm-V. fib/bigeminy/NSVT-keep magnesium above 2 and potassium above 4, will start an amiodarone infusion, continue metoprolol, follow-up cardiology recommendations, continue aspirin and Crestor, map goal greater than 65, off pressors    Continue lung protective strategies on the ventilator, daily breathing trials-did well today but secondary to above we decided to keep him intubated    Transaminitis-worsening-monitor-initially thought to be due to shock liver, Protonix for GI prophylaxis, continue feeds    Still on CRRT, follow-up nephrology recommendations, follow-up urology recommendations for right hydronephrosis, monitor urine output closely, dose medication renally, avoid nephrotoxic agents, correct electrolyte derangements as needed    Monitor for bleeding while on a heparin drip-Per cardiology to be discontinued tomorrow    Positive UA, negative cultures, perinephric stranding-continue ceftriaxone for now, continue doxycycline for now as well-we will de-escalate soon, white count worse today    Keep glucose less than 180 with subcutaneous insulin as needed      F - Feeding:  Yes   A - Analgesia: Fentanyl  S - Sedation: Propofol  T - DVT Prophylaxis: Heparin   H - Head of Bed: > 30 Degrees  U - Ulcer Prophylaxis: Protonix (pantoprazole)   G - Glycemic Control: Insulin  S - Spontaneous Breathing Trial: Yes  B - Bowel Regimen: Senna  I - Indwelling Catheter:   Tubes: ETT  Lines: Peripheral IV  Drains: Erickson Catheter  D - De-escalation of Antibiotics: n    Subjective:     Overnight events    Still intubated, off pressors, cardiac cath done yesterday-severe native three-vessel CAD, patent LIMA to LAD, known occluded SVG to RCA and SVG to left circumflex, moderately elevated LVEDP    Active Problem List:     Problem List  Never Reviewed          Codes Class    Leukocytosis ICD-10-CM: Y34.632  ICD-9-CM: 288.60         UTI (urinary tract infection) ICD-10-CM: N39.0  ICD-9-CM: 599.0         Tachycardia ICD-10-CM: R00.0  ICD-9-CM: 785.0         Sepsis ([address] ICD-10-CM: A41.9  ICD-9-CM: 038.9, 995.91         Hydronephrosis of right kidney ICD-10-CM: N13.30  ICD-9-CM: 998               Past Medical History:      has no past medical history on file. Past Surgical History:      has no past surgical history on file. Home Medications:     Prior to Admission medications    Medication Sig Start Date End Date Taking? Authorizing Provider   aspirin (ASPIRIN) 325 mg tablet Take 325 mg by mouth daily. Yes Provider, Historical   amLODIPine (NORVASC) 10 mg tablet Take 10 mg by mouth daily. Indications: high blood pressure   Yes Provider, Historical   ergocalciferol (ERGOCALCIFEROL) 1,250 mcg (50,000 unit) capsule Take 50,000 Units by mouth every seven (7) days. Yes Provider, Historical   multivitamin, tx-iron-ca-min (THERA-M w/ IRON) 9 mg iron-400 mcg tab tablet Take 1 Tab by mouth daily. Yes Provider, Historical   oxyCODONE-acetaminophen (Percocet) 5-325 mg per tablet Take 1 Tab by mouth every six (6) hours as needed for Pain. Yes Provider, Historical   metoprolol succinate (TOPROL-XL) 50 mg XL tablet Take 50 mg by mouth daily. Indications: high blood pressure   Yes Provider, Historical       Allergies/Social/Family History:     No Known Allergies   Social History     Tobacco Use    Smoking status: Not on file   Substance Use Topics    Alcohol use: Not on file      No family history on file.            Objective:   Vital Signs:  Visit Vitals  BP (!) 110/48   Pulse 67   Temp 99.6 °F (37.6 °C)   Resp 18   Ht 5' 10\" (1.778 m)   Wt 89.5 kg (197 lb 5 oz)   SpO2 99%   BMI 28.31 kg/m²    O2 Flow Rate (L/min): 15 l/min O2 Device: Endotracheal tube, Ventilator Temp (24hrs), Av.7 °F (37.1 °C), Min:97.2 °F (36.2 °C), Max:99.7 °F (37.6 °C)           Intake/Output:     Intake/Output Summary (Last 24 hours) at 10/23/2020 1529  Last data filed at 10/23/2020 1500  Gross per 24 hour   Intake 1588.29 ml   Output 2236 ml   Net -647.71 ml       Physical Exam:  General-intubated, sedated  Neuro-following simple commands  Cardiac-irregular  Lungs-clear  Abdomen-soft, nontender, nondistended  Extremities-warm    LABS AND  DATA: Personally reviewed  Recent Labs     10/23/20  0619 10/22/20  1805   WBC 17.3* 13.4*   HGB 10.4* 10.1*   HCT 32.9* 30.7*    141*     Recent Labs     10/23/20  1251 10/23/20  0620  10/22/20  2359    137  --  136   K 3.0* 3.4*  --  3.3*    103  --  102   CO2 26 25  --  25   BUN 22* 23*  --  25*   CREA 1.42* 1.39*  --  1.58*    83  --  101*   CA 9.3 9.1  --  9.3   MG 1.9  --   --  1.9   PHOS 2.3* 3.3   < > 2.4*    < > = values in this interval not displayed. Recent Labs     10/23/20  1251 10/23/20  0620 10/23/20  0619  10/21/20  0059   AP  --   --  334*  --  159*   TP  --   --  6.1*  --  7.1   ALB 2.4* 2.4* 2.4*   < > 2.8*   GLOB  --   --  3.7  --  4.3*    < > = values in this interval not displayed. Recent Labs     10/23/20  0924 10/23/20  0238  10/21/20  0601 10/21/20  0059   INR  --   --   --  1.1 1.1   PTP  --   --   --  11.5* 11.4*   APTT 60.2* 51.8*   < >  --   --     < > = values in this interval not displayed. No results for input(s): PHI, PCO2I, PO2I, FIO2I in the last 72 hours.   Recent Labs     10/22/20  0536 10/21/20  2323   TROIQ 16.80* 15.70*       Hemodynamics:   PAP:   CO:     Wedge:   CI:     CVP:    SVR:       PVR:       Ventilator Settings:  Mode Rate Tidal Volume Pressure FiO2 PEEP Spontaneous   550 ml    50 % 5 cm H20     Peak airway pressure: 11 cm H2O    Minute ventilation: 15 l/min        MEDS: Reviewed    Chest X-Ray:  CXR Results  (Last 48 hours)               10/22/20 0609  XR CHEST PORT Final result    Impression:  IMPRESSION: Stable appearance of the chest.           Narrative:  INDICATION: Intubated. Respiratory failure. COMPARISON: 10/21/2020       FINDINGS: Single AP portable view of the chest obtained at 4:42 AM demonstrates   no change in position of the lines and tubes. The cardiomediastinal silhouette   is stable. The patient is status post median sternotomy. Diffuse bilateral   airspace disease is not significantly changed. No pneumothorax is seen. There is   no evidence of pleural effusion. Multidisciplinary Rounds Completed:  y    ABCDEF Bundle/Checklist Completed:  Yes      NOTE OF PERSONAL INVOLVEMENT IN CARE   This patient has a high probability of imminent, clinically significant deterioration, which requires the highest level of preparedness to intervene urgently. I participated in the decision-making and personally managed or directed the management of the following life and organ supporting interventions that required my frequent assessment to treat or prevent imminent deterioration. I personally spent 40 minutes of critical care time. This is time spentactively involved in patient care as well as the coordination of care. This does not include any procedural time.     Signed By: Oliver Browning MD     October 23, 2020

## 2020-10-23 NOTE — PROGRESS NOTES
Occupational Therapy    Completed chart review for OT eval.  Discussed patient with nursing, patient continues to be intubated and on CVVHD. Demonstrating increased in alertness this AM.  Plans for potential extubation today. Will continue to follow for OT eval as appropriate.        Recommend nursing to complete with patient, as able, in order to promote cardiopulmonary systems, maintain strength, endurance and independence:   -bed in chair position with foot board on 3x/day 30-60 mins max each and/OR reverse trendelenburg with foot board on and non-skid footwear  -passive ROM B UEs and LEs during bathing to prevent contractures  -positioning to prevent edema and contractures.      Thank you,    Gila Dougherty, OT

## 2020-10-24 ENCOUNTER — APPOINTMENT (OUTPATIENT)
Dept: GENERAL RADIOLOGY | Age: 75
DRG: 870 | End: 2020-10-24
Attending: INTERNAL MEDICINE
Payer: MEDICARE

## 2020-10-24 LAB
ALBUMIN SERPL-MCNC: 2.2 G/DL (ref 3.5–5)
ALBUMIN SERPL-MCNC: 2.3 G/DL (ref 3.5–5)
ALBUMIN SERPL-MCNC: 2.3 G/DL (ref 3.5–5)
ALBUMIN SERPL-MCNC: 2.4 G/DL (ref 3.5–5)
ALBUMIN SERPL-MCNC: 2.4 G/DL (ref 3.5–5)
ALBUMIN/GLOB SERPL: 0.6 {RATIO} (ref 1.1–2.2)
ALP SERPL-CCNC: 474 U/L (ref 45–117)
ALT SERPL-CCNC: 644 U/L (ref 12–78)
ANION GAP SERPL CALC-SCNC: 10 MMOL/L (ref 5–15)
ANION GAP SERPL CALC-SCNC: 10 MMOL/L (ref 5–15)
ANION GAP SERPL CALC-SCNC: 8 MMOL/L (ref 5–15)
ANION GAP SERPL CALC-SCNC: 8 MMOL/L (ref 5–15)
APTT PPP: 63 SEC (ref 22.1–32)
AST SERPL-CCNC: 486 U/L (ref 15–37)
ATRIAL RATE: 125 BPM
BACTERIA SPEC CULT: NORMAL
BASOPHILS # BLD: 0.1 K/UL (ref 0–0.1)
BASOPHILS NFR BLD: 0 % (ref 0–1)
BILIRUB DIRECT SERPL-MCNC: 0.7 MG/DL (ref 0–0.2)
BILIRUB SERPL-MCNC: 1.1 MG/DL (ref 0.2–1)
BUN SERPL-MCNC: 20 MG/DL (ref 6–20)
BUN SERPL-MCNC: 21 MG/DL (ref 6–20)
BUN SERPL-MCNC: 22 MG/DL (ref 6–20)
BUN SERPL-MCNC: 22 MG/DL (ref 6–20)
BUN/CREAT SERPL: 15 (ref 12–20)
BUN/CREAT SERPL: 16 (ref 12–20)
CALCIUM SERPL-MCNC: 10 MG/DL (ref 8.5–10.1)
CALCIUM SERPL-MCNC: 9.2 MG/DL (ref 8.5–10.1)
CALCIUM SERPL-MCNC: 9.2 MG/DL (ref 8.5–10.1)
CALCIUM SERPL-MCNC: 9.4 MG/DL (ref 8.5–10.1)
CALCULATED R AXIS, ECG10: 75 DEGREES
CALCULATED T AXIS, ECG11: -124 DEGREES
CHLORIDE SERPL-SCNC: 101 MMOL/L (ref 97–108)
CHLORIDE SERPL-SCNC: 102 MMOL/L (ref 97–108)
CO2 SERPL-SCNC: 24 MMOL/L (ref 21–32)
CO2 SERPL-SCNC: 24 MMOL/L (ref 21–32)
CO2 SERPL-SCNC: 25 MMOL/L (ref 21–32)
CO2 SERPL-SCNC: 26 MMOL/L (ref 21–32)
CREAT SERPL-MCNC: 1.3 MG/DL (ref 0.7–1.3)
CREAT SERPL-MCNC: 1.34 MG/DL (ref 0.7–1.3)
CREAT SERPL-MCNC: 1.36 MG/DL (ref 0.7–1.3)
CREAT SERPL-MCNC: 1.37 MG/DL (ref 0.7–1.3)
DIAGNOSIS, 93000: NORMAL
DIFFERENTIAL METHOD BLD: ABNORMAL
EOSINOPHIL # BLD: 0.2 K/UL (ref 0–0.4)
EOSINOPHIL NFR BLD: 1 % (ref 0–7)
ERYTHROCYTE [DISTWIDTH] IN BLOOD BY AUTOMATED COUNT: 16.2 % (ref 11.5–14.5)
ERYTHROCYTE [DISTWIDTH] IN BLOOD BY AUTOMATED COUNT: 16.3 % (ref 11.5–14.5)
GLOBULIN SER CALC-MCNC: 4 G/DL (ref 2–4)
GLUCOSE BLD STRIP.AUTO-MCNC: 103 MG/DL (ref 65–100)
GLUCOSE BLD STRIP.AUTO-MCNC: 130 MG/DL (ref 65–100)
GLUCOSE BLD STRIP.AUTO-MCNC: 136 MG/DL (ref 65–100)
GLUCOSE BLD STRIP.AUTO-MCNC: 148 MG/DL (ref 65–100)
GLUCOSE SERPL-MCNC: 109 MG/DL (ref 65–100)
GLUCOSE SERPL-MCNC: 140 MG/DL (ref 65–100)
GLUCOSE SERPL-MCNC: 149 MG/DL (ref 65–100)
GLUCOSE SERPL-MCNC: 150 MG/DL (ref 65–100)
HCT VFR BLD AUTO: 32.2 % (ref 36.6–50.3)
HCT VFR BLD AUTO: 32.5 % (ref 36.6–50.3)
HGB BLD-MCNC: 10.3 G/DL (ref 12.1–17)
HGB BLD-MCNC: 10.7 G/DL (ref 12.1–17)
IMM GRANULOCYTES # BLD AUTO: 0.2 K/UL (ref 0–0.04)
IMM GRANULOCYTES NFR BLD AUTO: 1 % (ref 0–0.5)
LYMPHOCYTES # BLD: 0.9 K/UL (ref 0.8–3.5)
LYMPHOCYTES NFR BLD: 5 % (ref 12–49)
MAGNESIUM SERPL-MCNC: 2 MG/DL (ref 1.6–2.4)
MAGNESIUM SERPL-MCNC: 2.1 MG/DL (ref 1.6–2.4)
MAGNESIUM SERPL-MCNC: 2.2 MG/DL (ref 1.6–2.4)
MAGNESIUM SERPL-MCNC: 2.3 MG/DL (ref 1.6–2.4)
MCH RBC QN AUTO: 27.2 PG (ref 26–34)
MCH RBC QN AUTO: 27.9 PG (ref 26–34)
MCHC RBC AUTO-ENTMCNC: 32 G/DL (ref 30–36.5)
MCHC RBC AUTO-ENTMCNC: 32.9 G/DL (ref 30–36.5)
MCV RBC AUTO: 84.6 FL (ref 80–99)
MCV RBC AUTO: 85.2 FL (ref 80–99)
MONOCYTES # BLD: 1 K/UL (ref 0–1)
MONOCYTES NFR BLD: 6 % (ref 5–13)
NEUTS SEG # BLD: 15.8 K/UL (ref 1.8–8)
NEUTS SEG NFR BLD: 87 % (ref 32–75)
NRBC # BLD: 0 K/UL (ref 0–0.01)
NRBC # BLD: 0.02 K/UL (ref 0–0.01)
NRBC BLD-RTO: 0 PER 100 WBC
NRBC BLD-RTO: 0.1 PER 100 WBC
P-R INTERVAL, ECG05: 224 MS
PHOSPHATE SERPL-MCNC: 2.2 MG/DL (ref 2.6–4.7)
PHOSPHATE SERPL-MCNC: 2.7 MG/DL (ref 2.6–4.7)
PHOSPHATE SERPL-MCNC: 3.1 MG/DL (ref 2.6–4.7)
PHOSPHATE SERPL-MCNC: 3.1 MG/DL (ref 2.6–4.7)
PLATELET # BLD AUTO: 156 K/UL (ref 150–400)
PLATELET # BLD AUTO: 158 K/UL (ref 150–400)
PMV BLD AUTO: 11.2 FL (ref 8.9–12.9)
PMV BLD AUTO: 11.2 FL (ref 8.9–12.9)
POTASSIUM SERPL-SCNC: 3.2 MMOL/L (ref 3.5–5.1)
POTASSIUM SERPL-SCNC: 3.4 MMOL/L (ref 3.5–5.1)
POTASSIUM SERPL-SCNC: 3.5 MMOL/L (ref 3.5–5.1)
POTASSIUM SERPL-SCNC: 3.8 MMOL/L (ref 3.5–5.1)
PROT SERPL-MCNC: 6.4 G/DL (ref 6.4–8.2)
Q-T INTERVAL, ECG07: 336 MS
QRS DURATION, ECG06: 124 MS
QTC CALCULATION (BEZET), ECG08: 463 MS
RBC # BLD AUTO: 3.78 M/UL (ref 4.1–5.7)
RBC # BLD AUTO: 3.84 M/UL (ref 4.1–5.7)
SERVICE CMNT-IMP: ABNORMAL
SERVICE CMNT-IMP: NORMAL
SODIUM SERPL-SCNC: 135 MMOL/L (ref 136–145)
SODIUM SERPL-SCNC: 135 MMOL/L (ref 136–145)
SODIUM SERPL-SCNC: 136 MMOL/L (ref 136–145)
SODIUM SERPL-SCNC: 136 MMOL/L (ref 136–145)
THERAPEUTIC RANGE,PTTT: ABNORMAL SECS (ref 58–77)
VENTRICULAR RATE, ECG03: 114 BPM
WBC # BLD AUTO: 16.6 K/UL (ref 4.1–11.1)
WBC # BLD AUTO: 18.2 K/UL (ref 4.1–11.1)

## 2020-10-24 PROCEDURE — 80076 HEPATIC FUNCTION PANEL: CPT

## 2020-10-24 PROCEDURE — 94003 VENT MGMT INPAT SUBQ DAY: CPT

## 2020-10-24 PROCEDURE — 74011000258 HC RX REV CODE- 258: Performed by: EMERGENCY MEDICINE

## 2020-10-24 PROCEDURE — 83735 ASSAY OF MAGNESIUM: CPT

## 2020-10-24 PROCEDURE — 80069 RENAL FUNCTION PANEL: CPT

## 2020-10-24 PROCEDURE — 74011250637 HC RX REV CODE- 250/637: Performed by: INTERNAL MEDICINE

## 2020-10-24 PROCEDURE — 65610000006 HC RM INTENSIVE CARE

## 2020-10-24 PROCEDURE — 74011250636 HC RX REV CODE- 250/636: Performed by: EMERGENCY MEDICINE

## 2020-10-24 PROCEDURE — 74011250636 HC RX REV CODE- 250/636: Performed by: FAMILY MEDICINE

## 2020-10-24 PROCEDURE — 74011250636 HC RX REV CODE- 250/636: Performed by: NURSE PRACTITIONER

## 2020-10-24 PROCEDURE — 71045 X-RAY EXAM CHEST 1 VIEW: CPT

## 2020-10-24 PROCEDURE — 74011000258 HC RX REV CODE- 258: Performed by: FAMILY MEDICINE

## 2020-10-24 PROCEDURE — 82962 GLUCOSE BLOOD TEST: CPT

## 2020-10-24 PROCEDURE — 90945 DIALYSIS ONE EVALUATION: CPT

## 2020-10-24 PROCEDURE — 74011636637 HC RX REV CODE- 636/637: Performed by: FAMILY MEDICINE

## 2020-10-24 PROCEDURE — 85025 COMPLETE CBC W/AUTO DIFF WBC: CPT

## 2020-10-24 PROCEDURE — 74011000250 HC RX REV CODE- 250: Performed by: INTERNAL MEDICINE

## 2020-10-24 PROCEDURE — C9113 INJ PANTOPRAZOLE SODIUM, VIA: HCPCS | Performed by: NURSE PRACTITIONER

## 2020-10-24 PROCEDURE — 36592 COLLECT BLOOD FROM PICC: CPT

## 2020-10-24 PROCEDURE — 74011000250 HC RX REV CODE- 250: Performed by: NURSE PRACTITIONER

## 2020-10-24 PROCEDURE — 74011000250 HC RX REV CODE- 250: Performed by: EMERGENCY MEDICINE

## 2020-10-24 PROCEDURE — 77030018798 HC PMP KT ENTRL FED COVD -A

## 2020-10-24 PROCEDURE — 74011250636 HC RX REV CODE- 250/636: Performed by: INTERNAL MEDICINE

## 2020-10-24 PROCEDURE — 74011250637 HC RX REV CODE- 250/637: Performed by: NURSE PRACTITIONER

## 2020-10-24 PROCEDURE — 85730 THROMBOPLASTIN TIME PARTIAL: CPT

## 2020-10-24 PROCEDURE — 94760 N-INVAS EAR/PLS OXIMETRY 1: CPT

## 2020-10-24 PROCEDURE — 74011636637 HC RX REV CODE- 636/637: Performed by: EMERGENCY MEDICINE

## 2020-10-24 RX ORDER — FAMOTIDINE 20 MG/1
20 TABLET, FILM COATED ORAL DAILY
Status: DISCONTINUED | OUTPATIENT
Start: 2020-10-25 | End: 2020-10-26

## 2020-10-24 RX ORDER — POTASSIUM CHLORIDE 29.8 MG/ML
20 INJECTION INTRAVENOUS ONCE
Status: COMPLETED | OUTPATIENT
Start: 2020-10-24 | End: 2020-10-24

## 2020-10-24 RX ORDER — HEPARIN SODIUM 5000 [USP'U]/ML
5000 INJECTION, SOLUTION INTRAVENOUS; SUBCUTANEOUS EVERY 8 HOURS
Status: DISCONTINUED | OUTPATIENT
Start: 2020-10-24 | End: 2020-11-04 | Stop reason: HOSPADM

## 2020-10-24 RX ORDER — METOPROLOL TARTRATE 25 MG/1
100 TABLET, FILM COATED ORAL EVERY 12 HOURS
Status: DISCONTINUED | OUTPATIENT
Start: 2020-10-24 | End: 2020-11-04 | Stop reason: HOSPADM

## 2020-10-24 RX ADMIN — CALCIUM CHLORIDE, MAGNESIUM CHLORIDE, DEXTROSE MONOHYDRATE, LACTIC ACID, SODIUM CHLORIDE, SODIUM BICARBONATE AND POTASSIUM CHLORIDE 2275 ML/HR: 5.15; 2.03; 22; 5.4; 6.46; 3.09; .157 INJECTION INTRAVENOUS at 06:46

## 2020-10-24 RX ADMIN — HEPARIN SODIUM 16 UNITS/KG/HR: 10000 INJECTION, SOLUTION INTRAVENOUS at 07:19

## 2020-10-24 RX ADMIN — CALCIUM CHLORIDE, MAGNESIUM CHLORIDE, DEXTROSE MONOHYDRATE, LACTIC ACID, SODIUM CHLORIDE, SODIUM BICARBONATE AND POTASSIUM CHLORIDE 2275 ML/HR: 5.15; 2.03; 22; 5.4; 6.46; 3.09; .157 INJECTION INTRAVENOUS at 14:50

## 2020-10-24 RX ADMIN — AMIODARONE HYDROCHLORIDE 0.5 MG/MIN: 50 INJECTION, SOLUTION INTRAVENOUS at 08:18

## 2020-10-24 RX ADMIN — INSULIN GLARGINE 17 UNITS: 100 INJECTION, SOLUTION SUBCUTANEOUS at 17:00

## 2020-10-24 RX ADMIN — CALCIUM CHLORIDE, MAGNESIUM CHLORIDE, DEXTROSE MONOHYDRATE, LACTIC ACID, SODIUM CHLORIDE, SODIUM BICARBONATE AND POTASSIUM CHLORIDE 2275 ML/HR: 3.68; 3.05; 22; 5.4; 6.46; 3.09; .314 INJECTION INTRAVENOUS at 21:23

## 2020-10-24 RX ADMIN — CALCIUM CHLORIDE, MAGNESIUM CHLORIDE, DEXTROSE MONOHYDRATE, LACTIC ACID, SODIUM CHLORIDE, SODIUM BICARBONATE AND POTASSIUM CHLORIDE 2275 ML/HR: 3.68; 3.05; 22; 5.4; 6.46; 3.09; .314 INJECTION INTRAVENOUS at 19:06

## 2020-10-24 RX ADMIN — CALCIUM CHLORIDE, MAGNESIUM CHLORIDE, DEXTROSE MONOHYDRATE, LACTIC ACID, SODIUM CHLORIDE, SODIUM BICARBONATE AND POTASSIUM CHLORIDE 2275 ML/HR: 5.15; 2.03; 22; 5.4; 6.46; 3.09; .157 INJECTION INTRAVENOUS at 00:58

## 2020-10-24 RX ADMIN — CALCIUM CHLORIDE, MAGNESIUM CHLORIDE, DEXTROSE MONOHYDRATE, LACTIC ACID, SODIUM CHLORIDE, SODIUM BICARBONATE AND POTASSIUM CHLORIDE 2275 ML/HR: 3.68; 3.05; 22; 5.4; 6.46; 3.09; .314 INJECTION INTRAVENOUS at 23:45

## 2020-10-24 RX ADMIN — Medication 10 ML: at 14:08

## 2020-10-24 RX ADMIN — PROPOFOL 25 MCG/KG/MIN: 10 INJECTION, EMULSION INTRAVENOUS at 16:39

## 2020-10-24 RX ADMIN — CALCIUM CHLORIDE, MAGNESIUM CHLORIDE, DEXTROSE MONOHYDRATE, LACTIC ACID, SODIUM CHLORIDE, SODIUM BICARBONATE AND POTASSIUM CHLORIDE 2275 ML/HR: 3.68; 3.05; 22; 5.4; 6.46; 3.09; .314 INJECTION INTRAVENOUS at 16:45

## 2020-10-24 RX ADMIN — METOPROLOL TARTRATE 100 MG: 25 TABLET, FILM COATED ORAL at 21:13

## 2020-10-24 RX ADMIN — Medication 10 ML: at 21:14

## 2020-10-24 RX ADMIN — Medication 10 ML: at 06:08

## 2020-10-24 RX ADMIN — CALCIUM CHLORIDE, MAGNESIUM CHLORIDE, DEXTROSE MONOHYDRATE, LACTIC ACID, SODIUM CHLORIDE, SODIUM BICARBONATE AND POTASSIUM CHLORIDE 2275 ML/HR: 5.15; 2.03; 22; 5.4; 6.46; 3.09; .157 INJECTION INTRAVENOUS at 04:40

## 2020-10-24 RX ADMIN — AMIODARONE HYDROCHLORIDE 0.5 MG/MIN: 50 INJECTION, SOLUTION INTRAVENOUS at 23:07

## 2020-10-24 RX ADMIN — HEPARIN SODIUM 5000 UNITS: 5000 INJECTION INTRAVENOUS; SUBCUTANEOUS at 21:13

## 2020-10-24 RX ADMIN — HYDRALAZINE HYDROCHLORIDE 10 MG: 20 INJECTION INTRAMUSCULAR; INTRAVENOUS at 10:21

## 2020-10-24 RX ADMIN — POTASSIUM PHOSPHATE, MONOBASIC AND POTASSIUM PHOSPHATE, DIBASIC: 224; 236 INJECTION, SOLUTION, CONCENTRATE INTRAVENOUS at 03:00

## 2020-10-24 RX ADMIN — CALCIUM CHLORIDE, MAGNESIUM CHLORIDE, DEXTROSE MONOHYDRATE, LACTIC ACID, SODIUM CHLORIDE, SODIUM BICARBONATE AND POTASSIUM CHLORIDE 2275 ML/HR: 5.15; 2.03; 22; 5.4; 6.46; 3.09; .157 INJECTION INTRAVENOUS at 12:49

## 2020-10-24 RX ADMIN — CEFTRIAXONE SODIUM 1 G: 1 INJECTION, POWDER, FOR SOLUTION INTRAMUSCULAR; INTRAVENOUS at 22:12

## 2020-10-24 RX ADMIN — POTASSIUM CHLORIDE 20 MEQ: 29.8 INJECTION, SOLUTION INTRAVENOUS at 03:25

## 2020-10-24 RX ADMIN — LABETALOL HYDROCHLORIDE 10 MG: 5 INJECTION, SOLUTION INTRAVENOUS at 13:44

## 2020-10-24 RX ADMIN — INSULIN LISPRO 4 UNITS: 100 INJECTION, SOLUTION INTRAVENOUS; SUBCUTANEOUS at 00:53

## 2020-10-24 RX ADMIN — HEPARIN SODIUM 5000 UNITS: 5000 INJECTION INTRAVENOUS; SUBCUTANEOUS at 14:07

## 2020-10-24 RX ADMIN — ROSUVASTATIN 40 MG: 40 TABLET, FILM COATED ORAL at 21:13

## 2020-10-24 RX ADMIN — SODIUM CHLORIDE 40 MG: 9 INJECTION, SOLUTION INTRAMUSCULAR; INTRAVENOUS; SUBCUTANEOUS at 08:00

## 2020-10-24 RX ADMIN — LABETALOL HYDROCHLORIDE 10 MG: 5 INJECTION, SOLUTION INTRAVENOUS at 11:28

## 2020-10-24 RX ADMIN — METOPROLOL TARTRATE 100 MG: 25 TABLET, FILM COATED ORAL at 08:01

## 2020-10-24 RX ADMIN — ASPIRIN 81 MG: 81 TABLET, CHEWABLE ORAL at 08:00

## 2020-10-24 RX ADMIN — CALCIUM CHLORIDE, MAGNESIUM CHLORIDE, DEXTROSE MONOHYDRATE, LACTIC ACID, SODIUM CHLORIDE, SODIUM BICARBONATE AND POTASSIUM CHLORIDE 2275 ML/HR: 5.15; 2.03; 22; 5.4; 6.46; 3.09; .157 INJECTION INTRAVENOUS at 10:48

## 2020-10-24 RX ADMIN — LABETALOL HYDROCHLORIDE 10 MG: 5 INJECTION, SOLUTION INTRAVENOUS at 16:29

## 2020-10-24 RX ADMIN — POTASSIUM CHLORIDE 20 MEQ: 29.8 INJECTION, SOLUTION INTRAVENOUS at 21:10

## 2020-10-24 RX ADMIN — Medication 50 MCG/HR: at 09:32

## 2020-10-24 RX ADMIN — DOXYCYCLINE HYCLATE 100 MG: 100 TABLET, COATED ORAL at 08:01

## 2020-10-24 RX ADMIN — Medication 10 ML: at 21:13

## 2020-10-24 RX ADMIN — CALCIUM CHLORIDE, MAGNESIUM CHLORIDE, DEXTROSE MONOHYDRATE, LACTIC ACID, SODIUM CHLORIDE, SODIUM BICARBONATE AND POTASSIUM CHLORIDE 2275 ML/HR: 5.15; 2.03; 22; 5.4; 6.46; 3.09; .157 INJECTION INTRAVENOUS at 02:53

## 2020-10-24 NOTE — PROGRESS NOTES
Name: Ruthie Duval MRN: [id number]   : 1945 Hospital: [address]   Date: 10/24/2020        IMPRESSION:   · CKD stage 4. The etiology is not clear, but patient is aware of having CKD for at least 9 years. · JAMAL- oligo-anuric, after the Cardiac arrest. Patient is hypotensive. Has low glomerular pressure. OnCRT now. · Hypervolemia with hypoxia- on Vent, volume status is improved. · Hypervolemia- pulmonary edema, poorly responsive to diuretics. · Hyperkalemia- resolved after conservative measures. · UTI- on AB's  · S/P card cath  · Increased troponin- card cath is scheduled for today 2.30 PM  · Right hydronephrosis- urology is evaluating. PLAN:   · Patient is critically ill. He will be left on RRT for now. If he becomes more stable and still requires RRT- will transition to [facility]. Will change the prismasol to $ of 4.0  · Monitor the GFR very cloesy- I's and O's, daily electrolytes. ·  Continue AB therapy  · Will follow with results from card cath. · Continue full support, including pressors. · Urology following for right ureteral obstruction. Subjective/Interval History:   I have reviewed the flowsheet and previous days notes. ROS:Review of systems not obtained due to patient factors.     Objective:   Vital Signs:    Visit Vitals  BP (!) 134/52 (BP 1 Location: Right arm, BP Patient Position: At rest)   Pulse 72   Temp 98.9 °F (37.2 °C)   Resp 15   Ht 5' 10\" (1.778 m)   Wt 89 kg (196 lb 3.4 oz)   SpO2 99%   BMI 28.15 kg/m²       O2 Device: Endotracheal tube, Ventilator   O2 Flow Rate (L/min): 15 l/min   Temp (24hrs), Av.9 °F (37.2 °C), Min:98.7 °F (37.1 °C), Max:99.2 °F (37.3 °C)       Intake/Output:   Last shift:      10/24 07 - 10/24 1900  In: 153.9 [I.V.:73.9]  Out: 055 [Urine:10]  Last 3 shifts: 10/22 1901 - 10/24 0700  In: 4149.1 [I.V.:2620.1]  Out: 2622 [Urine:557]    Intake/Output Summary (Last 24 hours) at 10/24/2020 1337  Last data filed at 10/24/2020 1200  Gross per 24 hour   Intake 2599.63 ml   Output 3219 ml   Net -619.37 ml        Physical Exam:  General:    Sedated, on vent. Wife at bed side. Head:   Normocephalic, without obvious abnormality, atraumatic. Eyes:   Conjunctivae/corneas clear. Nose:  Nares normal. No drainage or sinus tenderness. Throat:    ET tube in place. Neck:  Supple, symmetrical,  no adenopathy, thyroid: non tender    no carotid bruit and no JVD. Lungs:   Diminished to auscultation bilaterally. No Wheezing or Rhonchi. + rales. Chest wall:  No tenderness or deformity. No Accessory muscle use. Heart:   Regular rate and rhythm,  no murmur, rub or gallop. Abdomen:   Soft, non-tender. Not distended. Bowel sounds normal. No masses  Extremities: Extremities normal, atraumatic, No cyanosis. 1+ edema. No clubbing  Skin:     Texture, turgor normal. No rashes or lesions. Not Jaundiced  Psych:  Unable to assess. Neurologic: Sedated. DATA:  Labs:  Recent Labs     10/24/20  1203 10/24/20  0617 10/24/20  0616 10/24/20  0015   *  --  136 135*   K 3.5  --  3.8 3.2*     --  102 101   CO2 25  --  24 24   BUN 20  --  21* 22*   CREA 1.30  --  1.34* 1.36*   CA 9.4  --  10.0 9.2   ALB 2.3* 2.4* 2.4* 2.3*   PHOS 2.7  --  3.1 2.2*   MG 2.0  --  2.3 2.2     Recent Labs     10/24/20  0616 10/23/20  2230 10/23/20  0619   WBC 18.2* 16.6* 17.3*   HGB 10.7* 10.3* 10.4*   HCT 32.5* 32.2* 32.9*    156 155     No results for input(s): RASHMI, KU, CLU, CREAU in the last 72 hours.     No lab exists for component: PROU    Total time spent with patient:  35 minutes    []       Care Plan discussed with:   Family, Colleague, Nursing, Medical Team    Linda Min MD

## 2020-10-24 NOTE — PROGRESS NOTES
[facility]    ICU TEAM Progress Note    Name: Arina Lopez   : 1945   MRN: [id number]   Date: 10/24/2020      Assessment:     - Cardiac arrest  - tachyarrhythmias with multifocal ventricular ectopy  - JAMAL on CKD  - R sided hydronephrosis  - hyperglycemia  - Acute hypoxic respiratory failure  - leukocytosis.  Possible RLL PNA  - Elevated LFTs of unclear etiology  - profound weakness      ICU Comprehensive Plan of Care:   Vent settings established, reviewed and/or adjusted as per orders  Continue nebulized bronchodilators and steroids  Continue systemic steroids  Supplemental O2 to maintain SpO2 92-97%  Daily SBT when meets criteria   Wean in PSV mode as tolerated     - he is tolerating PS 5 cm H2O but has excessive ET secretions and is profoundly weak  ICU hemodynamic/cardiac monitoring  MAP goal > 65 mmHg  Continue amiodarone initiated 10/23  Monitor I/Os and Uo  Monitor renal function panel intermittently  Correct electrolytes as needed  Cont CRRT per Nephrology  Continue TF protocol  Sedation protocol  Cont TFs, SUP  DC heparin gtt after 48 hrs (per Cards recs)  DVT px: SQ UFH   Ceftriaxone day 5 - continue for now  Keep glucose less than 180 with subcutaneous insulin as needed      F - Feeding:  Yes   A - Analgesia: Fentanyl  S - Sedation: Propofol  T - DVT Prophylaxis: Heparin   H - Head of Bed: > 30 Degrees  U - Ulcer Prophylaxis: Protonix (pantoprazole)   G - Glycemic Control: Insulin  S - Spontaneous Breathing Trial: Yes  B - Bowel Regimen: Senna  I - Indwelling Catheter:   Tubes: ETT  Lines: Peripheral IV  Drains: Erickson Catheter  D - De-escalation of Antibiotics: n    Subjective:     Overnight events    Still intubated, off pressors, cardiac cath done yesterday-severe native three-vessel CAD, patent LIMA to LAD, known occluded SVG to RCA and SVG to left circumflex, moderately elevated LVEDP    Active Problem List:     Problem List  Never Reviewed          Codes Class    Leukocytosis ICD-10-CM: D72.829  ICD-9-CM: 288.60         UTI (urinary tract infection) ICD-10-CM: N39.0  ICD-9-CM: 599.0         Tachycardia ICD-10-CM: R00.0  ICD-9-CM: 785.0         Sepsis ([address] ICD-10-CM: A41.9  ICD-9-CM: 038.9, 995.91         Hydronephrosis of right kidney ICD-10-CM: N13.30  ICD-9-CM: 952               Past Medical History:      has no past medical history on file. Past Surgical History:      has no past surgical history on file. Home Medications:     Prior to Admission medications    Medication Sig Start Date End Date Taking? Authorizing Provider   aspirin (ASPIRIN) 325 mg tablet Take 325 mg by mouth daily. Yes Provider, Historical   amLODIPine (NORVASC) 10 mg tablet Take 10 mg by mouth daily. Indications: high blood pressure   Yes Provider, Historical   ergocalciferol (ERGOCALCIFEROL) 1,250 mcg (50,000 unit) capsule Take 50,000 Units by mouth every seven (7) days. Yes Provider, Historical   multivitamin, tx-iron-ca-min (THERA-M w/ IRON) 9 mg iron-400 mcg tab tablet Take 1 Tab by mouth daily. Yes Provider, Historical   oxyCODONE-acetaminophen (Percocet) 5-325 mg per tablet Take 1 Tab by mouth every six (6) hours as needed for Pain. Yes Provider, Historical   metoprolol succinate (TOPROL-XL) 50 mg XL tablet Take 50 mg by mouth daily. Indications: high blood pressure   Yes Provider, Historical       Allergies/Social/Family History:     No Known Allergies   Social History     Tobacco Use    Smoking status: Not on file   Substance Use Topics    Alcohol use: Not on file      No family history on file.            Objective:   Vital Signs:  Visit Vitals  BP (!) 123/45   Pulse 70   Temp 98.9 °F (37.2 °C)   Resp 16   Ht 5' 10\" (1.778 m)   Wt 89 kg (196 lb 3.4 oz)   SpO2 99%   BMI 28.15 kg/m²    O2 Flow Rate (L/min): 15 l/min O2 Device: Endotracheal tube, Ventilator Temp (24hrs), Av.9 °F (37.2 °C), Min:98.7 °F (37.1 °C), Max:99.2 °F (37.3 °C)           Intake/Output:     Intake/Output Summary (Last 24 hours) at 10/24/2020 1545  Last data filed at 10/24/2020 1500  Gross per 24 hour   Intake 2144.54 ml   Output 3098 ml   Net -953.46 ml       Physical Exam:  General-intubated, sedated  Neuro-following simple commands  Cardiac-irregular  Lungs-clear  Abdomen-soft, nontender, nondistended  Extremities-warm    LABS AND  DATA: Personally reviewed  Recent Labs     10/24/20  0616 10/23/20  2230   WBC 18.2* 16.6*   HGB 10.7* 10.3*   HCT 32.5* 32.2*    156     Recent Labs     10/24/20  1203 10/24/20  0616   * 136   K 3.5 3.8    102   CO2 25 24   BUN 20 21*   CREA 1.30 1.34*   * 109*   CA 9.4 10.0   MG 2.0 2.3   PHOS 2.7 3.1     Recent Labs     10/24/20  1203 10/24/20  0617  10/23/20  0619   AP  --  474*  --  334*   TP  --  6.4  --  6.1*   ALB 2.3* 2.4*   < > 2.4*   GLOB  --  4.0  --  3.7    < > = values in this interval not displayed. Recent Labs     10/24/20  0616 10/23/20  1546   APTT 63.0* 66.8*      No results for input(s): PHI, PCO2I, PO2I, FIO2I in the last 72 hours. Recent Labs     10/22/20  0536 10/21/20  2323   TROIQ 16.80* 15.70*       Hemodynamics:   PAP:   CO:     Wedge:   CI:     CVP:    SVR:       PVR:       Ventilator Settings:  Mode Rate Tidal Volume Pressure FiO2 PEEP   Spontaneous   500 ml  5 cm H2O 50 % 5 cm H20     Peak airway pressure: 20 cm H2O    Minute ventilation: 10.2 l/min        MEDS: Reviewed    Chest X-Ray:  CXR Results  (Last 48 hours)    None          Multidisciplinary Rounds Completed:  y    ABCDEF Bundle/Checklist Completed:  Yes      NOTE OF PERSONAL INVOLVEMENT IN CARE   This patient has a high probability of imminent, clinically significant deterioration, which requires the highest level of preparedness to intervene urgently. I participated in the decision-making and personally managed or directed the management of the following life and organ supporting interventions that required my frequent assessment to treat or prevent imminent deterioration.     I personally spent 35 minutes of critical care time. This is time spentactively involved in patient care as well as the coordination of care. This does not include any procedural time.     Signed By: Cj Spangler MD     October 24, 2020

## 2020-10-24 NOTE — PROGRESS NOTES
Nursing Shift Note 1945-0800 1930: Bedside and Verbal shift change report given to Boni Steele RN (oncoming nurse) by Joi Fletcher RN (offgoing nurse). Report included the following information SBAR, Kardex, Procedure Summary, Intake/Output, MAR, Accordion, Recent Results and Cardiac Rhythm Afib. Wife Sara Montez at bedside. Patient with CVVHD running. 2000: Patient RASS -4, decreased propofol. 0400: Restraints left off after bath- patient with little to no spontaneous movememnt at this time    0600: high circuit pressure alarm frequently sounding- lavaged and suctioned twice, removed multiple small mucous plugs. 0730: Bedside and Verbal shift change report given to Joi Fletcher RN (oncoming nurse) by Boni Steele RN (offgoing nurse). Report included the following information SBAR, Kardex, MAR, Accordion, Recent Results and Cardiac Rhythm NSR, PVCs. Shift Summary: No acute events overnight. Patient HR remained controlled. Tolerated CVVHD well, CRRT ran without difficulty d/t access and return pressures much improved from previous shift. Titrated propofol down to 5, RASS score -3, opens eyes spontaneously by this AM, little spontaneous movement, no command following, withdraws to pain. Care Plan Summary  Problem: Falls - Risk of  Goal: *Absence of Falls  Description: Document Jhonny Walker Fall Risk and appropriate interventions in the flowsheet.   Outcome: Progressing Towards Goal  Note: Fall Risk Interventions:  Mobility Interventions: Communicate number of staff needed for ambulation/transfer    Mentation Interventions: Adequate sleep, hydration, pain control, Door open when patient unattended, Evaluate medications/consider consulting pharmacy, Increase mobility, More frequent rounding, Reorient patient, Room close to nurse's station, Toileting rounds, Update white board    Medication Interventions: Evaluate medications/consider consulting pharmacy    Elimination Interventions: Bed/chair exit alarm, Call light in reach, Toileting schedule/hourly rounds              Problem: Patient Education: Go to Patient Education Activity  Goal: Patient/Family Education  Outcome: Progressing Towards Goal     Problem: Urinary Tract Infection - Adult  Goal: *Absence of infection signs and symptoms  Outcome: Progressing Towards Goal     Problem: Patient Education: Go to Patient Education Activity  Goal: Patient/Family Education  Outcome: Progressing Towards Goal     Problem: Pain  Goal: *Control of Pain  Outcome: Progressing Towards Goal     Problem: Patient Education: Go to Patient Education Activity  Goal: Patient/Family Education  Outcome: Progressing Towards Goal     Problem: Breathing Pattern - Ineffective  Goal: *Absence of hypoxia  Outcome: Progressing Towards Goal  Goal: *Use of effective breathing techniques  Outcome: Progressing Towards Goal     Problem: Patient Education: Go to Patient Education Activity  Goal: Patient/Family Education  Outcome: Progressing Towards Goal     Problem: Diabetes Self-Management  Goal: *Disease process and treatment process  Description: Define diabetes and identify own type of diabetes; list 3 options for treating diabetes. Outcome: Progressing Towards Goal  Goal: *Incorporating nutritional management into lifestyle  Description: Describe effect of type, amount and timing of food on blood glucose; list 3 methods for planning meals. Outcome: Progressing Towards Goal  Goal: *Incorporating physical activity into lifestyle  Description: State effect of exercise on blood glucose levels. Outcome: Progressing Towards Goal  Goal: *Developing strategies to promote health/change behavior  Description: Define the ABC's of diabetes; identify appropriate screenings, schedule and personal plan for screenings.   Outcome: Progressing Towards Goal  Goal: *Using medications safely  Description: State effect of diabetes medications on diabetes; name diabetes medication taking, action and side effects. Outcome: Progressing Towards Goal  Goal: *Monitoring blood glucose, interpreting and using results  Description: Identify recommended blood glucose targets  and personal targets. Outcome: Progressing Towards Goal  Goal: *Prevention, detection, treatment of acute complications  Description: List symptoms of hyper- and hypoglycemia; describe how to treat low blood sugar and actions for lowering  high blood glucose level. Outcome: Progressing Towards Goal  Goal: *Prevention, detection and treatment of chronic complications  Description: Define the natural course of diabetes and describe the relationship of blood glucose levels to long term complications of diabetes. Outcome: Progressing Towards Goal  Goal: *Developing strategies to address psychosocial issues  Description: Describe feelings about living with diabetes; identify support needed and support network  Outcome: Progressing Towards Goal  Goal: *Sick day guidelines  Outcome: Progressing Towards Goal     Problem: Patient Education: Go to Patient Education Activity  Goal: Patient/Family Education  Outcome: Progressing Towards Goal     Problem: Ventilator Management  Goal: *Adequate oxygenation and ventilation  Outcome: Progressing Towards Goal  Goal: *Patient maintains clear airway/free of aspiration  Outcome: Progressing Towards Goal  Goal: *Absence of infection signs and symptoms  Outcome: Progressing Towards Goal  Goal: *Normal spontaneous ventilation  Outcome: Progressing Towards Goal     Problem: Patient Education: Go to Patient Education Activity  Goal: Patient/Family Education  Outcome: Progressing Towards Goal     Problem: Pressure Injury - Risk of  Goal: *Prevention of pressure injury  Description: Document Luis Felipe Scale and appropriate interventions in the flowsheet. Outcome: Progressing Towards Goal  Note: Pressure Injury Interventions:  Sensory Interventions: Assess changes in LOC, Turn and reposition approx.  every two hours (pillows and wedges if needed), Float heels    Moisture Interventions: Absorbent underpads, Moisture barrier, Minimize layers    Activity Interventions: Assess need for specialty bed, Pressure redistribution bed/mattress(bed type), PT/OT evaluation    Mobility Interventions: Assess need for specialty bed, Float heels, HOB 30 degrees or less, Turn and reposition approx.  every two hours(pillow and wedges)    Nutrition Interventions: Document food/fluid/supplement intake, Offer support with meals,snacks and hydration    Friction and Shear Interventions: Minimize layers, Lift team/patient mobility team, HOB 30 degrees or less, Feet elevated on foot rest                Problem: Patient Education: Go to Patient Education Activity  Goal: Patient/Family Education  Outcome: Progressing Towards Goal     Problem: Non-Violent Restraints  Goal: *Removal from restraints as soon as assessed to be safe  Outcome: Progressing Towards Goal  Goal: *No harm/injury to patient while restraints in use  Outcome: Progressing Towards Goal  Goal: *Patient's dignity will be maintained  Outcome: Progressing Towards Goal  Goal: *Patient Specific Goal (EDIT GOAL, INSERT TEXT)  Outcome: Progressing Towards Goal  Goal: Non-violent Restaints:Standard Interventions  Outcome: Progressing Towards Goal  Goal: Non-violent Restraints:Patient Interventions  Outcome: Progressing Towards Goal  Goal: Patient/Family Education  Outcome: Progressing Towards Goal     Problem: Nutrition Deficit  Goal: *Optimize nutritional status  Outcome: Progressing Towards Goal

## 2020-10-24 NOTE — PROGRESS NOTES
0400.   Primary Nurse Judy Tineo RN and Brinda Calix RN performed a dual skin assessment on this patient No impairment noted  Luis Felipe score is 13

## 2020-10-24 NOTE — PROGRESS NOTES
0730 Bedside and Verbal shift change report given to MARCELINO LING RN (oncoming nurse) by Daniel Jernigan and CINDI Beck (offgoing nurse). Report included the following information SBAR, Kardex, ED Summary, OR Summary, Procedure Summary, Intake/Output, MAR, Accordion, Recent Results, Med Rec Status, Cardiac Rhythm SINUS RHYTHM WITH FREQUENT PVC'S and Alarm Parameters . 0805 Propofol stopped for SAT/SBT    0815 This RN calling RT regarding SAT/SBT.     0828 Pt placed on SBT    1030 This RN asking Dr. Flaca Berumen if pt going to be extubated to day. MD wants to wait until 10/25 AM and pt can stay on SBT as long as tolerating well. 1400 Heparin gtt discontinued per Dr. Flaca Berumen and verbal orders for heparin SQ 5,000 units Q8 hrs.    8288 This RN calling down to pharmacy regarding prismasol bags. MD put note to pharmacy to change K+ to 4.0; bags brought up are still K+ 2. Pharmacy will send up new bags. 1636  RT called to bedside to place pt back on rate on ventilator. Propofol restarted. 2000 Bedside and Verbal shift change report given to [facility], RN and CINDI Beck RN (oncoming nurse) by Nadira LING RN (offgoing nurse). Report included the following information SBAR, Kardex, ED Summary, OR Summary, Procedure Summary, Intake/Output, MAR, Accordion, Recent Results, Med Rec Status, Cardiac Rhythm SINUS RHYTHM WITH PVCS and Alarm Parameters .

## 2020-10-24 NOTE — DIALYSIS
[facility]              [phone]           Orders  Mode: CVVHD   Prismasol Bath: 2K/3.5Ca   Blood Flow Rate: 200ml/min   Prismasol Dose: 25ml/kg/hr   Factor: 50ml/hr      Metrics  BP/HR: 158/51 71   Access Pressure: -63   Filter Pressure: 182   Return Pressure: 92   TMP: 76   PD: 60   Dialysate Flow Rate: 2250ml/hr      Comments / Plan:   Patient, orders, line and consent verified. Labs, notes and code status reviewed. [id number] filter running well with no indication for change at this time. RIJ non-tunneled CVC intact transparent dressing with old drainage. Lines visible/secure/reversed with blood warmer attached to the return line and set to 37*C. Education and Pre/Post with primary RN.

## 2020-10-24 NOTE — PROGRESS NOTES
Nursing Shift Note [id number] 1930: Bedside and Verbal shift change report given to Samantha Walters RN (oncoming nurse) by Hernan Dunn RN (offgoing nurse). Report included the following information SBAR, Kardex, Procedure Summary, Intake/Output, MAR, Accordion, Recent Results and Cardiac Rhythm NSR, PVCs. Significant other Abigail Rivas at bedside- updated     2020: Daughter/ Radha Bhakti, called. Gave update on patient's status, plan for tomorrow. Also discussed family dynamic. Clau Cronin does not have close relations and does not talk with patient's step children or their spouses. She expresses she wants MINIMAL information ONLY to be given to other family calling, especially the arieson's wife, Edwin Chen, who has called. Note placed on Kardex    2100: RASS -3, propofol titrated based on tolerance of vent    0300: Vent occasionally alarming- patient stacking some breaths, some high circuit pressure. ETT scution produces thick white sputum. 9166: Patient opening eyes spontaneously, little movement in any extremity. 0730: Bedside and Verbal shift change report given to Hernan Dunn RN (oncoming nurse) by Samantha Walters RN (offgoing nurse). Report included the following information SBAR, Kardex, Procedure Summary, Intake/Output, MAR, Accordion, Recent Results and Cardiac Rhythm NSR, PVCs. Shift Summary: No acute events overnight. Remains on CVVHD, tolerating well, SBP 140s-160 per art line. Remains on amio 0.5mg/hr. Propofol at 15mcg/kg/min to improve vent synchrony/tolerance overnight, reduced to 10 at 0700 and turned off at 0730 for SBT. Opening eyes spontaneously, withdraws to pain, minimal spontaneous movement (seen in L arm only), no command following on propofol.

## 2020-10-24 NOTE — PROGRESS NOTES
Physical Therapy:    Chart reviewed. Spoke to [facility]. Patient remains intubated with possible extubation planned today. Will continue to follow and complete PT evaluation once patient is medically appropriate.     Abhilash Greco, PT

## 2020-10-24 NOTE — PROGRESS NOTES
10/24/20 1636   Weaning Parameters   Spontaneous Breathing Trial Complete No (Comments)   Resp Rate Observed 0   VT 51   RSBI 600   SBT Results Returned to previous vent settings at this time.

## 2020-10-25 LAB
ALBUMIN SERPL-MCNC: 2.2 G/DL (ref 3.5–5)
ALBUMIN SERPL-MCNC: 2.3 G/DL (ref 3.5–5)
ALBUMIN SERPL-MCNC: 2.3 G/DL (ref 3.5–5)
ALBUMIN/GLOB SERPL: 0.6 {RATIO} (ref 1.1–2.2)
ALP SERPL-CCNC: 506 U/L (ref 45–117)
ALT SERPL-CCNC: 490 U/L (ref 12–78)
ANION GAP SERPL CALC-SCNC: 7 MMOL/L (ref 5–15)
ANION GAP SERPL CALC-SCNC: 8 MMOL/L (ref 5–15)
AST SERPL-CCNC: 336 U/L (ref 15–37)
BASOPHILS # BLD: 0 K/UL (ref 0–0.1)
BASOPHILS NFR BLD: 0 % (ref 0–1)
BILIRUB DIRECT SERPL-MCNC: 0.6 MG/DL (ref 0–0.2)
BILIRUB SERPL-MCNC: 0.8 MG/DL (ref 0.2–1)
BUN SERPL-MCNC: 25 MG/DL (ref 6–20)
BUN SERPL-MCNC: 28 MG/DL (ref 6–20)
BUN/CREAT SERPL: 18 (ref 12–20)
BUN/CREAT SERPL: 21 (ref 12–20)
CALCIUM SERPL-MCNC: 8.9 MG/DL (ref 8.5–10.1)
CALCIUM SERPL-MCNC: 9 MG/DL (ref 8.5–10.1)
CHLORIDE SERPL-SCNC: 103 MMOL/L (ref 97–108)
CHLORIDE SERPL-SCNC: 103 MMOL/L (ref 97–108)
CO2 SERPL-SCNC: 25 MMOL/L (ref 21–32)
CO2 SERPL-SCNC: 26 MMOL/L (ref 21–32)
CREAT SERPL-MCNC: 1.33 MG/DL (ref 0.7–1.3)
CREAT SERPL-MCNC: 1.37 MG/DL (ref 0.7–1.3)
DIFFERENTIAL METHOD BLD: ABNORMAL
EOSINOPHIL # BLD: 0.2 K/UL (ref 0–0.4)
EOSINOPHIL NFR BLD: 1 % (ref 0–7)
ERYTHROCYTE [DISTWIDTH] IN BLOOD BY AUTOMATED COUNT: 16.6 % (ref 11.5–14.5)
GLOBULIN SER CALC-MCNC: 4.1 G/DL (ref 2–4)
GLUCOSE BLD STRIP.AUTO-MCNC: 121 MG/DL (ref 65–100)
GLUCOSE BLD STRIP.AUTO-MCNC: 126 MG/DL (ref 65–100)
GLUCOSE BLD STRIP.AUTO-MCNC: 133 MG/DL (ref 65–100)
GLUCOSE BLD STRIP.AUTO-MCNC: 133 MG/DL (ref 65–100)
GLUCOSE BLD STRIP.AUTO-MCNC: 153 MG/DL (ref 65–100)
GLUCOSE BLD STRIP.AUTO-MCNC: 96 MG/DL (ref 65–100)
GLUCOSE SERPL-MCNC: 150 MG/DL (ref 65–100)
GLUCOSE SERPL-MCNC: 180 MG/DL (ref 65–100)
HCT VFR BLD AUTO: 33.4 % (ref 36.6–50.3)
HGB BLD-MCNC: 10.6 G/DL (ref 12.1–17)
IMM GRANULOCYTES # BLD AUTO: 0.2 K/UL (ref 0–0.04)
IMM GRANULOCYTES NFR BLD AUTO: 2 % (ref 0–0.5)
LYMPHOCYTES # BLD: 0.9 K/UL (ref 0.8–3.5)
LYMPHOCYTES NFR BLD: 6 % (ref 12–49)
MAGNESIUM SERPL-MCNC: 2.3 MG/DL (ref 1.6–2.4)
MAGNESIUM SERPL-MCNC: 2.4 MG/DL (ref 1.6–2.4)
MCH RBC QN AUTO: 27.2 PG (ref 26–34)
MCHC RBC AUTO-ENTMCNC: 31.7 G/DL (ref 30–36.5)
MCV RBC AUTO: 85.9 FL (ref 80–99)
MONOCYTES # BLD: 1.2 K/UL (ref 0–1)
MONOCYTES NFR BLD: 9 % (ref 5–13)
NEUTS SEG # BLD: 11.5 K/UL (ref 1.8–8)
NEUTS SEG NFR BLD: 82 % (ref 32–75)
NRBC # BLD: 0 K/UL (ref 0–0.01)
NRBC BLD-RTO: 0 PER 100 WBC
PHOSPHATE SERPL-MCNC: 2.5 MG/DL (ref 2.6–4.7)
PHOSPHATE SERPL-MCNC: 2.9 MG/DL (ref 2.6–4.7)
PLATELET # BLD AUTO: 179 K/UL (ref 150–400)
PMV BLD AUTO: 11.5 FL (ref 8.9–12.9)
POTASSIUM SERPL-SCNC: 3.8 MMOL/L (ref 3.5–5.1)
POTASSIUM SERPL-SCNC: 3.8 MMOL/L (ref 3.5–5.1)
PROT SERPL-MCNC: 6.4 G/DL (ref 6.4–8.2)
RBC # BLD AUTO: 3.89 M/UL (ref 4.1–5.7)
SERVICE CMNT-IMP: ABNORMAL
SERVICE CMNT-IMP: NORMAL
SODIUM SERPL-SCNC: 136 MMOL/L (ref 136–145)
SODIUM SERPL-SCNC: 136 MMOL/L (ref 136–145)
WBC # BLD AUTO: 14.1 K/UL (ref 4.1–11.1)

## 2020-10-25 PROCEDURE — 65610000006 HC RM INTENSIVE CARE

## 2020-10-25 PROCEDURE — 83735 ASSAY OF MAGNESIUM: CPT

## 2020-10-25 PROCEDURE — 82962 GLUCOSE BLOOD TEST: CPT

## 2020-10-25 PROCEDURE — 97530 THERAPEUTIC ACTIVITIES: CPT

## 2020-10-25 PROCEDURE — 80069 RENAL FUNCTION PANEL: CPT

## 2020-10-25 PROCEDURE — 77030018798 HC PMP KT ENTRL FED COVD -A

## 2020-10-25 PROCEDURE — 74011250636 HC RX REV CODE- 250/636: Performed by: EMERGENCY MEDICINE

## 2020-10-25 PROCEDURE — 36415 COLL VENOUS BLD VENIPUNCTURE: CPT

## 2020-10-25 PROCEDURE — 74011250637 HC RX REV CODE- 250/637: Performed by: INTERNAL MEDICINE

## 2020-10-25 PROCEDURE — 74011250636 HC RX REV CODE- 250/636: Performed by: INTERNAL MEDICINE

## 2020-10-25 PROCEDURE — 74011250637 HC RX REV CODE- 250/637: Performed by: FAMILY MEDICINE

## 2020-10-25 PROCEDURE — 74011000258 HC RX REV CODE- 258: Performed by: FAMILY MEDICINE

## 2020-10-25 PROCEDURE — 3E0G76Z INTRODUCTION OF NUTRITIONAL SUBSTANCE INTO UPPER GI, VIA NATURAL OR ARTIFICIAL OPENING: ICD-10-PCS | Performed by: INTERNAL MEDICINE

## 2020-10-25 PROCEDURE — 74011636637 HC RX REV CODE- 636/637: Performed by: FAMILY MEDICINE

## 2020-10-25 PROCEDURE — 80076 HEPATIC FUNCTION PANEL: CPT

## 2020-10-25 PROCEDURE — 74011636637 HC RX REV CODE- 636/637: Performed by: EMERGENCY MEDICINE

## 2020-10-25 PROCEDURE — 97161 PT EVAL LOW COMPLEX 20 MIN: CPT

## 2020-10-25 PROCEDURE — 94003 VENT MGMT INPAT SUBQ DAY: CPT

## 2020-10-25 PROCEDURE — 77010033678 HC OXYGEN DAILY

## 2020-10-25 PROCEDURE — 85025 COMPLETE CBC W/AUTO DIFF WBC: CPT

## 2020-10-25 PROCEDURE — 74011000250 HC RX REV CODE- 250: Performed by: INTERNAL MEDICINE

## 2020-10-25 PROCEDURE — 74011250636 HC RX REV CODE- 250/636: Performed by: FAMILY MEDICINE

## 2020-10-25 PROCEDURE — 74011000258 HC RX REV CODE- 258: Performed by: EMERGENCY MEDICINE

## 2020-10-25 RX ORDER — CLOPIDOGREL BISULFATE 75 MG/1
75 TABLET ORAL DAILY
Status: DISCONTINUED | OUTPATIENT
Start: 2020-10-25 | End: 2020-11-04 | Stop reason: HOSPADM

## 2020-10-25 RX ADMIN — CALCIUM CHLORIDE, MAGNESIUM CHLORIDE, DEXTROSE MONOHYDRATE, LACTIC ACID, SODIUM CHLORIDE, SODIUM BICARBONATE AND POTASSIUM CHLORIDE 2275 ML/HR: 3.68; 3.05; 22; 5.4; 6.46; 3.09; .314 INJECTION INTRAVENOUS at 06:45

## 2020-10-25 RX ADMIN — ASPIRIN 81 MG: 81 TABLET, CHEWABLE ORAL at 09:07

## 2020-10-25 RX ADMIN — CALCIUM CHLORIDE, MAGNESIUM CHLORIDE, DEXTROSE MONOHYDRATE, LACTIC ACID, SODIUM CHLORIDE, SODIUM BICARBONATE AND POTASSIUM CHLORIDE 2275 ML/HR: 3.68; 3.05; 22; 5.4; 6.46; 3.09; .314 INJECTION INTRAVENOUS at 04:24

## 2020-10-25 RX ADMIN — Medication 10 ML: at 15:04

## 2020-10-25 RX ADMIN — CALCIUM CHLORIDE, MAGNESIUM CHLORIDE, DEXTROSE MONOHYDRATE, LACTIC ACID, SODIUM CHLORIDE, SODIUM BICARBONATE AND POTASSIUM CHLORIDE 2275 ML/HR: 3.68; 3.05; 22; 5.4; 6.46; 3.09; .314 INJECTION INTRAVENOUS at 02:00

## 2020-10-25 RX ADMIN — ACETAMINOPHEN 650 MG: 325 TABLET ORAL at 18:29

## 2020-10-25 RX ADMIN — Medication 10 ML: at 21:13

## 2020-10-25 RX ADMIN — AMIODARONE HYDROCHLORIDE 0.5 MG/MIN: 50 INJECTION, SOLUTION INTRAVENOUS at 11:35

## 2020-10-25 RX ADMIN — METOPROLOL TARTRATE 100 MG: 25 TABLET, FILM COATED ORAL at 09:07

## 2020-10-25 RX ADMIN — CLOPIDOGREL BISULFATE 75 MG: 75 TABLET ORAL at 12:15

## 2020-10-25 RX ADMIN — CEFTRIAXONE SODIUM 1 G: 1 INJECTION, POWDER, FOR SOLUTION INTRAMUSCULAR; INTRAVENOUS at 21:32

## 2020-10-25 RX ADMIN — HEPARIN SODIUM 5000 UNITS: 5000 INJECTION INTRAVENOUS; SUBCUTANEOUS at 21:12

## 2020-10-25 RX ADMIN — AMIODARONE HYDROCHLORIDE 0.5 MG/MIN: 50 INJECTION, SOLUTION INTRAVENOUS at 23:48

## 2020-10-25 RX ADMIN — PROPOFOL 15 MCG/KG/MIN: 10 INJECTION, EMULSION INTRAVENOUS at 03:58

## 2020-10-25 RX ADMIN — FAMOTIDINE 20 MG: 20 TABLET ORAL at 09:07

## 2020-10-25 RX ADMIN — ROSUVASTATIN 40 MG: 40 TABLET, FILM COATED ORAL at 21:12

## 2020-10-25 RX ADMIN — Medication 10 ML: at 06:26

## 2020-10-25 RX ADMIN — Medication 20 ML: at 21:13

## 2020-10-25 RX ADMIN — INSULIN LISPRO 4 UNITS: 100 INJECTION, SOLUTION INTRAVENOUS; SUBCUTANEOUS at 23:44

## 2020-10-25 RX ADMIN — HEPARIN SODIUM 5000 UNITS: 5000 INJECTION INTRAVENOUS; SUBCUTANEOUS at 15:02

## 2020-10-25 RX ADMIN — INSULIN GLARGINE 17 UNITS: 100 INJECTION, SOLUTION SUBCUTANEOUS at 21:30

## 2020-10-25 RX ADMIN — ACETAMINOPHEN 650 MG: 325 TABLET ORAL at 12:24

## 2020-10-25 RX ADMIN — HEPARIN SODIUM 5000 UNITS: 5000 INJECTION INTRAVENOUS; SUBCUTANEOUS at 06:30

## 2020-10-25 RX ADMIN — HEPARIN SODIUM 3400 UNITS: 1000 INJECTION INTRAVENOUS; SUBCUTANEOUS at 08:53

## 2020-10-25 RX ADMIN — ACETAMINOPHEN 650 MG: 325 TABLET ORAL at 21:12

## 2020-10-25 NOTE — PROGRESS NOTES
[facility]    ICU TEAM Progress Note    Name: Franny Watson   : 1945   MRN: [id number]   Date: 10/25/2020      Assessment:     - Cardiac arrest  - CAD - C 10/22: Severe native 3V CAD, patent LIMA-LAD, known occluded SVG-RCA and SVG-LCx; moderately elevated LVEDP  - Ischemic cardiomyopathy. TTE 10/21: LVEF is 40 - 45%. Left ventricle not well visualized. Mild mitral regurgitation   - tachyarrhythmias with multifocal ventricular ectopy - controlled on amiodarone  - JAMAL on CKD, stage IV. Transitioned from CRRT to IHD 10/25  - R sided hydronephrosis  - mild hyperglycemia  - Acute hypoxic respiratory failure - intubated 10/20, extubated 10/25  - leukocytosis. Possible RLL PNA  - Elevated LFTs of unclear etiology  - profound generalized weakness      ICU Comprehensive Plan of Care:   Extubated under my supervision today - tolerating well thus far  Supplemental O2 to maintain SpO2 92-97%  ICU hemodynamic/cardiac monitoring  MAP goal > 65 mmHg  Continue amiodarone infusion - initiated 10/23  Leave A-line for now. Possible DC 10/26  Monitor I/Os and Uo  Monitor renal function panel intermittently  Correct electrolytes as needed  Transitioned from CRRT to intermittent HD 10/25  Will leave NGT post extubation. Continue TFs @ 20 cc/hr  Sedation protocol  Cont TFs, SUP  DVT px: SQ UFH   Ceftriaxone day 6 - discontinue 10/25  Keep glucose less than 180 with subcutaneous insulin as needed        Subjective:     Overnight events: No major events. This morning, assed SBT and F/C. Secretions improved. Extubated under my supervision and seems to be tolerating well. Cognition appears intact. Calm. NAD after extubation. Home Medications:     Prior to Admission medications    Medication Sig Start Date End Date Taking? Authorizing Provider   aspirin (ASPIRIN) 325 mg tablet Take 325 mg by mouth daily. Yes Provider, Historical   amLODIPine (NORVASC) 10 mg tablet Take 10 mg by mouth daily.  Indications: high blood pressure   Yes Provider, Historical   ergocalciferol (ERGOCALCIFEROL) 1,250 mcg (50,000 unit) capsule Take 50,000 Units by mouth every seven (7) days. Yes Provider, Historical   multivitamin, tx-iron-ca-min (THERA-M w/ IRON) 9 mg iron-400 mcg tab tablet Take 1 Tab by mouth daily. Yes Provider, Historical   oxyCODONE-acetaminophen (Percocet) 5-325 mg per tablet Take 1 Tab by mouth every six (6) hours as needed for Pain. Yes Provider, Historical   metoprolol succinate (TOPROL-XL) 50 mg XL tablet Take 50 mg by mouth daily. Indications: high blood pressure   Yes Provider, Historical       Allergies/Social/Family History:     No Known Allergies   Social History     Tobacco Use    Smoking status: Not on file   Substance Use Topics    Alcohol use: Not on file      No family history on file.            Objective:   Vital Signs:  Visit Vitals  BP (!) 116/43   Pulse (!) 55   Temp 100.4 °F (38 °C)   Resp 18   Ht 5' 10\" (1.778 m)   Wt 87.5 kg (192 lb 14.4 oz)   SpO2 97%   BMI 27.68 kg/m²    O2 Flow Rate (L/min): 4 l/min O2 Device: Nasal cannula Temp (24hrs), Av.5 °F (36.9 °C), Min:97.8 °F (36.6 °C), Max:100.4 °F (38 °C)           Intake/Output:     Intake/Output Summary (Last 24 hours) at 10/25/2020 1552  Last data filed at 10/25/2020 1500  Gross per 24 hour   Intake 1770.96 ml   Output 1646 ml   Net 124.96 ml       Physical Exam:  General: NAD post extubation  Neuro: Cns intact, Sophy, diffusely weak, cognition appears intact  Cardiac: elva, regular, no M  Lungs: few scattered rhonchi, no wheezes  Abdomen: soft, nontender, nondistended  Extremities: warm, no edema    LABS AND  DATA: Personally reviewed  Recent Labs     10/25/20  0616 10/24/20  0616   WBC 14.1* 18.2*   HGB 10.6* 10.7*   HCT 33.4* 32.5*    158     Recent Labs     10/25/20  0616 10/25/20  0116    136   K 3.8 3.8    103   CO2 25 26   BUN 28* 25*   CREA 1.33* 1.37*   * 150*   CA 8.9 9.0   MG 2.4 2.3   PHOS 2.5* 2.9 Recent Labs     10/25/20  0618 10/25/20  0616  10/24/20  0617   *  --   --  474*   TP 6.4  --   --  6.4   ALB 2.3* 2.2*   < > 2.4*   GLOB 4.1*  --   --  4.0    < > = values in this interval not displayed. Recent Labs     10/24/20  0616 10/23/20  1546   APTT 63.0* 66.8*      No results for input(s): PHI, PCO2I, PO2I, FIO2I in the last 72 hours. No results for input(s): CPK, CKMB, TROIQ, BNPP in the last 72 hours. Hemodynamics:   PAP:   CO:     Wedge:   CI:     CVP:    SVR:       PVR:       Ventilator Settings:  Mode Rate Tidal Volume Pressure FiO2 PEEP   Spontaneous   500 ml  5 cm H2O 50 % 5 cm H20     Peak airway pressure: 21 cm H2O    Minute ventilation: 8.75 l/min        MEDS: Reviewed    Chest X-Ray: No new film today  CXR Results  (Last 48 hours)               10/24/20 1714  XR CHEST PORT Final result    Impression:  IMPRESSION:    No significant change. Multifocal airspace disease, most confluent at the right   lung base. Narrative:  INDICATION:    hypoxemic respiratory failure        EXAMINATION:  AP CHEST, PORTABLE       COMPARISON: 10/22/2020       FINDINGS: Single AP portable view of the chest at 1702 hours demonstrates no   change in position of the lines and tubes. The cardiomediastinal silhouette is   stable. There is chronic cardiomegaly. Diffuse bilateral airspace disease with   confluence at the lung bases, right worse than left, not significantly changed. NOTE OF PERSONAL INVOLVEMENT IN CARE   This patient has a high probability of imminent, clinically significant deterioration, which requires the highest level of preparedness to intervene urgently. I participated in the decision-making and personally managed or directed the management of the following life and organ supporting interventions that required my frequent assessment to treat or prevent imminent deterioration. I personally spent 45 minutes of critical care time.   This is time spent actively involved in patient care as well as the coordination of care and includes multiple checks on the patient after extubation. This does not include any procedural time.       Robert Nelson  10/25/2020 4:12 PM

## 2020-10-25 NOTE — PROGRESS NOTES
Problem: Mobility Impaired (Adult and Pediatric)  Goal: *Acute Goals and Plan of Care (Insert Text)  Description: FUNCTIONAL STATUS PRIOR TO ADMISSION: Patient was independent and active without use of DME. Driving. Retired. HOME SUPPORT PRIOR TO ADMISSION: The patient lived with wife but did not require assist.    Physical Therapy Goals  Initiated 10/25/2020  1. Patient will move from supine to sit and sit to supine  in bed with moderate assistance  within 7 day(s). 2.  Patient will transfer from bed to chair and chair to bed with moderate assistance  using the least restrictive device within 7 day(s). 3.  Patient will perform sit to stand with moderate assistance  within 7 day(s). 4.  Patient will ambulate with moderate assistance  for 5 feet with the least restrictive device within 7 day(s). Outcome: Progressing Towards Goal     PHYSICAL THERAPY EVALUATION  Patient: Smith Saini (12 y.o. male)  Date: 10/25/2020  Primary Diagnosis: Hydronephrosis of right kidney [N13.30]  UTI (urinary tract infection) [N39.0]  Sepsis ([address] [A41.9]  Tachycardia [R00.0]  Leukocytosis [D72.829]  Procedure(s) (LRB):  LEFT HEART CATH / CORONARY ANGIOGRAPHY (N/A) 3 Days Post-Op   Precautions:   Fall    ASSESSMENT  Patient admitted 10/19/2020 for pain in the right lower abdomen, UTI, acute renal insufficiency and sepsis. Patient was a  Code Blue 10/20 and intubated. Extubated this AM at 9:15AM. On 4L. Patient is not speaking, but able to shake head \"no\" appropriately. History/PLOF obtained from wife at bedside. Patient is unable to move extremities against gravity. Trace muscle contraction in the quads. Almost no muscle activation in the UEs this AM. Supine to sit total assist x 2, needing constant posterior support in sitting. Of concern, Patient neck is flexed and side bent to the right. Focus on lifting head/ extending neck in sitting today. Patient able to tolerate approx 1 minute in supine with max support.  O2 and BP stable throughout. Patient able to nod \"yes\" when asked if he needed to return to supine. Patient will need extensive rehab moving forward to regain muscle strength. Discussed recovery process and role of acute care PT with wife. Current Level of Function Impacting Discharge (mobility/balance): total assist    Functional Outcome Measure: The patient scored unable to complete on the TUG outcome measure which is indicative of high fall risk. Other factors to consider for discharge: Previously independent     Patient will benefit from skilled therapy intervention to address the above noted impairments. PLAN :  Recommendations and Planned Interventions: bed mobility training, transfer training, gait training, therapeutic exercises, neuromuscular re-education, patient and family training/education, and therapeutic activities      Frequency/Duration: Patient will be followed by physical therapy:  5 times a week to address goals. Recommendation for discharge: (in order for the patient to meet his/her long term goals)  To be determined: Will most likely need rehab    This discharge recommendation:  Has not yet been discussed the attending provider and/or case management    IF patient discharges home will need the following DME: to be determined (TBD)         SUBJECTIVE:   Patient shaking head \"no\" to pain    OBJECTIVE DATA SUMMARY:   HISTORY:    No past medical history on file. No past surgical history on file.    CAD, DM, CABG, HTN, AAA repair, fem-fem bypass with chronic left lower extremity paralysis and hyperlipidemia    Home Situation  Home Environment: Private residence  # Steps to Enter: 3  Rails to Enter: Yes  One/Two Story Residence: One story  Living Alone: No  Support Systems: Spouse/Significant Other/Partner, Family member(s)  Patient Expects to be Discharged to[de-identified] Rehabilitation facility  Current DME Used/Available at Home: None    EXAMINATION/PRESENTATION/DECISION MAKING:   Critical Behavior:  Neurologic State: Eyes open spontaneously  Orientation Level: Unable to verbalize  Cognition: No command following     Hearing: Auditory  Auditory Impairment: None    Strength:    Strength: Grossly decreased, non-functional    Tone & Sensation:   Tone: Abnormal    Coordination:  Coordination: Grossly decreased, non-functional    Functional Mobility:  Bed Mobility:  Supine to Sit: Total assistance;Assist x2  Sit to Supine: Maximum assistance;Assist x2  Scooting: Total assistance    Balance:   Sitting: Impaired  Sitting - Static: Poor (constant support)  Sitting - Dynamic: Poor (constant support)    Functional Measure:  Timed up and go:    Timed Get Up And Go Test: (unable to complete)       < than 10 seconds=Normal  Greater then 13.5 seconds (in elderly)=Increased fall risk   Mirian MCCULLOUGH, [place]. Predicting the probability for falls in community dwelling older adults using the Timed Up and Go Test. Phys Ther. 2000;80:896-903. Physical Therapy Evaluation Charge Determination   History Examination Presentation Decision-Making   MEDIUM  Complexity : 1-2 comorbidities / personal factors will impact the outcome/ POC  LOW Complexity : 1-2 Standardized tests and measures addressing body structure, function, activity limitation and / or participation in recreation  LOW Complexity : Stable, uncomplicated  LOW Complexity : FOTO score of       Based on the above components, the patient evaluation is determined to be of the following complexity level: LOW     Pain Rating:  Unable to verbalize. Shaking head \"no\" when asked about pain. Activity Tolerance:   Fair, desaturates with exertion and requires oxygen, requires frequent rest breaks, observed SOB with activity, and extubated this AM  Please refer to the flowsheet for vital signs taken during this treatment.     After treatment patient left in no apparent distress:   Supine in bed, Call bell within reach, Caregiver / family present, and Side rails x 3    COMMUNICATION/EDUCATION:   The patients plan of care was discussed with: Registered nurse. Fall prevention education was provided and the patient/caregiver indicated understanding., Patient/family have participated as able in goal setting and plan of care. , and Patient/family agree to work toward stated goals and plan of care.     Thank you for this referral.  Gino Rao, PT, DPT  Geriatric Clinical Specialist     Time Calculation: 20 mins

## 2020-10-25 NOTE — PROGRESS NOTES
Name: Jose Ugarte MRN: [id number]   : 1945 Hospital: [address]   Date: 10/25/2020        IMPRESSION:   · CKD stage 4. The etiology is not clear, but patient is aware of having CKD for at least 9 years. · JAMAL- oligo-anuric, after the Cardiac arrest. Patient is hypotensive. Has low glomerular pressure. OnCRT now. Hypotension is improved. · Respiratory failure. Plans for extubation today  · Hypervolemia- pulmonary edema, poorly responsive to diuretics. Better with fluid removal via CRT  · Hyperkalemia- resolved after conservative measures. · UTI- on AB's  · S/P card cath  · Increased troponin- card cath is scheduled for today 2.30 PM  · Right hydronephrosis- urology is evaluating. PLAN:   · Patient will be d/c from CRT. Intermittent HD as needed. · Monitor the GFR very cloesy- I's and O's, daily electrolytes. ·  Continue AB therapy  · Urology following for right ureteral obstruction. Subjective/Interval History:   I have reviewed the flowsheet and previous days notes. ROS:Review of systems not obtained due to patient factors. Objective:   Vital Signs:    Visit Vitals  BP (!) 123/44   Pulse (!) 58   Temp 98.2 °F (36.8 °C)   Resp 15   Ht 5' 10\" (1.778 m)   Wt 87.5 kg (192 lb 14.4 oz)   SpO2 99%   BMI 27.68 kg/m²       O2 Device: Endotracheal tube, Ventilator   O2 Flow Rate (L/min): 15 l/min   Temp (24hrs), Av.2 °F (36.8 °C), Min:97.8 °F (36.6 °C), Max:98.9 °F (37.2 °C)       Intake/Output:   Last shift:      10/25 07 - 10/25 1900  In: 5   Out: 163   Last 3 shifts: 10/23 1901 - 10/25 07  In: 3560.4 [I.V.:1905.4]  Out: 8948 [Urine:334]    Intake/Output Summary (Last 24 hours) at 10/25/2020 0855  Last data filed at 10/25/2020 0800  Gross per 24 hour   Intake 1973 ml   Output 2244 ml   Net -271 ml        Physical Exam:  General:    Awake , on vent. Responding appropriately. Head:   Normocephalic, without obvious abnormality, atraumatic.   Eyes:   Conjunctivae/corneas clear.    Nose:  Nares normal. No drainage or sinus tenderness. Throat:    ET tube in place. Neck:  Supple, symmetrical,  no adenopathy, thyroid: non tender    no carotid bruit and no JVD. Lungs:   Diminished to auscultation bilaterally. No Wheezing or Rhonchi. + rales. Chest wall:  No tenderness or deformity. No Accessory muscle use. Heart:   Regular rate and rhythm,  no murmur, rub or gallop. Abdomen:   Soft, non-tender. Not distended. Bowel sounds normal. No masses  Extremities: Extremities normal, atraumatic, No cyanosis. 1+ edema. No clubbing  Skin:     Texture, turgor normal. No rashes or lesions. Not Jaundiced  Psych:  Unable to assess. Neurologic: Awake and alert      DATA:  Labs:  Recent Labs     10/25/20  0618 10/25/20  0616 10/25/20  0116 10/24/20  1807   NA  --  136 136 136   K  --  3.8 3.8 3.4*   CL  --  103 103 102   CO2  --  25 26 26   BUN  --  28* 25* 22*   CREA  --  1.33* 1.37* 1.37*   CA  --  8.9 9.0 9.2   ALB 2.3* 2.2* 2.3* 2.2*   PHOS  --  2.5* 2.9 3.1   MG  --  2.4 2.3 2.1     Recent Labs     10/25/20  0616 10/24/20  0616 10/23/20  2230   WBC 14.1* 18.2* 16.6*   HGB 10.6* 10.7* 10.3*   HCT 33.4* 32.5* 32.2*    158 156     No results for input(s): RASHMI, KU, CLU, CREAU in the last 72 hours.     No lab exists for component: PROU    Total time spent with patient:  35 minutes    []       Care Plan discussed with:   Family, Colleague, Nursing, Medical Team    Graciela Hansen MD

## 2020-10-25 NOTE — PROGRESS NOTES
0730 Bedside and Verbal shift change report given to MARCELINO Bailey RN (oncoming nurse) by Chavez Oliveira, BEHZAD and [facility], [facility], RN (offgoing nurse). Report included the following information SBAR, Kardex, ED Summary, OR Summary, Procedure Summary, Intake/Output, MAR, Accordion, Recent Results, Med Rec Status, Cardiac Rhythm NSR;PVC and Alarm Parameters . Assumed care of pt. Assessment complete. Propofol turned off and pt placed on SBT. 0720 This RN paging Dr. Darlene Allison. [phone] Dr. Darlene Allison returning page. This RN asking if pt can be transitioned to HD, per Dr. Maria Ines Troncoso request. Dr. Darlene Allison okay with pt being switched to HD and will be up to round on pt in a few minutes. 0915 Pt extubated to 4LNC.      1200 Pt with rectal temp of 100.4, tylenol given. 1800  Pt with rectal temp of 100.7, tylenol given. Pt bathed. 2000 Bedside and Verbal shift change report given to Lavelle Lim RN (oncoming nurse) by Marcio LING RN (offgoing nurse). Report included the following information SBAR, Kardex, ED Summary, OR Summary, Procedure Summary, Intake/Output, MAR, Accordion, Recent Results, Med Rec Status, Cardiac Rhythm NSR/SINUS TONIA WITH OCCASIONAL PVC'S and Alarm Parameters . Shift summery: Updated pt's daughter Aramis Cerda multiple times throughout shift. Aramis Cerda stated it was okay to update pt's step-son. Pt A&O x3, whispers and mouths words. VSS.

## 2020-10-25 NOTE — DIALYSIS
[facility]       [phone]          Per primary RN's discussion with Dr. Kenneth Smith patient will be transitioning to HD. All possible blood returned (165ml). Lines packed with heparin (see EMAR). Machine cleaned and collected.

## 2020-10-25 NOTE — PROGRESS NOTES
10/25/20 0913   Weaning Parameters   Spontaneous Breathing Trial Complete Yes   Resp Rate Observed 18   Ve 10.5      RSBI 29   SBT Results  Cuff leak presented at this time. 0915: Extubated patient to 4LPM NC O2 saturations remain 96%. RNx2 & patients wife at bedside. Patient tolerated well. Will continue to monitor patient.

## 2020-10-25 NOTE — PROGRESS NOTES
Patient currently being extubated. Will follow up as time allows and as appropriate for PT evaluation. Thanks!     Nichole Mcfarland PT, DPT  Geriatric Clinical Specialist

## 2020-10-26 ENCOUNTER — APPOINTMENT (OUTPATIENT)
Dept: GENERAL RADIOLOGY | Age: 75
DRG: 870 | End: 2020-10-26
Attending: INTERNAL MEDICINE
Payer: MEDICARE

## 2020-10-26 LAB
ALBUMIN SERPL-MCNC: 2.2 G/DL (ref 3.5–5)
ANION GAP SERPL CALC-SCNC: 11 MMOL/L (ref 5–15)
BUN SERPL-MCNC: 67 MG/DL (ref 6–20)
BUN/CREAT SERPL: 24 (ref 12–20)
CALCIUM SERPL-MCNC: 8.1 MG/DL (ref 8.5–10.1)
CHLORIDE SERPL-SCNC: 102 MMOL/L (ref 97–108)
CO2 SERPL-SCNC: 23 MMOL/L (ref 21–32)
CREAT SERPL-MCNC: 2.8 MG/DL (ref 0.7–1.3)
ERYTHROCYTE [DISTWIDTH] IN BLOOD BY AUTOMATED COUNT: 16.3 % (ref 11.5–14.5)
GLUCOSE BLD STRIP.AUTO-MCNC: 107 MG/DL (ref 65–100)
GLUCOSE BLD STRIP.AUTO-MCNC: 131 MG/DL (ref 65–100)
GLUCOSE BLD STRIP.AUTO-MCNC: 138 MG/DL (ref 65–100)
GLUCOSE BLD STRIP.AUTO-MCNC: 153 MG/DL (ref 65–100)
GLUCOSE BLD STRIP.AUTO-MCNC: 185 MG/DL (ref 65–100)
GLUCOSE SERPL-MCNC: 121 MG/DL (ref 65–100)
HCT VFR BLD AUTO: 32.5 % (ref 36.6–50.3)
HGB BLD-MCNC: 10.3 G/DL (ref 12.1–17)
MAGNESIUM SERPL-MCNC: 2.4 MG/DL (ref 1.6–2.4)
MCH RBC QN AUTO: 27 PG (ref 26–34)
MCHC RBC AUTO-ENTMCNC: 31.7 G/DL (ref 30–36.5)
MCV RBC AUTO: 85.1 FL (ref 80–99)
NRBC # BLD: 0 K/UL (ref 0–0.01)
NRBC BLD-RTO: 0 PER 100 WBC
PHOSPHATE SERPL-MCNC: 3.2 MG/DL (ref 2.6–4.7)
PLATELET # BLD AUTO: 204 K/UL (ref 150–400)
PMV BLD AUTO: 11.9 FL (ref 8.9–12.9)
POTASSIUM SERPL-SCNC: 3.3 MMOL/L (ref 3.5–5.1)
RBC # BLD AUTO: 3.82 M/UL (ref 4.1–5.7)
SERVICE CMNT-IMP: ABNORMAL
SODIUM SERPL-SCNC: 136 MMOL/L (ref 136–145)
WBC # BLD AUTO: 13.5 K/UL (ref 4.1–11.1)

## 2020-10-26 PROCEDURE — 85027 COMPLETE CBC AUTOMATED: CPT

## 2020-10-26 PROCEDURE — 99222 1ST HOSP IP/OBS MODERATE 55: CPT | Performed by: PHYSICAL MEDICINE & REHABILITATION

## 2020-10-26 PROCEDURE — 77010033678 HC OXYGEN DAILY

## 2020-10-26 PROCEDURE — 74018 RADEX ABDOMEN 1 VIEW: CPT

## 2020-10-26 PROCEDURE — 97535 SELF CARE MNGMENT TRAINING: CPT | Performed by: OCCUPATIONAL THERAPIST

## 2020-10-26 PROCEDURE — 51798 US URINE CAPACITY MEASURE: CPT

## 2020-10-26 PROCEDURE — 74011250637 HC RX REV CODE- 250/637: Performed by: INTERNAL MEDICINE

## 2020-10-26 PROCEDURE — 36415 COLL VENOUS BLD VENIPUNCTURE: CPT

## 2020-10-26 PROCEDURE — 74011000258 HC RX REV CODE- 258: Performed by: FAMILY MEDICINE

## 2020-10-26 PROCEDURE — 94760 N-INVAS EAR/PLS OXIMETRY 1: CPT

## 2020-10-26 PROCEDURE — 74011250636 HC RX REV CODE- 250/636: Performed by: FAMILY MEDICINE

## 2020-10-26 PROCEDURE — 74011636637 HC RX REV CODE- 636/637: Performed by: EMERGENCY MEDICINE

## 2020-10-26 PROCEDURE — 80069 RENAL FUNCTION PANEL: CPT

## 2020-10-26 PROCEDURE — 74011250636 HC RX REV CODE- 250/636: Performed by: INTERNAL MEDICINE

## 2020-10-26 PROCEDURE — 74011636637 HC RX REV CODE- 636/637: Performed by: FAMILY MEDICINE

## 2020-10-26 PROCEDURE — 83735 ASSAY OF MAGNESIUM: CPT

## 2020-10-26 PROCEDURE — 65660000001 HC RM ICU INTERMED STEPDOWN

## 2020-10-26 PROCEDURE — 82962 GLUCOSE BLOOD TEST: CPT

## 2020-10-26 PROCEDURE — 97530 THERAPEUTIC ACTIVITIES: CPT

## 2020-10-26 PROCEDURE — 97112 NEUROMUSCULAR REEDUCATION: CPT | Performed by: OCCUPATIONAL THERAPIST

## 2020-10-26 PROCEDURE — 74011250636 HC RX REV CODE- 250/636: Performed by: NURSE PRACTITIONER

## 2020-10-26 PROCEDURE — 97165 OT EVAL LOW COMPLEX 30 MIN: CPT | Performed by: OCCUPATIONAL THERAPIST

## 2020-10-26 PROCEDURE — 92610 EVALUATE SWALLOWING FUNCTION: CPT

## 2020-10-26 RX ORDER — AMIODARONE HYDROCHLORIDE 200 MG/1
400 TABLET ORAL 2 TIMES DAILY
Status: DISCONTINUED | OUTPATIENT
Start: 2020-10-26 | End: 2020-11-02

## 2020-10-26 RX ORDER — POTASSIUM CHLORIDE 14.9 MG/ML
10 INJECTION INTRAVENOUS
Status: COMPLETED | OUTPATIENT
Start: 2020-10-26 | End: 2020-10-26

## 2020-10-26 RX ADMIN — Medication 10 ML: at 21:28

## 2020-10-26 RX ADMIN — INSULIN LISPRO 4 UNITS: 100 INJECTION, SOLUTION INTRAVENOUS; SUBCUTANEOUS at 23:18

## 2020-10-26 RX ADMIN — CEFTRIAXONE SODIUM 1 G: 1 INJECTION, POWDER, FOR SOLUTION INTRAMUSCULAR; INTRAVENOUS at 21:31

## 2020-10-26 RX ADMIN — FAMOTIDINE 20 MG: 20 TABLET ORAL at 08:43

## 2020-10-26 RX ADMIN — INSULIN GLARGINE 17 UNITS: 100 INJECTION, SOLUTION SUBCUTANEOUS at 21:26

## 2020-10-26 RX ADMIN — AMIODARONE HYDROCHLORIDE 400 MG: 200 TABLET ORAL at 13:27

## 2020-10-26 RX ADMIN — HYDRALAZINE HYDROCHLORIDE 10 MG: 20 INJECTION INTRAMUSCULAR; INTRAVENOUS at 06:53

## 2020-10-26 RX ADMIN — Medication 10 ML: at 14:24

## 2020-10-26 RX ADMIN — METOPROLOL TARTRATE 100 MG: 25 TABLET, FILM COATED ORAL at 08:43

## 2020-10-26 RX ADMIN — AMIODARONE HYDROCHLORIDE 400 MG: 200 TABLET ORAL at 18:32

## 2020-10-26 RX ADMIN — CLOPIDOGREL BISULFATE 75 MG: 75 TABLET ORAL at 08:43

## 2020-10-26 RX ADMIN — Medication 10 ML: at 06:56

## 2020-10-26 RX ADMIN — METOPROLOL TARTRATE 100 MG: 25 TABLET, FILM COATED ORAL at 21:18

## 2020-10-26 RX ADMIN — HEPARIN SODIUM 5000 UNITS: 5000 INJECTION INTRAVENOUS; SUBCUTANEOUS at 21:18

## 2020-10-26 RX ADMIN — ASPIRIN 81 MG: 81 TABLET, CHEWABLE ORAL at 08:44

## 2020-10-26 RX ADMIN — HEPARIN SODIUM 5000 UNITS: 5000 INJECTION INTRAVENOUS; SUBCUTANEOUS at 13:28

## 2020-10-26 RX ADMIN — HEPARIN SODIUM 5000 UNITS: 5000 INJECTION INTRAVENOUS; SUBCUTANEOUS at 06:51

## 2020-10-26 RX ADMIN — POTASSIUM CHLORIDE 10 MEQ: 200 INJECTION, SOLUTION INTRAVENOUS at 16:28

## 2020-10-26 RX ADMIN — INSULIN LISPRO 4 UNITS: 100 INJECTION, SOLUTION INTRAVENOUS; SUBCUTANEOUS at 12:16

## 2020-10-26 RX ADMIN — POTASSIUM CHLORIDE 10 MEQ: 200 INJECTION, SOLUTION INTRAVENOUS at 14:23

## 2020-10-26 RX ADMIN — ROSUVASTATIN 40 MG: 40 TABLET, FILM COATED ORAL at 21:18

## 2020-10-26 RX ADMIN — Medication 10 ML: at 06:57

## 2020-10-26 NOTE — PROGRESS NOTES
[facility]    ICU TEAM Progress Note    Name: Magalys Fernandes   : 1945   MRN: [id number]   Date: 10/26/2020      Assessment:     - Cardiac arrest  - CAD - C 10/22: Severe native 3V CAD, patent LIMA-LAD, known occluded SVG-RCA and SVG-LCx; moderately elevated LVEDP  - Ischemic cardiomyopathy. TTE 10/21: LVEF is 40 - 45%. Left ventricle not well visualized. Mild mitral regurgitation   - tachyarrhythmias with multifocal ventricular ectopy - controlled on amiodarone  - JAMAL on CKD, stage IV. Transitioned from CRRT to IHD 10/25  - R sided hydronephrosis  - mild hyperglycemia  - Acute hypoxic respiratory failure - intubated 10/20, extubated 10/25  - leukocytosis. Possible RLL PNA  - Elevated LFTs of unclear etiology  - profound generalized weakness  -Dysphagia - failed swallow eval 10/26      ICU Comprehensive Plan of Care:   Transfer to [facility] - discussed with Hospitalist  Cont supplemental O2 to maintain SpO2 92-97%  Cont hemodynamic/cardiac monitoring  Change amiodarone infusion to enteral (per tube) - initiated 10/23   Changed to enteral 10/26 at loading dose  DC A-line   Monitor I/Os and Uo  Monitor renal function panel intermittently  Correct electrolytes as needed  HD per nephrology   Resume TFs per NGT. Advance to goal  DC famotidine as SUP no longer indicated post extubation  DVT px: SQ UFH   Ceftriaxone - 10 day course planned (through 10/28)  Keep glucose less than 180 with subcutaneous insulin as needed        Subjective:     Overnight events: No major events. Comfortable on NC O2. Hemodynamically stable. Home Medications:     Prior to Admission medications    Medication Sig Start Date End Date Taking? Authorizing Provider   aspirin (ASPIRIN) 325 mg tablet Take 325 mg by mouth daily. Yes Provider, Historical   amLODIPine (NORVASC) 10 mg tablet Take 10 mg by mouth daily.  Indications: high blood pressure   Yes Provider, Historical   ergocalciferol (ERGOCALCIFEROL) 1,250 mcg (50,000 unit) capsule Take 50,000 Units by mouth every seven (7) days. Yes Provider, Historical   multivitamin, tx-iron-ca-min (THERA-M w/ IRON) 9 mg iron-400 mcg tab tablet Take 1 Tab by mouth daily. Yes Provider, Historical   oxyCODONE-acetaminophen (Percocet) 5-325 mg per tablet Take 1 Tab by mouth every six (6) hours as needed for Pain. Yes Provider, Historical   metoprolol succinate (TOPROL-XL) 50 mg XL tablet Take 50 mg by mouth daily. Indications: high blood pressure   Yes Provider, Historical       Allergies/Social/Family History:     No Known Allergies   Social History     Tobacco Use    Smoking status: Not on file   Substance Use Topics    Alcohol use: Not on file      No family history on file.            Objective:   Vital Signs:  Visit Vitals  BP (!) 130/48   Pulse 64   Temp 98.4 °F (36.9 °C)   Resp 19   Ht 5' 10\" (1.778 m)   Wt 90 kg (198 lb 6.6 oz)   SpO2 97%   BMI 28.47 kg/m²    O2 Flow Rate (L/min): 4 l/min O2 Device: Nasal cannula Temp (24hrs), Av.5 °F (37.5 °C), Min:98 °F (36.7 °C), Max:100.5 °F (38.1 °C)           Intake/Output:     Intake/Output Summary (Last 24 hours) at 10/26/2020 1629  Last data filed at 10/26/2020 1000  Gross per 24 hour   Intake 1165 ml   Output 278 ml   Net 887 ml       Physical Exam:  General: NAD, cognition intact  Neuro: CNs intact, Sophy, diffusely weak  Cardiac: Regular, no M  Lungs: CTA  Abdomen: soft, nontender, nondistended  Extremities: warm, no edema    LABS AND  DATA: Personally reviewed  Recent Labs     10/26/20  0413 10/25/20  0616   WBC 13.5* 14.1*   HGB 10.3* 10.6*   HCT 32.5* 33.4*    179     Recent Labs     10/26/20  0413 10/25/20  0616    136   K 3.3* 3.8    103   CO2 23 25   BUN 67* 28*   CREA 2.80* 1.33*   * 180*   CA 8.1* 8.9   MG 2.4 2.4   PHOS 3.2 2.5*     Recent Labs     10/26/20  0413 10/25/20  0618  10/24/20  0617   AP  --  506*  --  474*   TP  --  6.4  --  6.4   ALB 2.2* 2.3*   < > 2.4*   GLOB  --  4.1*  --  4.0    < > = values in this interval not displayed. Recent Labs     10/24/20  0616   APTT 63.0*      No results for input(s): PHI, PCO2I, PO2I, FIO2I in the last 72 hours. No results for input(s): CPK, CKMB, TROIQ, BNPP in the last 72 hours. Hemodynamics:   PAP:   CO:     Wedge:   CI:     CVP:    SVR:       PVR:       Ventilator Settings:  Mode Rate Tidal Volume Pressure FiO2 PEEP   Spontaneous   500 ml  5 cm H2O 50 % 5 cm H20     Peak airway pressure: 21 cm H2O    Minute ventilation: 8.75 l/min        MEDS: Reviewed    Chest X-Ray: No new film today  CXR Results  (Last 48 hours)               10/24/20 1714  XR CHEST PORT Final result    Impression:  IMPRESSION:    No significant change. Multifocal airspace disease, most confluent at the right   lung base. Narrative:  INDICATION:    hypoxemic respiratory failure        EXAMINATION:  AP CHEST, PORTABLE       COMPARISON: 10/22/2020       FINDINGS: Single AP portable view of the chest at 1702 hours demonstrates no   change in position of the lines and tubes. The cardiomediastinal silhouette is   stable. There is chronic cardiomegaly. Diffuse bilateral airspace disease with   confluence at the lung bases, right worse than left, not significantly changed.                        Sav Iqbal [facility]  10/26/2020 4:12 PM

## 2020-10-26 NOTE — DIABETES MGMT
TERESA DELUCA  CLINICAL NURSE SPECIALIST CONSULT  [facility]GRAM [facility] DIABETES HEALTH    FOLLOW UP NOTE    Presentation   Kota Bhardwaj is a 70 y.o. male admitted  with right lower abdominal quadrant pain. Initial work up revealed hydrornephorsis and UTI. HX: PMH: CAD w/ MI and subsequent CABG 1999/ s/p AAA repair with left leg nephropathy/ fem to fem bypass/ HLD/ HTN/ DM2-A1C pending    DX: CT scan-hydronephrosis; cardiac cath revealed severe CAD disease. Current clinical course has been complicated by hyperglycemia. Diabetes: Patient has known Type 2 diabetes, treated with humalog per silvino report. No insulin or PO diabetic medications on PTA. Consulted by Provider for advanced diabetes nursing assessment and care, specifically related to   [] Transitioning off Yovanny Alstrom   [x] Inpatient management strategy  [x] Home management assessment  [] Survival skill education    Diabetes-related medical history  Acute complications  hyperglycemia  Neurological complications  NONE  Microvascular disease  Nephropathy  Macrovascular disease  CAD and Myocardial infarction  Other associated conditions     HTN/ HLD/    Diabetes medication history  Drug class Currently in use Discontinued Never used   Biguanide  Metformin    DDP-4 inhibitor       Sulfonylurea Glipizide 5mg daily     Thiazolidinedione      GLP-1 RA      SGLT-2 inhibitors      Basal insulin      Fixed Dose  Combinations      Bolus insulin  Humalog can't recall dosing, but took himself off due to symptoms of hypoglycemia      Subjective   Mr. Lindsay Maki remains critically ill and intubated. NSTEMI -->cardiac cath completed-->severe CAD disease with occlusion in SVG-RCA and SVG -LCx. No stents placed   Receiving CVVH for CKD  BG trends remained within target over the weekend without lows. Basal insulin restarted. Extubated yesterday.    ECHO-20-25%   Patient reports the following home diabetes self-care practices:  Eating pattern    Physical activity pattern-gets along well. Monitoring pattern-checked BG once daily-    Taking medications pattern  [] Consistent administration  [] Affordable      Objective   Physical exam  General Intubated on ventilator  Vital Signs   Visit Vitals  BP (!) 131/48   Pulse 83   Temp 98 °F (36.7 °C)   Resp 23   Ht 5' 10\" (1.778 m)   Wt 87.5 kg (192 lb 14.4 oz)   SpO2 97%   BMI 27.68 kg/m²     Skin  Warm and dry. Heart   Regular rate and rhythm. No murmurs, rubs or gallops  Lungs  Clear to auscultation without rales or rhonchi  Extremities No foot wounds    Diabetic foot exam:    Left Foot     Visual Exam: normal    Pulse DP: 1+ (weak)   Filament test: absent sensation      Right Foot   Visual Exam: normal    Pulse DP: 2+ (normal)   Filament test: normal sensation    Vibratory sensation: normal DP & PT pulses +2. Laboratory  Lab Results   Component Value Date/Time    Hemoglobin A1c 7.1 (H) 10/20/2020 02:02 AM     No results found for: LDL, LDLC, DLDLP  Lab Results   Component Value Date/Time    Creatinine 2.80 (H) 10/26/2020 04:13 AM     Lab Results   Component Value Date/Time    Sodium 136 10/26/2020 04:13 AM    Potassium 3.3 (L) 10/26/2020 04:13 AM    Chloride 102 10/26/2020 04:13 AM    CO2 23 10/26/2020 04:13 AM    Anion gap 11 10/26/2020 04:13 AM    Glucose 121 (H) 10/26/2020 04:13 AM    BUN 67 (H) 10/26/2020 04:13 AM    Creatinine 2.80 (H) 10/26/2020 04:13 AM    BUN/Creatinine ratio 24 (H) 10/26/2020 04:13 AM    GFR est AA 27 (L) 10/26/2020 04:13 AM    GFR est non-AA 22 (L) 10/26/2020 04:13 AM    Calcium 8.1 (L) 10/26/2020 04:13 AM    Bilirubin, total 0.8 10/25/2020 06:18 AM    Alk.  phosphatase 506 (H) 10/25/2020 06:18 AM    Protein, total 6.4 10/25/2020 06:18 AM    Albumin 2.2 (L) 10/26/2020 04:13 AM    Globulin 4.1 (H) 10/25/2020 06:18 AM    A-G Ratio 0.6 (L) 10/25/2020 06:18 AM    ALT (SGPT) 490 (H) 10/25/2020 06:18 AM     Lab Results   Component Value Date/Time    ALT (SGPT) 490 (H) 10/25/2020 06:18 AM       Factors affecting BG pattern  Factor Dose Comments   Nutrition:  TF via NGT   Nepro @ 20cc/hr Anticipate eventual hyperglycemia. Will require basal insulin to cover for the rise in BG. Drugs:   Heparin/Propofol/bicarb    Pain Abdominal pain    Infection UTI/Hydronephrosis  WBC 17.3   Lactic 4.6--> 1.5    CVVH-CKD stage 4 Improvement noted in GFR 50/Cr+1.39      Assessment and Plan   Nursing Diagnosis Risk for unstable blood glucose pattern   Nursing Intervention Domain 5252 Decision-making Support   Nursing Interventions Examined current inpatient diabetes control   Explored factors facilitating and impeding inpatient management  Identified self-management practices impeding diabetes control  Explored corrective strategies with patient and responsible inpatient provider   Informed patient of rational for insulin strategy while hospitalized       Evaluation   Mr. Bernarda Nielsen, has a long history  Type 2 diabetes- no recent A1C on file. Lab drawn this morning and result pending. He has co-morbid conditions in addition to his DM2. S/p code blue on NSTU with ROSC and transferred up to ICU level of care on 10/21/2020. 12 lead ECG revealed  NSTEMI necessitating cardiac cath which revealed severe CAD vessel disease. Renal status was also declining and patient was placed on CVVH. Renal status improving with CVVH. Overall BG within target over the weekend. Basal insulin restarted. TF initiated this morning -Nepro @ 20cc/hr. Anticipate BG will trend up with TF. Inpatient blood glucose management has been impacted by  [x] Kidney dysfunction =CKD stage 4  [x] Erratic meal consumption -poor appetite today  Recommendations   1. CONTINUE basal -17units daily. IF AM BG remains >200 after restarting, INCREASE basal dose up to 20units daily. .    2.  CONTINUE Corrective insulin  [x] Normal sensitivity    Discharge Planning   TBD    Billing Code(s)   I personally reviewed chart, notes, data and current medications in the medical record, and examined the patient at bedside before making care recommendations. Thank you for including us in their care. I spent 15 minutes in direct patient care today for this patient.   Time includes chart review, face to face with patient and collaboration with interdisciplinary care team.     Jaqueline Schwartz CNS  [facility] Diabetes [facility]  Access via [address] [place] [address]  [phone]

## 2020-10-26 NOTE — PROGRESS NOTES
Cardiology Progress Note  10/26/2020     Admit Date: 10/19/2020  Admit Diagnosis: Hydronephrosis of right kidney [N13.30]  UTI (urinary tract infection) [N39.0]  Sepsis ([address] [A41.9]  Tachycardia [R00.0]  Leukocytosis [D72.829]  CC: none currently    Assessment:   Active Problems:    Leukocytosis (10/19/2020)      UTI (urinary tract infection) (10/19/2020)      Tachycardia (10/19/2020)      Sepsis ([address] (10/19/2020)      Hydronephrosis of right kidney (10/19/2020)      Plan:     Off of heparin, now on DAPT  Echo reviewed  Start transition to PO amiodarone  Cont BB  Not on RAAS 2/2 JAMAL    Subjective:      Alonza Rideau cont with slow improvement    Objective:    Physical Exam:  Overall VSSAF;    Visit Vitals  BP (!) 136/47   Pulse 76   Temp 99.7 °F (37.6 °C)   Resp 22   Ht 5' 10\" (1.778 m)   Wt 87.5 kg (192 lb 14.4 oz)   SpO2 97%   BMI 27.68 kg/m²     Temp (24hrs), Av.2 °F (37.9 °C), Min:99.7 °F (37.6 °C), Max:100.5 °F (38.1 °C)    Patient Vitals for the past 8 hrs:   Pulse   10/26/20 0700 76   10/26/20 0653 78   10/26/20 0600 72   10/26/20 0500 69   10/26/20 0400 73   10/26/20 0300 70   10/26/20 0200 66   10/26/20 0100 66    Patient Vitals for the past 8 hrs:   Resp   10/26/20 0700 22   10/26/20 0600 15   10/26/20 0500 18   10/26/20 0400 22   10/26/20 0300 20   10/26/20 0200 21   10/26/20 0100 17    Patient Vitals for the past 8 hrs:   BP   10/26/20 0700 (!) 136/47   10/26/20 0653 (!) 172/50   10/26/20 0600 (!) 142/49   10/26/20 0500 (!) 133/48   10/26/20 0400 131/63   10/26/20 0300 (!) 130/51   10/26/20 0200 (!) 134/47   10/26/20 0100 (!) 124/45      10/24 190 - 10/26 0700  In: 2413.6 [I.V.:1073.6]  Out: 1588 [Urine:376]      General Appearance: Intubated   Ears/Nose/Mouth/Throat:   Normal MM; anicteric. JVP: WNL   Resp:   Lungs clear to auscultation bilaterally. Nl resp effort. Cardiovascular:  RRR, S1, S2 normal, no new murmur. No gallop or rub.    Abdomen:   Soft, non-tender, bowel sounds are present. Extremities: No edema bilaterally. Skin:  Neuro: Warm and dry.   Sedated                         Data Review:     Telemetry independently reviewed :   normal sinus rhythm         Labs:   Recent Results (from the past 24 hour(s))   GLUCOSE, POC    Collection Time: 10/25/20 12:14 PM   Result Value Ref Range    Glucose (POC) 126 (H) 65 - 100 mg/dL    Performed by [id number], POC    Collection Time: 10/25/20  6:25 PM   Result Value Ref Range    Glucose (POC) 121 (H) 65 - 100 mg/dL    Performed by [id number], POC    Collection Time: 10/25/20  9:26 PM   Result Value Ref Range    Glucose (POC) 133 (H) 65 - 100 mg/dL    Performed by Cyndi Anglin    GLUCOSE, POC    Collection Time: 10/25/20 11:37 PM   Result Value Ref Range    Glucose (POC) 153 (H) 65 - 100 mg/dL    Performed by Cyndi Anglin    RENAL FUNCTION PANEL    Collection Time: 10/26/20  4:13 AM   Result Value Ref Range    Sodium 136 136 - 145 mmol/L    Potassium 3.3 (L) 3.5 - 5.1 mmol/L    Chloride 102 97 - 108 mmol/L    CO2 23 21 - 32 mmol/L    Anion gap 11 5 - 15 mmol/L    Glucose 121 (H) 65 - 100 mg/dL    BUN 67 (H) 6 - 20 MG/DL    Creatinine 2.80 (H) 0.70 - 1.30 MG/DL    BUN/Creatinine ratio 24 (H) 12 - 20      GFR est AA 27 (L) >60 ml/min/1.73m2    GFR est non-AA 22 (L) >60 ml/min/1.73m2    Calcium 8.1 (L) 8.5 - 10.1 MG/DL    Phosphorus 3.2 2.6 - 4.7 MG/DL    Albumin 2.2 (L) 3.5 - 5.0 g/dL   CBC W/O DIFF    Collection Time: 10/26/20  4:13 AM   Result Value Ref Range    WBC 13.5 (H) 4.1 - 11.1 K/uL    RBC 3.82 (L) 4.10 - 5.70 M/uL    HGB 10.3 (L) 12.1 - 17.0 g/dL    HCT 32.5 (L) 36.6 - 50.3 %    MCV 85.1 80.0 - 99.0 FL    MCH 27.0 26.0 - 34.0 PG    MCHC 31.7 30.0 - 36.5 g/dL    RDW 16.3 (H) 11.5 - 14.5 %    PLATELET 679 862 - 680 K/uL    MPV 11.9 8.9 - 12.9 FL    NRBC 0.0 0  WBC    ABSOLUTE NRBC 0.00 0.00 - 0.01 K/uL   MAGNESIUM    Collection Time: 10/26/20  4:13 AM   Result Value Ref Range Magnesium 2.4 1.6 - 2.4 mg/dL   GLUCOSE, POC    Collection Time: 10/26/20  6:48 AM   Result Value Ref Range    Glucose (POC) 131 (H) 65 - 100 mg/dL    Performed by Theodore Prajapati       Current medications reviewed       Sanjuana Bates MD

## 2020-10-26 NOTE — PROGRESS NOTES
Problem: Self Care Deficits Care Plan (Adult)  Goal: *Acute Goals and Plan of Care (Insert Text)  Description:   FUNCTIONAL STATUS PRIOR TO ADMISSION: Patient was independent and active without use of DME. Drives. LLE weakness since 2011 when contracted salmonella. HOME SUPPORT: The patient lived with wife but did not require assist.    Occupational Therapy Goals  Initiated 10/26/2020  1. Patient will perform grooming seated EOB with minimal assistance/contact guard assist within 7 day(s). 2.  Patient will perform upper body dressing seated EOB with minimal assistance/contact guard assist within 7 day(s). 3.  Patient will perform toilet transfers with maximal assistance within 7 day(s). 4.  Patient will perform all aspects of toileting with maximal assistance within 7 day(s). 5.  Patient will participate in upper extremity therapeutic exercise/activities with minimal assistance/contact guard assist for 10 minutes within 7 day(s). 6.  Patient will utilize energy conservation techniques during functional activities with verbal, visual, and tactile cues within 7 day(s). Outcome: Progressing Towards Goal     OCCUPATIONAL THERAPY EVALUATION  Patient: Christian Keane (24 y.o. male)  Date: 10/26/2020  Primary Diagnosis: Hydronephrosis of right kidney [N13.30]  UTI (urinary tract infection) [N39.0]  Sepsis ([facility] [A41.9]  Tachycardia [R00.0]  Leukocytosis [D72.829]  Procedure(s) (LRB):  LEFT HEART CATH / CORONARY ANGIOGRAPHY (N/A) 4 Days Post-Op   Precautions: Fall, Skin    ASSESSMENT  Based on the objective data described below, the patient presents with decreased strength in all extremities 2-/5 L weaker than R, decreased endurance, mobility, balance in sitting and flexed stand, coordination and safety following admission for UTI and Sepsis. Pt with cardiac arrest on 10/20/20 and incubated. Extubated 10/25/20. He currently requires total A for all ADLs and functional mobility.   This is far from his independent baseline. Recommend IP rehab at discharge. Current Level of Function Impacting Discharge (ADLs/self-care): total A    Functional Outcome Measure: The patient scored Total: 10/100 on the Barthel Index outcome measure. Other factors to consider for discharge: see above     Patient will benefit from skilled therapy intervention to address the above noted impairments. PLAN :  Recommendations and Planned Interventions: self care training, functional mobility training, therapeutic exercise, balance training, visual/perceptual training, therapeutic activities, cognitive retraining, endurance activities, neuromuscular re-education, patient education, home safety training, and family training/education    Frequency/Duration: Patient will be followed by occupational therapy 5 times a week to address goals. Recommendation for discharge: (in order for the patient to meet his/her long term goals)  Therapy 3 hours per day 5-7 days per week    This discharge recommendation:  Has not yet been discussed the attending provider and/or case management    IF patient discharges home will need the following DME: TBD       SUBJECTIVE:   Patient stated I feel better.     OBJECTIVE DATA SUMMARY:   HISTORY:   No past medical history on file. No past surgical history on file.     Expanded or extensive additional review of patient history:     Home Situation  Home Environment: Private residence  # Steps to Enter: 3  Rails to Enter: Yes  Hand Rails : Bilateral  One/Two Story Residence: Two story(but wife plans to move bedroom into living room downstairs)  # of Interior Steps: 15  Interior Rails: Both  Living Alone: No  Support Systems: Family member(s), Spouse/Significant Other/Partner, Child(maryse)  Patient Expects to be Discharged to[de-identified] Private residence  Current DME Used/Available at Home: None  Tub or Shower Type: Shower    Hand dominance: Left    EXAMINATION OF PERFORMANCE DEFICITS:  Cognitive/Behavioral Status:  Neurologic State: Alert  Orientation Level: Oriented X4  Cognition: Decreased attention/concentration; Follows commands  Perception: Cues to maintain midline in sitting;Cues to maintain midline in standing; Tactile;Verbal;Visual  Perseveration: No perseveration noted  Safety/Judgement: Awareness of environment;Decreased awareness of need for assistance;Decreased awareness of need for safety;Decreased insight into deficits; Fall prevention      Hearing: Auditory  Auditory Impairment: None    Vision/Perceptual:                           Acuity: Able to read clock/calendar on wall without difficulty; Able to read employee name badge without difficulty         Range of Motion:  AROM: Grossly decreased, non-functional  PROM: Generally decreased, functional                      Strength:  Strength: Grossly decreased, non-functional(BUEs 2-/5)                Coordination:  Coordination: Grossly decreased, non-functional(dysmetria noted)  Fine Motor Skills-Upper: Left Impaired;Right Impaired    Gross Motor Skills-Upper: Left Impaired;Right Impaired    Tone & Sensation:  Tone: Abnormal                         Balance:  Sitting: Impaired;High guard; With support  Sitting - Static: Poor (constant support)  Sitting - Dynamic: Poor (constant support)  Standing: Impaired;Pull to stand; With support  Standing - Static: Poor  Standing - Dynamic : Poor    Functional Mobility and Transfers for ADLs:  Bed Mobility:  Rolling: Additional time;Assist x1;Maximum assistance(mod A towards L and total A towards R)  Supine to Sit: Maximum assistance; Additional time;Assist x2  Sit to Supine: Maximum assistance; Additional time;Assist x2  Scooting: Maximum assistance; Additional time;Assist x2    Transfers:  Sit to Stand: Maximum assistance; Additional time;Assist x2(flexed posture- onlty tolerated 15 sec)  Stand to Sit: Maximum assistance; Additional time;Assist x2  Bed to Chair: Total assistance  Toilet Transfer :  Total assistance(bed level)  Assistive Device : Gait Belt    ADL Assessment and intervention:  Feeding: Total assistance    Oral Facial Hygiene/Grooming: Total assistance    Bathing: Total assistance    Upper Body Dressing: Total assistance    Lower Body Dressing: Total assistance    Toileting: Total assistance    Cognitive Retraining  Safety/Judgement: Awareness of environment;Decreased awareness of need for assistance;Decreased awareness of need for safety;Decreased insight into deficits; Fall prevention     Functional Measure:  Barthel Index:    Bathin  Bladder: 5  Bowels: 5  Groomin  Dressin  Feedin  Mobility: 0  Stairs: 0  Toilet Use: 0  Transfer (Bed to Chair and Back): 0  Total: 10/100        The Barthel ADL Index: Guidelines  1. The index should be used as a record of what a patient does, not as a record of what a patient could do. 2. The main aim is to establish degree of independence from any help, physical or verbal, however minor and for whatever reason. 3. The need for supervision renders the patient not independent. 4. A patient's performance should be established using the best available evidence. Asking the patient, friends/relatives and nurses are the usual sources, but direct observation and common sense are also important. However direct testing is not needed. 5. Usually the patient's performance over the preceding 24-48 hours is important, but occasionally longer periods will be relevant. 6. Middle categories imply that the patient supplies over 50 per cent of the effort. 7. Use of aids to be independent is allowed. Maria Antonia Engle., Barthel, DMARIELA. ([id number]). Functional evaluation: the Barthel Index. [address] [facility] (14)2. DOLLY Alamo, Destiny Centeno., Camden Aden., Mauro, [address] [place] [address] (). Measuring the change indisability after inpatient rehabilitation; comparison of the responsiveness of the Barthel Index and Functional [place] Measure.  Journal of Neurology, Neurosurgery, and Psychiatry, 66(4), 354-612. MARIA TERESA Weaver.SADIA, VERA Palacios, & Kiley Rincon M.A. (2004.) Assessment of post-stroke quality of life in cost-effectiveness studies: The usefulness of the Barthel Index and the EuroQoL-5D. Quality of Life Research, 15, 139-55         Occupational Therapy Evaluation Charge Determination   History Examination Decision-Making   LOW Complexity : Brief history review  HIGH Complexity : 5 or more performance deficits relating to physical, cognitive , or psychosocial skils that result in activity limitations and / or participation restrictions MEDIUM Complexity : Patient may present with comorbidities that affect occupational performnce. Miniml to moderate modification of tasks or assistance (eg, physical or verbal ) with assesment(s) is necessary to enable patient to complete evaluation       Based on the above components, the patient evaluation is determined to be of the following complexity level: LOW   Pain Ratin/10    Activity Tolerance:   Fair and requires frequent rest breaks  Please refer to the flowsheet for vital signs taken during this treatment. After treatment patient left in no apparent distress:    Supine in bed, Call bell within reach, and Caregiver / family present    COMMUNICATION/EDUCATION:   The patients plan of care was discussed with: Physical therapist and Registered nurse. Home safety education was provided and the patient/caregiver indicated understanding., Patient/family have participated as able in goal setting and plan of care. , and Patient/family agree to work toward stated goals and plan of care. This patients plan of care is appropriate for delegation to \[facility] [place] [facility]\"".     Thank you for this referral.  Kat Russell OT  Time Calculation: 33 mins

## 2020-10-26 NOTE — PROGRESS NOTES
Visited pt and Nava Landry with Palliative Dr. April Huggins. Met their  who was present at bedside. Offered presence and support during visit. Pt is no longer intubated and able to interact with us during this visit. Per Nava Landry, she and pt were , , and then remarried in [place]. She shared that she and pt's daughter are in close contact regarding pt's hospitalization. The purpose of our visit today was to introduce Dr April Huggins and the services of the palliative team.     Sonia Rios, and their  of ongoing support and prayers. Spoken prayer offered per request of pt's .     Sonal Kaur, Palliative

## 2020-10-26 NOTE — PROGRESS NOTES
1930:Bedside and Verbal shift change report given to Destiny Banks RN (oncoming nurse) by Santana Montano RN (offgoing nurse). Report included the following information SBAR, Kardex, ED Summary, Procedure Summary, Intake/Output, MAR, Cardiac Rhythm NSR with ST depression and Alarm Parameters . ~2000: Shift assessment completed; pt resting in bed, alert, oriented to self, time, and place. Able to follow commands. PERRLA. Moves all extremities purposefully but very weak. Mouth words and attempts to whisper at times. NSR; BP stable at this time; Amnio running at 0.5 mg. 4 L NC. Febrile; Will give APAP. RN will continue to monitor. 2109: RN informed Lubna Level, NP of HR-58-60 and pt Febrile; APAP given. NP ordered RN to hold metoprolol dose at this time and continue to monitor. 3793: 10 mg of PRN Hydralazine given at this time for SBP >160 via ART line. 0745: Bedside and Verbal shift change report given to MAGDA Boyd RN (oncoming nurse) by Destiny Banks RN (offgoing nurse). Report included the following information SBAR, Kardex, ED Summary, Procedure Summary, Intake/Output, MAR, Recent Results, Cardiac Rhythm NSR and Alarm Parameters .

## 2020-10-26 NOTE — CONSULTS
Palliative Medicine Consult  Duc: [phone] ([phone])    Patient Name: Chevy Henderson  YOB: 1945    Date of Initial Consult: 10/26/20  Reason for Consult: Care decisions  Requesting Provider: Pasquale Horner  Primary Care Physician: Unknown, Provider     SUMMARY:   Chevy Henderson is a 70 y.o. with a past history of CAD s/p CABG (recent stress test and echo EF 45%), AAA s/p repair, HTN, vascular disease w/ femoral-femoral bypass, LLE paralysis  who was admitted on 10/19/2020 from [facility] with RLQ pain. CT A/P showing mild R hydronephrosis w/ b/l nephrolithiasis. Found to have JAMAL on CKD. Started on abx. Code blue called 10/20- CPR done, intubated, pressors, heparin ggt, CRT started 10/21. S/p LHC showing severe CAD in all arteries except LIMA-LAD. 10/25 transitioned to HD.e extubated 10/25. Current medical issues leading to Palliative Medicine involvement include: \"End stage CAD, not a candidate for revascularization or long term mechanical support\". Social: Lives w/ wife Raine Aviles- they were ,then , then remarried although uncertain if it was renewal of vows or actual marriage again. Their daughter Saundra Johnson has been decision maker while pt intubated. PALLIATIVE DIAGNOSES:   1. Shortness of breath, extubated   2. Generalized weakness  3. Severe CAD s/p cardiac arrest  4. Goals of care        PLAN:   1. Along w/  Shelia Ames who has spent time w/ pt's wife Raine Aviles, meet w/ pt, Raine Aviles and their long time  and friend. 2. Pt extubated yest, very weak but able to tell me that he heard he had a heart attack. 3. Discuss palliative medicine and the importance of talking about some possible future care decisions given multiple organ systems affected, largely the heart and kidneys. Pt quite weak, fatigued and tired today however so defer large conversation today.    4. Additionally, there has been documentation in the chart that pt is actually his ex wife- they live together, were  and then  and then either remarried or had their vows taken in [place]. Therefore, because of this confusion- while pt was intubated it was their daughter Redd Conn who lives 3 hours away who was making decisions. Pt alert/oriented fully- would be best to make decisions together as a family- of note wife reports having a stroke [phone]/5477 and uncertain if she fully understands the gravity of pt's situation. 5. Attempted to reach daughter, will do again tmrw. 6. Initial consult note routed to primary continuity provider and/or primary health care team members  7. Communicated plan of care with: Palliative IDT, Lara [facility] Team incl Sanjuana WEN     GOALS OF CARE / TREATMENT PREFERENCES:     GOALS OF CARE:  Patient/Health Care Proxy Stated Goals: Prolong life    TREATMENT PREFERENCES:   Code Status: Full Code    Advance Care Planning:  [x] [facility] Interdisciplinary Team has updated the ACP Navigator with Health Care Decision Maker and Patient Capacity      Primary Decision Maker: Ana Rome - Daughter - [phone]      Advance Care Planning 10/19/2020   Confirm Advance Directive None       Medical Interventions: Full interventions         Other:    As far as possible, the palliative care team has discussed with patient / health care proxy about goals of care / treatment preferences for patient. HISTORY:     History obtained from: Pt, chart, staff, family     CHIEF COMPLAINT: Fatigue     HPI/SUBJECTIVE:    The patient is:   [x] Verbal and participatory  [] Non-participatory due to:     Pt w/ soft voice and very weak, but alert/oriented.       Clinical Pain Assessment (nonverbal scale for severity on nonverbal patients):   Clinical Pain Assessment  Severity: 0     Activity (Movement): Lying quietly, normal position    Duration: for how long has pt been experiencing pain (e.g., 2 days, 1 month, years)  Frequency: how often pain is an issue (e.g., several times per day, once every few days, constant)     FUNCTIONAL ASSESSMENT:     Palliative Performance Scale (PPS):  PPS: 30       PSYCHOSOCIAL/SPIRITUAL SCREENING:     Palliative IDT has assessed this patient for cultural preferences / practices and a referral made as appropriate to needs (Cultural Services, Patient Advocacy, Ethics, etc.)    Any spiritual / Worship concerns:  [] Yes /  [x] No    Caregiver Burnout:  [] Yes /  [x] No /  [] No Caregiver Present      Anticipatory grief assessment:   [x] Normal  / [] Maladaptive       ESAS Anxiety: Anxiety: 0    ESAS Depression: Depression: 0        REVIEW OF SYSTEMS:     Positive and pertinent negative findings in ROS are noted above in HPI. The following systems were [x] reviewed / [] unable to be reviewed as noted in HPI  Other findings are noted below. Systems: constitutional, ears/nose/mouth/throat, respiratory, gastrointestinal, genitourinary, musculoskeletal, integumentary, neurologic, psychiatric, endocrine. Positive findings noted below. Modified ESAS Completed by: provider   Fatigue: 8 Drowsiness: 2   Depression: 0 Pain: 0   Anxiety: 0 Nausea: 0   Anorexia: 8 Dyspnea: 2           Stool Occurrence(s): 2        PHYSICAL EXAM:     From RN flowsheet:  Wt Readings from Last 3 Encounters:   10/26/20 198 lb 6.6 oz (90 kg)     Blood pressure (!) 133/50, pulse 66, temperature 98.1 °F (36.7 °C), resp. rate 24, height 5' 10\" (1.778 m), weight 198 lb 6.6 oz (90 kg), SpO2 97 %.     Pain Scale 1: Numeric (0 - 10)  Pain Intensity 1: 0  Pain Onset 1: acut  Pain Location 1: Abdomen  Pain Orientation 1: Lower, Right  Pain Description 1: Aching  Pain Intervention(s) 1: Medication (see MAR)  Last bowel movement, if known:     Constitutional: awake, alert, oriented, calm   Eyes: pupils equal, anicteric  ENMT: no nasal discharge, moist mucous membranes  Respiratory: breathing not labored  Musculoskeletal: no deformity, no tenderness to palpation  Skin: warm, dry  Neurologic: following commands, moving all extremities         HISTORY:     Active Problems:    Leukocytosis (10/19/2020)      UTI (urinary tract infection) (10/19/2020)      Tachycardia (10/19/2020)      Sepsis ([address] (10/19/2020)      Hydronephrosis of right kidney (10/19/2020)      No past medical history on file. No past surgical history on file. No family history on file. History reviewed, no pertinent family history.   Social History     Tobacco Use    Smoking status: Not on file   Substance Use Topics    Alcohol use: Not on file     No Known Allergies   Current Facility-Administered Medications   Medication Dose Route Frequency    amiodarone (CORDARONE) tablet 400 mg  400 mg Oral BID    clopidogreL (PLAVIX) tablet 75 mg  75 mg Oral DAILY    heparin (porcine) injection 5,000 Units  5,000 Units SubCUTAneous Q8H    metoprolol tartrate (LOPRESSOR) tablet 100 mg  100 mg Per NG tube Q12H    labetaloL (NORMODYNE;TRANDATE) injection 10 mg  10 mg IntraVENous Q2H PRN    dextrose 10% infusion 125-250 mL  125-250 mL IntraVENous PRN    aspirin chewable tablet 81 mg  81 mg Oral DAILY    heparin (porcine) 1,000 unit/mL injection 3,400 Units  3,400 Units IntraVENous DIALYSIS PRN    insulin lispro (HUMALOG) injection   SubCUTAneous Q6H    ondansetron (ZOFRAN) injection 4 mg  4 mg IntraVENous Q4H PRN    albuterol-ipratropium (DUO-NEB) 2.5 MG-0.5 MG/3 ML  3 mL Nebulization Q4H PRN    sodium chloride (NS) flush 5-40 mL  5-40 mL IntraVENous Q8H    sodium chloride (NS) flush 5-40 mL  5-40 mL IntraVENous PRN    polyethylene glycol (MIRALAX) packet 17 g  17 g Oral DAILY PRN    sodium chloride (NS) flush 5-40 mL  5-40 mL IntraVENous Q8H    sodium chloride (NS) flush 5-40 mL  5-40 mL IntraVENous PRN    acetaminophen (TYLENOL) tablet 650 mg  650 mg Oral Q6H PRN    Or    acetaminophen (TYLENOL) suppository 650 mg  650 mg Rectal Q6H PRN    cefTRIAXone (ROCEPHIN) 1 g in 0.9% sodium chloride (MBP/ADV) 50 mL  1 g IntraVENous Q24H    glucose chewable tablet 16 g  4 Tab Oral PRN    glucagon (GLUCAGEN) injection 1 mg  1 mg IntraMUSCular PRN    insulin glargine (LANTUS) injection 17 Units  0.2 Units/kg SubCUTAneous QHS    hydrALAZINE (APRESOLINE) 20 mg/mL injection 10 mg  10 mg IntraVENous Q6H PRN    traMADoL (ULTRAM) tablet 50 mg  50 mg Oral Q4H PRN    rosuvastatin (CRESTOR) tablet 40 mg  40 mg Oral QHS    oxyCODONE IR (ROXICODONE) tablet 5 mg  5 mg Oral Q4H PRN    influenza vaccine 2020-21 (6 mos+)(PF) (FLUARIX/FLULAVAL/FLUZONE QUAD) injection 0.5 mL  0.5 mL IntraMUSCular PRIOR TO DISCHARGE          LAB AND IMAGING FINDINGS:     Lab Results   Component Value Date/Time    WBC 13.5 (H) 10/26/2020 04:13 AM    HGB 10.3 (L) 10/26/2020 04:13 AM    PLATELET 846 27/26/3706 04:13 AM     Lab Results   Component Value Date/Time    Sodium 136 10/26/2020 04:13 AM    Potassium 3.3 (L) 10/26/2020 04:13 AM    Chloride 102 10/26/2020 04:13 AM    CO2 23 10/26/2020 04:13 AM    BUN 67 (H) 10/26/2020 04:13 AM    Creatinine 2.80 (H) 10/26/2020 04:13 AM    Calcium 8.1 (L) 10/26/2020 04:13 AM    Magnesium 2.4 10/26/2020 04:13 AM    Phosphorus 3.2 10/26/2020 04:13 AM      Lab Results   Component Value Date/Time    Alk.  phosphatase 506 (H) 10/25/2020 06:18 AM    Protein, total 6.4 10/25/2020 06:18 AM    Albumin 2.2 (L) 10/26/2020 04:13 AM    Globulin 4.1 (H) 10/25/2020 06:18 AM     Lab Results   Component Value Date/Time    INR 1.1 10/21/2020 06:01 AM    Prothrombin time 11.5 (H) 10/21/2020 06:01 AM    aPTT 63.0 (H) 10/24/2020 06:16 AM      No results found for: IRON, FE, TIBC, IBCT, PSAT, FERR   Lab Results   Component Value Date/Time    pH 7.36 10/22/2020 06:15 AM    PCO2 44 10/22/2020 06:15 AM    PO2 133 (H) 10/22/2020 06:15 AM     No components found for: GLPOC   No results found for: CPK, CKMB             Total time: 50 min   Counseling / coordination time, spent as noted above: 40 min   > 50% counseling / coordination?: yes    Prolonged service was provided for  []30 min   []75 min in face to face time in the presence of the patient, spent as noted above. Time Start:   Time End:   Note: this can only be billed with 60637 (initial) or 78607 (follow up). If multiple start / stop times, list each separately.

## 2020-10-26 NOTE — PROGRESS NOTES
[facility] Adult  Hospitalist Group     ICU Transfer/Accept Summary     This patient is being transferred: Out from ICU   DATE OF TRANSFER: 10/26/2020       PATIENT ID: Moses Metz  MRN: [id number]   YOB: 1945    PRIMARY CARE PROVIDER: Unknown, Provider   DATE OF ADMISSION: 10/19/2020  1:23 AM    ATTENDING PHYSICIAN: Stephen Decker MD  CONSULTATIONS:   IP CONSULT TO UROLOGY  IP CONSULT TO NEPHROLOGY  IP CONSULT TO PALLIATIVE CARE - PROVIDER  IP CONSULT TO CARDIOLOGY    PROCEDURES/SURGERIES:   Procedure(s):  LEFT HEART CATH / CORONARY ANGIOGRAPHY    REASON FOR ADMISSION: <principal problem not specified>     HOSPITAL PROBLEM LIST:  Patient Active Problem List   Diagnosis Code    Leukocytosis D72.829    UTI (urinary tract infection) N39.0    Tachycardia R00.0    Sepsis ([address] A41.9    Hydronephrosis of right kidney N13.30         Brief HPI and Hospital Course:    Mr Zeb Ruiz is a 69 y/o with a past history of CAD s/p CABG( recent stress test and echo EF 45%), AAA s/p repair, HTN, PAD s/p femoral-femoral bypass, LLE paralysis was transferred from [facility] with abdominal pain at RLQ. CT abdomen/ pelvis without contrast showing right hydronephrosis. UA revealed UTI, JAMAL on CKD. He was started on antibiotic. On 10/20/2020 code blue was called after patient was noted to have cardiac arrest. CPR and intubated, pressor, heparin gtt started. CRRT started on 10/21. S/p LHC showing severe CAD in all arteries except LIMA-LAD. 10/25 transitioned to HD. extubated 10/25. Feeding on NGT    Patient seen and examined the day of transfer. He is awake, alert and oriented. NGT in place with generalized weakness. Assessment and Plan:  # Cardiac arrest  # CAD   - s/p - LHC 10/22: Severe native 3V CAD, patent LIMA-LAD, known occluded SVG-RCA and SVG-LCx; moderately elevated LVEDP  - Ischemic cardiomyopathy. TTE 10/21: LVEF is 40-45%. Left ventricle not well visualized.  Mild mitral regurgitation  - Cont hemodynamic/cardiac monitoring  - Cardiology following     # Tachyarrhythmias with multifocal ventricular ectopy - controlled on amiodarone  - Change amiodarone infusion to enteral (per tube) - initiated 10/23    # Acute hypoxic respiratory failure - intubated 10/20, extubated 10/25  - Cont supplemental O2 to maintain SpO2 92-97%    - JAMAL on CKD, stage IV.   - Transitioned from CRRT to HD 10/25  - Nephrology following     # Right sided hydronephrosis    # Mild hyperglycemia  - Keep glucose less than 180 with subcutaneous insulin as needed    # Leukocytosis. Possible RLL PNA ? ? Aspiration   - Ceftriaxone - 10 day course planned (through 10/28)    # Elevated LFTs of unclear etiology  - could be shock liver from hypoperfusion due to cardiac arrest   - monitor liver function test daily     # Profound generalized weakness/ICU myopathy   - check CK  - PT/OT    # Dysphagia - failed swallow eval 10/26  - Resume TFs per NGT. Advance to goal        DVT px: SQ UFH   Code: Full        PHYSICAL EXAMINATION:  Visit Vitals  BP (!) 141/50   Pulse 68   Temp 98.1 °F (36.7 °C)   Resp 18   Ht 5' 10\" (1.778 m)   Wt 90 kg (198 lb 6.6 oz)   SpO2 96%   BMI 28.47 kg/m²       General:          Alert, no distress  HEENT:           Atraumatic, MMM            Neck:               Supple, symmetrical,  thyroid: non tender  Lungs:             Clear to auscultation bilaterally. No Wheezing or Rhonchi. No rales. Heart:              Regular  rhythm,  No  murmur   No edema  Abdomen:       Soft, non-tender. Not distended. Bowel sounds normal  Extremities:     No cyanosis. No clubbing,  +2 distal pulses  Skin:                Not pale. Not Jaundiced  No rashes   Psych:             Not anxious or agitated.   Neurologic:      generalized weakness    Labs:     Recent Labs     10/26/20  0413 10/25/20  0616   WBC 13.5* 14.1*   HGB 10.3* 10.6*   HCT 32.5* 33.4*    179     Recent Labs     10/26/20  0413 10/25/20  5045 10/25/20  0116    136 136   K 3.3* 3.8 3.8    103 103   CO2 23 25 26   BUN 67* 28* 25*   CREA 2.80* 1.33* 1.37*   * 180* 150*   CA 8.1* 8.9 9.0   MG 2.4 2.4 2.3   PHOS 3.2 2.5* 2.9     Recent Labs     10/26/20  0413 10/25/20  0618 10/25/20  0616  10/24/20  0617   ALT  --  490*  --   --  644*   AP  --  506*  --   --  474*   TBILI  --  0.8  --   --  1.1*   TP  --  6.4  --   --  6.4   ALB 2.2* 2.3* 2.2*   < > 2.4*   GLOB  --  4.1*  --   --  4.0    < > = values in this interval not displayed. Recent Labs     10/24/20  0616   APTT 63.0*      No results for input(s): FE, TIBC, PSAT, FERR in the last 72 hours. No results found for: FOL, RBCF   No results for input(s): PH, PCO2, PO2 in the last 72 hours. No results for input(s): CPK, CKNDX, TROIQ in the last 72 hours.     No lab exists for component: CPKMB  No results found for: CHOL, CHOLX, CHLST, CHOLV, HDL, HDLP, LDL, LDLC, DLDLP, TGLX, TRIGL, TRIGP, CHHD, CHHDX  Lab Results   Component Value Date/Time    Glucose (POC) 107 (H) 10/26/2020 05:32 PM    Glucose (POC) 185 (H) 10/26/2020 11:36 AM    Glucose (POC) 131 (H) 10/26/2020 06:48 AM    Glucose (POC) 153 (H) 10/25/2020 11:37 PM    Glucose (POC) 133 (H) 10/25/2020 09:26 PM     Lab Results   Component Value Date/Time    Color YELLOW/STRAW 10/21/2020 12:59 AM    Appearance CLOUDY (A) 10/21/2020 12:59 AM    Specific gravity 1.018 10/21/2020 12:59 AM    pH (UA) 5.0 10/21/2020 12:59 AM    Protein 100 (A) 10/21/2020 12:59 AM    Glucose Negative 10/21/2020 12:59 AM    Ketone Negative 10/21/2020 12:59 AM    Bilirubin Negative 10/21/2020 12:59 AM    Urobilinogen 0.2 10/21/2020 12:59 AM    Nitrites Negative 10/21/2020 12:59 AM    Leukocyte Esterase MODERATE (A) 10/21/2020 12:59 AM    Epithelial cells FEW 10/21/2020 12:59 AM    Bacteria Negative 10/21/2020 12:59 AM    WBC 10-20 10/21/2020 12:59 AM    RBC 0-5 10/21/2020 12:59 AM         CODE STATUS:  x Full Code    DNR    Partial    Comfort Care Signed:   Ngozi Ramon MD  Date of Service:  10/26/2020  6:42 PM

## 2020-10-26 NOTE — PROGRESS NOTES
RUTHY  Patient presented to [address] [facility] with c/o abdominal pain. CT: Right nephrosis. Transferred to [facility] as [facility] does not have an urologist.   RUR: 14%  Disposition:Rehab    Patient extubated over the weekend. Per notes patient will need rehab at discharged. CM spoke with patient's daughter Zoya Desai [phone] to discuss transitions of care. CM will email her a list of facilities both SNF and inpatient rehabs. Per daughter Mely Fletcher  Is not legally  to patient. Care management will continue to follow.    Villa Cedillo RN,Care Management

## 2020-10-26 NOTE — PROGRESS NOTES
Name: Christian Keane MRN: [id number]   : 1945 Hospital: [facility]   Date: 10/26/2020        IMPRESSION:   · CKD stage 4. The etiology is not clear, but patient is aware of having CKD for at least 9 years. · JAMAL- oligo-anuric, after the Cardiac arrest --> off CRTT  · Respiratory failure - extubated  · Hypervolemia- pulmonary edema - resolved  · Hyperkalemia- resolved    · Hypokalemia  · UTI- on AB's  · S/P card cath  · IRight hydronephrosis- urology is evaluating. PLAN:   · CRRT was discontinued yesterday. · Hold HD today (10/26). He was comfortable. There was no suggestion of fluid overload on exam.  · There was no S 3 gallop or pericardial rub  · Daily lytes. · Continue AB therapy  · Urology following for right ureteral obstruction. ·  Avoid NSAIDs + IV contrast     Subjective/Interval History:     F/U JAMAL --> 10/26/2020    The events were noted. CRRT was discontinued yesterday. Felt better. Objective:   Vital Signs:    Visit Vitals  BP (!) 134/52   Pulse 65   Temp 98.4 °F (36.9 °C)   Resp 18   Ht 5' 10\" (1.778 m)   Wt 87.5 kg (192 lb 14.4 oz)   SpO2 97%   BMI 27.68 kg/m²       O2 Device: Nasal cannula   O2 Flow Rate (L/min): 4 l/min   Temp (24hrs), Av.6 °F (37.6 °C), Min:98 °F (36.7 °C), Max:100.5 °F (38.1 °C)       Intake/Output:   Last shift:      10/26 0701 - 10/26 1900  In: 205 [I.V.:60]  Out: 70 [Urine:70]  Last 3 shifts: 10/24 1901 - 10/26 07  In: 2413.6 [I.V.:1073.6]  Out: 1588 [Urine:376]    Intake/Output Summary (Last 24 hours) at 10/26/2020 1357  Last data filed at 10/26/2020 1000  Gross per 24 hour   Intake 1285 ml   Output 303 ml   Net 982 ml        Physical Exam:    Seen in ICU - 16    Mrs. Luis Mcdaniels was in attendance      General:    Awake and comfortable. .   Head:   Normocephalic    Nose:  NGT    Lungs:   No Wheezing or rales. Heart:   No S3  Gallop , No pericardial rub.   .  Abdomen:   Not distended    Extremities: No leg oedema    M/S Wasting +    Neurologic:      Responding appropriately    Psyche          Calm        DATA:  Labs:  Recent Labs     10/26/20  0413 10/25/20  0618 10/25/20  0616 10/25/20  0116     --  136 136   K 3.3*  --  3.8 3.8     --  103 103   CO2 23  --  25 26   BUN 67*  --  28* 25*   CREA 2.80*  --  1.33* 1.37*   CA 8.1*  --  8.9 9.0   ALB 2.2* 2.3* 2.2* 2.3*   PHOS 3.2  --  2.5* 2.9   MG 2.4  --  2.4 2.3     Recent Labs     10/26/20  0413 10/25/20  0616 10/24/20  0616   WBC 13.5* 14.1* 18.2*   HGB 10.3* 10.6* 10.7*   HCT 32.5* 33.4* 32.5*    179 158     No results for input(s): RASHMI, KU, CLU, CREAU in the last 72 hours.     No lab exists for component: PROU    Total time spent with patient:   []       Care Plan discussed with:     Patient and Wife    Elizabeth Trammell MD

## 2020-10-26 NOTE — PROGRESS NOTES
Problem: Mobility Impaired (Adult and Pediatric)  Goal: *Acute Goals and Plan of Care (Insert Text)  Description: FUNCTIONAL STATUS PRIOR TO ADMISSION: Patient was independent and active without use of DME. Driving. Retired. HOME SUPPORT PRIOR TO ADMISSION: The patient lived with wife but did not require assist.    Physical Therapy Goals  Initiated 10/25/2020  1. Patient will move from supine to sit and sit to supine  in bed with moderate assistance  within 7 day(s). 2.  Patient will transfer from bed to chair and chair to bed with moderate assistance  using the least restrictive device within 7 day(s). 3.  Patient will perform sit to stand with moderate assistance  within 7 day(s). 4.  Patient will ambulate with moderate assistance  for 5 feet with the least restrictive device within 7 day(s). Outcome: Progressing Towards Goal  PHYSICAL THERAPY TREATMENT  Patient: Stanley Dowd (3 y.o. male)  Date: 10/26/2020  Diagnosis: Hydronephrosis of right kidney [N13.30]  UTI (urinary tract infection) [N39.0]  Sepsis ([address] [A41.9]  Tachycardia [R00.0]  Leukocytosis [D72.829]   <principal problem not specified>  Procedure(s) (LRB):  LEFT HEART CATH / CORONARY ANGIOGRAPHY (N/A) 4 Days Post-Op  Precautions: Fall, Skin  Chart, physical therapy assessment, plan of care and goals were reviewed. ASSESSMENT  Patient continues with skilled PT services and is progressing towards goals. Pt remains limited by generalized weakness (most pronounced in L side), impaired balance, decreased activity tolerance, and impaired cognition. Pt transferred supine<>sit with maxA x2 and required constant support in sitting due to L trunk lean. Worked on lateral weight shifting midline<>R forearm support with good carryover improving sitting balance. Transferred sit<>stand x2 reps with maxA x2 and max manual facilitation of hip extension - tolerated ~15 seconds each time. Pt returned to bed and left with all needs met. Recommending IPR upon discharge. Current Level of Function Impacting Discharge (mobility/balance): maxA x2 for all mobility     Other factors to consider for discharge: fall risk, independent baseline          PLAN :  Patient continues to benefit from skilled intervention to address the above impairments. Continue treatment per established plan of care. to address goals. Recommendation for discharge: (in order for the patient to meet his/her long term goals)  Therapy 3 hours per day 5-7 days per week    This discharge recommendation:  Has not yet been discussed the attending provider and/or case management    IF patient discharges home will need the following DME: to be determined (TBD)       SUBJECTIVE:   Patient stated I just piddle around.     OBJECTIVE DATA SUMMARY:   Critical Behavior:  Neurologic State: Alert  Orientation Level: Oriented X4  Cognition: Decreased attention/concentration  Safety/Judgement: Awareness of environment, Decreased awareness of need for assistance, Decreased awareness of need for safety, Decreased insight into deficits, Fall prevention  Functional Mobility Training:  Bed Mobility:  Rolling: Additional time;Assist x1;Maximum assistance(mod A towards L and total A towards R)  Supine to Sit: Maximum assistance; Additional time;Assist x2  Sit to Supine: Maximum assistance; Additional time;Assist x2  Scooting: Maximum assistance; Additional time;Assist x2    Transfers:  Sit to Stand: Maximum assistance; Additional time;Assist x2(flexed posture- onlty tolerated 15 sec)  Stand to Sit: Maximum assistance; Additional time;Assist x2  Bed to Chair: Total assistance    Balance:  Sitting: Impaired;High guard; With support  Sitting - Static: Poor (constant support)  Sitting - Dynamic: Poor (constant support)  Standing: Impaired;Pull to stand; With support  Standing - Static: Poor  Standing - Dynamic : Poor    Activity Tolerance:   Fair  Please refer to the flowsheet for vital signs taken during this treatment. After treatment patient left in no apparent distress:   Supine in bed and Call bell within reach    COMMUNICATION/COLLABORATION:   The patients plan of care was discussed with: Occupational therapist and Registered nurse.      Jolie Montes PT, DPT   Time Calculation: 33 mins

## 2020-10-26 NOTE — PROGRESS NOTES
Problem: Dysphagia (Adult)  Goal: *Acute Goals and Plan of Care (Insert Text)  Description: Speech Therapy Goals  Initiated 10/26/2020    1. Patient will participate in swallow re-evaluation within 7 days. Outcome: Progressing Towards Goal       SPEECH LANGUAGE PATHOLOGY BEDSIDE SWALLOW EVALUATION  Patient: Christian Keane (89 y.o. male)  Date: 10/26/2020  Primary Diagnosis: Hydronephrosis of right kidney [N13.30]  UTI (urinary tract infection) [N39.0]  Sepsis ([address] [A41.9]  Tachycardia [R00.0]  Leukocytosis [D72.829]  Procedure(s) (LRB):  LEFT HEART CATH / CORONARY ANGIOGRAPHY (N/A) 4 Days Post-Op   Precautions:   Fall, Skin    ASSESSMENT :  Based on the objective data described below, the patient presents with high risk for prandial aspiration given poor secretion management with wet vocal quality, and recent prolonged intubation with subsequent dysphonia (thus placing pt at high risk for silent aspiration). Given these risk factors, PO trials limited to ice chips only. Recommend pt continue NPO status with alternate means of nutrition/hydration/medication and be allowed small amounts of ice chips for swallow rehab and patient comfort. Hopeful as extubation becomes more remote, swallow function will improve. Patient will benefit from skilled intervention to address the above impairments. Patients rehabilitation potential is considered to be Fair     PLAN :  Recommendations and Planned Interventions:  --NPO with alternate means of nutrition/hydration/medication  --Ice chips after strict oral care (one medicine cup every other hour)    Frequency/Duration: Patient will be followed by speech-language pathology 3 times a week to address goals. Discharge Recommendations: To Be Determined     SUBJECTIVE:   Patient stated, \"At least I can have these ice chips! . OBJECTIVE:   No past medical history on file. No past surgical history on file.   Prior Level of Function/Home Situation:   [address] [place] [address] Environment: Private residence  # Steps to Enter: 3  Rails to Enter: Yes  Hand Rails : Bilateral  One/Two Story Residence: Two story(but wife plans to move bedroom into living room downstairs)  # of Interior Steps: 15  Interior Rails: Both  Living Alone: No  Support Systems: Family member(s), Spouse/Significant Other/Partner, Child(maryse)  Patient Expects to be Discharged to[de-identified] Private residence  Current DME Used/Available at Home: None  Tub or Shower Type: Shower  Diet prior to admission: Regular diet/thin liquids  Current Diet:  NPO   Cognitive and Communication Status:  Neurologic State: Alert  Orientation Level: Oriented X4  Cognition: Decreased attention/concentration  Perception: Cues to maintain midline in sitting, Cues to maintain midline in standing, Tactile, Verbal, Visual  Perseveration: No perseveration noted  Safety/Judgement: Awareness of environment, Decreased awareness of need for assistance, Decreased awareness of need for safety, Decreased insight into deficits, Fall prevention  Oral Assessment:  Oral Assessment  Labial: No impairment  Dentition: Poor;Natural  Oral Hygiene: oral mucosa with some secretion pooling  Lingual: No impairment  Velum: No impairment  Mandible: No impairment  P.O. Trials:  Patient Position: upright in bed  Vocal quality prior to P.O.: Wet  Consistency Presented: Ice chips  How Presented: Self-fed/presented;Spoon     Bolus Acceptance: No impairment  Bolus Formation/Control: No impairment     Propulsion: No impairment  Oral Residue: None  Initiation of Swallow: Delayed (# of seconds)  Laryngeal Elevation: Decreased  Aspiration Signs/Symptoms: Weak cough; Change vocal quality  Pharyngeal Phase Characteristics: Altered vocal quality             Oral Phase Severity: No impairment  Pharyngeal Phase Severity : Moderate-severe    NOMS:   The NOMS functional outcome measure was used to quantify this patient's level of swallowing impairment.   Based on the NOMS, the patient was determined to be at level 1 for swallow function       NOMS Swallowing Levels:  Level 1 (CN): NPO  Level 2 (CM): NPO but takes consistency in therapy  Level 3 (CL): Takes less than 50% of nutrition p.o. and continues with nonoral feedings; and/or safe with mod cues; and/or max diet restriction  Level 4 (CK): Safe swallow but needs mod cues; and/or mod diet restriction; and/or still requires some nonoral feeding/supplements  Level 5 (CJ): Safe swallow with min diet restriction; and/or needs min cues  Level 6 (CI): Independent with p.o.; rare cues; usually self cues; may need to avoid some foods or needs extra time  Level 7 ([address] [place] [address]): Independent for all p.o.  MUKESH. (2003). National Outcomes Measurement System (NOMS): Adult Speech-Language Pathology User's Guide. Pain:  Pain Scale 1: Numeric (0 - 10)  Pain Intensity 1: 0       After treatment:   Call bell within reach and Nursing notified    COMMUNICATION/EDUCATION:   Patient was educated regarding his deficit(s) of dysphagia as this relates to his diagnosis of prolonged intubation. He demonstrated Good understanding as evidenced by teach back. The patient's plan of care including recommendations, planned interventions, and recommended diet changes were discussed with: Registered nurse. Patient/family have participated as able in goal setting and plan of care.     Thank you for this referral.  Valdez Mixon, SLP  Time Calculation: 10 mins

## 2020-10-27 LAB
ALBUMIN SERPL-MCNC: 2.1 G/DL (ref 3.5–5)
ANION GAP SERPL CALC-SCNC: 10 MMOL/L (ref 5–15)
BASOPHILS # BLD: 0.2 K/UL (ref 0–0.1)
BASOPHILS NFR BLD: 1 % (ref 0–1)
BUN SERPL-MCNC: 104 MG/DL (ref 6–20)
BUN/CREAT SERPL: 24 (ref 12–20)
CALCIUM SERPL-MCNC: 8.5 MG/DL (ref 8.5–10.1)
CHLORIDE SERPL-SCNC: 101 MMOL/L (ref 97–108)
CO2 SERPL-SCNC: 22 MMOL/L (ref 21–32)
CREAT SERPL-MCNC: 4.34 MG/DL (ref 0.7–1.3)
DIFFERENTIAL METHOD BLD: ABNORMAL
EOSINOPHIL # BLD: 0 K/UL (ref 0–0.4)
EOSINOPHIL NFR BLD: 0 % (ref 0–7)
ERYTHROCYTE [DISTWIDTH] IN BLOOD BY AUTOMATED COUNT: 15.9 % (ref 11.5–14.5)
GLUCOSE BLD STRIP.AUTO-MCNC: 106 MG/DL (ref 65–100)
GLUCOSE BLD STRIP.AUTO-MCNC: 115 MG/DL (ref 65–100)
GLUCOSE BLD STRIP.AUTO-MCNC: 150 MG/DL (ref 65–100)
GLUCOSE BLD STRIP.AUTO-MCNC: 158 MG/DL (ref 65–100)
GLUCOSE SERPL-MCNC: 122 MG/DL (ref 65–100)
HCT VFR BLD AUTO: 32.5 % (ref 36.6–50.3)
HGB BLD-MCNC: 10.5 G/DL (ref 12.1–17)
IMM GRANULOCYTES # BLD AUTO: 0 K/UL
IMM GRANULOCYTES NFR BLD AUTO: 0 %
LYMPHOCYTES # BLD: 1.4 K/UL (ref 0.8–3.5)
LYMPHOCYTES NFR BLD: 9 % (ref 12–49)
MCH RBC QN AUTO: 26.6 PG (ref 26–34)
MCHC RBC AUTO-ENTMCNC: 32.3 G/DL (ref 30–36.5)
MCV RBC AUTO: 82.5 FL (ref 80–99)
MONOCYTES # BLD: 1.6 K/UL (ref 0–1)
MONOCYTES NFR BLD: 10 % (ref 5–13)
NEUTS SEG # BLD: 12.4 K/UL (ref 1.8–8)
NEUTS SEG NFR BLD: 80 % (ref 32–75)
NRBC # BLD: 0 K/UL (ref 0–0.01)
NRBC BLD-RTO: 0 PER 100 WBC
PHOSPHATE SERPL-MCNC: 3.7 MG/DL (ref 2.6–4.7)
PLATELET # BLD AUTO: 240 K/UL (ref 150–400)
PLATELET COMMENTS,PCOM: ABNORMAL
PMV BLD AUTO: 11.6 FL (ref 8.9–12.9)
POTASSIUM SERPL-SCNC: 3.2 MMOL/L (ref 3.5–5.1)
RBC # BLD AUTO: 3.94 M/UL (ref 4.1–5.7)
RBC MORPH BLD: ABNORMAL
SERVICE CMNT-IMP: ABNORMAL
SODIUM SERPL-SCNC: 133 MMOL/L (ref 136–145)
WBC # BLD AUTO: 15.6 K/UL (ref 4.1–11.1)

## 2020-10-27 PROCEDURE — 36415 COLL VENOUS BLD VENIPUNCTURE: CPT

## 2020-10-27 PROCEDURE — 74011000258 HC RX REV CODE- 258: Performed by: FAMILY MEDICINE

## 2020-10-27 PROCEDURE — 74011250636 HC RX REV CODE- 250/636: Performed by: INTERNAL MEDICINE

## 2020-10-27 PROCEDURE — 85025 COMPLETE CBC W/AUTO DIFF WBC: CPT

## 2020-10-27 PROCEDURE — 92526 ORAL FUNCTION THERAPY: CPT | Performed by: SPEECH-LANGUAGE PATHOLOGIST

## 2020-10-27 PROCEDURE — 90935 HEMODIALYSIS ONE EVALUATION: CPT

## 2020-10-27 PROCEDURE — 97112 NEUROMUSCULAR REEDUCATION: CPT

## 2020-10-27 PROCEDURE — 74011250637 HC RX REV CODE- 250/637: Performed by: INTERNAL MEDICINE

## 2020-10-27 PROCEDURE — 5A1D70Z PERFORMANCE OF URINARY FILTRATION, INTERMITTENT, LESS THAN 6 HOURS PER DAY: ICD-10-PCS | Performed by: INTERNAL MEDICINE

## 2020-10-27 PROCEDURE — 74011636637 HC RX REV CODE- 636/637: Performed by: EMERGENCY MEDICINE

## 2020-10-27 PROCEDURE — 74011636637 HC RX REV CODE- 636/637: Performed by: FAMILY MEDICINE

## 2020-10-27 PROCEDURE — 80069 RENAL FUNCTION PANEL: CPT

## 2020-10-27 PROCEDURE — 74011250636 HC RX REV CODE- 250/636: Performed by: FAMILY MEDICINE

## 2020-10-27 PROCEDURE — 51798 US URINE CAPACITY MEASURE: CPT

## 2020-10-27 PROCEDURE — 99233 SBSQ HOSP IP/OBS HIGH 50: CPT | Performed by: PHYSICAL MEDICINE & REHABILITATION

## 2020-10-27 PROCEDURE — 77010033678 HC OXYGEN DAILY

## 2020-10-27 PROCEDURE — 97530 THERAPEUTIC ACTIVITIES: CPT

## 2020-10-27 PROCEDURE — 82962 GLUCOSE BLOOD TEST: CPT

## 2020-10-27 PROCEDURE — 65660000001 HC RM ICU INTERMED STEPDOWN

## 2020-10-27 PROCEDURE — 74011000250 HC RX REV CODE- 250: Performed by: INTERNAL MEDICINE

## 2020-10-27 PROCEDURE — 77030018798 HC PMP KT ENTRL FED COVD -A

## 2020-10-27 RX ORDER — ALBUMIN HUMAN 250 G/1000ML
12.5 SOLUTION INTRAVENOUS
Status: DISCONTINUED | OUTPATIENT
Start: 2020-10-27 | End: 2020-11-04 | Stop reason: HOSPADM

## 2020-10-27 RX ORDER — BACITRACIN 500 UNIT/G
1 PACKET (EA) TOPICAL
Status: COMPLETED | OUTPATIENT
Start: 2020-10-27 | End: 2020-10-27

## 2020-10-27 RX ADMIN — ROSUVASTATIN 40 MG: 40 TABLET, FILM COATED ORAL at 23:06

## 2020-10-27 RX ADMIN — Medication 10 ML: at 05:06

## 2020-10-27 RX ADMIN — HEPARIN SODIUM 5000 UNITS: 5000 INJECTION INTRAVENOUS; SUBCUTANEOUS at 14:41

## 2020-10-27 RX ADMIN — BACITRACIN 1 PACKET: 500 OINTMENT TOPICAL at 11:26

## 2020-10-27 RX ADMIN — Medication 10 ML: at 19:08

## 2020-10-27 RX ADMIN — HEPARIN SODIUM 2500 UNITS: 1000 INJECTION INTRAVENOUS; SUBCUTANEOUS at 18:40

## 2020-10-27 RX ADMIN — CLOPIDOGREL BISULFATE 75 MG: 75 TABLET ORAL at 08:08

## 2020-10-27 RX ADMIN — METOPROLOL TARTRATE 100 MG: 25 TABLET, FILM COATED ORAL at 23:06

## 2020-10-27 RX ADMIN — INSULIN LISPRO 4 UNITS: 100 INJECTION, SOLUTION INTRAVENOUS; SUBCUTANEOUS at 12:21

## 2020-10-27 RX ADMIN — AMIODARONE HYDROCHLORIDE 400 MG: 200 TABLET ORAL at 19:05

## 2020-10-27 RX ADMIN — HEPARIN SODIUM 5000 UNITS: 5000 INJECTION INTRAVENOUS; SUBCUTANEOUS at 23:06

## 2020-10-27 RX ADMIN — Medication 10 ML: at 23:11

## 2020-10-27 RX ADMIN — ASPIRIN 81 MG: 81 TABLET, CHEWABLE ORAL at 08:07

## 2020-10-27 RX ADMIN — INSULIN GLARGINE 17 UNITS: 100 INJECTION, SOLUTION SUBCUTANEOUS at 23:06

## 2020-10-27 RX ADMIN — AMIODARONE HYDROCHLORIDE 400 MG: 200 TABLET ORAL at 08:07

## 2020-10-27 RX ADMIN — Medication 10 ML: at 23:10

## 2020-10-27 RX ADMIN — METOPROLOL TARTRATE 100 MG: 25 TABLET, FILM COATED ORAL at 08:08

## 2020-10-27 RX ADMIN — HEPARIN SODIUM 5000 UNITS: 5000 INJECTION INTRAVENOUS; SUBCUTANEOUS at 05:36

## 2020-10-27 RX ADMIN — CEFTRIAXONE SODIUM 1 G: 1 INJECTION, POWDER, FOR SOLUTION INTRAMUSCULAR; INTRAVENOUS at 23:06

## 2020-10-27 NOTE — PROGRESS NOTES
Problem: Dysphagia (Adult)  Goal: *Acute Goals and Plan of Care (Insert Text)  Description: Speech Therapy Goals  Initiated 10/27/2020   1. Patient will tolerate full liquid diet without s/s of aspiration within 7 days   2. Patient will tolerate solid trials without s/s of aspiration within 7 days     Initiated 10/26/2020    1. Patient will participate in swallow re-evaluation within 7 days. MET 10/27/2020   Outcome: Progressing Towards Goal     SPEECH LANGUAGE PATHOLOGY DYSPHAGIA TREATMENT  Patient: Stanley Dowd (16 y.o. male)  Date: 10/27/2020  Diagnosis: Hydronephrosis of right kidney [N13.30]  UTI (urinary tract infection) [N39.0]  Sepsis ([address] [A41.9]  Tachycardia [R00.0]  Leukocytosis [D72.829]   <principal problem not specified>  Procedure(s) (LRB):  LEFT HEART CATH / CORONARY ANGIOGRAPHY (N/A) 5 Days Post-Op  Precautions: aspiration Fall, Skin    ASSESSMENT:  Patient with significant improvement in function, now with mild pharyngeal dysphagia with mildly reduced pharyngeal strength via palpation. Tolerated purees and thin liquids via straw sips including 2-3 successive sips without s/s of aspiration. Mastication of solids was significantly effortful - however, dentures are at home and patient reports he does not eat without them so do not suspect this is a true dysphagia  Wife to bring in tomorrow. Suspect he will tolerate solids well with the exception of fatigue once dentures in place. Overall, endurance will be the primary limiting factor in safe PO intake at this time and patient will likely benefit from smaller, more frequent snacks rather than full meals to increase safety. Suspect he will continue to progress nicely as he gets further out from extubation. PLAN:  Recommendations and Planned Interventions:  --recommend full liquid diet. Straws ok, but limit to single sips to reduce fatigue and increase safety.     --smaller, more frequent snacks rather than full meals for now to reduce fatigue and increase intake. Suspect he will benefit from some supplemental tube feeds for another day or so until his endurance further improves. --patient and wife educated at length and they had good understanding and wife appropriately cued patient to slow rate of intake. Patient continues to benefit from skilled intervention to address the above impairments. Continue treatment per established plan of care. Discharge Recommendations: To Be Determined     SUBJECTIVE:   Patient stated oh that tastes so good! . OBJECTIVE:   Cognitive and Communication Status:  Neurologic State: Alert, Appropriate for age  Orientation Level: Oriented X4  Cognition: Follows commands  Perception: Appears intact  Perseveration: No perseveration noted  Safety/Judgement: Not assessed  Dysphagia Treatment:  Oral Assessment:  Oral Assessment  Labial: No impairment  Dentition: Limited;Natural(has dentures at home, wife to bring in )  Oral Hygiene: moist mucosa   Lingual: No impairment  Mandible: No impairment  P.O. Trials:  Patient Position: upright in bed   Vocal quality prior to P.O.: No impairment  Consistency Presented: Ice chips; Thin liquid;Puree; Solid  How Presented: Self-fed/presented;SLP-fed/presented;Spoon;Straw;Successive swallows     Bolus Acceptance: No impairment  Bolus Formation/Control: No impairment     Propulsion: No impairment  Oral Residue: None  Initiation of Swallow: Delayed (# of seconds)(suspect mild )  Laryngeal Elevation: Decreased(mild )  Aspiration Signs/Symptoms: None  Pharyngeal Phase Characteristics: No impairment, issues, or problems ; Easily fatigued   Effective Modifications: Small sips and bites          Oral Phase Severity: No impairment  Pharyngeal Phase Severity : Mild  Exercises:  Laryngeal Exercises:                                                                                                                                   Pain:  Pain Scale 1: Numeric (0 - 10)  Pain Intensity 1: 0 After treatment:   Patient left in no apparent distress in bed, Call bell within reach, Nursing notified, and Caregiver / family present    COMMUNICATION/EDUCATION:     The patient's plan of care including recommendations, planned interventions, and recommended diet changes were discussed with: Registered nurse and ROMI Yanez M.CD.  CCC-SLP   Time Calculation: 18 mins

## 2020-10-27 NOTE — PROGRESS NOTES
Name: Smith Saini MRN: [id number]   : 1945 Hospital: [facility]   Date: 10/27/2020        IMPRESSION:   · CKD stage 4. The etiology is not clear, but patient is aware of having CKD for at least 9 years. · JAMAL- oligo-anuric, after the Cardiac arrest --> off CRTT  · Respiratory failure - extubated  · Hypervolemia- pulmonary edema - resolved  · Hyperkalemia- resolved    · Hypokalemia  · UTI- on AB's  · S/P card cath  · IRight hydronephrosis- urology is evaluating. PLAN:   · Azotemia has worsened. HD has been scheduled for today (10/27). · Dialysis orders were entered. · Urology following for right ureteral obstruction. ·  Avoid NSAIDs + IV contrast     Subjective/Interval History:     F/U JAMAL --> 10/26/2020    The events were noted. CRRT was discontinued yesterday. Felt better. F/U JAMAL --> 10/27/2020      Took part in therapy today. Objective:   Vital Signs:    Visit Vitals  BP (!) 121/58   Pulse 72   Temp 98.8 °F (37.1 °C)   Resp 19   Ht 5' 10\" (1.778 m)   Wt 87.4 kg (192 lb 10.9 oz)   SpO2 95%   BMI 27.65 kg/m²       O2 Device: Nasal cannula   O2 Flow Rate (L/min): 3 l/min   Temp (24hrs), Av.1 °F (37.3 °C), Min:98.1 °F (36.7 °C), Max:99.8 °F (37.7 °C)       Intake/Output:   Last shift:      10/27 0701 - 10/27 1900  In: -   Out: 150 [Urine:150]  Last 3 shifts: 10/25 1901 - 10/27 07  In: 0941 [I.V.:500]  Out: 328 [Urine:328]    Intake/Output Summary (Last 24 hours) at 10/27/2020 1424  Last data filed at 10/27/2020 1000  Gross per 24 hour   Intake 645 ml   Output 250 ml   Net 395 ml        Physical Exam:    Seen in ICU - 16    Mrs. Ryder Rhoades was in attendance. General:    Sleeping comfortable. .   Head:   Normocephalic    Nose:  NGT    Lungs:   No Wheezing or rales. Heart:   No S3  Gallop , No pericardial rub.   .  Abdomen:   Not distended    Extremities: No leg oedema    M/S                 Wasting +    Neurologic:      Responding appropriately    Psyche Calm        DATA:  Labs:  Recent Labs     10/27/20  0501 10/26/20  0413 10/25/20  0618 10/25/20  0616 10/25/20  0116   * 136  --  136 136   K 3.2* 3.3*  --  3.8 3.8    102  --  103 103   CO2 22 23  --  25 26   * 67*  --  28* 25*   CREA 4.34* 2.80*  --  1.33* 1.37*   CA 8.5 8.1*  --  8.9 9.0   ALB 2.1* 2.2* 2.3* 2.2* 2.3*   PHOS 3.7 3.2  --  2.5* 2.9   MG  --  2.4  --  2.4 2.3     Recent Labs     10/27/20  0502 10/26/20  0413 10/25/20  0616   WBC 15.6* 13.5* 14.1*   HGB 10.5* 10.3* 10.6*   HCT 32.5* 32.5* 33.4*    204 179     No results for input(s): RASHMI, KU, CLU, CREAU in the last 72 hours.     No lab exists for component: PROU    Total time spent with patient:   []       Care Plan discussed with:     Patient and Wife    Antonio Dunham MD

## 2020-10-27 NOTE — PROGRESS NOTES
Bedside and Verbal shift change report given to [address] (oncoming nurse) by Karoline Kingston  (offgoing nurse). Report included the following information SBAR, Intake/Output and Cardiac Rhythm NSR     Primary Nurse Caroline Patricio RN and Karla Gallegos RN performed a dual skin assessment on this patient No impairment noted  Luis Felipe score is 12    Shift Summary    Patient is alert and oriented x4 speech is clear at present. In good spirits. NGT in place. Nepro infusing at 20cc/hr. Tolerated well. Voided in urinal x2 jamarcus cloudy urine. Wife at bedside. Made comfortable       TRANSFER - OUT REPORT:    Verbal report given to  ACE*COMM  (name) on [facility]  being transferred to NSIU(unit) for routine progression of care       Report consisted of patients Situation, Background, Assessment and   Recommendations(SBAR). Information from the following report(s) SBAR, Intake/Output and Cardiac Rhythm SR was reviewed with the receiving nurse. Lines:   Peripheral IV 10/19/20 Anterior;Left;Proximal Forearm (Active)   Site Assessment Clean, dry, & intact 10/27/20 1600   Phlebitis Assessment 0 10/27/20 1600   Infiltration Assessment 0 10/27/20 1600   Dressing Status Clean, dry, & intact 10/27/20 1600   Dressing Type Transparent;Tape 10/27/20 1600   Hub Color/Line Status Brown;Capped 10/27/20 1600   Action Taken Open ports on tubing capped 10/27/20 1600   Alcohol Cap Used Yes 10/27/20 1600       Peripheral IV 10/20/20 Right;Mid Wrist (Active)   Site Assessment Clean, dry, & intact 10/27/20 1600   Phlebitis Assessment 0 10/27/20 1600   Infiltration Assessment 0 10/27/20 1600   Dressing Status Clean, dry, & intact 10/27/20 1600   Dressing Type Transparent;Tape 10/27/20 1600   Hub Color/Line Status Pink;Capped 10/27/20 1600   Action Taken Open ports on tubing capped 10/27/20 1600   Alcohol Cap Used Yes 10/27/20 1600        Opportunity for questions and clarification was provided.       Patient transported with:   Monitor  O2 @ 3 liters  Registered Nurse  Tech            .

## 2020-10-27 NOTE — ACP (ADVANCE CARE PLANNING)
Advanced care planning      Patient does not have an AMD. Pt was  to Raine Aviles, then they , then they either remarried in [place] or had their vows renewed- uncertain that they are legally . Pt's dtr Saundra Johnson from a previous relationship is legal NOK w/out documentation of marriage. Raine Aviles and Saundra Johnson do keep in contact. Not only consents, but any other important information (if pt confused or unable to understand) should go through Saundra Johnson, she can help Raine Aviles understand complex medical care. Both can receive any information. Primary Decision Maker: Nathaly Hurley - Daughter - [phone]     Addendum 10/28/20-     Explained risk/benefit of CPR/intubation when another cardiac arrest occurs- pt wishes to try resuscitative efforts but if is not making gains, he would not want prolonged life support. Dtr Saundra Johnson updated and Raine Aviles was at bedside during conversation.

## 2020-10-27 NOTE — PROGRESS NOTES
Problem: Mobility Impaired (Adult and Pediatric)  Goal: *Acute Goals and Plan of Care (Insert Text)  Description: FUNCTIONAL STATUS PRIOR TO ADMISSION: Patient was independent and active without use of DME. Driving. Retired. HOME SUPPORT PRIOR TO ADMISSION: The patient lived with wife but did not require assist.    Physical Therapy Goals  Initiated 10/25/2020  1. Patient will move from supine to sit and sit to supine  in bed with moderate assistance  within 7 day(s). 2.  Patient will transfer from bed to chair and chair to bed with moderate assistance  using the least restrictive device within 7 day(s). 3.  Patient will perform sit to stand with moderate assistance  within 7 day(s). 4.  Patient will ambulate with moderate assistance  for 5 feet with the least restrictive device within 7 day(s). Outcome: Progressing Towards Goal  PHYSICAL THERAPY TREATMENT  Patient: Gayle Orellana (56 y.o. male)  Date: 10/27/2020  Diagnosis: Hydronephrosis of right kidney [N13.30]  UTI (urinary tract infection) [N39.0]  Sepsis ([address] [A41.9]  Tachycardia [R00.0]  Leukocytosis [D72.829]   <principal problem not specified>  Procedure(s) (LRB):  LEFT HEART CATH / CORONARY ANGIOGRAPHY (N/A) 5 Days Post-Op  Precautions: Fall, Skin  Chart, physical therapy assessment, plan of care and goals were reviewed. ASSESSMENT  Patient continues with skilled PT services and is progressing towards goals. He remains limited by weakness L>R, poor balance, and decreased activity tolerance. Pt motivated to work with therapy and is progressing daily. Session focused on sitting balance. Pt with strong posterior and L trunk lean - initially required maxA to maintain midline but progressed to Mai with increased time and max verbal cuing. Performed sit<>stand x2 reps with maxA x2 and max manual facilitation of hip extension and upright posture. He tolerated 15 seconds standing. Attempted side stepping but pt unable this date.   Pt required rest breaks as RR increased quickly to 30 bpm, improved to low 20s with PLB. Will attempt bed>chair transfer next session as able and appropriate. Continuing to strongly recommend IPR upon discharge. Current Level of Function Impacting Discharge (mobility/balance): maxA x2 for transfers     Other factors to consider for discharge: independent baseline, motivated          PLAN :  Patient continues to benefit from skilled intervention to address the above impairments. Continue treatment per established plan of care. to address goals. Recommendation for discharge: (in order for the patient to meet his/her long term goals)  Therapy 3 hours per day 5-7 days per week    This discharge recommendation:  Has not yet been discussed the attending provider and/or case management    IF patient discharges home will need the following DME: to be determined (TBD)       SUBJECTIVE:   Patient stated I don't have that much hair.     OBJECTIVE DATA SUMMARY:   Critical Behavior:  Neurologic State: Alert  Orientation Level: Oriented X4  Cognition: Decreased attention/concentration  Safety/Judgement: Awareness of environment, Decreased awareness of need for assistance, Decreased awareness of need for safety, Decreased insight into deficits, Fall prevention  Functional Mobility Training:  Bed Mobility:  Supine to Sit: Maximum assistance;Assist x2  Sit to Supine: Maximum assistance;Assist x2  Scooting: Maximum assistance;Assist x2    Transfers:  Sit to Stand: Maximum assistance;Assist x2  Stand to Sit: Maximum assistance;Assist x2    Balance:  Sitting: Impaired; With support  Sitting - Static: Fair (occasional); Poor (constant support)  Sitting - Dynamic: Poor (constant support)  Standing: Impaired; With support  Standing - Static: Poor;Constant support  Standing - Dynamic : Poor;Constant support    Activity Tolerance:   Fair  Please refer to the flowsheet for vital signs taken during this treatment.     After treatment patient left in no apparent distress:   Supine in bed, Call bell within reach, and Caregiver / family present    COMMUNICATION/COLLABORATION:   The patients plan of care was discussed with: Occupational therapist and Registered nurse.      Elizabeth Helm PT, DPT   Time Calculation: 34 mins Transposition Flap Text: The defect edges were debeveled with a #15 scalpel blade.  Given the location of the defect and the proximity to free margins a transposition flap was deemed most appropriate.  Using a sterile surgical marker, an appropriate transposition flap was drawn incorporating the defect.    The area thus outlined was incised deep to adipose tissue with a #15 scalpel blade.  The skin margins were undermined to an appropriate distance in all directions utilizing iris scissors.

## 2020-10-27 NOTE — PROCEDURES
[facility]  [phone]    Vitals   Pre   Post   Assessment   Pre   Post     Temp  Temp: 97.7 °F (36.5 °C) (10/27/20 1200)  97.9 LOC  AXO AXO   HR   Pulse (Heart Rate): 62 (10/27/20 1505) 67 Lungs   Diminished   Diminished    B/P   BP: (!) 133/54 (10/27/20 1505) 141/78 Cardiac   SB  SB   Resp   Resp Rate: 22 (10/27/20 1505) 21 Skin   Warm, dry   Warm, dry   Pain level  Pain Intensity 1: 0 (10/27/20 1200) 0/10 Edema  Trace    Trace    Orders:    Duration:   Start:    8096 End:    1635 Total:   3.5   Dialyzer:   Dialyzer/Set Up Inspection: Revaclear (10/27/20 1505)   K Bath:   Dialysate K (mEq/L): 4 (10/27/20 1505)   Ca Bath:   Dialysate CA (mEq/L): 2.5 (10/27/20 1505)   Na/Bicarb:   Dialysate NA (mEq/L): 140 (10/27/20 1505)   Target Fluid Removal:   Goal/Amount of Fluid to Remove (mL): 1500 mL (10/27/20 1505)   Access     Type & Location:   RIJ CVC: Dressing CDI. No s/s of infection. Both lumens aspirate & flush well. Running well at  - 350.     Labs     Obtained/Reviewed   Critical Results Called   Date when labs were drawn-  Hgb-    HGB   Date Value Ref Range Status   10/27/2020 10.5 (L) 12.1 - 17.0 g/dL Final     K-    Potassium   Date Value Ref Range Status   10/27/2020 3.2 (L) 3.5 - 5.1 mmol/L Final     Ca-   Calcium   Date Value Ref Range Status   10/27/2020 8.5 8.5 - 10.1 MG/DL Final     Bun-   BUN   Date Value Ref Range Status   10/27/2020 104 (H) 6 - 20 MG/DL Final     Comment:     INVESTIGATED PER DELTA CHECK PROTOCOL     Creat-   Creatinine   Date Value Ref Range Status   10/27/2020 4.34 (H) 0.70 - 1.30 MG/DL Final     Comment:     INVESTIGATED PER DELTA CHECK PROTOCOL        Medications/ Blood Products Given     Name   Dose   Route and Time     Heparin 1:1000  units 1.1 ml  1.4ml  1840 CVCHD             Blood Volume Processed (BVP):    65 L Net Fluid   Removed:  1500 ml   Comments   Time Out Done: 1500  Primary Nurse Rpt Pre: Court Frankel RN  Primary Nurse Rpt Post: Sharath Ho RN  Pt Education: site care  Care Plan: on  going  Tx Summary:  SBAR received from Primary RN. Pt arrived to HD suite A&O. Consent signed & on file. 1505: Each catheter limb disinfected per p&p, caps removed, hubs disinfected per p&p. Each lumen aspirated for blood return and flushed with Normal Saline per policy. VSS. Dialysis Tx initiated. 1835: Tx ended. VSS. Each dialysis catheter limb disinfected per p&p, all possible blood returned per p&p, and each dialysis hub disinfected per p&p. Each lumen flushed, post dialysis catheter Heparin dwell instilled per order, and caps applied. Bed locked and in the lowest position, call bell and belongings in reach. SBAR given to Primary, RN. Patient is stable at time of my departure. All Dialysis related medications have been reviewed.    Admiting Diagnosis: kidney infection  Pt's previous clinic- N/A  Consent signed - Informed Consent Verified: Yes (10/27/20 1505)  Hepatitis Status- Ag negative/Ab susceptible 10/22/20  Machine #- Machine Number: [id number] (10/27/20 1505)  Telemetry status- on monitor  Pre-dialysis wt.-  87.4 kg

## 2020-10-27 NOTE — PROGRESS NOTES
Problem: Self Care Deficits Care Plan (Adult)  Goal: *Acute Goals and Plan of Care (Insert Text)  Description:   FUNCTIONAL STATUS PRIOR TO ADMISSION: Patient was independent and active without use of DME. Drives. LLE weakness since 2011 when contracted salmonella. HOME SUPPORT: The patient lived with wife but did not require assist.    Occupational Therapy Goals  Initiated 10/26/2020  1. Patient will perform grooming seated EOB with minimal assistance/contact guard assist within 7 day(s). 2.  Patient will perform upper body dressing seated EOB with minimal assistance/contact guard assist within 7 day(s). 3.  Patient will perform toilet transfers with maximal assistance within 7 day(s). 4.  Patient will perform all aspects of toileting with maximal assistance within 7 day(s). 5.  Patient will participate in upper extremity therapeutic exercise/activities with minimal assistance/contact guard assist for 10 minutes within 7 day(s). 6.  Patient will utilize energy conservation techniques during functional activities with verbal, visual, and tactile cues within 7 day(s). Outcome: Progressing Towards Goal    OCCUPATIONAL THERAPY TREATMENT  Patient: Luanne Pratt (83 y.o. male)  Date: 10/27/2020  Diagnosis: Hydronephrosis of right kidney [N13.30]  UTI (urinary tract infection) [N39.0]  Sepsis ([address] [A41.9]  Tachycardia [R00.0]  Leukocytosis [D72.829]   <principal problem not specified>  Procedure(s) (LRB):  LEFT HEART CATH / CORONARY ANGIOGRAPHY (N/A) 5 Days Post-Op  Precautions: Fall, Skin  Chart, occupational therapy assessment, plan of care, and goals were reviewed. ASSESSMENT  Patient continues with skilled OT services and is progressing towards goals. Patient ADLs limited by weakness L>R, poor balance, impaired cardiopulmonary endurance, limited lower body access, and decreased activity tolerance. Patient required max A x2 for bed mobility.  Patient required max A to comb hair - attempted with BUE, however, 2* poor sitting balance, unable to complete for more than 10 seconds before requiring UE support on EOB to maintain balance. Will attempt bed>chair transfer next session as able and appropriate in prep for ADLs in chair and in prep for [facility] transfers. Continuing to strongly recommend IPR upon discharge to maximize safety and functional IND. Current Level of Function Impacting Discharge (ADLs): max A for ADLs and max A x2 for bed mobility    Other factors to consider for discharge: was IND at baseline         PLAN :  Patient continues to benefit from skilled intervention to address the above impairments. Continue treatment per established plan of care. to address goals. Recommend with staff: Recommend with nursing patient to complete as able in order to maintain strength, endurance and independence: ADLs with supervision/setup, once Egress Test completed bed to chair position 3x/day and mobilizing self in bed for toileting with 2 assist. Thank you for your assistance. Recommend next OT session: EOB ADLs, transfer to chair    Recommendation for discharge: (in order for the patient to meet his/her long term goals)  Therapy 3 hours per day 5-7 days per week    This discharge recommendation:  Has been made in collaboration with the attending provider and/or case management    IF patient discharges home will need the following DME: bedside commode, hospital bed, transfer bench, and wheelchair       SUBJECTIVE:   Patient stated Let's do it!     OBJECTIVE DATA SUMMARY:   Cognitive/Behavioral Status:  Neurologic State: Alert  Orientation Level: Oriented X4  Cognition: Appropriate for age attention/concentration; Follows commands  Perception: Cues to maintain midline in sitting; Tactile;Verbal  Perseveration: No perseveration noted  Safety/Judgement: Awareness of environment; Fall prevention    Functional Mobility and Transfers for ADLs:  Bed Mobility:  Supine to Sit: Maximum assistance;Assist x2  Sit to Supine: Maximum assistance;Assist x2  Scooting: Maximum assistance;Assist x2    Transfers:  Sit to Stand: Maximum assistance;Assist x2    Balance: Patient with strong posterior and L trunk lean - initially required max A to maintain midline but progressed to min A with increased time and max verbal cuing. Performed sit<>stand x2 reps with maxA x2 and max manual facilitation of hip extension and upright posture. He tolerated 15 seconds standing. Attempted side stepping but patient unable this date. Patient required rest breaks as RR increased quickly to 30 bpm, improved to low 20s with PLB. Sitting: Impaired; With support  Sitting - Static: Fair (occasional); Poor (constant support)  Sitting - Dynamic: Poor (constant support)  Standing: Impaired; With support  Standing - Static: Constant support;Poor  Standing - Dynamic : Constant support;Poor    ADL Intervention: Co-tx with PT 2* need for 2 skilled clinicians to maximize safety and functional gains. Grooming  Position Performed: Seated edge of bed  Brushing/Combing Hair: Maximum assistance    Cognitive Retraining  Safety/Judgement: Awareness of environment; Fall prevention    Pain:  Reporting no pain    Activity Tolerance:   Good and requires rest breaks  Please refer to the flowsheet for vital signs taken during this treatment. After treatment patient left in no apparent distress:   Supine in bed, Call bell within reach, Caregiver / family present, Side rails x 3, and RN aware    COMMUNICATION/COLLABORATION:   The patients plan of care was discussed with: Physical therapist and Registered nurse.      Tameka Mccracken OT  Time Calculation: 30 mins

## 2020-10-27 NOTE — PROGRESS NOTES
Palliative Medicine Consult  Xavier: [phone] ([phone])    Patient Name: Kay Schneider  YOB: 1945    Date of Initial Consult: 10/26/20  Reason for Consult: Care decisions  Requesting Provider: Keanu Cruz  Primary Care Physician: Unknown, Provider     SUMMARY:   Kay Schneider is a 70 y.o. with a past history of CAD s/p CABG (recent stress test and echo EF 45%), AAA s/p repair, HTN, vascular disease w/ femoral-femoral bypass, LLE paralysis  who was admitted on 10/19/2020 from [facility] with RLQ pain. CT A/P showing mild R hydronephrosis w/ b/l nephrolithiasis. Found to have JAMAL on CKD. Started on abx. Code blue called 10/20- CPR done, intubated, pressors, heparin ggt, CRT started 10/21. S/p LHC showing severe CAD in all arteries except LIMA-LAD. 10/25 transitioned to HD extubated 10/25. Current medical issues leading to Palliative Medicine involvement include: \"End stage CAD, not a candidate for revascularization or long term mechanical support\". Social: Lives w/ wife Eliezer Nicholas- they were ,then , then remarried although uncertain if it was renewal of vows or actual marriage again. Pt's daughter Aramis Cerda has been decision maker while pt intubated. PALLIATIVE DIAGNOSES:   1. Shortness of breath, extubated   2. Generalized weakness  3. Severe CAD s/p cardiac arrest  4. Goals of care        PLAN:   1. Pt making very good gains, although this does not change the fact he has end stage CAD and renal dysfunction. 2. Pt in good spirits, about to get his central line out. Eliezer Nicholas at bedside- she is very pleased about how he is doing. Just meeting her today and yest, I have concerns that she does not fully understand how ill patient is- because he looks so good. 3. Speak to pt's dtr Justine Elvis is her step mom and while they communicate well, Eliezer Nicholas does not retain a lot of information that the medical team says.    4. Give medical update and Aramis Cerda agrees that talking about care decisions is important- even w/ medical management pt is going to be at risk for another cardiac arrest whether it be in the next week or in the next years. He survived this round of CPR and intubation- in part because he was in the hospital. I talk about out-of-hospital CPR and that even if he were to have his heart start again, he would likely have anoxic brain injury and other complications. Discuss what a DNR status is. 5. Evaristo Luciano open to me discussing these things w/ pt and I will put her on speaker phone. Otherwise she may visit this Sat and in order for a care conversation I think that we can make an exception and have 2 visitors for limited period of time. 6. Communicated plan of care with: Palliative [facility] Team incl Sanjuana WEN     GOALS OF CARE / TREATMENT PREFERENCES:     GOALS OF CARE:  Patient/Health Care Proxy Stated Goals: Prolong life    TREATMENT PREFERENCES:   Code Status: Full Code    Advance Care Planning:  [x] [facility] Interdisciplinary Team has updated the ACP Navigator with Health Care Decision Maker and Patient Capacity      Primary Decision Maker: Ten Rivera - Daughter - [phone]      Advance Care Planning 10/19/2020   Confirm Advance Directive None       Medical Interventions: Full interventions         Other:    As far as possible, the palliative care team has discussed with patient / health care proxy about goals of care / treatment preferences for patient. HISTORY:     History obtained from: Pt, chart, staff, family     CHIEF COMPLAINT: Fatigue     HPI/SUBJECTIVE:    The patient is:   [x] Verbal and participatory  [] Non-participatory due to:     Pt breathing well, tolerating liquid diet, about to have central line out.      Clinical Pain Assessment (nonverbal scale for severity on nonverbal patients):   Clinical Pain Assessment  Severity: 0     Activity (Movement): Lying quietly, normal position    Duration: for how long has pt been experiencing pain (e.g., 2 days, 1 month, years)  Frequency: how often pain is an issue (e.g., several times per day, once every few days, constant)     FUNCTIONAL ASSESSMENT:     Palliative Performance Scale (PPS):  PPS: 40       PSYCHOSOCIAL/SPIRITUAL SCREENING:     Palliative IDT has assessed this patient for cultural preferences / practices and a referral made as appropriate to needs (Cultural Services, Patient Advocacy, Ethics, etc.)    Any spiritual / Synagogue concerns:  [] Yes /  [x] No    Caregiver Burnout:  [] Yes /  [x] No /  [] No Caregiver Present      Anticipatory grief assessment:   [x] Normal  / [] Maladaptive       ESAS Anxiety: Anxiety: 0    ESAS Depression: Depression: 0        REVIEW OF SYSTEMS:     Positive and pertinent negative findings in ROS are noted above in HPI. The following systems were [x] reviewed / [] unable to be reviewed as noted in HPI  Other findings are noted below. Systems: constitutional, ears/nose/mouth/throat, respiratory, gastrointestinal, genitourinary, musculoskeletal, integumentary, neurologic, psychiatric, endocrine. Positive findings noted below. Modified ESAS Completed by: provider   Fatigue: 5 Drowsiness: 0   Depression: 0 Pain: 0   Anxiety: 0 Nausea: 0   Anorexia: 8 Dyspnea: 2           Stool Occurrence(s): 2        PHYSICAL EXAM:     From RN flowsheet:  Wt Readings from Last 3 Encounters:   10/27/20 192 lb 10.9 oz (87.4 kg)     Blood pressure (!) 121/58, pulse 72, temperature 98.8 °F (37.1 °C), resp. rate 19, height 5' 10\" (1.778 m), weight 192 lb 10.9 oz (87.4 kg), SpO2 95 %.     Pain Scale 1: Numeric (0 - 10)  Pain Intensity 1: 0  Pain Onset 1: acut  Pain Location 1: Abdomen  Pain Orientation 1: Lower, Right  Pain Description 1: Aching  Pain Intervention(s) 1: Medication (see MAR)  Last bowel movement, if known:     Constitutional: awake, alert, oriented, calm   Eyes: pupils equal, anicteric  ENMT: no nasal discharge, moist mucous membranes  Respiratory: breathing not labored  Musculoskeletal: no deformity, no tenderness to palpation  Skin: warm, dry  Neurologic: following commands, moving all extremities         HISTORY:     Active Problems:    Leukocytosis (10/19/2020)      UTI (urinary tract infection) (10/19/2020)      Tachycardia (10/19/2020)      Sepsis ([address] (10/19/2020)      Hydronephrosis of right kidney (10/19/2020)      No past medical history on file. No past surgical history on file. No family history on file. History reviewed, no pertinent family history.   Social History     Tobacco Use    Smoking status: Not on file   Substance Use Topics    Alcohol use: Not on file     No Known Allergies   Current Facility-Administered Medications   Medication Dose Route Frequency    albumin human 25% (BUMINATE) solution 12.5 g  12.5 g IntraVENous DIALYSIS PRN    amiodarone (CORDARONE) tablet 400 mg  400 mg Oral BID    clopidogreL (PLAVIX) tablet 75 mg  75 mg Oral DAILY    heparin (porcine) injection 5,000 Units  5,000 Units SubCUTAneous Q8H    metoprolol tartrate (LOPRESSOR) tablet 100 mg  100 mg Per NG tube Q12H    labetaloL (NORMODYNE;TRANDATE) injection 10 mg  10 mg IntraVENous Q2H PRN    dextrose 10% infusion 125-250 mL  125-250 mL IntraVENous PRN    aspirin chewable tablet 81 mg  81 mg Oral DAILY    heparin (porcine) 1,000 unit/mL injection 3,400 Units  3,400 Units IntraVENous DIALYSIS PRN    insulin lispro (HUMALOG) injection   SubCUTAneous Q6H    ondansetron (ZOFRAN) injection 4 mg  4 mg IntraVENous Q4H PRN    albuterol-ipratropium (DUO-NEB) 2.5 MG-0.5 MG/3 ML  3 mL Nebulization Q4H PRN    sodium chloride (NS) flush 5-40 mL  5-40 mL IntraVENous Q8H    sodium chloride (NS) flush 5-40 mL  5-40 mL IntraVENous PRN    polyethylene glycol (MIRALAX) packet 17 g  17 g Oral DAILY PRN    sodium chloride (NS) flush 5-40 mL  5-40 mL IntraVENous Q8H    sodium chloride (NS) flush 5-40 mL  5-40 mL IntraVENous PRN    acetaminophen (TYLENOL) tablet 650 mg  650 mg Oral Q6H PRN    Or    acetaminophen (TYLENOL) suppository 650 mg  650 mg Rectal Q6H PRN    cefTRIAXone (ROCEPHIN) 1 g in 0.9% sodium chloride (MBP/ADV) 50 mL  1 g IntraVENous Q24H    glucose chewable tablet 16 g  4 Tab Oral PRN    glucagon (GLUCAGEN) injection 1 mg  1 mg IntraMUSCular PRN    insulin glargine (LANTUS) injection 17 Units  0.2 Units/kg SubCUTAneous QHS    hydrALAZINE (APRESOLINE) 20 mg/mL injection 10 mg  10 mg IntraVENous Q6H PRN    traMADoL (ULTRAM) tablet 50 mg  50 mg Oral Q4H PRN    rosuvastatin (CRESTOR) tablet 40 mg  40 mg Oral QHS    oxyCODONE IR (ROXICODONE) tablet 5 mg  5 mg Oral Q4H PRN    influenza vaccine 2020-21 (6 mos+)(PF) (FLUARIX/FLULAVAL/FLUZONE QUAD) injection 0.5 mL  0.5 mL IntraMUSCular PRIOR TO DISCHARGE          LAB AND IMAGING FINDINGS:     Lab Results   Component Value Date/Time    WBC 15.6 (H) 10/27/2020 05:02 AM    HGB 10.5 (L) 10/27/2020 05:02 AM    PLATELET 283 65/52/4932 05:02 AM     Lab Results   Component Value Date/Time    Sodium 133 (L) 10/27/2020 05:01 AM    Potassium 3.2 (L) 10/27/2020 05:01 AM    Chloride 101 10/27/2020 05:01 AM    CO2 22 10/27/2020 05:01 AM     (H) 10/27/2020 05:01 AM    Creatinine 4.34 (H) 10/27/2020 05:01 AM    Calcium 8.5 10/27/2020 05:01 AM    Magnesium 2.4 10/26/2020 04:13 AM    Phosphorus 3.7 10/27/2020 05:01 AM      Lab Results   Component Value Date/Time    Alk.  phosphatase 506 (H) 10/25/2020 06:18 AM    Protein, total 6.4 10/25/2020 06:18 AM    Albumin 2.1 (L) 10/27/2020 05:01 AM    Globulin 4.1 (H) 10/25/2020 06:18 AM     Lab Results   Component Value Date/Time    INR 1.1 10/21/2020 06:01 AM    Prothrombin time 11.5 (H) 10/21/2020 06:01 AM    aPTT 63.0 (H) 10/24/2020 06:16 AM      No results found for: IRON, FE, TIBC, IBCT, PSAT, FERR   Lab Results   Component Value Date/Time    pH 7.36 10/22/2020 06:15 AM    PCO2 44 10/22/2020 06:15 AM    PO2 133 (H) 10/22/2020 06:15 AM     No components found for: GLPOC   No results found for: CPK, CKMB             Total time: 35min   Counseling / coordination time, spent as noted above: 30 min   > 50% counseling / coordination?: yes    Prolonged service was provided for  []30 min   []75 min in face to face time in the presence of the patient, spent as noted above. Time Start:   Time End:   Note: this can only be billed with 83625 (initial) or 65055 (follow up). If multiple start / stop times, list each separately.

## 2020-10-27 NOTE — PROGRESS NOTES
The results of the morning lytes were reviewed. Azotemia has worsened. Will schedule  HD this morning (10/27) . Dialysis orders were entered.

## 2020-10-27 NOTE — PROGRESS NOTES
1930: Bedside and Verbal shift change report given to Charla Dye RN (oncoming nurse) by MAGDA Boyd RN (offgoing nurse). Report included the following information SBAR, Kardex, ED Summary, Procedure Summary, Intake/Output, MAR, Recent Results, Cardiac Rhythm NSR and Alarm Parameters . ~2000: Shift assessment completed; pt resting in bed, alert, oriented x4. Able to follow commands. PERRLA. Moves all extremities purposefully but very weak. NSR; BP stable at this time. 3 L NC. Temp-99.7. RN will continue to monitor. Shift note: Uneventful night. Pt remained stable with no acute problems overnight. 0745: Bedside and Verbal shift change report given to KANA Carolina (oncoming nurse) by Charla Dye RN (offgoing nurse). Report included the following information SBAR, Kardex, ED Summary, Procedure Summary, Intake/Output, MAR, Recent Results, Cardiac Rhythm NSR and Alarm Parameters .

## 2020-10-27 NOTE — PROGRESS NOTES
Cardiology Progress Note  10/27/2020     Admit Date: 10/19/2020  Admit Diagnosis: Hydronephrosis of right kidney [N13.30]  UTI (urinary tract infection) [N39.0]  Sepsis ([address] [A41.9]  Tachycardia [R00.0]  Leukocytosis [D72.829]  CC: none currently    Assessment:   Active Problems:    Leukocytosis (10/19/2020)      UTI (urinary tract infection) (10/19/2020)      Tachycardia (10/19/2020)      Sepsis ([address] (10/19/2020)      Hydronephrosis of right kidney (10/19/2020)      Plan:     Echo reviewed  Cont DAPT, BB, statin  Not on RAAS 2/2 JAMAL    Subjective:      Jose Ugarte has no cardiac c/o    Objective:    Physical Exam:  Overall VSSAF;    Visit Vitals  BP (!) 133/59 (BP 1 Location: Left arm, BP Patient Position: At rest)   Pulse 68   Temp 98.8 °F (37.1 °C)   Resp 23   Ht 5' 10\" (1.778 m)   Wt 87.4 kg (192 lb 10.9 oz)   SpO2 94%   BMI 27.65 kg/m²     Temp (24hrs), Av °F (37.2 °C), Min:98.1 °F (36.7 °C), Max:99.8 °F (37.7 °C)    Patient Vitals for the past 8 hrs:   Pulse   10/27/20 0807 68   10/27/20 0800 68   10/27/20 0700 66   10/27/20 0600 64   10/27/20 0500 69   10/27/20 0400 60   10/27/20 0300 63   10/27/20 0200 60    Patient Vitals for the past 8 hrs:   Resp   10/27/20 0800 23   10/27/20 0700 22   10/27/20 0600 22   10/27/20 0500 23   10/27/20 0400 16   10/27/20 0300 22   10/27/20 0200 22    Patient Vitals for the past 8 hrs:   BP   10/27/20 0800 (!) 133/59   10/27/20 0700 (!) 135/59   10/27/20 0600 (!) 133/48   10/27/20 0500 (!) 139/53   10/27/20 0400 (!) 122/44   10/27/20 0300 (!) 129/49   10/27/20 0200 (!) 130/49      10/25 190 - 10/27 0700  In: 1540 [I.V.:500]  Out: 328 [Urine:328]      General Appearance: Intubated   Ears/Nose/Mouth/Throat:   Normal MM; anicteric. JVP: WNL   Resp:   Lungs clear to auscultation bilaterally. Nl resp effort. Cardiovascular:  RRR, S1, S2 normal, no new murmur. No gallop or rub. Abdomen:   Soft, non-tender, bowel sounds are present.    Extremities: No edema bilaterally. Skin:  Neuro: Warm and dry.   Sedated                         Data Review:     Telemetry independently reviewed :   normal sinus rhythm         Labs:   Recent Results (from the past 24 hour(s))   GLUCOSE, POC    Collection Time: 10/26/20 11:36 AM   Result Value Ref Range    Glucose (POC) 185 (H) 65 - 100 mg/dL    Performed by Marnie Cline [address], POC    Collection Time: 10/26/20  5:32 PM   Result Value Ref Range    Glucose (POC) 107 (H) 65 - 100 mg/dL    Performed by [employer]    GLUCOSE, POC    Collection Time: 10/26/20  9:24 PM   Result Value Ref Range    Glucose (POC) 138 (H) 65 - 100 mg/dL    Performed by Cyndi Anglin    GLUCOSE, POC    Collection Time: 10/26/20 11:14 PM   Result Value Ref Range    Glucose (POC) 153 (H) 65 - 100 mg/dL    Performed by Cyndi Anglin    RENAL FUNCTION PANEL    Collection Time: 10/27/20  5:01 AM   Result Value Ref Range    Sodium 133 (L) 136 - 145 mmol/L    Potassium 3.2 (L) 3.5 - 5.1 mmol/L    Chloride 101 97 - 108 mmol/L    CO2 22 21 - 32 mmol/L    Anion gap 10 5 - 15 mmol/L    Glucose 122 (H) 65 - 100 mg/dL     (H) 6 - 20 MG/DL    Creatinine 4.34 (H) 0.70 - 1.30 MG/DL    BUN/Creatinine ratio 24 (H) 12 - 20      GFR est AA 16 (L) >60 ml/min/1.73m2    GFR est non-AA 13 (L) >60 ml/min/1.73m2    Calcium 8.5 8.5 - 10.1 MG/DL    Phosphorus 3.7 2.6 - 4.7 MG/DL    Albumin 2.1 (L) 3.5 - 5.0 g/dL   CBC WITH AUTOMATED DIFF    Collection Time: 10/27/20  5:02 AM   Result Value Ref Range    WBC 15.6 (H) 4.1 - 11.1 K/uL    RBC 3.94 (L) 4.10 - 5.70 M/uL    HGB 10.5 (L) 12.1 - 17.0 g/dL    HCT 32.5 (L) 36.6 - 50.3 %    MCV 82.5 80.0 - 99.0 FL    MCH 26.6 26.0 - 34.0 PG    MCHC 32.3 30.0 - 36.5 g/dL    RDW 15.9 (H) 11.5 - 14.5 %    PLATELET 807 224 - 375 K/uL    MPV 11.6 8.9 - 12.9 FL    NRBC 0.0 0  WBC    ABSOLUTE NRBC 0.00 0.00 - 0.01 K/uL    NEUTROPHILS 80 (H) 32 - 75 %    LYMPHOCYTES 9 (L) 12 - 49 %    MONOCYTES 10 5 - 13 %    EOSINOPHILS 0 0 - 7 % BASOPHILS 1 0 - 1 %    IMMATURE GRANULOCYTES 0 %    ABS. NEUTROPHILS 12.4 (H) 1.8 - 8.0 K/UL    ABS. LYMPHOCYTES 1.4 0.8 - 3.5 K/UL    ABS. MONOCYTES 1.6 (H) 0.0 - 1.0 K/UL    ABS. EOSINOPHILS 0.0 0.0 - 0.4 K/UL    ABS. BASOPHILS 0.2 (H) 0.0 - 0.1 K/UL    ABS. IMM.  GRANS. 0.0 K/UL    DF MANUAL      PLATELET COMMENTS Large Platelets      RBC COMMENTS ANISOCYTOSIS  1+       GLUCOSE, POC    Collection Time: 10/27/20  5:06 AM   Result Value Ref Range    Glucose (POC) 115 (H) 65 - 100 mg/dL    Performed by Radha Blanchard       Current medications reviewed       Jaclyn Guillermo MD

## 2020-10-27 NOTE — DIABETES MGMT
TERESA DELUCA  CLINICAL NURSE SPECIALIST CONSULT  PROGRAM FOR DIABETES HEALTH    FOLLOW UP NOTE    Presentation   Gayle Orellana is a 70 y.o. male admitted  with right lower abdominal quadrant pain. Initial work up revealed hydrornephorsis and UTI. HX: PMH: CAD w/ MI and subsequent CABG 1999/ s/p AAA repair with left leg nephropathy/ fem to fem bypass/ HLD/ HTN/ DM2-A1C pending    DX: CT scan-hydronephrosis; cardiac cath revealed severe CAD disease. Current clinical course has been complicated by hyperglycemia. Diabetes: Patient has known Type 2 diabetes, treated with humalog per pauln report. No insulin or PO diabetic medications on PTA. Consulted by Provider for advanced diabetes nursing assessment and care, specifically related to   [] Transitioning off Lily Shah   [x] Inpatient management strategy  [x] Home management assessment  [] Survival skill education    Diabetes-related medical history  Acute complications  hyperglycemia  Neurological complications  NONE  Microvascular disease  Nephropathy  Macrovascular disease  CAD and Myocardial infarction  Other associated conditions     HTN/ HLD/    Diabetes medication history  Drug class Currently in use Discontinued Never used   Biguanide  Metformin    DDP-4 inhibitor       Sulfonylurea Glipizide 5mg daily     Thiazolidinedione      GLP-1 RA      SGLT-2 inhibitors      Basal insulin      Fixed Dose  Combinations      Bolus insulin  Humalog can't recall dosing, but took himself off due to symptoms of hypoglycemia      Subjective   Mr Roseline Tatum is visiting with his wife, Giuliana Verde this morning. Is aware he will be going to get HD today. Mr. Roseline Tatum s/p NSTEMI -->cardiac cath completed-->severe CAD disease with occlusion in SVG-RCA and SVG -LCx. No stents placed   Receiving CVVH for CKD  BG trends remained within target without lows. Basal insulin restarted.    Extubated yesterday; very weak  ECHO-20-25%     Patient reports the following home diabetes self-care practices:  Eating pattern    Physical activity pattern-gets along well. Monitoring pattern-checked BG once daily-    Taking medications pattern  [] Consistent administration  [] Affordable      Objective   Physical exam  General Very weak; ill looking  Vital Signs   Visit Vitals  BP (!) 133/59 (BP 1 Location: Left arm, BP Patient Position: At rest)   Pulse 68   Temp 98.8 °F (37.1 °C)   Resp 23   Ht 5' 10\" (1.778 m)   Wt 87.4 kg (192 lb 10.9 oz)   SpO2 94%   BMI 27.65 kg/m²     Skin  Warm and dry. Heart   Regular rate and rhythm. No murmurs, rubs or gallops  Lungs  Clear to auscultation without rales or rhonchi  Extremities No foot wounds    Diabetic foot exam:    Left Foot     Visual Exam: normal    Pulse DP: 1+ (weak)   Filament test: absent sensation      Right Foot   Visual Exam: normal    Pulse DP: 2+ (normal)   Filament test: normal sensation    Vibratory sensation: normal DP & PT pulses +2. Laboratory  Lab Results   Component Value Date/Time    Hemoglobin A1c 7.1 (H) 10/20/2020 02:02 AM     No results found for: LDL, LDLC, DLDLP  Lab Results   Component Value Date/Time    Creatinine 4.34 (H) 10/27/2020 05:01 AM     Lab Results   Component Value Date/Time    Sodium 133 (L) 10/27/2020 05:01 AM    Potassium 3.2 (L) 10/27/2020 05:01 AM    Chloride 101 10/27/2020 05:01 AM    CO2 22 10/27/2020 05:01 AM    Anion gap 10 10/27/2020 05:01 AM    Glucose 122 (H) 10/27/2020 05:01 AM     (H) 10/27/2020 05:01 AM    Creatinine 4.34 (H) 10/27/2020 05:01 AM    BUN/Creatinine ratio 24 (H) 10/27/2020 05:01 AM    GFR est AA 16 (L) 10/27/2020 05:01 AM    GFR est non-AA 13 (L) 10/27/2020 05:01 AM    Calcium 8.5 10/27/2020 05:01 AM    Bilirubin, total 0.8 10/25/2020 06:18 AM    Alk.  phosphatase 506 (H) 10/25/2020 06:18 AM    Protein, total 6.4 10/25/2020 06:18 AM    Albumin 2.1 (L) 10/27/2020 05:01 AM    Globulin 4.1 (H) 10/25/2020 06:18 AM    A-G Ratio 0.6 (L) 10/25/2020 06:18 AM    ALT (SGPT) 490 (H) 10/25/2020 06:18 AM     Lab Results   Component Value Date/Time    ALT (SGPT) 490 (H) 10/25/2020 06:18 AM       Factors affecting BG pattern  Factor Dose Comments   Nutrition:  TF via NGT   Nepro @ 20cc/hr Anticipate eventual hyperglycemia. Will require basal insulin to cover for the rise in BG. Drugs:   Heparin/Propofol/bicarb    Pain Abdominal pain    Infection UTI/Hydronephrosis  WBC 17.3   Lactic 4.6--> 1.5    CVVH-CKD stage 4  GFR 13  Cr+ 4.34  Will be getting HD      Assessment and Plan   Nursing Diagnosis Risk for unstable blood glucose pattern   Nursing Intervention Domain 2005 Decision-making Support   Nursing Interventions Examined current inpatient diabetes control   Explored factors facilitating and impeding inpatient management  Identified self-management practices impeding diabetes control  Explored corrective strategies with patient and responsible inpatient provider   Informed patient of rational for insulin strategy while hospitalized         Evaluation   Mr. Brandie Tam, has a long history  Type 2 diabetes- A1C 7.1%. He has co-morbid conditions in addition to his DM2. S/p code blue on NSTU with ROSC and transferred up to ICU level of care on 10/21/2020. 12 lead ECG revealed  NSTEMI necessitating cardiac cath which revealed severe CAD vessel disease. Steady decline in renal status now necessitating HD. Overall BG within target . BG trends past 24h: 107-185mg/dl   Basal insulin restarted. TF initiated this morning -Nepro @ 20cc/hr     Inpatient blood glucose management has been impacted by  [x] Kidney dysfunction =CKD stage 4  [x] Erratic meal consumption -poor appetite today  Recommendations   1. CONTINUE basal -17units daily.    *Adjustments to basal insulin dosing may need to be made in light of renal failure and risk for hypoglycemia*    2.  CONTINUE Corrective insulin  [x] Normal sensitivity    Discharge Planning   TBD    Billing Code(s)   I personally reviewed chart, notes, data and current medications in the medical record, and examined the patient at bedside before making care recommendations. Thank you for including us in their care. I spent 15 minutes in direct patient care today for this patient.   Time includes chart review, face to face with patient and collaboration with interdisciplinary care team.     Raul Jerez, [facility]  Program for Diabetes Health  Access via [address] [place] [address]  [phone]

## 2020-10-28 LAB
ANION GAP SERPL CALC-SCNC: 8 MMOL/L (ref 5–15)
BASOPHILS # BLD: 0.1 K/UL (ref 0–0.1)
BASOPHILS NFR BLD: 1 % (ref 0–1)
BUN SERPL-MCNC: 71 MG/DL (ref 6–20)
BUN/CREAT SERPL: 19 (ref 12–20)
CALCIUM SERPL-MCNC: 8 MG/DL (ref 8.5–10.1)
CHLORIDE SERPL-SCNC: 101 MMOL/L (ref 97–108)
CO2 SERPL-SCNC: 23 MMOL/L (ref 21–32)
CREAT SERPL-MCNC: 3.68 MG/DL (ref 0.7–1.3)
DIFFERENTIAL METHOD BLD: ABNORMAL
EOSINOPHIL # BLD: 0.1 K/UL (ref 0–0.4)
EOSINOPHIL NFR BLD: 1 % (ref 0–7)
ERYTHROCYTE [DISTWIDTH] IN BLOOD BY AUTOMATED COUNT: 15.8 % (ref 11.5–14.5)
GLUCOSE BLD STRIP.AUTO-MCNC: 125 MG/DL (ref 65–100)
GLUCOSE BLD STRIP.AUTO-MCNC: 130 MG/DL (ref 65–100)
GLUCOSE BLD STRIP.AUTO-MCNC: 132 MG/DL (ref 65–100)
GLUCOSE BLD STRIP.AUTO-MCNC: 137 MG/DL (ref 65–100)
GLUCOSE SERPL-MCNC: 125 MG/DL (ref 65–100)
HCT VFR BLD AUTO: 32.3 % (ref 36.6–50.3)
HGB BLD-MCNC: 10.3 G/DL (ref 12.1–17)
IMM GRANULOCYTES # BLD AUTO: 0 K/UL
IMM GRANULOCYTES NFR BLD AUTO: 0 %
LYMPHOCYTES # BLD: 1.7 K/UL (ref 0.8–3.5)
LYMPHOCYTES NFR BLD: 12 % (ref 12–49)
MCH RBC QN AUTO: 26.5 PG (ref 26–34)
MCHC RBC AUTO-ENTMCNC: 31.9 G/DL (ref 30–36.5)
MCV RBC AUTO: 83.2 FL (ref 80–99)
METAMYELOCYTES NFR BLD MANUAL: 1 %
MONOCYTES # BLD: 1.8 K/UL (ref 0–1)
MONOCYTES NFR BLD: 13 % (ref 5–13)
MYELOCYTES NFR BLD MANUAL: 1 %
NEUTS BAND NFR BLD MANUAL: 4 % (ref 0–6)
NEUTS SEG # BLD: 10.1 K/UL (ref 1.8–8)
NEUTS SEG NFR BLD: 67 % (ref 32–75)
NRBC # BLD: 0 K/UL (ref 0–0.01)
NRBC BLD-RTO: 0 PER 100 WBC
PLATELET # BLD AUTO: 252 K/UL (ref 150–400)
PLATELET COMMENTS,PCOM: ABNORMAL
PMV BLD AUTO: 11.8 FL (ref 8.9–12.9)
POTASSIUM SERPL-SCNC: 3.4 MMOL/L (ref 3.5–5.1)
RBC # BLD AUTO: 3.88 M/UL (ref 4.1–5.7)
RBC MORPH BLD: ABNORMAL
RBC MORPH BLD: ABNORMAL
SERVICE CMNT-IMP: ABNORMAL
SODIUM SERPL-SCNC: 132 MMOL/L (ref 136–145)
WBC # BLD AUTO: 14.2 K/UL (ref 4.1–11.1)

## 2020-10-28 PROCEDURE — 85025 COMPLETE CBC W/AUTO DIFF WBC: CPT

## 2020-10-28 PROCEDURE — 97530 THERAPEUTIC ACTIVITIES: CPT

## 2020-10-28 PROCEDURE — 36415 COLL VENOUS BLD VENIPUNCTURE: CPT

## 2020-10-28 PROCEDURE — 92526 ORAL FUNCTION THERAPY: CPT

## 2020-10-28 PROCEDURE — 74011250636 HC RX REV CODE- 250/636: Performed by: FAMILY MEDICINE

## 2020-10-28 PROCEDURE — 74011250637 HC RX REV CODE- 250/637: Performed by: INTERNAL MEDICINE

## 2020-10-28 PROCEDURE — 74011636637 HC RX REV CODE- 636/637: Performed by: EMERGENCY MEDICINE

## 2020-10-28 PROCEDURE — 97116 GAIT TRAINING THERAPY: CPT

## 2020-10-28 PROCEDURE — 65660000000 HC RM CCU STEPDOWN

## 2020-10-28 PROCEDURE — 74011250636 HC RX REV CODE- 250/636: Performed by: INTERNAL MEDICINE

## 2020-10-28 PROCEDURE — 74011636637 HC RX REV CODE- 636/637: Performed by: FAMILY MEDICINE

## 2020-10-28 PROCEDURE — 80048 BASIC METABOLIC PNL TOTAL CA: CPT

## 2020-10-28 PROCEDURE — 82962 GLUCOSE BLOOD TEST: CPT

## 2020-10-28 PROCEDURE — 99233 SBSQ HOSP IP/OBS HIGH 50: CPT | Performed by: PHYSICAL MEDICINE & REHABILITATION

## 2020-10-28 PROCEDURE — 97535 SELF CARE MNGMENT TRAINING: CPT

## 2020-10-28 PROCEDURE — 74011000258 HC RX REV CODE- 258: Performed by: FAMILY MEDICINE

## 2020-10-28 RX ORDER — INSULIN LISPRO 100 [IU]/ML
INJECTION, SOLUTION INTRAVENOUS; SUBCUTANEOUS
Status: DISCONTINUED | OUTPATIENT
Start: 2020-10-28 | End: 2020-11-04 | Stop reason: HOSPADM

## 2020-10-28 RX ADMIN — ASPIRIN 81 MG: 81 TABLET, CHEWABLE ORAL at 09:19

## 2020-10-28 RX ADMIN — HEPARIN SODIUM 5000 UNITS: 5000 INJECTION INTRAVENOUS; SUBCUTANEOUS at 21:45

## 2020-10-28 RX ADMIN — CLOPIDOGREL BISULFATE 75 MG: 75 TABLET ORAL at 09:19

## 2020-10-28 RX ADMIN — ROSUVASTATIN 40 MG: 40 TABLET, FILM COATED ORAL at 21:44

## 2020-10-28 RX ADMIN — CEFTRIAXONE SODIUM 1 G: 1 INJECTION, POWDER, FOR SOLUTION INTRAMUSCULAR; INTRAVENOUS at 21:44

## 2020-10-28 RX ADMIN — AMIODARONE HYDROCHLORIDE 400 MG: 200 TABLET ORAL at 19:07

## 2020-10-28 RX ADMIN — METOPROLOL TARTRATE 100 MG: 25 TABLET, FILM COATED ORAL at 09:19

## 2020-10-28 RX ADMIN — Medication 10 ML: at 21:45

## 2020-10-28 RX ADMIN — METOPROLOL TARTRATE 100 MG: 25 TABLET, FILM COATED ORAL at 21:44

## 2020-10-28 RX ADMIN — Medication 10 ML: at 21:46

## 2020-10-28 RX ADMIN — Medication 10 ML: at 14:48

## 2020-10-28 RX ADMIN — Medication 10 ML: at 14:49

## 2020-10-28 RX ADMIN — INSULIN GLARGINE 17 UNITS: 100 INJECTION, SOLUTION SUBCUTANEOUS at 21:45

## 2020-10-28 RX ADMIN — INSULIN LISPRO 4 UNITS: 100 INJECTION, SOLUTION INTRAVENOUS; SUBCUTANEOUS at 00:33

## 2020-10-28 RX ADMIN — HEPARIN SODIUM 5000 UNITS: 5000 INJECTION INTRAVENOUS; SUBCUTANEOUS at 06:35

## 2020-10-28 RX ADMIN — AMIODARONE HYDROCHLORIDE 400 MG: 200 TABLET ORAL at 09:19

## 2020-10-28 RX ADMIN — HEPARIN SODIUM 5000 UNITS: 5000 INJECTION INTRAVENOUS; SUBCUTANEOUS at 14:48

## 2020-10-28 RX ADMIN — Medication 10 ML: at 06:37

## 2020-10-28 NOTE — PROGRESS NOTES
Problem: Falls - Risk of  Goal: *Absence of Falls  Description: Document [place] Fall Risk and appropriate interventions in the flowsheet. Outcome: Progressing Towards Goal  Note: Fall Risk Interventions:  Mobility Interventions: PT Consult for mobility concerns, PT Consult for assist device competence, Patient to call before getting OOB    Mentation Interventions: Adequate sleep, hydration, pain control, Door open when patient unattended, Evaluate medications/consider consulting pharmacy, Increase mobility, More frequent rounding, Room close to nurse's station, Toileting rounds, Update white board    Medication Interventions: Evaluate medications/consider consulting pharmacy, Patient to call before getting OOB    Elimination Interventions: Call light in reach, Patient to call for help with toileting needs, Stay With Me (per policy)              Problem: Pain  Goal: *Control of Pain  Outcome: Progressing Towards Goal     Problem: Diabetes Self-Management  Goal: *Disease process and treatment process  Description: Define diabetes and identify own type of diabetes; list 3 options for treating diabetes. Outcome: Progressing Towards Goal  Goal: *Incorporating nutritional management into lifestyle  Description: Describe effect of type, amount and timing of food on blood glucose; list 3 methods for planning meals. Outcome: Progressing Towards Goal  Goal: *Incorporating physical activity into lifestyle  Description: State effect of exercise on blood glucose levels. Outcome: Progressing Towards Goal  Goal: *Developing strategies to promote health/change behavior  Description: Define the ABC's of diabetes; identify appropriate screenings, schedule and personal plan for screenings.   Outcome: Progressing Towards Goal     Problem: Breathing Pattern - Ineffective  Goal: *Absence of hypoxia  Outcome: Progressing Towards Goal     Problem: Pressure Injury - Risk of  Goal: *Prevention of pressure injury  Description: Document [place] Felipe Scale and appropriate interventions in the flowsheet.   Outcome: Progressing Towards Goal  Note: Pressure Injury Interventions:  Sensory Interventions: Assess need for specialty bed, Avoid rigorous massage over bony prominences, Discuss PT/OT consult with provider, Keep linens dry and wrinkle-free, Maintain/enhance activity level    Moisture Interventions: Absorbent underpads, Minimize layers    Activity Interventions: PT/OT evaluation, Pressure redistribution bed/mattress(bed type), Increase time out of bed    Mobility Interventions: HOB 30 degrees or less, Pressure redistribution bed/mattress (bed type), PT/OT evaluation    Nutrition Interventions: Document food/fluid/supplement intake, Discuss nutritional consult with provider    Friction and Shear Interventions: HOB 30 degrees or less, Lift sheet, Lift team/patient mobility team

## 2020-10-28 NOTE — PROGRESS NOTES
Problem: Dysphagia (Adult)  Goal: *Acute Goals and Plan of Care (Insert Text)  Description: Speech Therapy Goals  Initiated 10/27/2020   1. Patient will tolerate full liquid diet without s/s of aspiration within 7 days   2. Patient will tolerate solid trials without s/s of aspiration within 7 days     Initiated 10/26/2020    1. Patient will participate in swallow re-evaluation within 7 days. MET 10/27/2020   Outcome: Progressing Towards Goal     SPEECH LANGUAGE PATHOLOGY DYSPHAGIA TREATMENT  Patient: Deangelo Bhakta (23 y.o. male)  Date: 10/28/2020  Diagnosis: Hydronephrosis of right kidney [N13.30]  UTI (urinary tract infection) [N39.0]  Sepsis ([address] [A41.9]  Tachycardia [R00.0]  Leukocytosis [D72.829]   <principal problem not specified>  Procedure(s) (LRB):  LEFT HEART CATH / CORONARY ANGIOGRAPHY (N/A) 6 Days Post-Op  Precautions:  Fall, Skin    ASSESSMENT:  Patient with improvement in bedside presentation, now with strong voicing, no overt s/s of aspiration, and only minimal residue on left side of oral cavity. Given improvement at bedside, recommend pt initiate diet as outlined below. Suspect pt approaching baseline swallow function, however, will follow-up for diet tolerance. PLAN:  Recommendations and Planned Interventions:  --Mechanical soft diet/thin liquids  --General aspiration precautions  --Meds as tolerated    Patient continues to benefit from skilled intervention to address the above impairments. Continue treatment per established plan of care. Discharge Recommendations:  None     SUBJECTIVE:   Patient stated,  \"This is really good! .     OBJECTIVE:   Cognitive and Communication Status:  Neurologic State: Alert  Orientation Level: Oriented X4  Cognition: Follows commands, Appropriate for age attention/concentration  Perception: Cues to maintain midline in sitting, Verbal, Tactile  Perseveration: No perseveration noted  Safety/Judgement: Fall prevention, Awareness of environment  Dysphagia Treatment:    P.O. Trials:  Patient Position: upright in bed  Vocal quality prior to P.O.: No impairment  Consistency Presented: Thin liquid; Solid;Puree  How Presented: Self-fed/presented;Cup/sip;Straw     Bolus Acceptance: No impairment  Bolus Formation/Control: Impaired        Oral Residue: Left;Lingual  Initiation of Swallow: No impairment  Laryngeal Elevation: Functional  Aspiration Signs/Symptoms: None  Pharyngeal Phase Characteristics: No impairment, issues, or problems              Oral Phase Severity: Mild  Pharyngeal Phase Severity : No impairment    Pain:  Pain Scale 1: Numeric (0 - 10)  Pain Intensity 1: 0       After treatment:   Call bell within reach and Nursing notified    COMMUNICATION/EDUCATION:       The patient's plan of care including recommendations, planned interventions, and recommended diet changes were discussed with: Registered nurse.      HALINA Marley  Time Calculation: 20 mins

## 2020-10-28 NOTE — PROGRESS NOTES
Cardiology Progress Note  10/28/2020     Admit Date: 10/19/2020  Admit Diagnosis: Hydronephrosis of right kidney [N13.30]  UTI (urinary tract infection) [N39.0]  Sepsis ([address] [A41.9]  Tachycardia [R00.0]  Leukocytosis [D72.829]  CC: none currently    Assessment:   Active Problems:    Leukocytosis (10/19/2020)      UTI (urinary tract infection) (10/19/2020)      Tachycardia (10/19/2020)      Sepsis ([address] (10/19/2020)      Hydronephrosis of right kidney (10/19/2020)      Plan:     Cont current cardiac meds  Not on RAAS 2/2 renal function  No further IP cardiac plans    Subjective:      Ivette [place] has no cardiac c/o    Objective:    Physical Exam:  Overall VSSAF;    Visit Vitals  BP (!) 141/52   Pulse 67   Temp 98.5 °F (36.9 °C)   Resp 22   Ht 5' 10\" (1.778 m)   Wt 83 kg (183 lb)   SpO2 96%   BMI 26.26 kg/m²     Temp (24hrs), Av.2 °F (36.8 °C), Min:97.7 °F (36.5 °C), Max:98.7 °F (37.1 °C)    Patient Vitals for the past 8 hrs:   Pulse   10/28/20 0919 67   10/28/20 0604 62   10/28/20 0215 61    Patient Vitals for the past 8 hrs:   Resp   10/28/20 0604 22   10/28/20 0215 17    Patient Vitals for the past 8 hrs:   BP   10/28/20 0919 (!) 141/52   10/28/20 0604 (!) 127/50   10/28/20 0215 115/76      10/26 1901 - 10/28 07  In:  [P.O.:960; I.V.:50]  Out:  [Urine:375]      General Appearance: Intubated   Ears/Nose/Mouth/Throat:   Normal MM; anicteric. JVP: WNL   Resp:   Lungs clear to auscultation bilaterally. Nl resp effort. Cardiovascular:  RRR, S1, S2 normal, no new murmur. No gallop or rub. Abdomen:   Soft, non-tender, bowel sounds are present. Extremities: No edema bilaterally. Skin:  Neuro: Warm and dry.   Sedated                         Data Review:     Telemetry independently reviewed :   normal sinus rhythm         Labs:   Recent Results (from the past 24 hour(s))   GLUCOSE, POC    Collection Time: 10/27/20 11:37 AM   Result Value Ref Range    Glucose (POC) 158 (H) 65 - 100 mg/dL Performed by Maria Isabel Rushing, POC    Collection Time: 10/27/20  5:40 PM   Result Value Ref Range    Glucose (POC) 106 (H) 65 - 100 mg/dL    Performed by Julio Cesar Ventura    GLUCOSE, POC    Collection Time: 10/27/20 11:57 PM   Result Value Ref Range    Glucose (POC) 150 (H) 65 - 100 mg/dL    Performed by Emily Garsia    CBC WITH AUTOMATED DIFF    Collection Time: 10/28/20  2:53 AM   Result Value Ref Range    WBC 14.2 (H) 4.1 - 11.1 K/uL    RBC 3.88 (L) 4.10 - 5.70 M/uL    HGB 10.3 (L) 12.1 - 17.0 g/dL    HCT 32.3 (L) 36.6 - 50.3 %    MCV 83.2 80.0 - 99.0 FL    MCH 26.5 26.0 - 34.0 PG    MCHC 31.9 30.0 - 36.5 g/dL    RDW 15.8 (H) 11.5 - 14.5 %    PLATELET 555 979 - 725 K/uL    MPV 11.8 8.9 - 12.9 FL    NRBC 0.0 0  WBC    ABSOLUTE NRBC 0.00 0.00 - 0.01 K/uL    NEUTROPHILS 67 32 - 75 %    BAND NEUTROPHILS 4 0 - 6 %    LYMPHOCYTES 12 12 - 49 %    MONOCYTES 13 5 - 13 %    EOSINOPHILS 1 0 - 7 %    BASOPHILS 1 0 - 1 %    METAMYELOCYTES 1 (H) 0 %    MYELOCYTES 1 (H) 0 %    IMMATURE GRANULOCYTES 0 %    ABS. NEUTROPHILS 10.1 (H) 1.8 - 8.0 K/UL    ABS. LYMPHOCYTES 1.7 0.8 - 3.5 K/UL    ABS. MONOCYTES 1.8 (H) 0.0 - 1.0 K/UL    ABS. EOSINOPHILS 0.1 0.0 - 0.4 K/UL    ABS. BASOPHILS 0.1 0.0 - 0.1 K/UL    ABS. IMM.  GRANS. 0.0 K/UL    DF MANUAL      PLATELET COMMENTS Large Platelets      RBC COMMENTS ANISOCYTOSIS  1+        RBC COMMENTS MICROCYTOSIS  1+       METABOLIC PANEL, BASIC    Collection Time: 10/28/20  2:53 AM   Result Value Ref Range    Sodium 132 (L) 136 - 145 mmol/L    Potassium 3.4 (L) 3.5 - 5.1 mmol/L    Chloride 101 97 - 108 mmol/L    CO2 23 21 - 32 mmol/L    Anion gap 8 5 - 15 mmol/L    Glucose 125 (H) 65 - 100 mg/dL    BUN 71 (H) 6 - 20 MG/DL    Creatinine 3.68 (H) 0.70 - 1.30 MG/DL    BUN/Creatinine ratio 19 12 - 20      GFR est AA 20 (L) >60 ml/min/1.73m2    GFR est non-AA 16 (L) >60 ml/min/1.73m2    Calcium 8.0 (L) 8.5 - 10.1 MG/DL   GLUCOSE, POC    Collection Time: 10/28/20  5:46 AM   Result Value Ref Range    Glucose (POC) 125 (H) 65 - 100 mg/dL    Performed by Sharon Ni       Current medications reviewed       Ailin Anders MD

## 2020-10-28 NOTE — PROGRESS NOTES
Palliative Medicine Consult  Duc: [phone] ([phone])    Patient Name: Arina Lopez  YOB: 1945    Date of Initial Consult: 10/26/20  Reason for Consult: Care decisions  Requesting Provider: Blanca Dolan  Primary Care Physician: Unknown, Provider     SUMMARY:   Arina Lopez is a 70 y.o. with a past history of CAD s/p CABG (recent stress test and echo EF 45%), AAA s/p repair, HTN, vascular disease w/ femoral-femoral bypass, LLE paralysis  who was admitted on 10/19/2020 from [facility] with RLQ pain. CT A/P showing mild R hydronephrosis w/ b/l nephrolithiasis. Found to have JAMAL on CKD. Started on abx. Code blue called 10/20- CPR done, intubated, pressors, heparin ggt, CRT started 10/21. S/p LHC showing severe CAD in all arteries except LIMA-LAD. 10/25 transitioned to HD extubated 10/25. Current medical issues leading to Palliative Medicine involvement include: \"End stage CAD, not a candidate for revascularization or long term mechanical support\". Social: Lives w/ wife Rojas Hurley- they were ,then , then remarried although uncertain if it was renewal of vows or actual marriage again. Pt's daughter Alyson Kraus has been decision maker while pt intubated. PALLIATIVE DIAGNOSES:   1. Shortness of breath, extubated   2. Generalized weakness  3. Severe CAD s/p cardiac arrest  4. Goals of care        PLAN:   1. Pt continues to do well w/ therapies, feeling good today. 2. As pt completely awake/alert/oriented we discuss the risk/benefit of CPR/intubation when another cardiac arrest occurs in detail- mariana w/ out of-hospital arrests. 3. Pt wishes to try resuscitative efforts again (remains full code) but if is not making gains, he would not want prolonged life support. 4. Daughter Alyson Kraus updated and in agreement, although she has more concerns of how he would do after another cardiac arrest. Rojas Hurley at bedside.    5. We talk about cardiopulmonary rehab, and that I think he can get back to where he was w/ ADLs but will have to take things easy, no heavy yard work, etc- he has 6 acres. He agrees to have Claudia's son Kiko help out more. 6. Family and pt prefers [facility] in [place] for inpatient rehab.  7. Communicated plan of care with: Palliative IDT, [facility] Team incl Sosa WEN     GOALS OF CARE / TREATMENT PREFERENCES:     GOALS OF CARE:  Patient/Health Care Proxy Stated Goals: Rehabilitation    TREATMENT PREFERENCES:   Code Status: Full Code    Advance Care Planning:  [x] [facility] Med Interdisciplinary Team has updated the ACP Navigator with [place] and Patient Capacity      Primary Decision Maker: Tammi Ugarte - Daughter - [phone]      Advance Care Planning 10/19/2020   Confirm Advance Directive None       Medical Interventions: Full interventions         Other:    As far as possible, the palliative care team has discussed with patient / health care proxy about goals of care / treatment preferences for patient. HISTORY:     History obtained from: Pt, chart, staff, family     CHIEF COMPLAINT: Fatigue     HPI/SUBJECTIVE:    The patient is:   [x] Verbal and participatory  [] Non-participatory due to:     Pt breathing well, tolerating liquid diet, about to have central line out.      Clinical Pain Assessment (nonverbal scale for severity on nonverbal patients):   Clinical Pain Assessment  Severity: 0     Activity (Movement): Lying quietly, normal position    Duration: for how long has pt been experiencing pain (e.g., 2 days, 1 month, years)  Frequency: how often pain is an issue (e.g., several times per day, once every few days, constant)     FUNCTIONAL ASSESSMENT:     Palliative Performance Scale (PPS):  PPS: 60       PSYCHOSOCIAL/SPIRITUAL SCREENING:     Palliative IDT has assessed this patient for cultural preferences / practices and a referral made as appropriate to needs (Cultural Services, Patient Advocacy, Ethics, etc.)    Any spiritual / Lutheran concerns:  [] Yes /  [x] No    Caregiver Burnout:  [] Yes /  [x] No /  [] No Caregiver Present      Anticipatory grief assessment:   [x] Normal  / [] Maladaptive       ESAS Anxiety: Anxiety: 0    ESAS Depression: Depression: 0        REVIEW OF SYSTEMS:     Positive and pertinent negative findings in ROS are noted above in HPI. The following systems were [x] reviewed / [] unable to be reviewed as noted in HPI  Other findings are noted below. Systems: constitutional, ears/nose/mouth/throat, respiratory, gastrointestinal, genitourinary, musculoskeletal, integumentary, neurologic, psychiatric, endocrine. Positive findings noted below. Modified ESAS Completed by: provider   Fatigue: 5 Drowsiness: 0   Depression: 0 Pain: 0   Anxiety: 0 Nausea: 0   Anorexia: 0 Dyspnea: 1           Stool Occurrence(s): 2        PHYSICAL EXAM:     From RN flowsheet:  Wt Readings from Last 3 Encounters:   10/28/20 183 lb (83 kg)     Blood pressure (!) 143/51, pulse 64, temperature 98.5 °F (36.9 °C), resp. rate 20, height 5' 10\" (1.778 m), weight 183 lb (83 kg), SpO2 98 %. Pain Scale 1: Numeric (0 - 10)  Pain Intensity 1: 0  Pain Onset 1: acut  Pain Location 1: Abdomen  Pain Orientation 1: Lower, Right  Pain Description 1: Aching  Pain Intervention(s) 1: Medication (see MAR)  Last bowel movement, if known:     Constitutional: awake, alert, oriented, calm   Eyes: pupils equal, anicteric  ENMT: no nasal discharge, moist mucous membranes  Respiratory: breathing not labored  Musculoskeletal: no deformity, no tenderness to palpation  Skin: warm, dry  Neurologic: following commands, moving all extremities         HISTORY:     Active Problems:    Leukocytosis (10/19/2020)      UTI (urinary tract infection) (10/19/2020)      Tachycardia (10/19/2020)      Sepsis ([address] (10/19/2020)      Hydronephrosis of right kidney (10/19/2020)      No past medical history on file. No past surgical history on file. No family history on file. History reviewed, no pertinent family history.   Social History     Tobacco Use    Smoking status: Not on file   Substance Use Topics    Alcohol use: Not on file     No Known Allergies   Current Facility-Administered Medications   Medication Dose Route Frequency    albumin human 25% (BUMINATE) solution 12.5 g  12.5 g IntraVENous DIALYSIS PRN    amiodarone (CORDARONE) tablet 400 mg  400 mg Oral BID    clopidogreL (PLAVIX) tablet 75 mg  75 mg Oral DAILY    heparin (porcine) injection 5,000 Units  5,000 Units SubCUTAneous Q8H    metoprolol tartrate (LOPRESSOR) tablet 100 mg  100 mg Per NG tube Q12H    labetaloL (NORMODYNE;TRANDATE) injection 10 mg  10 mg IntraVENous Q2H PRN    dextrose 10% infusion 125-250 mL  125-250 mL IntraVENous PRN    aspirin chewable tablet 81 mg  81 mg Oral DAILY    heparin (porcine) 1,000 unit/mL injection 3,400 Units  3,400 Units IntraVENous DIALYSIS PRN    insulin lispro (HUMALOG) injection   SubCUTAneous Q6H    ondansetron (ZOFRAN) injection 4 mg  4 mg IntraVENous Q4H PRN    albuterol-ipratropium (DUO-NEB) 2.5 MG-0.5 MG/3 ML  3 mL Nebulization Q4H PRN    sodium chloride (NS) flush 5-40 mL  5-40 mL IntraVENous Q8H    sodium chloride (NS) flush 5-40 mL  5-40 mL IntraVENous PRN    polyethylene glycol (MIRALAX) packet 17 g  17 g Oral DAILY PRN    sodium chloride (NS) flush 5-40 mL  5-40 mL IntraVENous Q8H    sodium chloride (NS) flush 5-40 mL  5-40 mL IntraVENous PRN    acetaminophen (TYLENOL) tablet 650 mg  650 mg Oral Q6H PRN    Or    acetaminophen (TYLENOL) suppository 650 mg  650 mg Rectal Q6H PRN    cefTRIAXone (ROCEPHIN) 1 g in 0.9% sodium chloride (MBP/ADV) 50 mL  1 g IntraVENous Q24H    glucose chewable tablet 16 g  4 Tab Oral PRN    glucagon (GLUCAGEN) injection 1 mg  1 mg IntraMUSCular PRN    insulin glargine (LANTUS) injection 17 Units  0.2 Units/kg SubCUTAneous QHS    hydrALAZINE (APRESOLINE) 20 mg/mL injection 10 mg  10 mg IntraVENous Q6H PRN    traMADoL (ULTRAM) tablet 50 mg  50 mg Oral Q4H PRN    rosuvastatin (CRESTOR) tablet 40 mg  40 mg Oral QHS    oxyCODONE IR (ROXICODONE) tablet 5 mg  5 mg Oral Q4H PRN    influenza vaccine 2020-21 (6 mos+)(PF) (FLUARIX/FLULAVAL/FLUZONE QUAD) injection 0.5 mL  0.5 mL IntraMUSCular PRIOR TO DISCHARGE          LAB AND IMAGING FINDINGS:     Lab Results   Component Value Date/Time    WBC 14.2 (H) 10/28/2020 02:53 AM    HGB 10.3 (L) 10/28/2020 02:53 AM    PLATELET 867 15/96/6607 02:53 AM     Lab Results   Component Value Date/Time    Sodium 132 (L) 10/28/2020 02:53 AM    Potassium 3.4 (L) 10/28/2020 02:53 AM    Chloride 101 10/28/2020 02:53 AM    CO2 23 10/28/2020 02:53 AM    BUN 71 (H) 10/28/2020 02:53 AM    Creatinine 3.68 (H) 10/28/2020 02:53 AM    Calcium 8.0 (L) 10/28/2020 02:53 AM    Magnesium 2.4 10/26/2020 04:13 AM    Phosphorus 3.7 10/27/2020 05:01 AM      Lab Results   Component Value Date/Time    Alk. phosphatase 506 (H) 10/25/2020 06:18 AM    Protein, total 6.4 10/25/2020 06:18 AM    Albumin 2.1 (L) 10/27/2020 05:01 AM    Globulin 4.1 (H) 10/25/2020 06:18 AM     Lab Results   Component Value Date/Time    INR 1.1 10/21/2020 06:01 AM    Prothrombin time 11.5 (H) 10/21/2020 06:01 AM    aPTT 63.0 (H) 10/24/2020 06:16 AM      No results found for: IRON, FE, TIBC, IBCT, PSAT, FERR   Lab Results   Component Value Date/Time    pH 7.36 10/22/2020 06:15 AM    PCO2 44 10/22/2020 06:15 AM    PO2 133 (H) 10/22/2020 06:15 AM     No components found for: GLPOC   No results found for: CPK, CKMB             Total time: 35min   Counseling / coordination time, spent as noted above: 30 min   > 50% counseling / coordination?: yes    Prolonged service was provided for  []30 min   []75 min in face to face time in the presence of the patient, spent as noted above. Time Start:   Time End:   Note: this can only be billed with 05240 (initial) or 37487 (follow up). If multiple start / stop times, list each separately.

## 2020-10-28 NOTE — PROGRESS NOTES
Problem: Self Care Deficits Care Plan (Adult)  Goal: *Acute Goals and Plan of Care (Insert Text)  Description:   FUNCTIONAL STATUS PRIOR TO ADMISSION: Patient was independent and active without use of DME. Drives. LLE weakness since 2011 when contracted salmonella. HOME SUPPORT: The patient lived with wife but did not require assist.    Occupational Therapy Goals  Initiated 10/26/2020  1. Patient will perform grooming seated EOB with minimal assistance/contact guard assist within 7 day(s). 2.  Patient will perform upper body dressing seated EOB with minimal assistance/contact guard assist within 7 day(s). 3.  Patient will perform toilet transfers with maximal assistance within 7 day(s). 4.  Patient will perform all aspects of toileting with maximal assistance within 7 day(s). 5.  Patient will participate in upper extremity therapeutic exercise/activities with minimal assistance/contact guard assist for 10 minutes within 7 day(s). 6.  Patient will utilize energy conservation techniques during functional activities with verbal, visual, and tactile cues within 7 day(s). Outcome: Progressing Towards Goal     OCCUPATIONAL THERAPY TREATMENT  Patient: Christian Keane (80 y.o. male)  Date: 10/28/2020  Diagnosis: Hydronephrosis of right kidney [N13.30]  UTI (urinary tract infection) [N39.0]  S[address]is [address] [address] [A41.9]  Tachycardia [R00.0]  Leukocytosis [D72.829]   <principal problem not specified>  Procedure(s) (LRB):  LEFT HEART CATH / CORONARY ANGIOGRAPHY (N/A) 6 Days Post-Op  Precautions: Fall, Skin  Chart, occupational therapy assessment, plan of care, and goals were reviewed. ASSESSMENT  Patient continues with skilled OT services and is progressing towards goals. Patient continues to improve mobility and balance. Patient tolerating sitting EOB for approx 10 minutes with intermittent balance challenges - typically able to maintain midline with CGA-min assist, verbal/tactile cues, and UE support.   In unsupported sitting, patient able to maintain upright position for approx 15 seconds at a time, with LOB noted when turning head to L/R. Patient able to stand from EOB with mod assist x2 with BLEs blocked to facilitate extension and prevent buckling, standing and taking several steps to chair. Given patient's decreased activity tolerance and impaired balance, continue to recommend use of bedpan for toileting at this time - plan to trial [facility] next session. With min assist for orientation of spoon, patient able to self-feed ice chips from cup using BUEs in supported sitting position with increased time and effort for completion. Patient receptive to education regarding pacing, grading activities, and active ROM for UE strengthening. Although requires frequent rest breaks between tasks/exercises but continues to make good progress with therapy services. Current Level of Function Impacting Discharge (ADLs): min assist for self-feeding with material set-up; mod assist x2 for SPT    Other factors to consider for discharge: independent at baseline         PLAN :  Patient continues to benefit from skilled intervention to address the above impairments. Continue treatment per established plan of care. to address goals. Recommend with staff: OOB to chair via SPT with mod assist x2 and UE support; encourage active participation in self-care    Recommend next OT session: BSC transfer; sitting balance with ADLs    Recommendation for discharge: (in order for the patient to meet his/her long term goals)  Therapy 3 hours per day 5-7 days per week; If unable, will need SNF    This discharge recommendation:  Has been made in collaboration with the attending provider and/or case management    IF patient discharges home will need the following DME: TBD       SUBJECTIVE:   Patient stated it feels good to be up.     OBJECTIVE DATA SUMMARY:   Cognitive/Behavioral Status:  Neurologic State: Alert  Orientation Level: Oriented X4  Cognition: Follows commands; Appropriate for age attention/concentration  Perception: Cues to maintain midline in sitting;Verbal;Tactile  Perseveration: No perseveration noted  Safety/Judgement: Fall prevention; Awareness of environment; Insight into deficits    Functional Mobility and Transfers for ADLs:  Bed Mobility:  Sit to Supine: Maximum assistance;Assist x2    Transfers:  Sit to Stand: Moderate assistance;Assist x2   Stand to Sit: Moderate assistance;Assist x2  Bed to Chair: Moderate assistance;Assist x2    Balance:  Sitting: Impaired  Sitting - Static: Fair (occasional)  Sitting - Dynamic: Fair (occasional); Poor (constant support)  Standing: Impaired; With support  Standing - Static: Constant support;Poor  Standing - Dynamic : Constant support;Poor    ADL Intervention:  Feeding  Feeding Assistance: Minimum assistance  Utensil Management: Minimum assistance(for functional orientation of spoon)  Food to Mouth: Stand-by assistance  Drink to Mouth: Stand-by assistance  Cues: Verbal cues provided;Visual cues provided;Physical assistance    Cognitive Retraining  Safety/Judgement: Fall prevention; Awareness of environment; Insight into deficits    Therapeutic Exercises:   Patient participating in exercises in supported and unsupported sitting. Supported  -10 reps scapular elevation  -5 reps shoulder flexion, emphasis on eccentric control  -5 reps elbow flexion, emphasis on eccentric control    Unsupported  -Turning head to L/R with min assist to maintain upright position  -Maintaining midline without UE support, approx 15 seconds x2 trials    Pain:  Patient with mild pain reported in L side. Activity Tolerance:   Fair and requires frequent rest breaks  Please refer to the flowsheet for vital signs taken during this treatment.     After treatment patient left in no apparent distress:   Sitting in chair, Call bell within reach, Bed / chair alarm activated, and Caregiver / family present    COMMUNICATION/COLLABORATION:   The patients plan of care was discussed with: Physical therapist and Registered nurse.      Omar Montgomery OT  Time Calculation: 24 mins

## 2020-10-28 NOTE — PROGRESS NOTES
Problem: Mobility Impaired (Adult and Pediatric)  Goal: *Acute Goals and Plan of Care (Insert Text)  Description: FUNCTIONAL STATUS PRIOR TO ADMISSION: Patient was independent and active without use of DME. Driving. Retired. HOME SUPPORT PRIOR TO ADMISSION: The patient lived with wife but did not require assist.    Physical Therapy Goals  Initiated 10/25/2020  1. Patient will move from supine to sit and sit to supine  in bed with moderate assistance  within 7 day(s). 2.  Patient will transfer from bed to chair and chair to bed with moderate assistance  using the least restrictive device within 7 day(s). 3.  Patient will perform sit to stand with moderate assistance  within 7 day(s). 4.  Patient will ambulate with moderate assistance  for 5 feet with the least restrictive device within 7 day(s). Outcome: Progressing Towards Goal   PHYSICAL THERAPY TREATMENT  Patient: Danton Canavan (27 y.o. male)  Date: 10/28/2020  Diagnosis: Hydronephrosis of right kidney [N13.30]  UTI (urinary tract infection) [N39.0]  Sepsis ([address]) [A41.9]  Tachycardia [R00.0]  Leukocytosis [D72.829]   <principal problem not specified>  Procedure(s) (LRB):  LEFT HEART CATH / CORONARY ANGIOGRAPHY (N/A) 6 Days Post-Op  Precautions: Fall, Skin  Chart, physical therapy assessment, plan of care and goals were reviewed. ASSESSMENT  Patient continues with skilled PT services and is progressing towards goals. Pt received supine in bed with significant other at bedside. Pt continues to be limited by L sided weakness and generalized weakness, decreased functional mobility, impaired balance and gait. Pt demonstrated improvement in ability to assume sitting EOB today, but still needed mod A x 2. Initially, pt with fair-poor seated balance, but once positioned properly, pt was able to maintain static sitting with supervision.  Pt completed sit<>stand transfers with mod A x 2 and took side steps along the bedside and ultimately transferred to the chair with mod A x 2. Pt with difficulty advancing LLE and required assistance for weight shifting. Pt will continue to benefit from PT to progress mobility as tolerated. Recommend inpatient rehab upon discharge. .     Current Level of Function Impacting Discharge (mobility/balance): mod A x 2 supine to sit, sit<>stand, side steps and stand pivot to the chair    Other factors to consider for discharge: previously independent         PLAN :  Patient continues to benefit from skilled intervention to address the above impairments. Continue treatment per established plan of care. to address goals. Recommendation for discharge: (in order for the patient to meet his/her long term goals)  Therapy 3 hours per day 5-7 days per week    This discharge recommendation:  Has been made in collaboration with the attending provider and/or case management    IF patient discharges home will need the following DME: needs 2 person assistance for transfers       SUBJECTIVE:   Patient stated I would like to sit up in the chair if I can.     OBJECTIVE DATA SUMMARY:   Critical Behavior:  Neurologic State: Alert  Orientation Level: Oriented X4  Cognition: Follows commands, Appropriate for age attention/concentration  Safety/Judgement: Fall prevention, Awareness of environment  Functional Mobility Training:  Bed Mobility:     Supine to Sit: Moderate assistance;Assist x2  Sit to Supine: Maximum assistance;Assist x2           Transfers:  Sit to Stand: Moderate assistance;Assist x2  Stand to Sit: Moderate assistance;Assist x2        Bed to Chair: Moderate assistance;Assist x2                    Balance:  Sitting: Impaired  Sitting - Static: Fair (occasional)  Sitting - Dynamic: Fair (occasional); Poor (constant support)  Standing: Impaired; With support  Standing - Static: Constant support;Poor  Standing - Dynamic : Constant support;Poor  Ambulation/Gait Training:  Distance (ft): 2 Feet (ft)  Assistive Device: Gait belt(B HHA)  Ambulation - Level of Assistance: Moderate assistance;Assist x2        Gait Abnormalities: Decreased step clearance; Hemiplegic; Step to gait;Trunk sway increased        Base of Support: Narrowed; Shift to right  Stance: Left decreased  Speed/Kamla: Pace decreased (<100 feet/min); Shuffled  Step Length: Right shortened;Left shortened  Swing Pattern: Left asymmetrical                 Stairs: Therapeutic Exercises: Ankle pumps, LAQ (difficult to perform on the L)  Pain Rating:  Pt denied pain    Activity Tolerance:   Good  Please refer to the flowsheet for vital signs taken during this treatment. After treatment patient left in no apparent distress:   Sitting in chair, Call bell within reach, Bed / chair alarm activated, Caregiver / family present, and Side rails x 3    COMMUNICATION/COLLABORATION:   The patients plan of care was discussed with: Occupational therapist and Registered nurse.      Zohaib Gandara PT, DPT   Time Calculation: 24 mins

## 2020-10-28 NOTE — PROGRESS NOTES
Bedside shift change report given to BEHZAD Swan (oncoming nurse) by BEHZAD CLINE (offgoing nurse). Report included the following information SBAR, Cardiac Rhythm NSR and Dual Neuro Assessment.

## 2020-10-28 NOTE — PROGRESS NOTES
Bedside and Verbal shift change report given to Christopher Vaz RN (oncoming nurse) by Janie Stein RN (offgoing nurse). Report included the following information SBAR, Kardex, MAR, Cardiac Rhythm NSR and Dual Neuro Assessment.

## 2020-10-28 NOTE — PROGRESS NOTES
Hospitalist Progress Note  Hiram Garcia MD  Answering service: [phone] OR [phone] from in house phone      Date of Service:  10/27/2020  NAME:  Ruthie Duval  :  1945  MRN:  [id number]      Admission Summary:   Mr Charleen Abbott is a 71 y/o with a past history of CAD s/p CABG( recent stress test and echo EF 45%), AAA s/p repair, HTN, PAD s/p femoral-femoral bypass, LLE paralysis was transferred from [facility] with abdominal pain at RLQ. CT abdomen/ pelvis without contrast showing right hydronephrosis.  UA revealed UTI, JAMAL on CKD. He was started on antibiotic. On 10/20/2020 code blue was called after patient was noted to have cardiac arrest. CPR and intubated, pressor, heparin gtt started. CRRT started on 10/21. S/p LHC showing severe CAD in all arteries except LIMA-LAD. 10/25 transitioned to HD. extubated 10/25. Feeding on NGT    Interval history / Subjective:      Patient seen and examined this afternoon. Patient is more awake and interactive. He is getting strong. Wants the NGT to come off. Assessment & Plan:     # Cardiac arrest  # CAD   - s/p - LHC 10/22: Severe native 3V CAD, patent LIMA-LAD, known occluded SVG-RCA and SVG-LCx; moderately elevated LVEDP  - Ischemic cardiomyopathy. TTE 10/21: LVEF is 40-45%. Left ventricle not well visualized.  Mild mitral regurgitation  - Cont hemodynamic/cardiac monitoring  - Cardiology following      # Tachyarrhythmias with multifocal ventricular ectopy - controlled on amiodarone  - Change amiodarone infusion to enteral (per tube) - initiated 10/23     # Acute hypoxic respiratory failure - intubated 10/20, extubated 10/25  - Cont supplemental O2 to maintain SpO2 92-97%     # JAMAL on CKD, stage IV.   - Transitioned from CRRT to HD 10/25  - Nephrology following      # Right sided hydronephrosis     # Mild hyperglycemia  - Keep glucose less than 180 with subcutaneous insulin as needed     # Leukocytosis. Possible RLL PNA ? ? Aspiration   - Ceftriaxone - 10 day course planned (through 10/28)     # Elevated LFTs of unclear etiology  - could be shock liver from hypoperfusion due to cardiac arrest   - monitor liver function test daily      # Profound generalized weakness/ICU myopathy   - check CK  - PT/OT     # Dysphagia - failed swallow eval 10/26  - on TFs per NGT. - Plan remove NGT and start on oral diet           DVT px: SQ UFH   Code: Full      Hospital Problems  Never Reviewed          Codes Class Noted POA    Leukocytosis ICD-10-CM: N74.292  ICD-9-CM: 288.60  10/19/2020 Unknown        UTI (urinary tract infection) ICD-10-CM: N39.0  ICD-9-CM: 599.0  10/19/2020 Unknown        Tachycardia ICD-10-CM: R00.0  ICD-9-CM: 785.0  10/19/2020 Unknown        Sepsis ([address] ICD-10-CM: A41.9  ICD-9-CM: 038.9, 995.91  10/19/2020 Unknown        Hydronephrosis of right kidney ICD-10-CM: N13.30  ICD-9-CM: 400  10/19/2020 Unknown                Review of Systems:   Pertinent positive mentioned interval hx/HPI. Other systems reviewed and negative. Vital Signs:    Last 24hrs VS reviewed since prior progress note. Most recent are:  Visit Vitals  BP (!) 129/55 (BP 1 Location: Right arm, BP Patient Position: At rest)   Pulse 68   Temp 98.7 °F (37.1 °C)   Resp 17   Ht 5' 10\" (1.778 m)   Wt 80.9 kg (178 lb 4.8 oz)   SpO2 99%   BMI 25.58 kg/m²         Intake/Output Summary (Last 24 hours) at 10/27/2020 2119  Last data filed at 10/27/2020 1840  Gross per 24 hour   Intake 530 ml   Output 1875 ml   Net -1345 ml        Physical Examination:             Constitutional:  No acute distress, NGT   ENT:  Oral mucous moist   Resp:  CTA bilaterally. No wheezing/rhonchi/rales. No accessory muscle use   CV:  Regular rhythm, normal rate, no murmurs, gallops, rubs    GI:  Soft, non distended, non tender.  normoactive bowel sounds, no hepatosplenomegaly     Musculoskeletal:  No edema, warm, 2+ pulses throughout    Neurologic:  generalized weakness             Data Review:      I personally reviewed labs and imaging     Labs:     Recent Labs     10/27/20  0502 10/26/20  0413   WBC 15.6* 13.5*   HGB 10.5* 10.3*   HCT 32.5* 32.5*    204     Recent Labs     10/27/20  0501 10/26/20  0413 10/25/20  0616 10/25/20  0116   * 136 136 136   K 3.2* 3.3* 3.8 3.8    102 103 103   CO2 22 23 25 26   * 67* 28* 25*   CREA 4.34* 2.80* 1.33* 1.37*   * 121* 180* 150*   CA 8.5 8.1* 8.9 9.0   MG  --  2.4 2.4 2.3   PHOS 3.7 3.2 2.5* 2.9     Recent Labs     10/27/20  0501 10/26/20  0413 10/25/20  0618   ALT  --   --  490*   AP  --   --  506*   TBILI  --   --  0.8   TP  --   --  6.4   ALB 2.1* 2.2* 2.3*   GLOB  --   --  4.1*     No results for input(s): INR, PTP, APTT, INREXT in the last 72 hours. No results for input(s): FE, TIBC, PSAT, FERR in the last 72 hours. No results found for: FOL, RBCF   No results for input(s): PH, PCO2, PO2 in the last 72 hours. No results for input(s): CPK, CKNDX, TROIQ in the last 72 hours.     No lab exists for component: CPKMB  No results found for: CHOL, CHOLX, CHLST, CHOLV, HDL, HDLP, LDL, LDLC, DLDLP, TGLX, TRIGL, TRIGP, CHHD, CHHDX  Lab Results   Component Value Date/Time    Glucose (POC) 106 (H) 10/27/2020 05:40 PM    Glucose (POC) 158 (H) 10/27/2020 11:37 AM    Glucose (POC) 115 (H) 10/27/2020 05:06 AM    Glucose (POC) 153 (H) 10/26/2020 11:14 PM    Glucose (POC) 138 (H) 10/26/2020 09:24 PM     Lab Results   Component Value Date/Time    Color YELLOW/STRAW 10/21/2020 12:59 AM    Appearance CLOUDY (A) 10/21/2020 12:59 AM    Specific gravity 1.018 10/21/2020 12:59 AM    pH (UA) 5.0 10/21/2020 12:59 AM    Protein 100 (A) 10/21/2020 12:59 AM    Glucose Negative 10/21/2020 12:59 AM    Ketone Negative 10/21/2020 12:59 AM    Bilirubin Negative 10/21/2020 12:59 AM    Urobilinogen 0.2 10/21/2020 12:59 AM    Nitrites Negative 10/21/2020 12:59 AM    Leukocyte Esterase MODERATE (A) 10/21/2020 12:59 AM Epithelial cells FEW 10/21/2020 12:59 AM    Bacteria Negative 10/21/2020 12:59 AM    WBC 10-20 10/21/2020 12:59 AM    RBC 0-5 10/21/2020 12:59 AM         Medications Reviewed:     Current Facility-Administered Medications   Medication Dose Route Frequency    albumin human 25% (BUMINATE) solution 12.5 g  12.5 g IntraVENous DIALYSIS PRN    amiodarone (CORDARONE) tablet 400 mg  400 mg Oral BID    clopidogreL (PLAVIX) tablet 75 mg  75 mg Oral DAILY    heparin (porcine) injection 5,000 Units  5,000 Units SubCUTAneous Q8H    metoprolol tartrate (LOPRESSOR) tablet 100 mg  100 mg Per NG tube Q12H    labetaloL (NORMODYNE;TRANDATE) injection 10 mg  10 mg IntraVENous Q2H PRN    dextrose 10% infusion 125-250 mL  125-250 mL IntraVENous PRN    aspirin chewable tablet 81 mg  81 mg Oral DAILY    heparin (porcine) 1,000 unit/mL injection 3,400 Units  3,400 Units IntraVENous DIALYSIS PRN    insulin lispro (HUMALOG) injection   SubCUTAneous Q6H    ondansetron (ZOFRAN) injection 4 mg  4 mg IntraVENous Q4H PRN    albuterol-ipratropium (DUO-NEB) 2.5 MG-0.5 MG/3 ML  3 mL Nebulization Q4H PRN    sodium chloride (NS) flush 5-40 mL  5-40 mL IntraVENous Q8H    sodium chloride (NS) flush 5-40 mL  5-40 mL IntraVENous PRN    polyethylene glycol (MIRALAX) packet 17 g  17 g Oral DAILY PRN    sodium chloride (NS) flush 5-40 mL  5-40 mL IntraVENous Q8H    sodium chloride (NS) flush 5-40 mL  5-40 mL IntraVENous PRN    acetaminophen (TYLENOL) tablet 650 mg  650 mg Oral Q6H PRN    Or    acetaminophen (TYLENOL) suppository 650 mg  650 mg Rectal Q6H PRN    cefTRIAXone (ROCEPHIN) 1 g in 0.9% sodium chloride (MBP/ADV) 50 mL  1 g IntraVENous Q24H    glucose chewable tablet 16 g  4 Tab Oral PRN    glucagon (GLUCAGEN) injection 1 mg  1 mg IntraMUSCular PRN    insulin glargine (LANTUS) injection 17 Units  0.2 Units/kg SubCUTAneous QHS    hydrALAZINE (APRESOLINE) 20 mg/mL injection 10 mg  10 mg IntraVENous Q6H PRN    traMADoL (ULTRAM) tablet 50 mg  50 mg Oral Q4H PRN    rosuvastatin (CRESTOR) tablet 40 mg  40 mg Oral QHS    oxyCODONE IR (ROXICODONE) tablet 5 mg  5 mg Oral Q4H PRN    influenza vaccine 2020-21 (6 mos+)(PF) (FLUARIX/FLULAVAL/FLUZONE QUAD) injection 0.5 mL  0.5 mL IntraMUSCular PRIOR TO DISCHARGE     ______________________________________________________________________  EXPECTED LENGTH OF STAY: 4d 19h  ACTUAL LENGTH OF STAY:          6                 Maria Elena Benoit MD   Patient has given Verbal permission to discuss medical care with   persons present in the room and and also with contact as listed on face sheet. Please note that this dictation was completed with Nano ePrint, the computer voice recognition software. Quite often unanticipated grammatical, syntax, homophones, and other interpretive errors are inadvertently transcribed by the computer software. Please disregard these errors. Please excuse any errors that have escaped final proofreading. Thank you.

## 2020-10-28 NOTE — DIABETES MGMT
Diabetes Management Team to sign off at this point as patient's blood glucose remains stable. Please re-consult us if patient needs change. Thank you for including us in their care.      Signed By: Denise Garcia [facility]   [facility]    October 28, 2020

## 2020-10-28 NOTE — PROGRESS NOTES
Hospitalist Progress Note  Be Anglin MD  Answering service: [phone] OR [phone] from in house phone      Date of Service:  10/28/2020  NAME:  Evin Sandhu  :  1945  MRN:  [id number]      Admission Summary:   Mr Katt Chery is a 71 y/o with a past history of CAD s/p CABG( recent stress test and echo EF 45%), AAA s/p repair, HTN, PAD s/p femoral-femoral bypass, LLE paralysis was transferred from [facility] with abdominal pain at RLQ. CT abdomen/ pelvis without contrast showing right hydronephrosis.  UA revealed UTI, JAMAL on CKD. He was started on antibiotic. On 10/20/2020 code blue was called after patient was noted to have cardiac arrest. CPR and intubated, pressor, heparin gtt started. CRRT started on 10/21. S/p LHC showing severe CAD in all arteries except LIMA-LAD. 10/25 transitioned to HD. extubated 10/25. Feeding on NGT    Interval history / Subjective:      Patient seen and examined this morning. Patient making progress daily. Per wife, patient was up and took few steps. He also took a few bites of food. Assessment & Plan:     # Cardiac arrest  # CAD   - s/p - LHC 10/22: Severe native 3V CAD, patent LIMA-LAD, known occluded SVG-RCA and SVG-LCx; moderately elevated LVEDP  - Ischemic cardiomyopathy. TTE 10/21: LVEF is 40-45%. Left ventricle not well visualized. Mild mitral regurgitation  - Cont hemodynamic/cardiac monitoring  - Cardiology following-- no further cardiac work up planned.      # Tachyarrhythmias with multifocal ventricular ectopy - controlled on amiodarone  - Change amiodarone infusion to enteral (per tube) - initiated 10/23     # Acute hypoxic respiratory failure - intubated 10/20, extubated 10/25  - Cont supplemental O2 to maintain SpO2 92-97%    # Leukocytosis possible RLL PNA ? ? Aspiration   - Ceftriaxone - 10 day course planned (through 10/28)     # JAMAL on CKD, stage IV  - acute kidney injury due to oligo-anuric due to cardiac arrest   - Transitioned from CRRT to HD 10/25  - Nephrology following      # Right sided hydronephrosis  - Urology following     # Hypokalemia   - replace as needed      # Mild hyperglycemia  - Keep glucose less than 180 with subcutaneous insulin as needed    # Elevated LFTs of unclear etiology  - could be shock liver from hypoperfusion due to cardiac arrest   - monitor liver function test daily      # Profound generalized weakness/ICU myopathy   - PT/OT     # Dysphagia - Improved   - off tube feed, remove NGT today   - Mechanical soft diet        DVT px: SQ UFH   Code: Full   Dispo: Rehab      Hospital Problems  Never Reviewed          Codes Class Noted POA    Leukocytosis ICD-10-CM: T73.501  ICD-9-CM: 288.60  10/19/2020 Unknown        UTI (urinary tract infection) ICD-10-CM: N39.0  ICD-9-CM: 599.0  10/19/2020 Unknown        Tachycardia ICD-10-CM: R00.0  ICD-9-CM: 785.0  10/19/2020 Unknown        Sepsis ([address] ICD-10-CM: A41.9  ICD-9-CM: 038.9, 995.91  10/19/2020 Unknown        Hydronephrosis of right kidney ICD-10-CM: N13.30  ICD-9-CM: 218  10/19/2020 Unknown                Review of Systems:   Pertinent positive mentioned interval hx/HPI. Other systems reviewed and negative. Vital Signs:    Last 24hrs VS reviewed since prior progress note. Most recent are:  Visit Vitals  BP (!) 126/51 (BP 1 Location: Right arm, BP Patient Position: At rest)   Pulse (!) 58   Temp 99.4 °F (37.4 °C)   Resp 23   Ht 5' 10\" (1.778 m)   Wt 83 kg (183 lb)   SpO2 97%   BMI 26.26 kg/m²         Intake/Output Summary (Last 24 hours) at 10/28/2020 1412  Last data filed at 10/28/2020 0400  Gross per 24 hour   Intake 930 ml   Output 1500 ml   Net -570 ml        Physical Examination:             Constitutional:  No acute distress, NGT in place    ENT:  Oral mucous moist   Resp:  CTA bilaterally. No wheezing/rhonchi/rales.  No accessory muscle use   CV:  Regular rhythm, normal rate, no murmurs, gallops, rubs    GI: Soft, non distended, non tender. normoactive bowel sounds, no hepatosplenomegaly     Musculoskeletal:  No edema, warm, 2+ pulses throughout    Neurologic:  generalized weakness improving             Data Review:      I personally reviewed labs and imaging     Labs:     Recent Labs     10/28/20  0253 10/27/20  0502   WBC 14.2* 15.6*   HGB 10.3* 10.5*   HCT 32.3* 32.5*    240     Recent Labs     10/28/20  0253 10/27/20  0501 10/26/20  0413   * 133* 136   K 3.4* 3.2* 3.3*    101 102   CO2 23 22 23   BUN 71* 104* 67*   CREA 3.68* 4.34* 2.80*   * 122* 121*   CA 8.0* 8.5 8.1*   MG  --   --  2.4   PHOS  --  3.7 3.2     Recent Labs     10/27/20  0501 10/26/20  0413   ALB 2.1* 2.2*     No results for input(s): INR, PTP, APTT, INREXT, INREXT in the last 72 hours. No results for input(s): FE, TIBC, PSAT, FERR in the last 72 hours. No results found for: FOL, RBCF   No results for input(s): PH, PCO2, PO2 in the last 72 hours. No results for input(s): CPK, CKNDX, TROIQ in the last 72 hours.     No lab exists for component: CPKMB  No results found for: CHOL, CHOLX, CHLST, CHOLV, HDL, HDLP, LDL, LDLC, DLDLP, TGLX, TRIGL, TRIGP, CHHD, CHHDX  Lab Results   Component Value Date/Time    Glucose (POC) 132 (H) 10/28/2020 11:58 AM    Glucose (POC) 125 (H) 10/28/2020 05:46 AM    Glucose (POC) 150 (H) 10/27/2020 11:57 PM    Glucose (POC) 106 (H) 10/27/2020 05:40 PM    Glucose (POC) 158 (H) 10/27/2020 11:37 AM     Lab Results   Component Value Date/Time    Color YELLOW/STRAW 10/21/2020 12:59 AM    Appearance CLOUDY (A) 10/21/2020 12:59 AM    Specific gravity 1.018 10/21/2020 12:59 AM    pH (UA) 5.0 10/21/2020 12:59 AM    Protein 100 (A) 10/21/2020 12:59 AM    Glucose Negative 10/21/2020 12:59 AM    Ketone Negative 10/21/2020 12:59 AM    Bilirubin Negative 10/21/2020 12:59 AM    Urobilinogen 0.2 10/21/2020 12:59 AM    Nitrites Negative 10/21/2020 12:59 AM    Leukocyte Esterase MODERATE (A) 10/21/2020 12:59 AM Epithelial cells FEW 10/21/2020 12:59 AM    Bacteria Negative 10/21/2020 12:59 AM    WBC 10-20 10/21/2020 12:59 AM    RBC 0-5 10/21/2020 12:59 AM         Medications Reviewed:     Current Facility-Administered Medications   Medication Dose Route Frequency    albumin human 25% (BUMINATE) solution 12.5 g  12.5 g IntraVENous DIALYSIS PRN    amiodarone (CORDARONE) tablet 400 mg  400 mg Oral BID    clopidogreL (PLAVIX) tablet 75 mg  75 mg Oral DAILY    heparin (porcine) injection 5,000 Units  5,000 Units SubCUTAneous Q8H    metoprolol tartrate (LOPRESSOR) tablet 100 mg  100 mg Per NG tube Q12H    labetaloL (NORMODYNE;TRANDATE) injection 10 mg  10 mg IntraVENous Q2H PRN    dextrose 10% infusion 125-250 mL  125-250 mL IntraVENous PRN    aspirin chewable tablet 81 mg  81 mg Oral DAILY    heparin (porcine) 1,000 unit/mL injection 3,400 Units  3,400 Units IntraVENous DIALYSIS PRN    insulin lispro (HUMALOG) injection   SubCUTAneous Q6H    ondansetron (ZOFRAN) injection 4 mg  4 mg IntraVENous Q4H PRN    albuterol-ipratropium (DUO-NEB) 2.5 MG-0.5 MG/3 ML  3 mL Nebulization Q4H PRN    sodium chloride (NS) flush 5-40 mL  5-40 mL IntraVENous Q8H    sodium chloride (NS) flush 5-40 mL  5-40 mL IntraVENous PRN    polyethylene glycol (MIRALAX) packet 17 g  17 g Oral DAILY PRN    sodium chloride (NS) flush 5-40 mL  5-40 mL IntraVENous Q8H    sodium chloride (NS) flush 5-40 mL  5-40 mL IntraVENous PRN    acetaminophen (TYLENOL) tablet 650 mg  650 mg Oral Q6H PRN    Or    acetaminophen (TYLENOL) suppository 650 mg  650 mg Rectal Q6H PRN    cefTRIAXone (ROCEPHIN) 1 g in 0.9% sodium chloride (MBP/ADV) 50 mL  1 g IntraVENous Q24H    glucose chewable tablet 16 g  4 Tab Oral PRN    glucagon (GLUCAGEN) injection 1 mg  1 mg IntraMUSCular PRN    insulin glargine (LANTUS) injection 17 Units  0.2 Units/kg SubCUTAneous QHS    hydrALAZINE (APRESOLINE) 20 mg/mL injection 10 mg  10 mg IntraVENous Q6H PRN    traMADoL (ULTRAM) tablet 50 mg  50 mg Oral Q4H PRN    rosuvastatin (CRESTOR) tablet 40 mg  40 mg Oral QHS    oxyCODONE IR (ROXICODONE) tablet 5 mg  5 mg Oral Q4H PRN    influenza vaccine 2020-21 (6 mos+)(PF) (FLUARIX/FLULAVAL/FLUZONE QUAD) injection 0.5 mL  0.5 mL IntraMUSCular PRIOR TO DISCHARGE     ______________________________________________________________________  EXPECTED LENGTH OF STAY: 4d 19h  ACTUAL LENGTH OF STAY:          5                 Maria Elena Benoit MD   Patient has given Verbal permission to discuss medical care with   persons present in the room and and also with contact as listed on face sheet. Please note that this dictation was completed with IZEA, the computer voice recognition software. Quite often unanticipated grammatical, syntax, homophones, and other interpretive errors are inadvertently transcribed by the computer software. Please disregard these errors. Please excuse any errors that have escaped final proofreading. Thank you.

## 2020-10-28 NOTE — PROGRESS NOTES
Name: Geovanna Carmona MRN: [id number]   : 1945 Hospital: [facility]   Date: 10/28/2020        IMPRESSION:   · CKD stage 4 .  · JAMAL- oligo-anuric - S/P cardiac arrest --> off CRRT  · Respiratory failure - extubated  · Hypervolemia- pulmonary edema - resolved  · Hyperkalemia- resolved    · Hypokalemia - improving  · UTI- on AB's  · S/P card cath  · IRight hydronephrosis- urology is evaluating. PLAN:   · Hold HD today (10/28)  · Watch for renal recovery. · Follow lytes  · Urology following for right ureteral obstruction. ·  Avoid NSAIDs + IV contrast     Subjective/Interval History:     F/U JAMAL --> 10/26/2020    The events were noted. CRRT was discontinued yesterday. Felt better. F/U JAMAL --> 10/27/2020      Took part in therapy today. F/U JAMAL --> 10/28/2020      Felt better. Seems to be moving more. Had HD yesterday. Objective:   Vital Signs:    Visit Vitals  BP (!) 126/51 (BP 1 Location: Right arm, BP Patient Position: At rest)   Pulse (!) 58   Temp 99.4 °F (37.4 °C)   Resp 23   Ht 5' 10\" (1.778 m)   Wt 83 kg (183 lb)   SpO2 97%   BMI 26.26 kg/m²       O2 Device: Nasal cannula   O2 Flow Rate (L/min): 3 l/min   Temp (24hrs), Av.5 °F (36.9 °C), Min:97.9 °F (36.6 °C), Max:99.4 °F (37.4 °C)       Intake/Output:   Last shift:      No intake/output data recorded. Last 3 shifts: 10/26 1901 - 10/28 0700  In:  [P.O.:960; I.V.:50]  Out:  [Urine:375]    Intake/Output Summary (Last 24 hours) at 10/28/2020 1409  Last data filed at 10/28/2020 0400  Gross per 24 hour   Intake 930 ml   Output 1500 ml   Net -570 ml        Physical Exam:    Seen in Room 675. Mrs. Joanne Hazel was in attendance. General:    Smiling today. .   Head:   Normocephalic    Nose:  NGT    Lungs:   No Wheezing or rales. Heart:   No S3  Gallop , No pericardial rub.   .  Abdomen:   Not distended    Extremities: No leg oedema    M/S                 Wasting +    Neurologic:      Responding appropriately            DATA:  Labs:  Recent Labs     10/28/20  0253 10/27/20  0501 10/26/20  0413   * 133* 136   K 3.4* 3.2* 3.3*    101 102   CO2 23 22 23   BUN 71* 104* 67*   CREA 3.68* 4.34* 2.80*   CA 8.0* 8.5 8.1*   ALB  --  2.1* 2.2*   PHOS  --  3.7 3.2   MG  --   --  2.4     Recent Labs     10/28/20  0253 10/27/20  0502 10/26/20  0413   WBC 14.2* 15.6* 13.5*   HGB 10.3* 10.5* 10.3*   HCT 32.3* 32.5* 32.5*    240 204     No results for input(s): RASHMI, KU, CLU, CREAU in the last 72 hours.     No lab exists for component: PROU    Total time spent with patient:   []       Care Plan discussed with:     Patient and Wife    Richie Rivera MD

## 2020-10-28 NOTE — PROGRESS NOTES
Bedside and Verbal shift change report given to Jennifer Esquivel RN (oncoming nurse) by ROLANDO BENJAMIN RN (offgoing nurse). Report included the following information SBAR, Kardex and Intake/Output.

## 2020-10-28 NOTE — PROGRESS NOTES
Problem: Falls - Risk of  Goal: *Absence of Falls  Description: Document Miky Tuttle Fall Risk and appropriate interventions in the flowsheet.   Outcome: Progressing Towards Goal  Note: Fall Risk Interventions:  Mobility Interventions: Assess mobility with egress test, Communicate number of staff needed for ambulation/transfer, Patient to call before getting OOB, PT Consult for mobility concerns, PT Consult for assist device competence, Strengthening exercises (ROM-active/passive), Utilize walker, cane, or other assistive device    Mentation Interventions: Adequate sleep, hydration, pain control, Door open when patient unattended, Evaluate medications/consider consulting pharmacy, Increase mobility, More frequent rounding, Room close to nurse's station, Toileting rounds, Update white board    Medication Interventions: Evaluate medications/consider consulting pharmacy, Patient to call before getting OOB, Teach patient to arise slowly    Elimination Interventions: Call light in reach, Patient to call for help with toileting needs, Stay With Me (per policy), Toilet paper/wipes in reach, Toileting schedule/hourly rounds, Urinal in reach

## 2020-10-29 LAB
ANION GAP SERPL CALC-SCNC: 12 MMOL/L (ref 5–15)
BUN SERPL-MCNC: 89 MG/DL (ref 6–20)
BUN/CREAT SERPL: 19 (ref 12–20)
CALCIUM SERPL-MCNC: 8.3 MG/DL (ref 8.5–10.1)
CHLORIDE SERPL-SCNC: 97 MMOL/L (ref 97–108)
CO2 SERPL-SCNC: 21 MMOL/L (ref 21–32)
CREAT SERPL-MCNC: 4.74 MG/DL (ref 0.7–1.3)
ERYTHROCYTE [DISTWIDTH] IN BLOOD BY AUTOMATED COUNT: 15.4 % (ref 11.5–14.5)
GLUCOSE BLD STRIP.AUTO-MCNC: 130 MG/DL (ref 65–100)
GLUCOSE BLD STRIP.AUTO-MCNC: 151 MG/DL (ref 65–100)
GLUCOSE BLD STRIP.AUTO-MCNC: 151 MG/DL (ref 65–100)
GLUCOSE BLD STRIP.AUTO-MCNC: 90 MG/DL (ref 65–100)
GLUCOSE SERPL-MCNC: 95 MG/DL (ref 65–100)
HCT VFR BLD AUTO: 29.7 % (ref 36.6–50.3)
HGB BLD-MCNC: 9.7 G/DL (ref 12.1–17)
MCH RBC QN AUTO: 26.7 PG (ref 26–34)
MCHC RBC AUTO-ENTMCNC: 32.7 G/DL (ref 30–36.5)
MCV RBC AUTO: 81.8 FL (ref 80–99)
NRBC # BLD: 0 K/UL (ref 0–0.01)
NRBC BLD-RTO: 0 PER 100 WBC
PLATELET # BLD AUTO: 271 K/UL (ref 150–400)
PMV BLD AUTO: 11.8 FL (ref 8.9–12.9)
POTASSIUM SERPL-SCNC: 3.2 MMOL/L (ref 3.5–5.1)
RBC # BLD AUTO: 3.63 M/UL (ref 4.1–5.7)
SERVICE CMNT-IMP: ABNORMAL
SERVICE CMNT-IMP: NORMAL
SODIUM SERPL-SCNC: 130 MMOL/L (ref 136–145)
WBC # BLD AUTO: 17.3 K/UL (ref 4.1–11.1)

## 2020-10-29 PROCEDURE — 85027 COMPLETE CBC AUTOMATED: CPT

## 2020-10-29 PROCEDURE — 74011636637 HC RX REV CODE- 636/637: Performed by: FAMILY MEDICINE

## 2020-10-29 PROCEDURE — 36415 COLL VENOUS BLD VENIPUNCTURE: CPT

## 2020-10-29 PROCEDURE — 74011250637 HC RX REV CODE- 250/637: Performed by: INTERNAL MEDICINE

## 2020-10-29 PROCEDURE — 65660000000 HC RM CCU STEPDOWN

## 2020-10-29 PROCEDURE — 74011250636 HC RX REV CODE- 250/636: Performed by: INTERNAL MEDICINE

## 2020-10-29 PROCEDURE — 82962 GLUCOSE BLOOD TEST: CPT

## 2020-10-29 PROCEDURE — 80048 BASIC METABOLIC PNL TOTAL CA: CPT

## 2020-10-29 PROCEDURE — 74011636637 HC RX REV CODE- 636/637: Performed by: NURSE PRACTITIONER

## 2020-10-29 PROCEDURE — 90935 HEMODIALYSIS ONE EVALUATION: CPT

## 2020-10-29 RX ADMIN — INSULIN LISPRO 4 UNITS: 100 INJECTION, SOLUTION INTRAVENOUS; SUBCUTANEOUS at 12:20

## 2020-10-29 RX ADMIN — Medication 10 ML: at 22:37

## 2020-10-29 RX ADMIN — HEPARIN SODIUM 5000 UNITS: 5000 INJECTION INTRAVENOUS; SUBCUTANEOUS at 06:29

## 2020-10-29 RX ADMIN — Medication 10 ML: at 14:53

## 2020-10-29 RX ADMIN — AMIODARONE HYDROCHLORIDE 400 MG: 200 TABLET ORAL at 18:02

## 2020-10-29 RX ADMIN — Medication 10 ML: at 06:30

## 2020-10-29 RX ADMIN — METOPROLOL TARTRATE 100 MG: 25 TABLET, FILM COATED ORAL at 09:54

## 2020-10-29 RX ADMIN — ROSUVASTATIN 40 MG: 40 TABLET, FILM COATED ORAL at 22:36

## 2020-10-29 RX ADMIN — AMIODARONE HYDROCHLORIDE 400 MG: 200 TABLET ORAL at 09:55

## 2020-10-29 RX ADMIN — INSULIN GLARGINE 17 UNITS: 100 INJECTION, SOLUTION SUBCUTANEOUS at 22:35

## 2020-10-29 RX ADMIN — CLOPIDOGREL BISULFATE 75 MG: 75 TABLET ORAL at 09:55

## 2020-10-29 RX ADMIN — HEPARIN SODIUM 5000 UNITS: 5000 INJECTION INTRAVENOUS; SUBCUTANEOUS at 22:35

## 2020-10-29 RX ADMIN — HEPARIN SODIUM 3400 UNITS: 1000 INJECTION INTRAVENOUS; SUBCUTANEOUS at 15:48

## 2020-10-29 RX ADMIN — ASPIRIN 81 MG: 81 TABLET, CHEWABLE ORAL at 09:54

## 2020-10-29 RX ADMIN — HEPARIN SODIUM 5000 UNITS: 5000 INJECTION INTRAVENOUS; SUBCUTANEOUS at 14:52

## 2020-10-29 RX ADMIN — METOPROLOL TARTRATE 100 MG: 25 TABLET, FILM COATED ORAL at 22:35

## 2020-10-29 NOTE — PROGRESS NOTES
Hospitalist Progress Note  Lynette Garcia MD  Answering service: [phone] OR [phone] from in house phone      Date of Service:  10/29/2020  NAME:  Cecil Disla  :  1945  MRN:  [id number]    Admission Summary:   Mr Rosalba Michel is a 71 y/o with a past history of CAD s/p CABG( recent stress test and echo EF 45%), AAA s/p repair, HTN, PAD s/p femoral-femoral bypass, LLE paralysis was transferred from [facility] with abdominal pain at RLQ. CT abdomen/ pelvis without contrast showing right hydronephrosis.  UA revealed UTI, JAMAL on CKD. He was started on antibiotic. On 10/20/2020 code blue was called after patient was noted to have cardiac arrest. CPR and intubated, pressor, heparin gtt started. CRRT started on 10/21. S/p LHC showing severe CAD in all arteries except LIMA-LAD. 10/25 transitioned to HD. extubated 10/25. Feeding on NGT    Interval history / Subjective:      Patient seen and examined this morning. making progress, feels pretty good. Fully oriented, engaging in conversation. Wife visiting. Going to need a permacath for HD plans prior to dc. Assessment & Plan:     # s/p Cardiac arrest: this admission. # CAD: s/p CABG  #. Ischemic cardiomyopathy  - s/p - [facility] 10/22: Severe native 3V CAD, patent LIMA-LAD, known occluded SVG-RCA and SVG-LCx; moderately elevated LVEDP  - TTE 10/21: LVEF is 40-45%. - Cardiology following-- no further cardiac work up     # Tachyarrhythmias with multifocal ventricular ectopies - controlled on amiodarone   # Acute hypoxic respiratory failure - intubated 10/20, extubated 10/25- on 2L O2 NC  # Leukocytosis possible RLL PNA ? Aspiration - Ceftriaxone - 10 day course  # ARF on CKD, stage IV- due to cardiac arrest- CRRT to HD 10/25- Nephro following     # Right sided hydronephrosis- Urology following   # Hypokalemia - replace as needed   # Mild hyperglycemia- Keep glucose less than 180 with subcutaneous insulin as needed  # Elevated LFTs: ?shock liver from hypoperfusion due to cardiac arrest - monitor  # Profound generalized weakness/ICU myopathy - PT/OT  # Dysphagia - Improved - off tube feed, remove NGT today - Mechanical soft diet      DVT px: SQ UFH   Code: Full   Dispo: Rehab      Hospital Problems  Never Reviewed          Codes Class Noted POA    Leukocytosis ICD-10-CM: W90.394  ICD-9-CM: 288.60  10/19/2020 Unknown        UTI (urinary tract infection) ICD-10-CM: N39.0  ICD-9-CM: 599.0  10/19/2020 Unknown        Tachycardia ICD-10-CM: R00.0  ICD-9-CM: 785.0  10/19/2020 Unknown        Sepsis ([address] ICD-10-CM: A41.9  ICD-9-CM: 038.9, 995.91  10/19/2020 Unknown        Hydronephrosis of right kidney ICD-10-CM: N13.30  ICD-9-CM: 243  10/19/2020 Unknown            Review of Systems:   Pertinent positive mentioned interval hx/HPI. Other systems reviewed and negative. Vital Signs:    Last 24hrs VS reviewed since prior progress note. Most recent are:  Visit Vitals  BP (!) 96/44   Pulse (!) 59   Temp 98.5 °F (36.9 °C) (Oral)   Resp 26   Ht 5' 10\" (1.778 m)   Wt 82.9 kg (182 lb 12.8 oz)   SpO2 100%   BMI 26.23 kg/m²         Intake/Output Summary (Last 24 hours) at 10/29/2020 1448  Last data filed at 10/29/2020 0957  Gross per 24 hour   Intake 340 ml   Output 355 ml   Net -15 ml        Physical Examination:     Constitutional:  No acute distress, NGT in place        Resp: No wheezing/rhonchi/rales. No accessory muscle use   CV:  Regular rhythm, normal rate, no murmurs, gallops, rubs    GI:  Soft, non distended, non tender.     Musculoskeletal:  No edema, warm    Neurologic:  generalized weakness improving        Data Review:      I personally reviewed labs and imaging     Labs:     Recent Labs     10/29/20  0229 10/28/20  0253   WBC 17.3* 14.2*   HGB 9.7* 10.3*   HCT 29.7* 32.3*    252     Recent Labs     10/29/20  0229 10/28/20  0253 10/27/20  0501   * 132* 133*   K 3.2* 3.4* 3.2*   CL 97 101 101   CO2 21 23 22   BUN 89* 71* 104*   CREA 4.74* 3.68* 4.34*   GLU 95 125* 122*   CA 8.3* 8.0* 8.5   PHOS  --   --  3.7     Recent Labs     10/27/20  0501   ALB 2.1*     No results for input(s): INR, PTP, APTT, INREXT, INREXT in the last 72 hours. No results for input(s): FE, TIBC, PSAT, FERR in the last 72 hours. No results found for: FOL, RBCF   No results for input(s): PH, PCO2, PO2 in the last 72 hours. No results for input(s): CPK, CKNDX, TROIQ in the last 72 hours.     No lab exists for component: CPKMB  No results found for: CHOL, CHOLX, CHLST, CHOLV, HDL, HDLP, LDL, LDLC, DLDLP, TGLX, TRIGL, TRIGP, CHHD, CHHDX  Lab Results   Component Value Date/Time    Glucose (POC) 151 (H) 10/29/2020 11:32 AM    Glucose (POC) 90 10/29/2020 07:59 AM    Glucose (POC) 130 (H) 10/28/2020 08:57 PM    Glucose (POC) 137 (H) 10/28/2020 06:56 PM    Glucose (POC) 132 (H) 10/28/2020 11:58 AM     Lab Results   Component Value Date/Time    Color YELLOW/STRAW 10/21/2020 12:59 AM    Appearance CLOUDY (A) 10/21/2020 12:59 AM    Specific gravity 1.018 10/21/2020 12:59 AM    pH (UA) 5.0 10/21/2020 12:59 AM    Protein 100 (A) 10/21/2020 12:59 AM    Glucose Negative 10/21/2020 12:59 AM    Ketone Negative 10/21/2020 12:59 AM    Bilirubin Negative 10/21/2020 12:59 AM    Urobilinogen 0.2 10/21/2020 12:59 AM    Nitrites Negative 10/21/2020 12:59 AM    Leukocyte Esterase MODERATE (A) 10/21/2020 12:59 AM    Epithelial cells FEW 10/21/2020 12:59 AM    Bacteria Negative 10/21/2020 12:59 AM    WBC 10-20 10/21/2020 12:59 AM    RBC 0-5 10/21/2020 12:59 AM         Medications Reviewed:     Current Facility-Administered Medications   Medication Dose Route Frequency    insulin lispro (HUMALOG) injection   SubCUTAneous AC&HS    albumin human 25% (BUMINATE) solution 12.5 g  12.5 g IntraVENous DIALYSIS PRN    amiodarone (CORDARONE) tablet 400 mg  400 mg Oral BID    clopidogreL (PLAVIX) tablet 75 mg  75 mg Oral DAILY    heparin (porcine) injection 5,000 Units 5,000 Units SubCUTAneous Q8H    metoprolol tartrate (LOPRESSOR) tablet 100 mg  100 mg Per NG tube Q12H    labetaloL (NORMODYNE;TRANDATE) injection 10 mg  10 mg IntraVENous Q2H PRN    dextrose 10% infusion 125-250 mL  125-250 mL IntraVENous PRN    aspirin chewable tablet 81 mg  81 mg Oral DAILY    heparin (porcine) 1,000 unit/mL injection 3,400 Units  3,400 Units IntraVENous DIALYSIS PRN    ondansetron (ZOFRAN) injection 4 mg  4 mg IntraVENous Q4H PRN    albuterol-ipratropium (DUO-NEB) 2.5 MG-0.5 MG/3 ML  3 mL Nebulization Q4H PRN    sodium chloride (NS) flush 5-40 mL  5-40 mL IntraVENous Q8H    sodium chloride (NS) flush 5-40 mL  5-40 mL IntraVENous PRN    polyethylene glycol (MIRALAX) packet 17 g  17 g Oral DAILY PRN    sodium chloride (NS) flush 5-40 mL  5-40 mL IntraVENous Q8H    sodium chloride (NS) flush 5-40 mL  5-40 mL IntraVENous PRN    acetaminophen (TYLENOL) tablet 650 mg  650 mg Oral Q6H PRN    Or    acetaminophen (TYLENOL) suppository 650 mg  650 mg Rectal Q6H PRN    glucose chewable tablet 16 g  4 Tab Oral PRN    glucagon (GLUCAGEN) injection 1 mg  1 mg IntraMUSCular PRN    insulin glargine (LANTUS) injection 17 Units  0.2 Units/kg SubCUTAneous QHS    hydrALAZINE (APRESOLINE) 20 mg/mL injection 10 mg  10 mg IntraVENous Q6H PRN    traMADoL (ULTRAM) tablet 50 mg  50 mg Oral Q4H PRN    rosuvastatin (CRESTOR) tablet 40 mg  40 mg Oral QHS    oxyCODONE IR (ROXICODONE) tablet 5 mg  5 mg Oral Q4H PRN    influenza vaccine 2020-21 (6 mos+)(PF) (FLUARIX/FLULAVAL/FLUZONE QUAD) injection 0.5 mL  0.5 mL IntraMUSCular PRIOR TO DISCHARGE     ______________________________________________________________________  EXPECTED LENGTH OF STAY: 4d 19h  ACTUAL LENGTH OF STAY:          10                 Olga Casey MD   Patient has given Verbal permission to discuss medical care with   persons present in the room and and also with contact as listed on face sheet.      Please note that this dictation was completed with Dragon, the computer voice recognition software. Quite often unanticipated grammatical, syntax, homophones, and other interpretive errors are inadvertently transcribed by the computer software. Please disregard these errors. Please excuse any errors that have escaped final proofreading. Thank you.

## 2020-10-29 NOTE — PROGRESS NOTES
The results of the morning lytes were reviewed. There is no suggestion of renal recovery at this time. HD will be scheduled again this morning (10/29).

## 2020-10-29 NOTE — PROGRESS NOTES
Cardiology Progress Note  10/29/2020     Admit Date: 10/19/2020  Admit Diagnosis: Hydronephrosis of right kidney [N13.30]  UTI (urinary tract infection) [N39.0]  Sepsis ([address] [A41.9]  Tachycardia [R00.0]  Leukocytosis [D72.829]  CC: none currently    Assessment:   Active Problems:    Leukocytosis (10/19/2020)      UTI (urinary tract infection) (10/19/2020)      Tachycardia (10/19/2020)      Sepsis ([address] (10/19/2020)      Hydronephrosis of right kidney (10/19/2020)      Plan:     No changes to cardiac meds  Echo reviewed  Not on RAAS 2/2 renal function  Signing off, please call with questions    Subjective:      Adolfo Chaudhary has no cardiac c/o    Objective:    Physical Exam:  Overall VSSAF;    Visit Vitals  BP (!) 132/55   Pulse (!) 59   Temp 98.5 °F (36.9 °C) (Oral)   Resp 24   Ht 5' 10\" (1.778 m)   Wt 82.9 kg (182 lb 12.8 oz)   SpO2 99%   BMI 26.23 kg/m²     Temp (24hrs), Av.5 °F (36.9 °C), Min:98.1 °F (36.7 °C), Max:98.9 °F (37.2 °C)    Patient Vitals for the past 8 hrs:   Pulse   10/29/20 1601 (!) 59   10/29/20 1545 60   10/29/20 1530 60   10/29/20 1515 60   10/29/20 1500 63   10/29/20 1445 (!) 59   10/29/20 1430 61   10/29/20 1415 60   10/29/20 1400 60   10/29/20 1345 60   10/29/20 1330 61   10/29/20 1315 61   10/29/20 1300 (!) 57   10/29/20 1245 (!) 56   10/29/20 1230 (!) 56   10/29/20 0954 64    Patient Vitals for the past 8 hrs:   Resp   10/29/20 1601 24   10/29/20 1545 21   10/29/20 1530 22   10/29/20 1515 24   10/29/20 1500 25   10/29/20 1445 26   10/29/20 1430 24   10/29/20 1415 23   10/29/20 1400 22   10/29/20 1345 22   10/29/20 1330 22   10/29/20 1315 26   10/29/20 1300 22   10/29/20 1245 16   10/29/20 1230 16   10/29/20 0954 16    Patient Vitals for the past 8 hrs:   BP   10/29/20 1601 (!) 132/55   10/29/20 1545 (!) 146/55   10/29/20 1530 (!) 135/52   10/29/20 1515 (!) 134/47   10/29/20 1500 (!) 118/58   10/29/20 1445 (!) 96/44   10/29/20 1430 123/74   10/29/20 1415 (!) 136/51   10/29/20 1400 (!) 132/50   10/29/20 1345 (!) 129/46   10/29/20 1330 123/61   10/29/20 1315 (!) 116/36   10/29/20 1300 (!) 110/48   10/29/20 1245 (!) 123/44   10/29/20 1230 (!) 128/50   10/29/20 0954 128/60      10/27 1901 - 10/29 0700  In: 1170 [P.O.:720]  Out: 355 [Urine:355]      General Appearance: Intubated   Ears/Nose/Mouth/Throat:   Normal MM; anicteric. JVP: WNL   Resp:   Lungs clear to auscultation bilaterally. Nl resp effort. Cardiovascular:  RRR, S1, S2 normal, no new murmur. No gallop or rub. Abdomen:   Soft, non-tender, bowel sounds are present. Extremities: No edema bilaterally. Skin:  Neuro: Warm and dry.   Sedated                         Data Review:     Telemetry independently reviewed :   normal sinus rhythm         Labs:   Recent Results (from the past 24 hour(s))   GLUCOSE, POC    Collection Time: 10/28/20  6:56 PM   Result Value Ref Range    Glucose (POC) 137 (H) 65 - 100 mg/dL    Performed by Reva Estrada    GLUCOSE, POC    Collection Time: 10/28/20  8:57 PM   Result Value Ref Range    Glucose (POC) 130 (H) 65 - 100 mg/dL    Performed by Tessie Ayala    CBC W/O DIFF    Collection Time: 10/29/20  2:29 AM   Result Value Ref Range    WBC 17.3 (H) 4.1 - 11.1 K/uL    RBC 3.63 (L) 4.10 - 5.70 M/uL    HGB 9.7 (L) 12.1 - 17.0 g/dL    HCT 29.7 (L) 36.6 - 50.3 %    MCV 81.8 80.0 - 99.0 FL    MCH 26.7 26.0 - 34.0 PG    MCHC 32.7 30.0 - 36.5 g/dL    RDW 15.4 (H) 11.5 - 14.5 %    PLATELET 627 284 - 620 K/uL    MPV 11.8 8.9 - 12.9 FL    NRBC 0.0 0  WBC    ABSOLUTE NRBC 0.00 0.00 - 7.25 K/uL   METABOLIC PANEL, BASIC    Collection Time: 10/29/20  2:29 AM   Result Value Ref Range    Sodium 130 (L) 136 - 145 mmol/L    Potassium 3.2 (L) 3.5 - 5.1 mmol/L    Chloride 97 97 - 108 mmol/L    CO2 21 21 - 32 mmol/L    Anion gap 12 5 - 15 mmol/L    Glucose 95 65 - 100 mg/dL    BUN 89 (H) 6 - 20 MG/DL    Creatinine 4.74 (H) 0.70 - 1.30 MG/DL    BUN/Creatinine ratio 19 12 - 20      GFR est AA 15 (L) >60 ml/min/1.73m2    GFR est non-AA 12 (L) >60 ml/min/1.73m2    Calcium 8.3 (L) 8.5 - 10.1 MG/DL   GLUCOSE, POC    Collection Time: 10/29/20  7:59 AM   Result Value Ref Range    Glucose (POC) 90 65 - 100 mg/dL    Performed by Levy APPIAH, POC    Collection Time: 10/29/20 11:32 AM   Result Value Ref Range    Glucose (POC) 151 (H) 65 - 100 mg/dL    Performed by Roxanne Quintanilla       Current medications reviewed       Owen Madera MD

## 2020-10-29 NOTE — PROGRESS NOTES
Bedside shift change report given to BEHZAD Swan (oncoming nurse) by Subha James RN (offgoing nurse). Report included the following information SBAR, Cardiac Rhythm NSR and Dual Neuro Assessment.

## 2020-10-29 NOTE — PROGRESS NOTES
Follow up visit with Bib Thompson and Gilson Santos. Pt provided an update on how he is feeling and Gilson Santos shared her sense of encouragement. Their  visited earlier today.  offered spoken prayer at bedside. Explored Don's coping and if he had any worries or concerns at this time - none expressed. Assurance of continued prayers offered.     Sonal Cuello, Palliative

## 2020-10-29 NOTE — PROGRESS NOTES
Name: Christian Keane MRN: [id number]   : 1945 Hospital: [address]   Date: 10/29/2020        IMPRESSION:   · CKD stage 4 .  · JAMAL- oligo-anuric - S/P cardiac arrest --> off CRRT --> on IHD  · Respiratory failure - extubated  · Hypervolemia- pulmonary edema - resolved  · Hyperkalemia- resolved    · Hypokalemia - addressed with a 4 K bath  · UTI- on AB's  · S/P card cath  · IRight hydronephrosis- urology is evaluating. PLAN:   · Complete HD today (10/29)  · Watch for renal recovery. · Follow lytes  · Urology following for right ureteral obstruction. ·  Avoid NSAIDs + IV contrast  · Will request a Perm Cath in anticipation of placement in the outpatient dialysis clinic. Subjective/Interval History:     F/U JAMAL --> 10/26/2020    The events were noted. CRRT was discontinued yesterday. Felt better. F/U JAMAL --> 10/27/2020      Took part in therapy today. F/U JAMAL --> 10/28/2020      Felt better. Seems to be moving more. Had HD yesterday. F/U JAMAL --> 10/29/2020    Feeling better. His appetite is improving. Objective:   Vital Signs:    Visit Vitals  BP (!) 118/58   Pulse 63   Temp 98.5 °F (36.9 °C) (Oral)   Resp 25   Ht 5' 10\" (1.778 m)   Wt 82.9 kg (182 lb 12.8 oz)   SpO2 98%   BMI 26.23 kg/m²       O2 Device: Nasal cannula   O2 Flow Rate (L/min): 3 l/min   Temp (24hrs), Av.5 °F (36.9 °C), Min:98.1 °F (36.7 °C), Max:98.9 °F (37.2 °C)       Intake/Output:   Last shift:      10/29 0701 - 10/29 1900  In: 340 [P.O.:340]  Out: -   Last 3 shifts: 10/27 1901 - 10/29 0700  In: 7532 [P.O.:720]  Out: 355 [Urine:355]    Intake/Output Summary (Last 24 hours) at 10/29/2020 1509  Last data filed at 10/29/2020 0957  Gross per 24 hour   Intake 340 ml   Output 355 ml   Net -15 ml        Physical Exam:    Seen in Room 670. Dialysis was in progress during my visit. His Dialysis Nurse was at the bedside. Mrs. Luis Mcdaniels was in attendance. General:    Comfortable.   .   Head: Normocephalic    Nose:  NGT    Lungs:   No Wheezing or rales. Heart:   No S3  Gallop , No pericardial rub. .  Abdomen:   Not distended    Extremities: No leg oedema    M/S                 Wasting +    Neurologic:      Responding appropriately            DATA:  Labs:  Recent Labs     10/29/20  0229 10/28/20  0253 10/27/20  0501   * 132* 133*   K 3.2* 3.4* 3.2*   CL 97 101 101   CO2 21 23 22   BUN 89* 71* 104*   CREA 4.74* 3.68* 4.34*   CA 8.3* 8.0* 8.5   ALB  --   --  2.1*   PHOS  --   --  3.7     Recent Labs     10/29/20  0229 10/28/20  0253 10/27/20  0502   WBC 17.3* 14.2* 15.6*   HGB 9.7* 10.3* 10.5*   HCT 29.7* 32.3* 32.5*    252 240     No results for input(s): RASHMI, KU, CLU, CREAU in the last 72 hours.     No lab exists for component: PROU    Total time spent with patient:   []       Care Plan discussed with:     Patient , Wife    Austin Medrano MD

## 2020-10-29 NOTE — PROGRESS NOTES
Problem: Falls - Risk of  Goal: *Absence of Falls  Description: Document Víctor De Los Santos Fall Risk and appropriate interventions in the flowsheet.   Outcome: Progressing Towards Goal  Note: Fall Risk Interventions:  Mobility Interventions: Assess mobility with egress test, Bed/chair exit alarm, Communicate number of staff needed for ambulation/transfer, OT consult for ADLs, Patient to call before getting OOB, PT Consult for mobility concerns, PT Consult for assist device competence, Utilize walker, cane, or other assistive device, Utilize gait belt for transfers/ambulation    Mentation Interventions: Adequate sleep, hydration, pain control, Bed/chair exit alarm, Door open when patient unattended, Evaluate medications/consider consulting pharmacy, Increase mobility, More frequent rounding, Reorient patient, Room close to nurse's station, Toileting rounds, Update white board    Medication Interventions: Bed/chair exit alarm, Evaluate medications/consider consulting pharmacy, Patient to call before getting OOB, Utilize gait belt for transfers/ambulation, Teach patient to arise slowly    Elimination Interventions: Bed/chair exit alarm, Call light in reach, Patient to call for help with toileting needs, Stay With Me (per policy), Toilet paper/wipes in reach, Toileting schedule/hourly rounds, Urinal in reach

## 2020-10-29 NOTE — PROGRESS NOTES
Occupational Therapy  10/29/20    Chart reviewed. Patient being set up for bedside HD at this time, will follow up for OT treatment as able & appropriate.      Thank you,   Cristal Larry, OTD, OTR/L

## 2020-10-29 NOTE — PROGRESS NOTES
Speech pathology  Spoke with RN who reports diet tolerance. Attempted to see patient. Patient  Being set up for HD bedside. Patient, his wife and HD RN report excellent tolerance of mechanical soft diet/ thin liquids. Patient just finished all of his lunch tray. He is not interested in diet advancement at this time but may be interested in dental soft. SLP will follow up x1 to determine appropriateness of dental soft.      Thanks,   Mehdi Valiente M.S. CCC-SLP

## 2020-10-29 NOTE — DIALYSIS
[facility]  [phone]    Vitals   Pre   Post   Assessment   Pre   Post     Temp  Temp: 98.4 °F (36.9 °C) (10/29/20 1230)  98.4 LOC  A&OX3 No change   HR   Pulse (Heart Rate): (!) 56 (10/29/20 1230) 63 Lungs   Dim bi lat  No change   B/P   BP: (!) 128/50 (10/29/20 1230) 139/51 Cardiac   RRR  No change   Resp   Resp Rate: 16 (10/29/20 1230) 20 Skin   Warm,dry  No change   Pain level  0/10 0/10 Edema  None noted     No change   Orders:    Duration:   Start:    1230 End:    1606 Total:   3.5 Hours   Dialyzer:   Dialyzer/Set Up Inspection: Revaclear (10/29/20 1230)   K Bath:   Dialysate K (mEq/L): 4 (10/29/20 1230)   Ca Bath:   Dialysate CA (mEq/L): 2.5 (10/29/20 1230)   Na/Bicarb:   Dialysate NA (mEq/L): 140 (10/29/20 1230)   Target Fluid Removal:   Goal/Amount of Fluid to Remove (mL): 1000 mL (10/29/20 1230)   Access     Type & Location:   RIJ CVC Dressing lifting changed. (10/29/20) No s/s of infection. Each catheter limb disinfected per p&p, caps removed, hubs disinfected per p&p. Aspirated and discarded 2mL of fluid from each port. Lines flushed well with NS. Tx initiated without incident.       Labs     Obtained/Reviewed   Critical Results Called   Date when labs were drawn-  Hgb-    HGB   Date Value Ref Range Status   10/29/2020 9.7 (L) 12.1 - 17.0 g/dL Final     K-    Potassium   Date Value Ref Range Status   10/29/2020 3.2 (L) 3.5 - 5.1 mmol/L Final     Ca-   Calcium   Date Value Ref Range Status   10/29/2020 8.3 (L) 8.5 - 10.1 MG/DL Final     Bun-   BUN   Date Value Ref Range Status   10/29/2020 89 (H) 6 - 20 MG/DL Final     Creat-   Creatinine   Date Value Ref Range Status   10/29/2020 4.74 (H) 0.70 - 1.30 MG/DL Final     Comment:     INVESTIGATED PER DELTA CHECK PROTOCOL        Medications/ Blood Products Given     Name   Dose   Route and Time     Heparin 1100unit/1.1mL Arterial dwell-1606   Heparin 1400unit/1.4mL Venous dwell-1606        Blood Volume Processed (BVP): 71.3L Net Fluid   Removed:  500mL   Comments   Time Out Done: Yes  Primary Nurse Rpt Pre: BEHZAD Swan  Primary Nurse Rpt Post: BEHZAD Swan  Pt Education: Procedure, access care, s/s of infection. Care Plan: Continue dialysis as ordered. Tx Summary:  1300-/48 UF paused and goal decreased to 500mL  1315-UF turned back on at the lower UF goal.  1448-Blood seeping around catheter insert site. Monitoring closely. Primary RN notified. Primary RN plans to change the dressing again in a few hours once clot reforms. 1500-Nephrologist and hospitalist at bedside. 1606-Tx completed per order. At end, all possible blood returned to pt. Lines flushed well with NS. Each dialysis catheter limb disinfected per p&p, blood returned per p&p, each dialysis hub disinfected per p&p, post dialysis catheter dwell instilled per order, and caps applied. Admiting Diagnosis: JAMAL  Pt's previous clinic-NA  Consent signed - Informed Consent Verified: Yes (10/29/20 1230)  [facility] Consent - On file  Hepatitis Status- Neg/Sheila (10/22/20)  Machine #- Machine Number: [id number] (10/29/20 1230)  Telemetry status-Monitored bedside and remotely  Pre-dialysis wt. - Pre-Dialysis Weight: 85 kg (187 lb 4.8 oz) (10/29/20 1230)

## 2020-10-29 NOTE — PROGRESS NOTES
Problem: Falls - Risk of  Goal: *Absence of Falls  Description: Document Jerod Aguilera Fall Risk and appropriate interventions in the flowsheet. Outcome: Progressing Towards Goal  Note: Fall Risk Interventions:  Mobility Interventions: Bed/chair exit alarm, Patient to call before getting OOB, PT Consult for mobility concerns    Mentation Interventions: Adequate sleep, hydration, pain control, Bed/chair exit alarm, Door open when patient unattended, Familiar objects from home    Medication Interventions: Assess postural VS orthostatic hypotension, Bed/chair exit alarm, Evaluate medications/consider consulting pharmacy, Patient to call before getting OOB    Elimination Interventions: Bed/chair exit alarm, Call light in reach, Patient to call for help with toileting needs              Problem: Urinary Tract Infection - Adult  Goal: *Absence of infection signs and symptoms  Outcome: Progressing Towards Goal     Problem: Pain  Goal: *Control of Pain  Outcome: Progressing Towards Goal     Problem: Diabetes Self-Management  Goal: *Incorporating physical activity into lifestyle  Description: State effect of exercise on blood glucose levels. Outcome: Progressing Towards Goal  Goal: *Developing strategies to promote health/change behavior  Description: Define the ABC's of diabetes; identify appropriate screenings, schedule and personal plan for screenings. Outcome: Progressing Towards Goal  Goal: *Using medications safely  Description: State effect of diabetes medications on diabetes; name diabetes medication taking, action and side effects.   Outcome: Progressing Towards Goal

## 2020-10-29 NOTE — PROGRESS NOTES
Physical therapy  10/29/20    Chart reviewed. Patient being set up for bedside HD at this time, will follow up for PT treatment as able & appropriate.      Thank you,   Nandini Fletcher, PT, DPT

## 2020-10-29 NOTE — PROGRESS NOTES
Bedside shift change report given to Angeles Vasquez RN (oncoming nurse) by Vikki Ramos RN (offgoing nurse). Report included the following information SBAR, Kardex, Intake/Output, MAR, Recent Results, Cardiac Rhythm NSR/SB and Dual Neuro Assessment.

## 2020-10-30 LAB
ALBUMIN SERPL-MCNC: 2.2 G/DL (ref 3.5–5)
ALBUMIN/GLOB SERPL: 0.5 {RATIO} (ref 1.1–2.2)
ALP SERPL-CCNC: 894 U/L (ref 45–117)
ALT SERPL-CCNC: 242 U/L (ref 12–78)
ANION GAP SERPL CALC-SCNC: 10 MMOL/L (ref 5–15)
AST SERPL-CCNC: 201 U/L (ref 15–37)
BILIRUB SERPL-MCNC: 0.6 MG/DL (ref 0.2–1)
BUN SERPL-MCNC: 61 MG/DL (ref 6–20)
BUN/CREAT SERPL: 16 (ref 12–20)
CALCIUM SERPL-MCNC: 8.5 MG/DL (ref 8.5–10.1)
CHLORIDE SERPL-SCNC: 99 MMOL/L (ref 97–108)
CO2 SERPL-SCNC: 23 MMOL/L (ref 21–32)
CREAT SERPL-MCNC: 3.85 MG/DL (ref 0.7–1.3)
GLOBULIN SER CALC-MCNC: 4.2 G/DL (ref 2–4)
GLUCOSE BLD STRIP.AUTO-MCNC: 145 MG/DL (ref 65–100)
GLUCOSE BLD STRIP.AUTO-MCNC: 78 MG/DL (ref 65–100)
GLUCOSE BLD STRIP.AUTO-MCNC: 86 MG/DL (ref 65–100)
GLUCOSE BLD STRIP.AUTO-MCNC: 97 MG/DL (ref 65–100)
GLUCOSE SERPL-MCNC: 100 MG/DL (ref 65–100)
POTASSIUM SERPL-SCNC: 3.4 MMOL/L (ref 3.5–5.1)
PROT SERPL-MCNC: 6.4 G/DL (ref 6.4–8.2)
SERVICE CMNT-IMP: ABNORMAL
SERVICE CMNT-IMP: NORMAL
SODIUM SERPL-SCNC: 132 MMOL/L (ref 136–145)

## 2020-10-30 PROCEDURE — 74011636637 HC RX REV CODE- 636/637: Performed by: FAMILY MEDICINE

## 2020-10-30 PROCEDURE — 36415 COLL VENOUS BLD VENIPUNCTURE: CPT

## 2020-10-30 PROCEDURE — 80053 COMPREHEN METABOLIC PANEL: CPT

## 2020-10-30 PROCEDURE — 97530 THERAPEUTIC ACTIVITIES: CPT

## 2020-10-30 PROCEDURE — 97535 SELF CARE MNGMENT TRAINING: CPT

## 2020-10-30 PROCEDURE — 74011250636 HC RX REV CODE- 250/636: Performed by: INTERNAL MEDICINE

## 2020-10-30 PROCEDURE — 74011250637 HC RX REV CODE- 250/637: Performed by: INTERNAL MEDICINE

## 2020-10-30 PROCEDURE — 65660000000 HC RM CCU STEPDOWN

## 2020-10-30 PROCEDURE — 82962 GLUCOSE BLOOD TEST: CPT

## 2020-10-30 RX ADMIN — AMIODARONE HYDROCHLORIDE 400 MG: 200 TABLET ORAL at 09:41

## 2020-10-30 RX ADMIN — AMIODARONE HYDROCHLORIDE 400 MG: 200 TABLET ORAL at 18:20

## 2020-10-30 RX ADMIN — HEPARIN SODIUM 5000 UNITS: 5000 INJECTION INTRAVENOUS; SUBCUTANEOUS at 05:43

## 2020-10-30 RX ADMIN — HEPARIN SODIUM 5000 UNITS: 5000 INJECTION INTRAVENOUS; SUBCUTANEOUS at 13:30

## 2020-10-30 RX ADMIN — ASPIRIN 81 MG: 81 TABLET, CHEWABLE ORAL at 09:41

## 2020-10-30 RX ADMIN — METOPROLOL TARTRATE 100 MG: 25 TABLET, FILM COATED ORAL at 21:31

## 2020-10-30 RX ADMIN — HEPARIN SODIUM 5000 UNITS: 5000 INJECTION INTRAVENOUS; SUBCUTANEOUS at 21:31

## 2020-10-30 RX ADMIN — Medication 10 ML: at 22:00

## 2020-10-30 RX ADMIN — Medication 20 ML: at 13:31

## 2020-10-30 RX ADMIN — CLOPIDOGREL BISULFATE 75 MG: 75 TABLET ORAL at 09:41

## 2020-10-30 RX ADMIN — INSULIN GLARGINE 17 UNITS: 100 INJECTION, SOLUTION SUBCUTANEOUS at 22:51

## 2020-10-30 RX ADMIN — Medication 10 ML: at 05:43

## 2020-10-30 RX ADMIN — ROSUVASTATIN 40 MG: 40 TABLET, FILM COATED ORAL at 21:31

## 2020-10-30 RX ADMIN — METOPROLOL TARTRATE 100 MG: 25 TABLET, FILM COATED ORAL at 09:41

## 2020-10-30 NOTE — PROGRESS NOTES
Problem: Self Care Deficits Care Plan (Adult)  Goal: *Acute Goals and Plan of Care (Insert Text)  Description:   FUNCTIONAL STATUS PRIOR TO ADMISSION: Patient was independent and active without use of DME. Drives. LLE weakness since 2011 when contracted salmonella. HOME SUPPORT: The patient lived with wife but did not require assist.    Occupational Therapy Goals  Initiated 10/26/2020  1. Patient will perform grooming seated EOB with minimal assistance/contact guard assist within 7 day(s). 2.  Patient will perform upper body dressing seated EOB with minimal assistance/contact guard assist within 7 day(s). 3.  Patient will perform toilet transfers with maximal assistance within 7 day(s). 4.  Patient will perform all aspects of toileting with maximal assistance within 7 day(s). 5.  Patient will participate in upper extremity therapeutic exercise/activities with minimal assistance/contact guard assist for 10 minutes within 7 day(s). 6.  Patient will utilize energy conservation techniques during functional activities with verbal, visual, and tactile cues within 7 day(s). Outcome: Progressing Towards Goal    OCCUPATIONAL THERAPY TREATMENT  Patient: Holley Melo (49 y.o. male)  Date: 10/30/2020  Diagnosis: Hydronephrosis of right kidney [N13.30]  UTI (urinary tract infection) [N39.0]  Sepsis ([address] [A41.9]  Tachycardia [R00.0]  Leukocytosis [D72.829]   <principal problem not specified>  Procedure(s) (LRB):  LEFT HEART CATH / CORONARY ANGIOGRAPHY (N/A) 8 Days Post-Op  Precautions: Fall, Skin  Chart, occupational therapy assessment, plan of care, and goals were reviewed. ASSESSMENT  Patient continues with skilled OT services and is progressing towards goals. Patient remains most limited by global weakness (L > R), impaired balance, decreased cardiopulmonary tolerance, and mild impulsivity with movement noted during lower body dressing and functional transfers.  Patient demonstrates significant fall risk and will benefit from IPR stay to increase safety during functional mobility and ADLs, address functional cognition, and increase community mobility. Current Level of Function Impacting Discharge (ADLs): mod A x2 sit <> stand, min A upper body ADLs, mod to max A lower body ADLs     Other factors to consider for discharge: lives with wife, PLOF, PMH, driving prior to admission          PLAN :  Patient continues to benefit from skilled intervention to address the above impairments. Continue treatment per established plan of care. to address goals. Recommend with staff: Transfer to [facility] with assist of 2, assist with ADLs with set up as able, OOB<>chair with assist 2 stand pivot transfer. Thank you! Recommend next OT session: standing ADLs, lower body access    Recommendation for discharge: (in order for the patient to meet his/her long term goals)  Therapy 3 hours per day 5-7 days per week    If d/c home, recommending HHOT/PT for home safety, ADL participation, and safe functional mobility, DME below, and physical assist of 2 and 24/7 superversion for all ADLs/IADLs and functional mobility. This discharge recommendation:   Has been made in collaboration with the attending provider and/or case management    IF patient discharges home will need the following DME: bedside commode, shower chair or transfer bench, and wheelchair (patient reports having RW at home)        SUBJECTIVE:   Patient stated Waldemar Stare am tired from getting to the toilet earlier.  Referring to [facility] transfer with nursing     OBJECTIVE DATA SUMMARY:   Cognitive/Behavioral Status:  Neurologic State: Alert  Orientation Level: Oriented X4  Cognition: Follows commands; Impulsive; Appropriate for age attention/concentration  Perception: Appears intact  Perseveration: No perseveration noted  Safety/Judgement: Awareness of environment;Decreased awareness of need for assistance;Decreased awareness of need for safety; Fall prevention    Functional Mobility and Transfers for ADLs:  Bed Mobility:  Supine to Sit: Additional time;Minimum assistance    Transfers:  Sit to Stand: Assist x2; Moderate assistance     Bed to Chair: Assist x2; Moderate assistance    Balance:  Sitting: Impaired  Sitting - Static: Good (unsupported)  Sitting - Dynamic: Fair (occasional)  Standing: Impaired; With support  Standing - Static: Poor;Constant support  Standing - Dynamic : Poor;Constant support    ADL Intervention:  Patient demonstrated functional mobility to chair with mod A x2 for maintaining balance, coordinating steps, standing technique (nose over toes), and safety with RW. Patient demonstrated one handed functional reach to anterior visual field while standing (1x per hand) with no LOB noted, but required cues for pacing and min A for standing balance. Patient required cues for pacing throughout session and demonstrated fatigue after 1 minute of standing (would quickly sit without warning). Lower Body Dressing Assistance  Socks: Moderate assistance(assist for pulling up foot; able to reach w/o LOB)  Leg Crossed Method Used: No(reports unable d/t knee pain)  Position Performed: Seated edge of bed  Cues: Physical assistance;Don;Verbal cues provided    Cognitive Retraining  Attention to Task: Multi-task;Distractibility  Maintains Attention For (Time): 1 minute  Following Commands: Follows one step commands/directions; Awareness of environment; Follows two step commands/directions  Safety/Judgement: Awareness of environment;Decreased awareness of need for assistance;Decreased awareness of need for safety; Fall prevention  Cues: Verbal cues provided; Tactile cues provided;Visual cues provided    Pain:  None reported     Activity Tolerance:   Fair, requires frequent rest breaks, and observed SOB with activity  Please refer to the flowsheet for vital signs taken during this treatment.     After treatment patient left in no apparent distress:   Sitting in chair, Call bell within reach, Bed / chair alarm activated, and Caregiver / family present    COMMUNICATION/COLLABORATION:   The patients plan of care was discussed with: Physical therapist.     Patient was educated regarding His deficit(s) of L sided weakness as this relates to His diagnosis of CVA/cardiac arrest.  He demonstrated Fair understanding as evidenced by verbalization. Patient and/or family was verbally educated on the BE FAST acronym for signs/symptoms of CVA and TIA. BE FAST was written on patient's communication board  for visual education and reinforcement. All questions answered with patient indicating good understanding. Regarding student involvement in patient care:  A student participated in this treatment session. Per CMS Medicare statements and AOTA guidelines I certify that the following was true:  1. I was present and directly observed the entire session. 2. I made all skilled judgments and clinical decisions regarding care. 3. I am the practitioner responsible for assessment, treatment, and documentation.       Sherrian Sicard, OTS  Time Calculation: 23 mins

## 2020-10-30 NOTE — PROGRESS NOTES
Problem: Mobility Impaired (Adult and Pediatric)  Goal: *Acute Goals and Plan of Care (Insert Text)  Description: FUNCTIONAL STATUS PRIOR TO ADMISSION: Patient was independent and active without use of DME. Driving. Retired. HOME SUPPORT PRIOR TO ADMISSION: The patient lived with wife but did not require assist.    Physical Therapy Goals  Initiated 10/25/2020  1. Patient will move from supine to sit and sit to supine  in bed with moderate assistance  within 7 day(s). 2.  Patient will transfer from bed to chair and chair to bed with moderate assistance  using the least restrictive device within 7 day(s). 3.  Patient will perform sit to stand with moderate assistance  within 7 day(s). 4.  Patient will ambulate with moderate assistance  for 5 feet with the least restrictive device within 7 day(s). Outcome: Progressing Towards Goal     PHYSICAL THERAPY TREATMENT  Patient: Patel Sandoval (84 y.o. male)  Date: 10/30/2020  Diagnosis: Hydronephrosis of right kidney [N13.30]  UTI (urinary tract infection) [N39.0]  Sepsis ([address] [A41.9]  Tachycardia [R00.0]  Leukocytosis [D72.829]   <principal problem not specified>  Procedure(s) (LRB):  LEFT HEART CATH / CORONARY ANGIOGRAPHY (N/A) 8 Days Post-Op  Precautions: Fall, Skin  Chart, physical therapy assessment, plan of care and goals were reviewed. ASSESSMENT  Patient continues with skilled PT services and is progressing towards goals - remains most limited by global weakness (L > R), impaired balance, and decreased cardiopulmonary tolerance to activity leading to increased falls risk and dependency from baseline level. Worked on repeated sit<>stands for functional strengthening - ultimately progressing to marching and taking several steps to chair. Overall mod A x2 with cues for hand placement, coordinated fwd rocking for momentum, and cues for \"nose over toes\". Fatigues quickly and demons uncontrolled descent to bed on multiple occasions.  Remained up in chair at end of session, VSS, NAD. He remains below his indep baseline and a high falls risk - continue to recommend discharge to [facility] once medically cleared. For the weekend, recommend patient to complete as able in order to maintain strength, endurance and independence with nursing: OOB to chair 3x/day with gait belt and assist x2. PT to follow-up with patient after the weekend. Current Level of Function Impacting Discharge (mobility/balance): mod A x2 for mobility; quick fatigue     Other factors to consider for discharge: high falls risk          PLAN :  Patient continues to benefit from skilled intervention to address the above impairments. Continue treatment per established plan of care. to address goals. Recommendation for discharge: (in order for the patient to meet his/her long term goals)  Therapy 3 hours per day 5-7 days per week    This discharge recommendation:  A follow-up discussion with the attending provider and/or case management is planned    IF patient discharges home will need the following DME: to be determined (TBD)       SUBJECTIVE:   Patient stated [facility] that was a workout.     OBJECTIVE DATA SUMMARY:   Critical Behavior:  Neurologic State: Alert, Eyes open spontaneously  Orientation Level: Oriented X4  Cognition: Follows commands, Appropriate decision making, Appropriate for age attention/concentration  Safety/Judgement: Fall prevention, Awareness of environment  Functional Mobility Training:  Bed Mobility:  Supine to Sit: Additional time;Minimum assistance     Transfers:  Sit to Stand: Assist x2; Moderate assistance  Stand to Sit: Assist x2; Moderate assistance  Bed to Chair: Assist x2; Moderate assistance     Balance:  Sitting: Impaired  Sitting - Static: Good (unsupported)  Sitting - Dynamic: Fair (occasional)  Standing: Impaired; With support  Standing - Static: Poor;Constant support  Standing - Dynamic : Poor;Constant support    Activity Tolerance:   Good, desaturates with exertion and requires oxygen, and requires frequent rest breaks  Please refer to the flowsheet for vital signs taken during this treatment. After treatment patient left in no apparent distress:   Sitting in chair, Call bell within reach, Bed / chair alarm activated, and Caregiver / family present    COMMUNICATION/COLLABORATION:   The patients plan of care was discussed with: Occupational therapist and Registered nurse.      Alvaro Anderson, PT, DPT   Time Calculation: 23 mins

## 2020-10-30 NOTE — PROGRESS NOTES
Hospitalist Progress Note  Marita Witt MD  Answering service: [phone] OR [phone] from in house phone      Date of Service:  10/30/2020  NAME:  Shashank Culp  :  1945  MRN:  [id number]    Admission Summary:   Mr Mariano Buerger is a 69 y/o with a past history of CAD s/p CABG( recent stress test and echo EF 45%), AAA s/p repair, HTN, PAD s/p femoral-femoral bypass, LLE paralysis was transferred from [facility] with abdominal pain at RLQ. CT abdomen/ pelvis without contrast showing right hydronephrosis.  UA revealed UTI, JAMAL on CKD. He was started on antibiotic. On 10/20/2020 code blue was called after patient was noted to have cardiac arrest. CPR and intubated, pressor, heparin gtt started. CRRT started on 10/21. S/p LHC showing severe CAD in all arteries except LIMA-LAD. 10/25 transitioned to HD. extubated 10/25. Feeding on NGT    Interval history / Subjective:      Patient seen and examined this morning. Feels well, smiling, wife at bedside, needs HD chair and permacath prior to dc to rehab. Assessment & Plan:     #. s/p Cardiac arrest: this admission. #. CAD: s/p CABG  #. Ischemic cardiomyopathy  - s/p - [facility] 10/22: Severe native 3V CAD, patent LIMA-LAD, known occluded SVG-RCA and SVG-LCx; moderately elevated LVEDP  - TTE 10/21: LVEF is 40-45%. - Cardiology following-- no further cardiac work up     # Tachyarrhythmias with multifocal ventricular ectopies - controlled on amiodarone   # Acute hypoxic respiratory failure - intubated 10/20, extubated 10/25- on 2L O2 NC  # Leukocytosis possible RLL PNA ? Aspiration - Ceftriaxone - 10 day course  # ARF on CKD, stage IV- due to cardiac arrest- CRRT to HD 10/25- Nephro following   - will need permaCath and HD chair prior to dc to rehab.     # Right sided hydronephrosis- Urology following   # Hypokalemia - replace as needed   # Mild hyperglycemia- Keep glucose less than 180 with subcutaneous insulin as needed  # Elevated LFTs: ?shock liver from hypoperfusion due to cardiac arrest - monitor  # Profound generalized weakness/ICU myopathy - PT/OT  # Dysphagia - Improved - off tube feed, removed NGT - Mechanical soft diet      DVT px: SQ UFH   Code: Full   Dispo: A/w Rehab     Hospital Problems  Never Reviewed          Codes Class Noted POA    Leukocytosis ICD-10-CM: D45.795  ICD-9-CM: 288.60  10/19/2020 Unknown        UTI (urinary tract infection) ICD-10-CM: N39.0  ICD-9-CM: 599.0  10/19/2020 Unknown        Tachycardia ICD-10-CM: R00.0  ICD-9-CM: 785.0  10/19/2020 Unknown        Sepsis ([address] ICD-10-CM: A41.9  ICD-9-CM: 038.9, 995.91  10/19/2020 Unknown        Hydronephrosis of right kidney ICD-10-CM: N13.30  ICD-9-CM: 271  10/19/2020 Unknown            Review of Systems:   Pertinent positive mentioned interval hx/HPI. Other systems reviewed and negative. Vital Signs:    Last 24hrs VS reviewed since prior progress note. Most recent are:  Visit Vitals  BP (!) 146/60 (BP 1 Location: Right arm, BP Patient Position: At rest)   Pulse 64   Temp 98.4 °F (36.9 °C)   Resp 20   Ht 5' 10\" (1.778 m)   Wt 86.3 kg (190 lb 3.2 oz)   SpO2 98%   BMI 27.29 kg/m²         Intake/Output Summary (Last 24 hours) at 10/30/2020 1236  Last data filed at 10/30/2020 0251  Gross per 24 hour   Intake 500 ml   Output 700 ml   Net -200 ml        Physical Examination:     Constitutional:  No acute distress, NGT in place        Resp: No wheezing/rhonchi/rales. No accessory muscle use   CV:  Regular rhythm, normal rate, no murmurs, gallops, rubs    GI:  Soft, non distended, non tender.     Musculoskeletal:  No edema, warm    Neurologic:  generalized weakness improving        Data Review:      I personally reviewed labs and imaging     Labs:     Recent Labs     10/29/20  0229 10/28/20  0253   WBC 17.3* 14.2*   HGB 9.7* 10.3*   HCT 29.7* 32.3*    252     Recent Labs     10/30/20  0542 10/29/20  0229 10/28/20  0253   * 130* 132* K 3.4* 3.2* 3.4*   CL 99 97 101   CO2 23 21 23   BUN 61* 89* 71*   CREA 3.85* 4.74* 3.68*    95 125*   CA 8.5 8.3* 8.0*     Recent Labs     10/30/20  0542   *   *   TBILI 0.6   TP 6.4   ALB 2.2*   GLOB 4.2*     No results for input(s): INR, PTP, APTT, INREXT, INREXT in the last 72 hours. No results for input(s): FE, TIBC, PSAT, FERR in the last 72 hours. No results found for: FOL, RBCF   No results for input(s): PH, PCO2, PO2 in the last 72 hours. No results for input(s): CPK, CKNDX, TROIQ in the last 72 hours.     No lab exists for component: CPKMB  No results found for: CHOL, CHOLX, CHLST, CHOLV, HDL, HDLP, LDL, LDLC, DLDLP, [place], CHHD, CHHDX  Lab Results   Component Value Date/Time    Glucose (POC) 86 10/30/2020 10:59 AM    Glucose (POC) 97 10/30/2020 07:18 AM    Glucose (POC) 151 (H) 10/29/2020 10:15 PM    Glucose (POC) 130 (H) 10/29/2020 04:14 PM    Glucose (POC) 151 (H) 10/29/2020 11:32 AM     Lab Results   Component Value Date/Time    Color YELLOW/STRAW 10/21/2020 12:59 AM    Appearance CLOUDY (A) 10/21/2020 12:59 AM    Specific gravity 1.018 10/21/2020 12:59 AM    pH (UA) 5.0 10/21/2020 12:59 AM    Protein 100 (A) 10/21/2020 12:59 AM    Glucose Negative 10/21/2020 12:59 AM    Ketone Negative 10/21/2020 12:59 AM    Bilirubin Negative 10/21/2020 12:59 AM    Urobilinogen 0.2 10/21/2020 12:59 AM    Nitrites Negative 10/21/2020 12:59 AM    Leukocyte Esterase MODERATE (A) 10/21/2020 12:59 AM    Epithelial cells FEW 10/21/2020 12:59 AM    Bacteria Negative 10/21/2020 12:59 AM    WBC 10-20 10/21/2020 12:59 AM    RBC 0-5 10/21/2020 12:59 AM         Medications Reviewed:     Current Facility-Administered Medications   Medication Dose Route Frequency    insulin lispro (HUMALOG) injection   SubCUTAneous AC&HS    albumin human 25% (BUMINATE) solution 12.5 g  12.5 g IntraVENous DIALYSIS PRN    amiodarone (CORDARONE) tablet 400 mg  400 mg Oral BID    clopidogreL (PLAVIX) tablet 75 mg  75 mg Oral DAILY    heparin (porcine) injection 5,000 Units  5,000 Units SubCUTAneous Q8H    metoprolol tartrate (LOPRESSOR) tablet 100 mg  100 mg Per NG tube Q12H    labetaloL (NORMODYNE;TRANDATE) injection 10 mg  10 mg IntraVENous Q2H PRN    dextrose 10% infusion 125-250 mL  125-250 mL IntraVENous PRN    aspirin chewable tablet 81 mg  81 mg Oral DAILY    heparin (porcine) 1,000 unit/mL injection 3,400 Units  3,400 Units IntraVENous DIALYSIS PRN    ondansetron (ZOFRAN) injection 4 mg  4 mg IntraVENous Q4H PRN    albuterol-ipratropium (DUO-NEB) 2.5 MG-0.5 MG/3 ML  3 mL Nebulization Q4H PRN    sodium chloride (NS) flush 5-40 mL  5-40 mL IntraVENous Q8H    sodium chloride (NS) flush 5-40 mL  5-40 mL IntraVENous PRN    polyethylene glycol (MIRALAX) packet 17 g  17 g Oral DAILY PRN    sodium chloride (NS) flush 5-40 mL  5-40 mL IntraVENous Q8H    sodium chloride (NS) flush 5-40 mL  5-40 mL IntraVENous PRN    acetaminophen (TYLENOL) tablet 650 mg  650 mg Oral Q6H PRN    Or    acetaminophen (TYLENOL) suppository 650 mg  650 mg Rectal Q6H PRN    glucose chewable tablet 16 g  4 Tab Oral PRN    glucagon (GLUCAGEN) injection 1 mg  1 mg IntraMUSCular PRN    insulin glargine (LANTUS) injection 17 Units  0.2 Units/kg SubCUTAneous QHS    hydrALAZINE (APRESOLINE) 20 mg/mL injection 10 mg  10 mg IntraVENous Q6H PRN    traMADoL (ULTRAM) tablet 50 mg  50 mg Oral Q4H PRN    rosuvastatin (CRESTOR) tablet 40 mg  40 mg Oral QHS    oxyCODONE IR (ROXICODONE) tablet 5 mg  5 mg Oral Q4H PRN    influenza vaccine 2020-21 (6 mos+)(PF) (FLUARIX/FLULAVAL/FLUZONE QUAD) injection 0.5 mL  0.5 mL IntraMUSCular PRIOR TO DISCHARGE     ______________________________________________________________________  EXPECTED LENGTH OF STAY: 4d 19h  ACTUAL LENGTH OF STAY:          Kj Trujillo MD   Patient has given Verbal permission to discuss medical care with   persons present in the room and and also with contact as listed on face sheet. Please note that this dictation was completed with [employer], the computer voice recognition software. Quite often unanticipated grammatical, syntax, homophones, and other interpretive errors are inadvertently transcribed by the computer software. Please disregard these errors. Please excuse any errors that have escaped final proofreading. Thank you.

## 2020-10-30 NOTE — PROGRESS NOTES
[facility]-Home  RUR-17% Low    CM notes consult for dialysis. Patient is anticipated to be inpatient through the weekend and will need perma cath placed. Therapy recommending IPR. CM received FOC and will complete referral. CM to follow up. Transition of Care Plan:     The Plan for Transition of Care is related to the following treatment goals: IPR    The Patient and/or patient representative was provided with a choice of provider and agrees  with the discharge plan. Yes [x] No []    A Freedom of choice list was provided with basic dialogue that supports the patient's individualized plan of care/goals and shares the quality data associated with the providers.        Yes [x] No []       * [facility]     Sonu Hart MS

## 2020-10-30 NOTE — PROGRESS NOTES
Music Therapy Assessment  Alfredo Brennan [id number]     1945  70 y.o.  male    Patient Telephone Number: [phone] (home)   Hoahaoism Affiliation: Zhilian Zhaopin   Language: English   Patient Active Problem List    Diagnosis Date Noted    Leukocytosis 10/19/2020    UTI (urinary tract infection) 10/19/2020    Tachycardia 10/19/2020    Sepsis ([address] 10/19/2020    Hydronephrosis of right kidney 10/19/2020        Date: 10/30/2020            Total Time (in minutes): 32          [facility] 6S NEURO-SCI TELE    Mental Status:   [x] Alert [  ] Wilbern Mariam [  ]  Confused  [  ] Minimally responsive  [  ] Sleeping    Communication Status: [  ] Impaired Speech [  ] Nonverbal -N/A    Physical Status:   [  ] Oxygen in use  [  ] Hard of Hearing [  ] Vision Impaired  [  ] Ambulatory  [  ] Ambulatory with assistance [  ] Non-ambulatory -N/A    Music Preferences, Background: Country, artists like Princess Merida. Clinical Problem addressed: support healthy pt/family coping and positive social interaction.     Goal(s) met in session:  Physical/Pain management (Scale of 1-10):    Pre-session rating: Pt didn't report pain  Post-session rating: Pt didn't report pain  [x] Increased relaxation   [  ] Affected breathing patterns  [  ] Decreased muscle tension   [  ] Decreased agitation  [  ] Affected heart rate    [  ] Increased alertness     Emotional/Psychological:  [x] Increased self-expression   [  ] Decreased aggressive behavior   [  ] Decreased feelings of stress  [  ] Discussed healthy coping skills     [x] Improved mood    [  ] Decreased withdrawn behavior     Social:  [  ] Decreased feelings of isolation/loneliness [x] Positive social interaction   [x] Provided support and/or comfort for family/friends    Spiritual:  [  ] Spiritual support    [  ] Expressed peace  [  ] Expressed malcom    [  ] Discussed beliefs    Techniques Utilized (Check all that apply):   [  ] Procedural support MT [x] Music for relaxation [x] Patient preferred music  [  ] Carmen analysis  [x] Janell Craft choice  [  ] Music for validation  [  ] Entrainment  [x] Movement to music [  ] Guided visualization  [  ] Derejekim Phelps  [  ] Patient instrument playing [  ] Janell Craft writing  [  ] Jane Spivey along   [  ] Ian Cox  [  ] Sensory stimulation  [x] Active Listening  [  ] Music for spiritual support [  ] Making of CDs as gifts    Session Observations:  F/up visit; patient (pt) was alert, lying in bed, and his spouse was at bedside. This music therapy intern (MTI) introduced self to the pt and asked how he was feeling today. Pt began sharing about his health journey, saying he feels blessed to be feeling much better. Pt's spouse mentioned past music the MTI has played in previous sessions to the pt, requesting to hear Ted Pilling' White Lightening. MTI sang and played this with guitar. Pt increased emotional expression in response to the music as evidenced by (AEB) his eyes brightening and smiling along with the music. Pt expressed enjoyment in the music and shared about why he enjoys that particular artist. Pt and the pt's spouse shared about their family, their Scientologist, and the constant support and prayers they've received during the pt's health journey. MTI provided active listening and words of validation. MTI sang and played Nida Chavez's Your Cheatin' Heart with guitar. Pt increased relaxation in response to the music AEB closing his eyes. Pt shared more about the history of his health and what the road to recovery will look like for him. MTI offered a final song and pt agreed to hear REBEL Jaimes 67. MTI sang and played this with guitar. Pt and pt's spouse reminisced about a country festival they attended together in the past, bringing up various stories and artists they enjoyed seeing together. Pt and pt's spouse expressed gratitude for the visit. Will follow as able. Jamie De, Music Therapy Intern  Spiritual Care Services Dept.    Referral-based service

## 2020-10-30 NOTE — PROGRESS NOTES
Comprehensive Nutrition Assessment    Type and Reason for Visit: Reassess    Nutrition Recommendations/Plan:      1. Continue Mech Soft diet. SLP to consider Dental soft as able. Pt agreeable to current diet. 2. Added Nepro for PO consumption to help meet protein needs. 3. Monitor K/Phos (K+ low, Phos WNL on last check). Nutrition Assessment:    Pt admitted with Leukocytosis. PMHx: CAD, MI, DM 2, AAA, HTN, HLD. Sepsis, UTI, pyelonephritis on admission. Cardiac arrest 10/20-intubated. JAMAL on CKD 4, started CRRT 10/21. ?NSTEMI 10/22-s/p (L) heart cath. Undergoing SBT's today. Pt on Mech soft diet currently and eating well. NG tube removed 10/28. PO >75% since 10/27. 100% on 10/29. Added Nepro PO to help meet protein needs. Added 3-4g No added salt to trays for current dialysis. Daily weights. BG have been WNL and BUN improving over last 4 days. Last BM 10/28. Edema improved. Patient Vitals for the past 168 hrs:   % Diet Eaten   10/27/20 1905 80 %   10/27/20 1200 75 %   10/26/20 2000 0 %   10/26/20 0400 0 %   10/26/20 0000 0 %   10/25/20 2000 0 %       Malnutrition Assessment:  Malnutrition Status:  No malnutrition      Nutritionally Significant Medications:   Amiodarone, plavix, lantus, humalog, crestor,     Estimated Daily Nutrient Needs:  Energy (kcal):  9046-2640 (MSJ x 1.2-1.3)  Protein (g):  104-122 (1.2-1.4g/kg)      Fluid (ml/day):  1 ml/kcal    Nutrition Related Findings:       BM: 10/28  Edema: none  Wounds:  None       Current Nutrition Therapies:   Diet: Mech Soft/ thin liquids    Anthropometric Measures:  · Height:  5' 10\" (177.8 cm)  · Current Body Wt:  86.2 kg (190 lb)   · Admission Body Wt:  185 lb 3 oz     · Ideal Body Wt: 166#  :  116.1 %    · BMI Categories:  Overweight (BMI 25.0-29. 9)     Wt Readings from Last 10 Encounters:   10/30/20 86.3 kg (190 lb 3.2 oz)       Nutrition Diagnosis:   · Inadequate oral intake related to impaired respiratory function as evidenced by NPO or clear liquid status due to medical condition, intubation   - resolved    Nutrition Interventions:   Food and/or Nutrient Delivery: Continue current diet, Start oral nutrition supplement  Nutrition Education and Counseling: No recommendations at this time  Coordination of Nutrition Care: Continued inpatient monitoring, Interdisciplinary rounds    Goals:  Pt will consume >75% of meals and 1-2 ONS per day within 5-7 days       Nutrition Monitoring and Evaluation:   Food/Nutrient Intake Outcomes: Food and nutrient intake, Supplement intake  Physical Signs/Symptoms Outcomes: Biochemical data, Fluid status or edema, Weight    Discharge Planning:    Continue oral nutrition supplement, Continue current diet     Barrie Hood, [id number]

## 2020-10-30 NOTE — PROGRESS NOTES
Name: Gayle Orellana MRN: [id number]   : 1945 Hospital: [facility]   Date: 10/30/2020        IMPRESSION:   · CKD stage 4 .  · JAMAL- oligo-anuric - S/P cardiac arrest --> off CRRT --> on IHD  · Respiratory failure - extubated  · Hypervolemia- pulmonary edema - resolved  · Hyperkalemia- resolved    · Hypokalemia - improving addressed with a 4 K bath  · UTI- on AB's  · S/P card cath  · IRight hydronephrosis- urology is evaluating. PLAN:   · HD again tomorrow  (10/31)  · Watch for renal recovery. · Follow lytes  · Urology following for right ureteral obstruction. · Avoid NSAIDs + IV contrast  · Will request a Perm Cath in anticipation of placement in the outpatient dialysis clinic. Subjective/Interval History:     F/U JAMAL --> 10/26/2020    The events were noted. CRRT was discontinued yesterday. Felt better. F/U JAMAL --> 10/27/2020      Took part in therapy today. F/U JAMAL --> 10/28/2020      Felt better. Seems to be moving more. Had HD yesterday. F/U JAMAL --> 10/29/2020    Feeling better. His appetite is improving. F/U JAMAL on HD --> 10/30/2020      Felling better every day. No new complaints were offered. Objective:   Vital Signs:    Visit Vitals  BP (!) 146/60 (BP 1 Location: Right arm, BP Patient Position: At rest) Comment: bp meds given   Pulse 64   Temp 98.4 °F (36.9 °C)   Resp 20   Ht 5' 10\" (1.778 m)   Wt 86.3 kg (190 lb 3.2 oz)   SpO2 98%   BMI 27.29 kg/m²       O2 Device: Nasal cannula   O2 Flow Rate (L/min): 3 l/min   Temp (24hrs), Av.6 °F (37 °C), Min:98.4 °F (36.9 °C), Max:99.1 °F (37.3 °C)       Intake/Output:   Last shift:      No intake/output data recorded.   Last 3 shifts: 10/28 1901 - 10/30 0700  In: 1080 [P.O.:1080]  Out: 1055 [Urine:555]    Intake/Output Summary (Last 24 hours) at 10/30/2020 1144  Last data filed at 10/30/2020 0251  Gross per 24 hour   Intake 740 ml   Output 700 ml   Net 40 ml        Physical Exam:    Seen in Room 12.    Mrs. Sisi Mcgowan was at the bedside. General:    Comfortable and smiling this morning  . Head:   Normocephalic    Right  IJ HD catheter    Lungs:   No Wheezing or rales. Heart:   No S3  Gallop , No pericardial rub. .  Abdomen:   Not distended    Extremities: No leg oedema    M/S                 Wasting +    Neurologic:      Responding appropriately. DATA:  Labs:  Recent Labs     10/30/20  0542 10/29/20  0229 10/28/20  0253   * 130* 132*   K 3.4* 3.2* 3.4*   CL 99 97 101   CO2 23 21 23   BUN 61* 89* 71*   CREA 3.85* 4.74* 3.68*   CA 8.5 8.3* 8.0*   ALB 2.2*  --   --      Recent Labs     10/29/20  0229 10/28/20  0253   WBC 17.3* 14.2*   HGB 9.7* 10.3*   HCT 29.7* 32.3*    252     No results for input(s): RASHMI, KU, CLU, CREAU in the last 72 hours.     No lab exists for component: PROU    Total time spent with patient:   []       Care Plan discussed with:     Patient , Wife    Kvng Cali MD

## 2020-10-30 NOTE — WOUND CARE
Wound Consult: Follow Up Visit. Chart reviewed. In to reassess Mr. Braulio Ramos. Spoke with patients nurse after assessment and care to hand off on visit. Patient is resting on a total care bed; he moves well in bed side to side and to lift upwards in bed/bridge hips. He has dialysis catheter in right neck, dressing dry. Assessment: 
Sacrum - dimple area remains intact, no redness. Distal gluteal cleft - linear skin breakdown ~ 1 x 0.4 x 0.1 cm, moist red, no surrounding redness. Partial thickness injury. Right lower buttock - chaffed area with epidermal sloughing; moist pink/red where skin chaffed off; blanching pink linear area extending upwards. Partial thickness injury. No injury to left buttock. Heels/feet without redness, very dry skin. Treatment: 
Both areas appear moisture/friction related; pad with urine; changed to dry pad and placed [place] to above areas and waffle cushion under patient in bed. Moisturized heels/feet. Floated off pillow. Wound Recommendations: 
Marathon to distal gluteal cleft/right buttocks MWF, allow to dry before allowign skin to touch skin or linens/pad. Skin Care Recommendations: 1. Minimize friction/shear: minimize layers of linen/pads under patient. 2. Off load pressure/reposition: continue to turn and reposition approximately every 2 hours; float heels; waffle cushion for sitting and in bed. 3. Manage Moisture - keep skin folds dry; incontinence skin care as needed; he voided in urinal but noted spillage on pad. 4. Continue to monitor nutrition, pain, and skin risk scale, and skin assessment. Plan: Will hand off to Dr. Alka Frankel. 
We will continue to reassess routinely and as needed. Cruz Rizzo RN,[facility] W[facility] Office [phone] Pager [phone]

## 2020-10-31 ENCOUNTER — APPOINTMENT (OUTPATIENT)
Dept: GENERAL RADIOLOGY | Age: 75
DRG: 870 | End: 2020-10-31
Attending: INTERNAL MEDICINE
Payer: MEDICARE

## 2020-10-31 LAB
ALBUMIN SERPL-MCNC: 2.3 G/DL (ref 3.5–5)
ALBUMIN/GLOB SERPL: 0.5 {RATIO} (ref 1.1–2.2)
ALP SERPL-CCNC: 912 U/L (ref 45–117)
ALT SERPL-CCNC: 216 U/L (ref 12–78)
ANION GAP SERPL CALC-SCNC: 10 MMOL/L (ref 5–15)
AST SERPL-CCNC: 169 U/L (ref 15–37)
BILIRUB SERPL-MCNC: 0.6 MG/DL (ref 0.2–1)
BUN SERPL-MCNC: 75 MG/DL (ref 6–20)
BUN/CREAT SERPL: 15 (ref 12–20)
CALCIUM SERPL-MCNC: 8.6 MG/DL (ref 8.5–10.1)
CHLORIDE SERPL-SCNC: 99 MMOL/L (ref 97–108)
CO2 SERPL-SCNC: 23 MMOL/L (ref 21–32)
COMMENT, HOLDF: NORMAL
CREAT SERPL-MCNC: 4.9 MG/DL (ref 0.7–1.3)
GLOBULIN SER CALC-MCNC: 4.4 G/DL (ref 2–4)
GLUCOSE BLD STRIP.AUTO-MCNC: 150 MG/DL (ref 65–100)
GLUCOSE BLD STRIP.AUTO-MCNC: 83 MG/DL (ref 65–100)
GLUCOSE BLD STRIP.AUTO-MCNC: 85 MG/DL (ref 65–100)
GLUCOSE BLD STRIP.AUTO-MCNC: 91 MG/DL (ref 65–100)
GLUCOSE SERPL-MCNC: 99 MG/DL (ref 65–100)
POTASSIUM SERPL-SCNC: 3.6 MMOL/L (ref 3.5–5.1)
PROT SERPL-MCNC: 6.7 G/DL (ref 6.4–8.2)
SAMPLES BEING HELD,HOLD: NORMAL
SERVICE CMNT-IMP: ABNORMAL
SERVICE CMNT-IMP: NORMAL
SODIUM SERPL-SCNC: 132 MMOL/L (ref 136–145)
TROPONIN I SERPL-MCNC: 1.18 NG/ML
TROPONIN I SERPL-MCNC: 1.28 NG/ML

## 2020-10-31 PROCEDURE — 74011250637 HC RX REV CODE- 250/637: Performed by: INTERNAL MEDICINE

## 2020-10-31 PROCEDURE — 36415 COLL VENOUS BLD VENIPUNCTURE: CPT

## 2020-10-31 PROCEDURE — 74011636637 HC RX REV CODE- 636/637: Performed by: FAMILY MEDICINE

## 2020-10-31 PROCEDURE — 84484 ASSAY OF TROPONIN QUANT: CPT

## 2020-10-31 PROCEDURE — 65660000000 HC RM CCU STEPDOWN

## 2020-10-31 PROCEDURE — 74011636637 HC RX REV CODE- 636/637: Performed by: NURSE PRACTITIONER

## 2020-10-31 PROCEDURE — 77030029684 HC NEB SM VOL KT MONA -A

## 2020-10-31 PROCEDURE — 74011250636 HC RX REV CODE- 250/636: Performed by: INTERNAL MEDICINE

## 2020-10-31 PROCEDURE — 74011000250 HC RX REV CODE- 250: Performed by: FAMILY MEDICINE

## 2020-10-31 PROCEDURE — 94664 DEMO&/EVAL PT USE INHALER: CPT

## 2020-10-31 PROCEDURE — 77010033678 HC OXYGEN DAILY

## 2020-10-31 PROCEDURE — 71045 X-RAY EXAM CHEST 1 VIEW: CPT

## 2020-10-31 PROCEDURE — 93005 ELECTROCARDIOGRAM TRACING: CPT

## 2020-10-31 PROCEDURE — 94760 N-INVAS EAR/PLS OXIMETRY 1: CPT

## 2020-10-31 PROCEDURE — 82962 GLUCOSE BLOOD TEST: CPT

## 2020-10-31 PROCEDURE — 80053 COMPREHEN METABOLIC PANEL: CPT

## 2020-10-31 PROCEDURE — 94640 AIRWAY INHALATION TREATMENT: CPT

## 2020-10-31 RX ADMIN — HEPARIN SODIUM 5000 UNITS: 5000 INJECTION INTRAVENOUS; SUBCUTANEOUS at 05:54

## 2020-10-31 RX ADMIN — Medication 10 ML: at 06:00

## 2020-10-31 RX ADMIN — Medication 10 ML: at 22:00

## 2020-10-31 RX ADMIN — AMIODARONE HYDROCHLORIDE 400 MG: 200 TABLET ORAL at 10:03

## 2020-10-31 RX ADMIN — IPRATROPIUM BROMIDE AND ALBUTEROL SULFATE 3 ML: .5; 3 SOLUTION RESPIRATORY (INHALATION) at 10:05

## 2020-10-31 RX ADMIN — INSULIN LISPRO 4 UNITS: 100 INJECTION, SOLUTION INTRAVENOUS; SUBCUTANEOUS at 17:31

## 2020-10-31 RX ADMIN — ASPIRIN 81 MG: 81 TABLET, CHEWABLE ORAL at 10:03

## 2020-10-31 RX ADMIN — HEPARIN SODIUM 5000 UNITS: 5000 INJECTION INTRAVENOUS; SUBCUTANEOUS at 13:30

## 2020-10-31 RX ADMIN — HEPARIN SODIUM 2500 UNITS: 1000 INJECTION INTRAVENOUS; SUBCUTANEOUS at 10:03

## 2020-10-31 RX ADMIN — ROSUVASTATIN 40 MG: 40 TABLET, FILM COATED ORAL at 21:00

## 2020-10-31 RX ADMIN — HEPARIN SODIUM 5000 UNITS: 5000 INJECTION INTRAVENOUS; SUBCUTANEOUS at 21:00

## 2020-10-31 RX ADMIN — METOPROLOL TARTRATE 100 MG: 25 TABLET, FILM COATED ORAL at 10:03

## 2020-10-31 RX ADMIN — Medication 10 ML: at 13:29

## 2020-10-31 RX ADMIN — METOPROLOL TARTRATE 100 MG: 25 TABLET, FILM COATED ORAL at 21:00

## 2020-10-31 RX ADMIN — AMIODARONE HYDROCHLORIDE 400 MG: 200 TABLET ORAL at 17:31

## 2020-10-31 RX ADMIN — CLOPIDOGREL BISULFATE 75 MG: 75 TABLET ORAL at 10:03

## 2020-10-31 RX ADMIN — INSULIN GLARGINE 17 UNITS: 100 INJECTION, SOLUTION SUBCUTANEOUS at 21:07

## 2020-10-31 NOTE — PROGRESS NOTES
Bedside and Verbal shift change report given to Rajendra Mcneill 1626 (oncoming nurse) by Luz Navarro RN (offgoing nurse). Report included the following information SBAR, Kardex, Intake/Output, MAR, Recent Results, Cardiac Rhythm NSR and Dual Neuro Assessment.

## 2020-10-31 NOTE — PROGRESS NOTES
Hospitalist Progress Note  Lynette Garcia MD  Answering service: [phone] OR [phone] from in house phone      Date of Service:  10/31/2020  NAME:  Cecil Disla  :  1945  MRN:  [id number]    Admission Summary:   Mr Rosalba Michel is a 71 y/o with a past history of CAD s/p CABG( recent stress test and echo EF 45%), AAA s/p repair, HTN, PAD s/p femoral-femoral bypass, LLE paralysis was transferred from [facility] with abdominal pain at RLQ. CT abdomen/ pelvis without contrast showing right hydronephrosis.  UA revealed UTI, JAMAL on CKD. He was started on antibiotic. On 10/20/2020 code blue was called after patient was noted to have cardiac arrest. CPR and intubated, pressor, heparin gtt started. CRRT started on 10/21. S/p LHC showing severe CAD in all arteries except LIMA-LAD. 10/25 transitioned to HD. extubated 10/25. Feeding on NGT    Interval history / Subjective:      Patient seen and examined this morning. Feels sob, much improved. Earlier this morning as started on HD felt sob ++, tachyarrhythmia and temporarily dropped his BP sytolic to 43P. Now BP, HR/rhythem and sats all back to normal, was placed up on 6L NC, now on 2L sats 99%. Assessment & Plan:     #. s/p Cardiac arrest: this admission. #. CAD: s/p CABG  #. Ischemic cardiomyopathy  - s/p - [facility] 10/22: Severe native 3V CAD, patent LIMA-LAD, known occluded SVG-RCA and SVG-LCx; moderately elevated LVEDP  - TTE 10/21: LVEF is 40-45%- Cardiology following- no further cardiac work up  - 10/31 had episode of severe resp distress, hypotension, tachyArrhythmia. These are anginal symptoms. Will call back cardiology for eval. Will check EKG, trops and get CXR    # Tachyarrhythmias with multifocal ventricular ectopies - controlled on amiodarone   # Acute hypoxic respiratory failure - intubated 10/20, extubated 10/25- on 2L O2 NC  # Leukocytosis possible RLL PNA ? Aspiration - Ceftriaxone - 10 day course  # ARF on CKD, stage IV- due to cardiac arrest- CRRT to HD 10/25- Nephro following   - will need permaCath and HD chair prior to dc to rehab. # Right sided hydronephrosis- Urology following   # Hypokalemia - replace as needed   # Mild hyperglycemia- Keep glucose less than 180 with subcutaneous insulin as needed  # Elevated LFTs: ?shock liver from hypoperfusion due to cardiac arrest - monitor  # Profound generalized weakness/ICU myopathy - PT/OT  # Dysphagia - Improved - off tube feed, removed NGT - Mechanical soft diet      DVT px: SQ UFH   Code: Full   Dispo: A/w Rehab     Hospital Problems  Never Reviewed          Codes Class Noted POA    Leukocytosis ICD-10-CM: N63.292  ICD-9-CM: 288.60  10/19/2020 Unknown        UTI (urinary tract infection) ICD-10-CM: N39.0  ICD-9-CM: 599.0  10/19/2020 Unknown        Tachycardia ICD-10-CM: R00.0  ICD-9-CM: 785.0  10/19/2020 Unknown        Sepsis ([facility] [address] ICD-10-CM: A41.9  ICD-9-CM: 038.9, 995.91  10/19/2020 Unknown        Hydronephrosis of right kidney ICD-10-CM: N13.30  ICD-9-CM: 620  10/19/2020 Unknown            Review of Systems:   Pertinent positive mentioned interval hx/HPI. Other systems reviewed and negative. Vital Signs:    Last 24hrs VS reviewed since prior progress note. Most recent are:  Visit Vitals  BP (!) 103/56   Pulse 74   Temp 98 °F (36.7 °C)   Resp (!) 35   Ht 5' 10\" (1.778 m)   Wt 88.5 kg (195 lb 3.2 oz)   SpO2 94%   BMI 28.01 kg/m²         Intake/Output Summary (Last 24 hours) at 10/31/2020 0940  Last data filed at 10/31/2020 0000  Gross per 24 hour   Intake    Output 300 ml   Net -300 ml        Physical Examination:     Constitutional:  No acute distress, NGT in place        Resp: No wheezing/rhonchi/rales. No accessory muscle use   CV:  Regular rhythm, normal rate, no murmurs,    GI:  Soft, non distended, non tender.     Musculoskeletal:  No edema, warm    Neurologic:  generalized weakness improving        Data Review: I personally reviewed labs and imaging     Labs:     Recent Labs     10/29/20  0229   WBC 17.3*   HGB 9.7*   HCT 29.7*        Recent Labs     10/31/20  0202 10/30/20  0542 10/29/20  0229   * 132* 130*   K 3.6 3.4* 3.2*   CL 99 99 97   CO2 23 23 21   BUN 75* 61* 89*   CREA 4.90* 3.85* 4.74*   GLU 99 100 95   CA 8.6 8.5 8.3*     Recent Labs     10/31/20  0202 10/30/20  0542   * 242*   * 894*   TBILI 0.6 0.6   TP 6.7 6.4   ALB 2.3* 2.2*   GLOB 4.4* 4.2*     No results for input(s): INR, PTP, APTT, INREXT, INREXT in the last 72 hours. No results for input(s): FE, TIBC, PSAT, FERR in the last 72 hours. No results found for: FOL, RBCF   No results for input(s): PH, PCO2, PO2 in the last 72 hours. No results for input(s): CPK, CKNDX, TROIQ in the last 72 hours.     No lab exists for component: CPKMB  No results found for: CHOL, CHOLX, CHLST, CHOLV, HDL, HDLP, LDL, LDLC, DLDLP, TGLX, TRIGL, TRIGP, CHHD, [facility]  Lab Results   Component Value Date/Time    Glucose (POC) 85 10/31/2020 07:35 AM    Glucose (POC) 145 (H) 10/30/2020 09:50 PM    Glucose (POC) 78 10/30/2020 04:30 PM    Glucose (POC) 86 10/30/2020 10:59 AM    Glucose (POC) 97 10/30/2020 07:18 AM     Lab Results   Component Value Date/Time    Color YELLOW/STRAW 10/21/2020 12:59 AM    Appearance CLOUDY (A) 10/21/2020 12:59 AM    Specific gravity 1.018 10/21/2020 12:59 AM    pH (UA) 5.0 10/21/2020 12:59 AM    Protein 100 (A) 10/21/2020 12:59 AM    Glucose Negative 10/21/2020 12:59 AM    Ketone Negative 10/21/2020 12:59 AM    Bilirubin Negative 10/21/2020 12:59 AM    Urobilinogen 0.2 10/21/2020 12:59 AM    Nitrites Negative 10/21/2020 12:59 AM    Leukocyte Esterase MODERATE (A) 10/21/2020 12:59 AM    Epithelial cells FEW 10/21/2020 12:59 AM    Bacteria Negative 10/21/2020 12:59 AM    WBC 10-20 10/21/2020 12:59 AM    RBC 0-5 10/21/2020 12:59 AM         Medications Reviewed:     Current Facility-Administered Medications   Medication Dose Route Frequency    insulin lispro (HUMALOG) injection   SubCUTAneous AC&HS    albumin human 25% (BUMINATE) solution 12.5 g  12.5 g IntraVENous DIALYSIS PRN    amiodarone (CORDARONE) tablet 400 mg  400 mg Oral BID    clopidogreL (PLAVIX) tablet 75 mg  75 mg Oral DAILY    heparin (porcine) injection 5,000 Units  5,000 Units SubCUTAneous Q8H    metoprolol tartrate (LOPRESSOR) tablet 100 mg  100 mg Per NG tube Q12H    labetaloL (NORMODYNE;TRANDATE) injection 10 mg  10 mg IntraVENous Q2H PRN    dextrose 10% infusion 125-250 mL  125-250 mL IntraVENous PRN    aspirin chewable tablet 81 mg  81 mg Oral DAILY    heparin (porcine) 1,000 unit/mL injection 3,400 Units  3,400 Units IntraVENous DIALYSIS PRN    ondansetron (ZOFRAN) injection 4 mg  4 mg IntraVENous Q4H PRN    albuterol-ipratropium (DUO-NEB) 2.5 MG-0.5 MG/3 ML  3 mL Nebulization Q4H PRN    sodium chloride (NS) flush 5-40 mL  5-40 mL IntraVENous Q8H    polyethylene glycol (MIRALAX) packet 17 g  17 g Oral DAILY PRN    sodium chloride (NS) flush 5-40 mL  5-40 mL IntraVENous PRN    acetaminophen (TYLENOL) tablet 650 mg  650 mg Oral Q6H PRN    Or    acetaminophen (TYLENOL) suppository 650 mg  650 mg Rectal Q6H PRN    glucose chewable tablet 16 g  4 Tab Oral PRN    glucagon (GLUCAGEN) injection 1 mg  1 mg IntraMUSCular PRN    insulin glargine (LANTUS) injection 17 Units  0.2 Units/kg SubCUTAneous QHS    hydrALAZINE (APRESOLINE) 20 mg/mL injection 10 mg  10 mg IntraVENous Q6H PRN    traMADoL (ULTRAM) tablet 50 mg  50 mg Oral Q4H PRN    rosuvastatin (CRESTOR) tablet 40 mg  40 mg Oral QHS    oxyCODONE IR (ROXICODONE) tablet 5 mg  5 mg Oral Q4H PRN    influenza vaccine 2020-21 (6 mos+)(PF) (FLUARIX/FLULAVAL/FLUZONE QUAD) injection 0.5 mL  0.5 mL IntraMUSCular PRIOR TO DISCHARGE     ______________________________________________________________________  EXPECTED LENGTH OF STAY: 4d 19h  ACTUAL LENGTH OF STAY:          12                 Subha Mcdonnell MD Patient has given Verbal permission to discuss medical care with   persons present in the room and and also with contact as listed on face sheet. Please note that this dictation was completed with [employer], the computer voice recognition software. Quite often unanticipated grammatical, syntax, homophones, and other interpretive errors are inadvertently transcribed by the computer software. Please disregard these errors. Please excuse any errors that have escaped final proofreading. Thank you.

## 2020-10-31 NOTE — PROGRESS NOTES
Bedside and Verbal shift change report given to Rosemarie Valverde RN (oncoming nurse) by Mehdi Cobb RN (offgoing nurse). Report included the following information Kardex, ED Summary, OR Summary, Procedure Summary, Intake/Output, MAR, Accordion, Recent Results, Med Rec Status, Cardiac Rhythm SR/SB w/1st degree AVB and Dual Neuro Assessment.

## 2020-10-31 NOTE — PROGRESS NOTES
Bedside and Verbal shift change report given to Leigh Cha (oncoming nurse) by Demetrius Rincon RN (offgoing nurse). Report included the following information SBAR, Kardex, ED Summary, Procedure Summary, Intake/Output, MAR, Recent Results, Cardiac Rhythm NSR, Quality Measures and Dual Neuro Assessment.

## 2020-10-31 NOTE — PROCEDURES
[facility]  [phone]    Vitals   Pre   Post   Assessment   Pre   Post     Temp  98  98 LOC  Alert and oriented x 4 same   HR   63 74 Lungs   Clear  wheezing with SOB   B/P  118/44 103/56 Cardiac   Normal sinus rhythm  some a fib    Resp   18 35 Skin   Dry and intact  none   Pain level  0 0 Edema    none   none   Orders:    Duration:   Start:    0815 End:    0845 Total:   30 minutes   Dialyzer:   Dialyzer/Set Up Inspection: Revaclear (10/29/20 1230)   K Bath:   Dialysate K (mEq/L): 4 (10/29/20 1230)   Ca Bath:   Dialysate CA (mEq/L): 2.5 (10/29/20 1230)   Na/Bicarb:   Dialysate NA (mEq/L): 140 (10/29/20 1230)   Target Fluid Removal:   Goal/Amount of Fluid to Remove (mL): 1000 mL (10/29/20 1230)   Access     Type & Location:   RIJ cvc non tunneled cvc, dressing changed,patent blood flow, Each catheter limb disinfected per p&p, caps removed, hubs disinfected per p&p.        Labs     Obtained/Reviewed   Critical Results Called   Date when labs were drawn-  Hgb-    HGB   Date Value Ref Range Status   10/29/2020 9.7 (L) 12.1 - 17.0 g/dL Final     K-    Potassium   Date Value Ref Range Status   10/31/2020 3.6 3.5 - 5.1 mmol/L Final     Ca-   Calcium   Date Value Ref Range Status   10/31/2020 8.6 8.5 - 10.1 MG/DL Final     Bun-   BUN   Date Value Ref Range Status   10/31/2020 75 (H) 6 - 20 MG/DL Final     Creat-   Creatinine   Date Value Ref Range Status   10/31/2020 4.90 (H) 0.70 - 1.30 MG/DL Final     Comment:     INVESTIGATED PER DELTA CHECK PROTOCOL        Medications/ Blood Products Given     Name   Dose   Route and Time           Heparin 2500 units 1.1 arterial and 1.4 ml venous        Blood Volume Processed (BVP):    19.9 Net Fluid   Removed:  + 100 ml   Comments   Time Out Done: 7997  Primary Nurse Rpt Pre:JUAN C Loera  Primary Nurse Rpt PostParry Juan C Coello  Pt Education: Procedural  Care Plan: Continue nephrology plan of care  Tx Summary:   0815-Labs, orders, and medications were reviewed. SBAR given by primary nurse. HD was initiated using RIJ cvc without any issues. 0830- Pt c/o of SOB \" I'm not feeling right I can't breathe\" oxygen increased to 6 liters respirations increased along with accessory muscle usage. BP normal but uf was turned off. Primary nurse at bedside. 0845- Pt c/o feeling of severe SOB, 02 94%, bp 72/44. Blood was returned and HD was terminated. Dr Angie Colorado was notified. All possible blood was returned without any issues. Each dialysis catheter limb disinfected per p&p, blood returned per p&p, each dialysis hub disinfected per p&p, post dialysis catheter dwell instilled per order, and caps applied. Primary nurse at bedside  0900- Vitals are stable and Dr Jourdan Haro is at the bedside assessing the pt. Admiting Diagnosis:JAMAL  Pt's previous clinic- TBD  Consent signed - Informed Consent Verified: Yes (10/29/20 1230)  [place] Consent - Verified  Hepatitis Status- Negative Hep B AG/ Susceptible Hep B AB 10/22  Machine #- Machine Number: [id number] (10/29/20 1230)  Telemetry status- monitored at [facility]  Pre-dialysis wt. - Pre-Dialysis Weight: 85 kg (187 lb 4.8 oz) (10/29/20 1230)

## 2020-10-31 NOTE — PROGRESS NOTES
Name: Geovanna Carmona MRN: [id number]   : 1945 [facility]   Date: 10/31/2020        IMPRESSION:   · CKD stage 4 .  · JAMAL- oligo-anuric - S/P cardiac arrest --> off CRRT --> on IHD  · Respiratory failure - extubated  · Hypervolemia- pulmonary edema - resolved  · Hyperkalemia- resolved    · Hypokalemia - improving addressed with a 4 K bath  · UTI- on AB's  · S/P card cath  · IRight hydronephrosis- urology is evaluating. · ? A . Fib  · Acute Bronchospasm      PLAN:   · Hold further HD today in light of the episode of hypotension. · Neb Rx now . · Defer further work-up of dyspnea and ? A. Fib to the Primary Team.  · Watch for renal recovery. · Follow lytes  · Urology following for right ureteral obstruction. · Avoid NSAIDs + IV contrast  · Will request a Perm Cath in anticipation of placement in the outpatient dialysis clinic. Subjective/Interval History:     F/U JAMAL --> 10/26/2020    The events were noted. CRRT was discontinued yesterday. Felt better. F/U JAMAL --> 10/27/2020      Took part in therapy today. F/U JAMAL --> 10/28/2020      Felt better. Seems to be moving more. Had HD yesterday. F/U JAMAL --> 10/29/2020    Feeling better. His appetite is improving. F/U JAMAL on HD --> 10/30/2020      Felling better every day. No new complaints were offered. F/U JAMAL on HD --> 10/31/2020      Became hypotensive shortly after starting HD this morning. HD was discontinued. His BP improved thereafter  Mr Joanne Hazel denied chest pain , dyspnea , nausea , vomiting , fever.         Objective:   Vital Signs:    Visit Vitals  BP (!) 103/56   Pulse 74   Temp 98 °F (36.7 °C)   Resp (!) 35   Ht 5' 10\" (1.778 m)   Wt 88.5 kg (195 lb 3.2 oz)   SpO2 94%   BMI 28.01 kg/m²       O2 Device: Nasal cannula   O2 Flow Rate (L/min): 3 l/min   Temp (24hrs), Av.3 °F (36.8 °C), Min:98 °F (36.7 °C), Max:98.7 °F (37.1 °C)       Intake/Output:   Last shift:      No intake/output data recorded. Last 3 shifts: 10/29 1901 - 10/31 0700  In: -   Out: 500 [Urine:500]    Intake/Output Summary (Last 24 hours) at 10/31/2020 0934  Last data filed at 10/31/2020 0000  Gross per 24 hour   Intake    Output 300 ml   Net -300 ml        Physical Exam:    Seen in Room 670. Mrs. Joanne Hazel was at the bedside. General:    Comfortable and smiling this morning    His Dialysis Nurse was present as well. .   Head:   Normocephalic    Right  IJ HD catheter    Lungs:   Expiratory Wheeze ++                          Prolonged expiratory phase of respiration                          Decreased air entry in the lower zones                              Heart:   No S3  Gallop , No pericardial rub. .  Abdomen:   Not distended    Extremities: No leg oedema    M/S                 Wasting +    Neurologic:      Responding appropriately. Psyche            Seemed a tad anxious                                     DATA:  Labs:  Recent Labs     10/31/20  0202 10/30/20  0542 10/29/20  0229   * 132* 130*   K 3.6 3.4* 3.2*   CL 99 99 97   CO2 23 23 21   BUN 75* 61* 89*   CREA 4.90* 3.85* 4.74*   CA 8.6 8.5 8.3*   ALB 2.3* 2.2*  --      Recent Labs     10/29/20  0229   WBC 17.3*   HGB 9.7*   HCT 29.7*        No results for input(s): RASHMI, KU, CLU, CREAU in the last 72 hours.     No lab exists for component: PROU    Total time spent with patient:   []       Care Plan discussed with:     Patient , Wife    D/W Dr. Nohelia Laura MD

## 2020-10-31 NOTE — PROGRESS NOTES
Problem: Falls - Risk of  Goal: *Absence of Falls  Description: Document Lara Levine Fall Risk and appropriate interventions in the flowsheet.   Outcome: Progressing Towards Goal  Note: Fall Risk Interventions:  Mobility Interventions: Bed/chair exit alarm, OT consult for ADLs, Patient to call before getting OOB, PT Consult for mobility concerns, Strengthening exercises (ROM-active/passive), PT Consult for assist device competence    Mentation Interventions: Adequate sleep, hydration, pain control, Bed/chair exit alarm, Door open when patient unattended, Familiar objects from home, Family/sitter at bedside, Increase mobility, Toileting rounds, Update white board    Medication Interventions: Evaluate medications/consider consulting pharmacy, Bed/chair exit alarm, Patient to call before getting OOB, Teach patient to arise slowly    Elimination Interventions: Call light in reach, Patient to call for help with toileting needs, Toilet paper/wipes in reach, Toileting schedule/hourly rounds              Problem: Patient Education: Go to Patient Education Activity  Goal: Patient/Family Education  Outcome: Progressing Towards Goal     Problem: Urinary Tract Infection - Adult  Goal: *Absence of infection signs and symptoms  Outcome: Progressing Towards Goal     Problem: Patient Education: Go to Patient Education Activity  Goal: Patient/Family Education  Outcome: Progressing Towards Goal     Problem: Pain  Goal: *Control of Pain  Outcome: Progressing Towards Goal     Problem: Patient Education: Go to Patient Education Activity  Goal: Patient/Family Education  Outcome: Progressing Towards Goal     Problem: Breathing Pattern - Ineffective  Goal: *Absence of hypoxia  Outcome: Progressing Towards Goal  Goal: *Use of effective breathing techniques  Outcome: Progressing Towards Goal     Problem: Patient Education: Go to Patient Education Activity  Goal: Patient/Family Education  Outcome: Progressing Towards Goal     Problem: Diabetes Self-Management  Goal: *Disease process and treatment process  Description: Define diabetes and identify own type of diabetes; list 3 options for treating diabetes. Outcome: Progressing Towards Goal  Goal: *Incorporating nutritional management into lifestyle  Description: Describe effect of type, amount and timing of food on blood glucose; list 3 methods for planning meals. Outcome: Progressing Towards Goal  Goal: *Incorporating physical activity into lifestyle  Description: State effect of exercise on blood glucose levels. Outcome: Progressing Towards Goal  Goal: *Developing strategies to promote health/change behavior  Description: Define the ABC's of diabetes; identify appropriate screenings, schedule and personal plan for screenings. Outcome: Progressing Towards Goal  Goal: *Using medications safely  Description: State effect of diabetes medications on diabetes; name diabetes medication taking, action and side effects. Outcome: Progressing Towards Goal  Goal: *Monitoring blood glucose, interpreting and using results  Description: Identify recommended blood glucose targets  and personal targets. Outcome: Progressing Towards Goal  Goal: *Prevention, detection, treatment of acute complications  Description: List symptoms of hyper- and hypoglycemia; describe how to treat low blood sugar and actions for lowering  high blood glucose level. Outcome: Progressing Towards Goal  Goal: *Prevention, detection and treatment of chronic complications  Description: Define the natural course of diabetes and describe the relationship of blood glucose levels to long term complications of diabetes.   Outcome: Progressing Towards Goal  Goal: *Developing strategies to address psychosocial issues  Description: Describe feelings about living with diabetes; identify support needed and support network  Outcome: Progressing Towards Goal  Goal: *Sick day guidelines  Outcome: Progressing Towards Goal     Problem: Patient Education: Go to Patient Education Activity  Goal: Patient/Family Education  Outcome: Progressing Towards Goal     Problem: Pressure Injury - Risk of  Goal: *Prevention of pressure injury  Description: Document Luis Felipe Scale and appropriate interventions in the flowsheet. Outcome: Progressing Towards Goal  Note: Pressure Injury Interventions:  Sensory Interventions: Assess changes in LOC, Check visual cues for pain, Discuss PT/OT consult with provider, Float heels, Keep linens dry and wrinkle-free, Maintain/enhance activity level, Minimize linen layers, Pressure redistribution bed/mattress (bed type), Turn and reposition approx. every two hours (pillows and wedges if needed)    Moisture Interventions: Absorbent underpads, Limit adult briefs, Maintain skin hydration (lotion/cream), Minimize layers, Moisture barrier    Activity Interventions: Increase time out of bed, Pressure redistribution bed/mattress(bed type), PT/OT evaluation    Mobility Interventions: HOB 30 degrees or less, Pressure redistribution bed/mattress (bed type), PT/OT evaluation, Turn and reposition approx.  every two hours(pillow and wedges)    Nutrition Interventions: Document food/fluid/supplement intake, Offer support with meals,snacks and hydration    Friction and Shear Interventions: Apply protective barrier, creams and emollients, HOB 30 degrees or less, Lift sheet                Problem: Patient Education: Go to Patient Education Activity  Goal: Patient/Family Education  Outcome: Progressing Towards Goal     Problem: Nutrition Deficit  Goal: *Optimize nutritional status  Outcome: Progressing Towards Goal     Problem: Patient Education: Go to Patient Education Activity  Goal: Patient/Family Education  Outcome: Progressing Towards Goal     Problem: Patient Education: Go to Patient Education Activity  Goal: Patient/Family Education  Outcome: Progressing Towards Goal     Problem: Patient Education: Go to Patient Education Activity  Goal: Patient/Family Education  Outcome: Progressing Towards Goal     Problem: Nutrition Deficit  Goal: *Optimize nutritional status  Outcome: Progressing Towards Goal

## 2020-10-31 NOTE — PROGRESS NOTES
Problem: Falls - Risk of  Goal: *Absence of Falls  Description: Document Jane Salazar Fall Risk and appropriate interventions in the flowsheet. Outcome: Progressing Towards Goal  Note: Fall Risk Interventions:  Mobility Interventions: Bed/chair exit alarm, OT consult for ADLs, Patient to call before getting OOB, PT Consult for mobility concerns, Strengthening exercises (ROM-active/passive), PT Consult for assist device competence    Mentation Interventions: Adequate sleep, hydration, pain control, Bed/chair exit alarm, Door open when patient unattended, Familiar objects from home, Family/sitter at bedside, Increase mobility, Toileting rounds, Update white board    Medication Interventions: Evaluate medications/consider consulting pharmacy, Bed/chair exit alarm, Patient to call before getting OOB, Teach patient to arise slowly    Elimination Interventions: Call light in reach, Patient to call for help with toileting needs, Toilet paper/wipes in reach, Toileting schedule/hourly rounds              Problem: Diabetes Self-Management  Goal: *Disease process and treatment process  Description: Define diabetes and identify own type of diabetes; list 3 options for treating diabetes. Outcome: Progressing Towards Goal  Goal: *Incorporating nutritional management into lifestyle  Description: Describe effect of type, amount and timing of food on blood glucose; list 3 methods for planning meals. Outcome: Progressing Towards Goal  Goal: *Incorporating physical activity into lifestyle  Description: State effect of exercise on blood glucose levels. Outcome: Progressing Towards Goal  Goal: *Developing strategies to promote health/change behavior  Description: Define the ABC's of diabetes; identify appropriate screenings, schedule and personal plan for screenings.   Outcome: Progressing Towards Goal  Goal: *Using medications safely  Description: State effect of diabetes medications on diabetes; name diabetes medication taking, action and side effects. Outcome: Progressing Towards Goal  Goal: *Monitoring blood glucose, interpreting and using results  Description: Identify recommended blood glucose targets  and personal targets. Outcome: Progressing Towards Goal  Goal: *Prevention, detection, treatment of acute complications  Description: List symptoms of hyper- and hypoglycemia; describe how to treat low blood sugar and actions for lowering  high blood glucose level. Outcome: Progressing Towards Goal  Goal: *Prevention, detection and treatment of chronic complications  Description: Define the natural course of diabetes and describe the relationship of blood glucose levels to long term complications of diabetes.   Outcome: Progressing Towards Goal  Goal: *Developing strategies to address psychosocial issues  Description: Describe feelings about living with diabetes; identify support needed and support network  Outcome: Progressing Towards Goal  Goal: *Sick day guidelines  Outcome: Progressing Towards Goal

## 2020-10-31 NOTE — PROGRESS NOTES
Cardiology Progress Note                                        Admit Date: 10/19/2020    Assessment/Plan:     Hypotension; during HD which was aborted; rule out ischemia with serial eznymes  CAD; s/p CABGs and only with patent LIMA-LAD that fills coronaries including native as vein grafts all failed  Cardiomyopathy; mild EF 40 to 45%    Love Carrera is a 70 y.o. male with     PROBLEM LIST:  Patient Active Problem List    Diagnosis Date Noted    Leukocytosis 10/19/2020    UTI (urinary tract infection) 10/19/2020    Tachycardia 10/19/2020    Sepsis ([address] 10/19/2020    Hydronephrosis of right kidney 10/19/2020         Subjective:     Love Carrera reports none. Visit Vitals  BP (!) 143/52 (BP 1 Location: Right arm, BP Patient Position: At rest)   Pulse 66   Temp 98.1 °F (36.7 °C)   Resp 27   Ht 5' 10\" (1.778 m)   Wt 88.5 kg (195 lb 3.2 oz)   SpO2 99%   BMI 28.01 kg/m²       Intake/Output Summary (Last 24 hours) at 10/31/2020 1023  Last data filed at 10/31/2020 0000  Gross per 24 hour   Intake    Output 300 ml   Net -300 ml       Objective:      Physical Exam:  HEENT: [place]err[place], EOMI  Neck: No JVD,  No thyroidmegaly  Resp: CTA bilaterally;  No wheezes or rales  CV: RRR s1s2 No murmur no s3  Abd:Soft, Nontender  Ext: No edema  Neuro: Alert and oriented; Nonfocal  Skin: Warm, Dry, Intact  Pulses: 2+ DP/PT/Rad      Telemetry: normal sinus rhythm    Current Facility-Administered Medications   Medication Dose Route Frequency    insulin lispro (HUMALOG) injection   SubCUTAneous AC&HS    albumin human 25% (BUMINATE) solution 12.5 g  12.5 g IntraVENous DIALYSIS PRN    amiodarone (CORDARONE) tablet 400 mg  400 mg Oral BID    clopidogreL (PLAVIX) tablet 75 mg  75 mg Oral DAILY    heparin (porcine) injection 5,000 Units  5,000 Units SubCUTAneous Q8H    metoprolol tartrate (LOPRESSOR) tablet 100 mg  100 mg Per NG tube Q12H    labetaloL (NORMODYNE;TRANDATE) injection 10 mg  10 mg IntraVENous Q2H PRN    dextrose 10% infusion 125-250 mL  125-250 mL IntraVENous PRN    aspirin chewable tablet 81 mg  81 mg Oral DAILY    heparin (porcine) 1,000 unit/mL injection 3,400 Units  3,400 Units IntraVENous DIALYSIS PRN    ondansetron (ZOFRAN) injection 4 mg  4 mg IntraVENous Q4H PRN    albuterol-ipratropium (DUO-NEB) 2.5 MG-0.5 MG/3 ML  3 mL Nebulization Q4H PRN    sodium chloride (NS) flush 5-40 mL  5-40 mL IntraVENous Q8H    polyethylene glycol (MIRALAX) packet 17 g  17 g Oral DAILY PRN    sodium chloride (NS) flush 5-40 mL  5-40 mL IntraVENous PRN    acetaminophen (TYLENOL) tablet 650 mg  650 mg Oral Q6H PRN    Or    acetaminophen (TYLENOL) suppository 650 mg  650 mg Rectal Q6H PRN    glucose chewable tablet 16 g  4 Tab Oral PRN    glucagon (GLUCAGEN) injection 1 mg  1 mg IntraMUSCular PRN    insulin glargine (LANTUS) injection 17 Units  0.2 Units/kg SubCUTAneous QHS    hydrALAZINE (APRESOLINE) 20 mg/mL injection 10 mg  10 mg IntraVENous Q6H PRN    traMADoL (ULTRAM) tablet 50 mg  50 mg Oral Q4H PRN    rosuvastatin (CRESTOR) tablet 40 mg  40 mg Oral QHS    oxyCODONE IR (ROXICODONE) tablet 5 mg  5 mg Oral Q4H PRN    influenza vaccine 2020-21 (6 mos+)(PF) (FLUARIX/FLULAVAL/FLUZONE QUAD) injection 0.5 mL  0.5 mL IntraMUSCular PRIOR TO DISCHARGE         Data Review:   Labs:    Recent Results (from the past 24 hour(s))   GLUCOSE, POC    Collection Time: 10/30/20 10:59 AM   Result Value Ref Range    Glucose (POC) 86 65 - 100 mg/dL    Performed by Shiraz Kee    GLUCOSE, POC    Collection Time: 10/30/20  4:30 PM   Result Value Ref Range    Glucose (POC) 78 65 - 100 mg/dL    Performed by Emerita Little    GLUCOSE, POC    Collection Time: 10/30/20  9:50 PM   Result Value Ref Range    Glucose (POC) 145 (H) 65 - 100 mg/dL    Performed by Fidencio Baptiste    METABOLIC PANEL, COMPREHENSIVE    Collection Time: 10/31/20  2:02 AM   Result Value Ref Range    Sodium 132 (L) 136 - 145 mmol/L    Potassium 3.6 3.5 - 5.1 mmol/L    Chloride 99 97 - 108 mmol/L    CO2 23 21 - 32 mmol/L    Anion gap 10 5 - 15 mmol/L    Glucose 99 65 - 100 mg/dL    BUN 75 (H) 6 - 20 MG/DL    Creatinine 4.90 (H) 0.70 - 1.30 MG/DL    BUN/Creatinine ratio 15 12 - 20      GFR est AA 14 (L) >60 ml/min/1.73m2    GFR est non-AA 12 (L) >60 ml/min/1.73m2    Calcium 8.6 8.5 - 10.1 MG/DL    Bilirubin, total 0.6 0.2 - 1.0 MG/DL    ALT (SGPT) 216 (H) 12 - 78 U/L    AST (SGOT) 169 (H) 15 - 37 U/L    Alk. phosphatase 912 (H) 45 - 117 U/L    Protein, total 6.7 6.4 - 8.2 g/dL    Albumin 2.3 (L) 3.5 - 5.0 g/dL    Globulin 4.4 (H) 2.0 - 4.0 g/dL    A-G Ratio 0.5 (L) 1.1 - 2.2     SAMPLES BEING HELD    Collection Time: 10/31/20  2:02 AM   Result Value Ref Range    SAMPLES BEING HELD 1LAV     COMMENT        Add-on orders for these samples will be processed based on acceptable specimen integrity and analyte stability, which may vary by analyte.    GLUCOSE, POC    Collection Time: 10/31/20  7:35 AM   Result Value Ref Range    Glucose (POC) 85 65 - 100 mg/dL    Performed by Antoinette Duenas

## 2020-11-01 ENCOUNTER — APPOINTMENT (OUTPATIENT)
Dept: ULTRASOUND IMAGING | Age: 75
DRG: 870 | End: 2020-11-01
Attending: INTERNAL MEDICINE
Payer: MEDICARE

## 2020-11-01 LAB
ALBUMIN SERPL-MCNC: 2.4 G/DL (ref 3.5–5)
ALBUMIN/GLOB SERPL: 0.6 {RATIO} (ref 1.1–2.2)
ALP SERPL-CCNC: 711 U/L (ref 45–117)
ALT SERPL-CCNC: 169 U/L (ref 12–78)
ANION GAP SERPL CALC-SCNC: 13 MMOL/L (ref 5–15)
AST SERPL-CCNC: 104 U/L (ref 15–37)
ATRIAL RATE: 300 BPM
BILIRUB SERPL-MCNC: 0.7 MG/DL (ref 0.2–1)
BUN SERPL-MCNC: 76 MG/DL (ref 6–20)
BUN/CREAT SERPL: 14 (ref 12–20)
CALCIUM SERPL-MCNC: 8.8 MG/DL (ref 8.5–10.1)
CALCULATED R AXIS, ECG10: 53 DEGREES
CALCULATED T AXIS, ECG11: 140 DEGREES
CHLORIDE SERPL-SCNC: 99 MMOL/L (ref 97–108)
CO2 SERPL-SCNC: 21 MMOL/L (ref 21–32)
COMMENT, HOLDF: NORMAL
CREAT SERPL-MCNC: 5.29 MG/DL (ref 0.7–1.3)
DIAGNOSIS, 93000: NORMAL
GLOBULIN SER CALC-MCNC: 4.3 G/DL (ref 2–4)
GLUCOSE BLD STRIP.AUTO-MCNC: 128 MG/DL (ref 65–100)
GLUCOSE BLD STRIP.AUTO-MCNC: 141 MG/DL (ref 65–100)
GLUCOSE BLD STRIP.AUTO-MCNC: 82 MG/DL (ref 65–100)
GLUCOSE BLD STRIP.AUTO-MCNC: 83 MG/DL (ref 65–100)
GLUCOSE SERPL-MCNC: 89 MG/DL (ref 65–100)
POTASSIUM SERPL-SCNC: 3.9 MMOL/L (ref 3.5–5.1)
PROT SERPL-MCNC: 6.7 G/DL (ref 6.4–8.2)
Q-T INTERVAL, ECG07: 428 MS
QRS DURATION, ECG06: 118 MS
QTC CALCULATION (BEZET), ECG08: 437 MS
SAMPLES BEING HELD,HOLD: NORMAL
SERVICE CMNT-IMP: ABNORMAL
SERVICE CMNT-IMP: ABNORMAL
SERVICE CMNT-IMP: NORMAL
SERVICE CMNT-IMP: NORMAL
SODIUM SERPL-SCNC: 133 MMOL/L (ref 136–145)
VENTRICULAR RATE, ECG03: 63 BPM

## 2020-11-01 PROCEDURE — 82962 GLUCOSE BLOOD TEST: CPT

## 2020-11-01 PROCEDURE — 94760 N-INVAS EAR/PLS OXIMETRY 1: CPT

## 2020-11-01 PROCEDURE — 65660000000 HC RM CCU STEPDOWN

## 2020-11-01 PROCEDURE — 80053 COMPREHEN METABOLIC PANEL: CPT

## 2020-11-01 PROCEDURE — 74011250636 HC RX REV CODE- 250/636: Performed by: INTERNAL MEDICINE

## 2020-11-01 PROCEDURE — 36415 COLL VENOUS BLD VENIPUNCTURE: CPT

## 2020-11-01 PROCEDURE — 74011250637 HC RX REV CODE- 250/637: Performed by: INTERNAL MEDICINE

## 2020-11-01 PROCEDURE — 74011636637 HC RX REV CODE- 636/637: Performed by: FAMILY MEDICINE

## 2020-11-01 PROCEDURE — 90935 HEMODIALYSIS ONE EVALUATION: CPT

## 2020-11-01 PROCEDURE — 76700 US EXAM ABDOM COMPLETE: CPT

## 2020-11-01 PROCEDURE — 77010033678 HC OXYGEN DAILY

## 2020-11-01 RX ADMIN — METOPROLOL TARTRATE 100 MG: 25 TABLET, FILM COATED ORAL at 09:23

## 2020-11-01 RX ADMIN — HEPARIN SODIUM 5000 UNITS: 5000 INJECTION INTRAVENOUS; SUBCUTANEOUS at 14:44

## 2020-11-01 RX ADMIN — INSULIN GLARGINE 17 UNITS: 100 INJECTION, SOLUTION SUBCUTANEOUS at 23:02

## 2020-11-01 RX ADMIN — CLOPIDOGREL BISULFATE 75 MG: 75 TABLET ORAL at 09:23

## 2020-11-01 RX ADMIN — ASPIRIN 81 MG: 81 TABLET, CHEWABLE ORAL at 09:23

## 2020-11-01 RX ADMIN — HEPARIN SODIUM 3400 UNITS: 1000 INJECTION INTRAVENOUS; SUBCUTANEOUS at 13:05

## 2020-11-01 RX ADMIN — Medication 10 ML: at 06:00

## 2020-11-01 RX ADMIN — METOPROLOL TARTRATE 100 MG: 25 TABLET, FILM COATED ORAL at 23:02

## 2020-11-01 RX ADMIN — HEPARIN SODIUM 5000 UNITS: 5000 INJECTION INTRAVENOUS; SUBCUTANEOUS at 06:05

## 2020-11-01 RX ADMIN — Medication 10 ML: at 14:45

## 2020-11-01 RX ADMIN — HEPARIN SODIUM 5000 UNITS: 5000 INJECTION INTRAVENOUS; SUBCUTANEOUS at 23:02

## 2020-11-01 RX ADMIN — AMIODARONE HYDROCHLORIDE 400 MG: 200 TABLET ORAL at 18:31

## 2020-11-01 RX ADMIN — Medication 10 ML: at 23:03

## 2020-11-01 RX ADMIN — AMIODARONE HYDROCHLORIDE 400 MG: 200 TABLET ORAL at 09:26

## 2020-11-01 RX ADMIN — ROSUVASTATIN 40 MG: 40 TABLET, FILM COATED ORAL at 23:02

## 2020-11-01 NOTE — PROGRESS NOTES
The ending of Daylight Saving Time occurred at 0200 hrs. Documentation of patient care and medications administered is done with respect to the time change. ruthy all pertinent systems normal

## 2020-11-01 NOTE — PROGRESS NOTES
Bedside and Verbal shift change report given to Janes [address] (oncoming nurse) by Mary Ellen Graff RN (offgoing nurse). Report included the following information SBAR, Kardex, ED Summary, Procedure Summary, Intake/Output, MAR, Recent Results, Cardiac Rhythm NSR w 1st degree AVB, Quality Measures and Dual Neuro Assessment.

## 2020-11-01 NOTE — DIALYSIS
[facility]  [phone]    Vitals   Pre   Post   Assessment   Pre   Post     Temp  Temp: 96.9 °F (36.1 °C) (11/01/20 0600)  98.6  LOC  A+OX3 A+Ox3   HR   Pulse (Heart Rate): 66(HD STARTED) (11/01/20 1010) 65 Lungs   CLEAR ON 2LPM VIA NASAL CANNULA  clear on 1.5lpm via nasal cannula   B/P   BP: (!) 129/52 (11/01/20 1010) 144/66 Cardiac   MONITORED  NSR  monitored nsr     Resp   Resp Rate: 20 (11/01/20 1010) 22 Skin   WARM DRY Warm dry    Pain level  Pain Intensity 1: 0 (11/01/20 0600) 0/10 Edema  NONE     none   Orders:    Duration:   Start:    1010 End:    1340 Total:   3.5 hrs   Dialyzer:   Dialyzer/Set Up Inspection: Revaclear (11/01/20 1010)   K Bath:   Dialysate K (mEq/L): 3 (11/01/20 1010)   Ca Bath:   Dialysate CA (mEq/L): 2.5 (11/01/20 1010)   Na/Bicarb:   Dialysate NA (mEq/L): 140 (11/01/20 1010)   Target Fluid Removal:   Goal/Amount of Fluid to Remove (mL): 1000 mL (11/01/20 1010)   Access     Type & Location:   RIGHT IJ YAKELIN CATHETER DRESSING CLEAN DRY AND INTACT  CLEANED AND PREPPED PER POLICY FLUSH +ASP/NS FLUSH ART/VENOUS PORTS   Labs     Obtained/Reviewed   Critical Results Called   Date when labs were drawn-  Hgb-    HGB   Date Value Ref Range Status   10/29/2020 9.7 (L) 12.1 - 17.0 g/dL Final     K-    Potassium   Date Value Ref Range Status   11/01/2020 3.9 3.5 - 5.1 mmol/L Final     Ca-   Calcium   Date Value Ref Range Status   11/01/2020 8.8 8.5 - 10.1 MG/DL Final     Bun-   BUN   Date Value Ref Range Status   11/01/2020 76 (H) 6 - 20 MG/DL Final     Creat-   Creatinine   Date Value Ref Range Status   11/01/2020 5.29 (H) 0.70 - 1.30 MG/DL Final        Medications/ Blood Products Given     Name   Dose   Route and Time     NONE                Blood Volume Processed (BVP):     Net Fluid   Removed:  0 ml  Even exchange 500ml patient urinating    Comments   Time Out Done: 1000  Primary Nurse Rpt Pre:  Paris Flower  Primary Nurse Rpt 2000 [facility]  Pt Education: [place], FLUID MANAGMENT  Care Plan: CONTINUE HD AS ORDERED BY NEPHROLOGY    Tx Summary: patient right ij lianet catheter very sensitive due to its location, lines had to be reversed at start of tx due to high arterial pressure, tolerated 3.5 hrs hd well bfr 300-350, no uf today patient continues to make urine, even exchange of 500ml with uf,at end of tx all possible blood returned in circuit with 300ml ns, art/venous ports flushed with heparin 1,100 units art port, 1.4ml venous port, sterile caps applied   right ij lianet catheter dressing changed with sterile technique dated 11/1/20, remains in room 670, call bell within reach, bed in lowest position    Admiting Maykel Lacey  Pt's previous clinic-NONE  Consent signed - YES  Hepatitis Status- NEGATIVE SUSCETIBLE 10/22/20  Machine #- Machine Number: ([id number]  ) (11/01/20 1010)  Telemetry status-MONITORED  Pre-dialysis wt. - Pre-Dialysis Weight: 89.4 kg (197 lb 1.5 oz) (11/01/20 1010)

## 2020-11-01 NOTE — ACP (ADVANCE CARE PLANNING)
Advance Care Planning Note    Name: Jalen Nava  YOB: 1945  MRN: [id number]  Admission Date: 10/19/2020  1:23 AM    Date of discussion: 11/1/2020    Active Diagnoses:    Hospital Problems  Never Reviewed          Codes Class Noted POA    Leukocytosis ICD-10-CM: D72.829  ICD-9-CM: 288.60  10/19/2020 Unknown        UTI (urinary tract infection) ICD-10-CM: N39.0  ICD-9-CM: 599.0  10/19/2020 Unknown        Tachycardia ICD-10-CM: R00.0  ICD-9-CM: 785.0  10/19/2020 Unknown        Sepsis ([address] ICD-10-CM: A41.9  ICD-9-CM: 038.9, 995.91  10/19/2020 Unknown        Hydronephrosis of right kidney ICD-10-CM: N13.30  ICD-9-CM: 577  10/19/2020 Unknown               These active diagnoses are of sufficient risk that focused discussion on advance care planning is indicated in order to allow the patient to thoughtfully consider personal goals of care, and if situations arise that prevent the ability to personally give input, to ensure appropriate representation of their personal desires for different levels and aggressiveness of care. Discussion:     Persons present and participating in discussion: Velma Brittle, MD    Discussion: discussed current condition, short and long term prognosis, discussed his CAD, and recent survival of cardiac arrest. Having survived it once he wants to stay full code. Encouraged to discuss with his PCP and family and get his advanced medical directives     Time Spent:     Total time spent face-to-face in education and discussion: 17 minutes.      Subha Mcdonnell MD  11/1/2020  2:47 PM

## 2020-11-01 NOTE — PROGRESS NOTES
[facility] Cardiology Progress Note    Date of service: 11/1/2020    Subjective:   Mr Rossana Mauro says he's feeling good today  No chest pain or difficulty breathing  Apparently he may be going to rehab tomorrow    Objective:    Visit Vitals  BP (!) 144/58 (BP 1 Location: Right arm, BP Patient Position: At rest)   Pulse 65   Temp 96.9 °F (36.1 °C)   Resp 18   Ht 5' 10\" (1.778 m)   Wt 89.4 kg (197 lb)   SpO2 97%   BMI 28.27 kg/m²       Physical Exam   Constitutional: He is oriented to person, place, and time. He appears well-developed and well-nourished. HENT:   Head: Normocephalic and atraumatic. Eyes: Conjunctivae are normal. No scleral icterus. Neck: No JVD present. Cardiovascular: Normal rate, regular rhythm and normal heart sounds. Exam reveals no gallop. No murmur heard. Pulmonary/Chest: Effort normal and breath sounds normal. No stridor. No respiratory distress. He has no wheezes. He has no rales. Abdominal: Soft. He exhibits no distension. Musculoskeletal:         General: No deformity or edema. Neurological: He is alert and oriented to person, place, and time. Skin: Skin is warm and dry. Psychiatric: He has a normal mood and affect.  His behavior is normal.       Data reviewed:  Recent Results (from the past 12 hour(s))   GLUCOSE, POC    Collection Time: 10/31/20  8:52 PM   Result Value Ref Range    Glucose (POC) 91 65 - 100 mg/dL    Performed by [facility] Lucero [facility],[address], COMPREHENSIVE    Collection Time: 11/01/20 12:39 AM   Result Value Ref Range    Sodium 133 (L) 136 - 145 mmol/L    Potassium 3.9 3.5 - 5.1 mmol/L    Chloride 99 97 - 108 mmol/L    CO2 21 21 - 32 mmol/L    Anion gap 13 5 - 15 mmol/L    Glucose 89 65 - 100 mg/dL    BUN 76 (H) 6 - 20 MG/DL    Creatinine 5.29 (H) 0.70 - 1.30 MG/DL    BUN/Creatinine ratio 14 12 - 20      GFR est AA 13 (L) >60 ml/min/1.73m2    GFR est non-AA 11 (L) >60 ml/min/1.73m2    Calcium 8.8 8.5 - 10.1 MG/DL    Bilirubin, total 0.7 0.2 - 1.0 MG/DL ALT (SGPT) 169 (H) 12 - 78 U/L    AST (SGOT) 104 (H) 15 - 37 U/L    Alk. phosphatase 711 (H) 45 - 117 U/L    Protein, total 6.7 6.4 - 8.2 g/dL    Albumin 2.4 (L) 3.5 - 5.0 g/dL    Globulin 4.3 (H) 2.0 - 4.0 g/dL    A-G Ratio 0.6 (L) 1.1 - 2.2     SAMPLES BEING HELD    Collection Time: 11/01/20 12:39 AM   Result Value Ref Range    SAMPLES BEING HELD 1SST     COMMENT        Add-on orders for these samples will be processed based on acceptable specimen integrity and analyte stability, which may vary by analyte. GLUCOSE, POC    Collection Time: 11/01/20  7:51 AM   Result Value Ref Range    Glucose (POC) 82 65 - 100 mg/dL    Performed by Deja Covarrubias        Assessment:  1. CAD s/p CABG with patent LIMA to LAD: stable  2. Cardiomyopathy: EF 40-45%  3. CKD stage IV with JAMAL this admission  4. PAD: stable    Plan:  Continue ASA and plavix  Continue statin  Continue amiodarone, beta blocker      Signed:  Zhang Blount MD  Interventional Cardiology  11/1/2020

## 2020-11-01 NOTE — PROGRESS NOTES
Name: Michelle Joshua MRN: [id number]   : 1945 Hospital: [facility]   Date: 2020        IMPRESSION:   · CKD stage 4 .  · JAMAL- oligo-anuric - S/P cardiac arrest --> off CRRT --> on IHD  · Respiratory failure - extubated  · Hypervolemia- pulmonary edema - resolved  · Hyperkalemia- resolved    · Hypokalemia - resolved - switch to a 3 K bath today. · UTI- on AB's  · S/P card cath  · IRight hydronephrosis- urology is evaluating. · ? A . Fib  · Acute Bronchospasm - resolved      PLAN:   · HD today () . · Watch for renal recovery. · Follow lytes  · Urology following for right ureteral obstruction. · Avoid NSAIDs + IV contrast  · Will request a Perm Cath in anticipation of placement in the outpatient dialysis clinic. Subjective/Interval History:     F/U JAMAL --> 10/26/2020    The events were noted. CRRT was discontinued yesterday. Felt better. F/U JAMAL --> 10/27/2020      Took part in therapy today. F/U JAMAL --> 10/28/2020      Felt better. Seems to be moving more. Had HD yesterday. F/U JAMAL --> 10/29/2020    Feeling better. His appetite is improving. F/U JAMAL on HD --> 10/30/2020      Felling better every day. No new complaints were offered. F/U JAMAL on HD --> 10/31/2020      Became hypotensive shortly after starting HD this morning. HD was discontinued. His BP improved thereafter  Mr Desi Hadley denied chest pain , dyspnea , nausea , vomiting , fever. F/U JAMAL , HD --> 2020    Felt better today. He stated \" not sure what happened\". There were no new symptoms. The ROS was unremarkable. Seen by Cardiology yesterday.       Objective:   Vital Signs:    Visit Vitals  BP (!) 144/58 (BP 1 Location: Right arm, BP Patient Position: At rest)   Pulse 65   Temp 96.9 °F (36.1 °C)   Resp 18   Ht 5' 10\" (1.778 m)   Wt 89.4 kg (197 lb)   SpO2 97%   BMI 28.27 kg/m²       O2 Device: Nasal cannula   O2 Flow Rate (L/min): 2 l/min   Temp (24hrs), Av.9 °F (36.6 °C), Min:96.9 °F (36.1 °C), Max:98.7 °F (37.1 °C)       Intake/Output:   Last shift:      No intake/output data recorded. Last 3 shifts: 10/30 1901 - 11/01 0700  In: -   Out: 900 [Urine:900]    Intake/Output Summary (Last 24 hours) at 11/1/2020 0849  Last data filed at 11/1/2020 0600  Gross per 24 hour   Intake    Output 600 ml   Net -600 ml        Physical Exam:    Seen in Room 670. Mrs. Marci Hawkins was at the bedside. General:    Comfortable   . Head:   Normocephalic    Right              IJ HD catheter    Lungs: No wheezes, no rales                            Heart:   No S3  Gallop , No pericardial rub. .  Abdomen:   Not distended    Extremities: No leg oedema    M/S                 Wasting +    Neurologic:      Responding appropriately. Psyche            Much calmer this morning                                   DATA:  Labs:  Recent Labs     11/01/20  0039 10/31/20  0202 10/30/20  0542   * 132* 132*   K 3.9 3.6 3.4*   CL 99 99 99   CO2 21 23 23   BUN 76* 75* 61*   CREA 5.29* 4.90* 3.85*   CA 8.8 8.6 8.5   ALB 2.4* 2.3* 2.2*     No results for input(s): WBC, HGB, HCT, PLT, HGBEXT, HCTEXT, PLTEXT, HGBEXT, HCTEXT, PLTEXT in the last 72 hours. No results for input(s): RASHMI, KU, CLU, CREAU in the last 72 hours.     No lab exists for component: PROU    Total time spent with patient:   []       Care Plan discussed with:     Patient , Wife    D/W Dr. Jun Abarca (Yesterday - 10/31/2020)    Mich Pedro MD

## 2020-11-01 NOTE — PROGRESS NOTES
Hospitalist Progress Note  Jian Souza MD  Answering service: [phone] OR [phone] from in house phone      Date of Service:  2020  NAME:  Eddie Covarrubias  :  1945  MRN:  [id number]    Admission Summary:   Mr Lamont Travis is a 69 y/o with a past history of CAD s/p CABG( recent stress test and echo EF 45%), AAA s/p repair, HTN, PAD s/p femoral-femoral bypass, LLE paralysis was transferred from [facility] with abdominal pain at RLQ. CT abdomen/ pelvis without contrast showing right hydronephrosis.  UA revealed UTI, JAMAL on CKD. He was started on antibiotic. On 10/20/2020 code blue was called after patient was noted to have cardiac arrest. CPR and intubated, pressor, heparin gtt started. CRRT started on 10/21. S/p LHC showing severe CAD in all arteries except LIMA-LAD. 10/25 transitioned to HD. extubated 10/25. Feeding on NGT    Interval history / Subjective:      Patient seen and examined this morning. Feels well, back to his normal well self. Tolerating HD today well. Currently on dialysis. No sob. On 2L NC sats 99%. Weaning down. Assessment & Plan:     #. s/p Cardiac arrest: this admission. #. CAD: s/p CABG  #. Ischemic cardiomyopathy  - s/p - [facility] 10/22: Severe native 3V CAD, patent LIMA-LAD, known occluded SVG-RCA and SVG-LCx; moderately elevated LVEDP  - TTE 10/21: LVEF is 40-45%- Cardiology following- no further cardiac work up  - 10/31 had episode of severe resp distress, hypotension, tachyArrhythmia. These are anginal symptoms. Will call back cardiology for eval. Will check EKG, trops and get CXR    # Tachyarrhythmias with multifocal ventricular ectopies - controlled on amiodarone   # Acute hypoxic respiratory failure - intubated 10/20, extubated 10/25- on 2L O2 NC  # Leukocytosis possible RLL PNA ? Aspiration - Ceftriaxone - 10 day course  # ARF on CKD, stage IV- due to cardiac arrest- CRRT to HD 10/25- Nephro following   - will need permaCath and HD chair prior to dc to rehab. # Right sided hydronephrosis- Urology following   # Hypokalemia - replace as needed   # Mild hyperglycemia- Keep glucose less than 180 with subcutaneous insulin as needed  # Elevated LFTs: ?shock liver from hypoperfusion due to cardiac arrest - monitor  # Profound generalized weakness/ICU myopathy - PT/OT  # Dysphagia - Improved - off tube feed, removed NGT - Mechanical soft diet      DVT px: SQ UFH   Code: Full   Dispo: A/w Rehab     Hospital Problems  Never Reviewed          Codes Class Noted POA    Leukocytosis ICD-10-CM: S18.924  ICD-9-CM: 288.60  10/19/2020 Unknown        UTI (urinary tract infection) ICD-10-CM: N39.0  ICD-9-CM: 599.0  10/19/2020 Unknown        Tachycardia ICD-10-CM: R00.0  ICD-9-CM: 785.0  10/19/2020 Unknown        Sepsis ([address] ICD-10-CM: A41.9  ICD-9-CM: 038.9, 995.91  10/19/2020 Unknown        Hydronephrosis of right kidney ICD-10-CM: N13.30  ICD-9-CM: 043  10/19/2020 Unknown            Review of Systems:   Pertinent positive mentioned interval hx/HPI. Other systems reviewed and negative. Vital Signs:    Last 24hrs VS reviewed since prior progress note. Most recent are:  Visit Vitals  BP (!) 142/46   Pulse 61   Temp 98.7 °F (37.1 °C)   Resp 22   Ht 5' 10\" (1.778 m)   Wt 89.4 kg (197 lb)   SpO2 98%   BMI 28.27 kg/m²         Intake/Output Summary (Last 24 hours) at 11/1/2020 1226  Last data filed at 11/1/2020 0600  Gross per 24 hour   Intake    Output 600 ml   Net -600 ml        Physical Examination:     Constitutional:  No acute distress, NGT in place        Resp: No wheezing/rhonchi/rales. No accessory muscle use   CV:  Regular rhythm, normal rate, no murmurs,    GI:  Soft, non distended, non tender.     Musculoskeletal:  No edema, warm    Neurologic:  generalized weakness improving        Data Review:      I personally reviewed labs and imaging     Labs:     No results for input(s): WBC, HGB, HCT, PLT, HGBEXT, HCTEXT, PLTEXT, HGBEXT, HCTEXT, PLTEXT in the last 72 hours. Recent Labs     11/01/20  0039 10/31/20  0202 10/30/20  0542   * 132* 132*   K 3.9 3.6 3.4*   CL 99 99 99   CO2 21 23 23   BUN 76* 75* 61*   CREA 5.29* 4.90* 3.85*   GLU 89 99 100   CA 8.8 8.6 8.5     Recent Labs     11/01/20  0039 10/31/20  0202 10/30/20  0542   * 216* 242*   * 912* 894*   TBILI 0.7 0.6 0.6   TP 6.7 6.7 6.4   ALB 2.4* 2.3* 2.2*   GLOB 4.3* 4.4* 4.2*     No results for input(s): INR, PTP, APTT, INREXT, INREXT in the last 72 hours. No results for input(s): FE, TIBC, PSAT, FERR in the last 72 hours. No results found for: FOL, RBCF   No results for input(s): PH, PCO2, PO2 in the last 72 hours.   Recent Labs     10/31/20  1715 10/31/20  1133   TROIQ 1.18* 1.28*     No results found for: CHOL, CHOLX, CHLST, CHOLV, HDL, HDLP, LDL, LDLC, DLDLP, TGLX, TRIGL, TRIGP, CHHD, CHHDX  Lab Results   Component Value Date/Time    Glucose (POC) 83 11/01/2020 12:05 PM    Glucose (POC) 82 11/01/2020 07:51 AM    Glucose (POC) 91 10/31/2020 08:52 PM    Glucose (POC) 150 (H) 10/31/2020 05:01 PM    Glucose (POC) 83 10/31/2020 12:08 PM     Lab Results   Component Value Date/Time    Color YELLOW/STRAW 10/21/2020 12:59 AM    Appearance CLOUDY (A) 10/21/2020 12:59 AM    Specific gravity 1.018 10/21/2020 12:59 AM    pH (UA) 5.0 10/21/2020 12:59 AM    Protein 100 (A) 10/21/2020 12:59 AM    Glucose Negative 10/21/2020 12:59 AM    Ketone Negative 10/21/2020 12:59 AM    Bilirubin Negative 10/21/2020 12:59 AM    Urobilinogen 0.2 10/21/2020 12:59 AM    Nitrites Negative 10/21/2020 12:59 AM    Leukocyte Esterase MODERATE (A) 10/21/2020 12:59 AM    Epithelial cells FEW 10/21/2020 12:59 AM    Bacteria Negative 10/21/2020 12:59 AM    WBC 10-20 10/21/2020 12:59 AM    RBC 0-5 10/21/2020 12:59 AM         Medications Reviewed:     Current Facility-Administered Medications   Medication Dose Route Frequency    insulin lispro (HUMALOG) injection   SubCUTAneous AC&HS    albumin human 25% (BUMINATE) solution 12.5 g  12.5 g IntraVENous DIALYSIS PRN    amiodarone (CORDARONE) tablet 400 mg  400 mg Oral BID    clopidogreL (PLAVIX) tablet 75 mg  75 mg Oral DAILY    heparin (porcine) injection 5,000 Units  5,000 Units SubCUTAneous Q8H    metoprolol tartrate (LOPRESSOR) tablet 100 mg  100 mg Per NG tube Q12H    labetaloL (NORMODYNE;TRANDATE) injection 10 mg  10 mg IntraVENous Q2H PRN    dextrose 10% infusion 125-250 mL  125-250 mL IntraVENous PRN    aspirin chewable tablet 81 mg  81 mg Oral DAILY    heparin (porcine) 1,000 unit/mL injection 3,400 Units  3,400 Units IntraVENous DIALYSIS PRN    ondansetron (ZOFRAN) injection 4 mg  4 mg IntraVENous Q4H PRN    albuterol-ipratropium (DUO-NEB) 2.5 MG-0.5 MG/3 ML  3 mL Nebulization Q4H PRN    sodium chloride (NS) flush 5-40 mL  5-40 mL IntraVENous Q8H    polyethylene glycol (MIRALAX) packet 17 g  17 g Oral DAILY PRN    sodium chloride (NS) flush 5-40 mL  5-40 mL IntraVENous PRN    acetaminophen (TYLENOL) tablet 650 mg  650 mg Oral Q6H PRN    Or    acetaminophen (TYLENOL) suppository 650 mg  650 mg Rectal Q6H PRN    glucose chewable tablet 16 g  4 Tab Oral PRN    glucagon (GLUCAGEN) injection 1 mg  1 mg IntraMUSCular PRN    insulin glargine (LANTUS) injection 17 Units  0.2 Units/kg SubCUTAneous QHS    hydrALAZINE (APRESOLINE) 20 mg/mL injection 10 mg  10 mg IntraVENous Q6H PRN    traMADoL (ULTRAM) tablet 50 mg  50 mg Oral Q4H PRN    rosuvastatin (CRESTOR) tablet 40 mg  40 mg Oral QHS    oxyCODONE IR (ROXICODONE) tablet 5 mg  5 mg Oral Q4H PRN    influenza vaccine 2020-21 (6 mos+)(PF) (FLUARIX/FLULAVAL/FLUZONE QUAD) injection 0.5 mL  0.5 mL IntraMUSCular PRIOR TO DISCHARGE     ______________________________________________________________________  EXPECTED LENGTH OF STAY: 4d 19h  ACTUAL LENGTH OF STAY:          Jenny Salgado MD   Patient has given Verbal permission to discuss medical care with   persons present in the room and and also with contact as listed on face sheet. Please note that this dictation was completed with [employer], the computer voice recognition software. Quite often unanticipated grammatical, syntax, homophones, and other interpretive errors are inadvertently transcribed by the computer software. Please disregard these errors. Please excuse any errors that have escaped final proofreading. Thank you.

## 2020-11-01 NOTE — PROGRESS NOTES
Problem: Falls - Risk of  Goal: *Absence of Falls  Description: Document Khai Magallon Fall Risk and appropriate interventions in the flowsheet.   Outcome: Progressing Towards Goal  Note: Fall Risk Interventions:  Mobility Interventions: Bed/chair exit alarm, OT consult for ADLs, Patient to call before getting OOB, PT Consult for mobility concerns, PT Consult for assist device competence    Mentation Interventions: Adequate sleep, hydration, pain control, Door open when patient unattended, Increase mobility, More frequent rounding, Reorient patient, Toileting rounds, Update white board    Medication Interventions: Bed/chair exit alarm, Patient to call before getting OOB, Teach patient to arise slowly    Elimination Interventions: Bed/chair exit alarm, Call light in reach, Patient to call for help with toileting needs, Toilet paper/wipes in reach, Toileting schedule/hourly rounds              Problem: Patient Education: Go to Patient Education Activity  Goal: Patient/Family Education  Outcome: Progressing Towards Goal     Problem: Urinary Tract Infection - Adult  Goal: *Absence of infection signs and symptoms  Outcome: Progressing Towards Goal     Problem: Patient Education: Go to Patient Education Activity  Goal: Patient/Family Education  Outcome: Progressing Towards Goal     Problem: Pain  Goal: *Control of Pain  Outcome: Progressing Towards Goal     Problem: Patient Education: Go to Patient Education Activity  Goal: Patient/Family Education  Outcome: Progressing Towards Goal     Problem: Breathing Pattern - Ineffective  Goal: *Absence of hypoxia  Outcome: Progressing Towards Goal  Goal: *Use of effective breathing techniques  Outcome: Progressing Towards Goal     Problem: Patient Education: Go to Patient Education Activity  Goal: Patient/Family Education  Outcome: Progressing Towards Goal     Problem: Diabetes Self-Management  Goal: *Disease process and treatment process  Description: Define diabetes and identify own type of diabetes; list 3 options for treating diabetes. Outcome: Progressing Towards Goal  Goal: *Incorporating nutritional management into lifestyle  Description: Describe effect of type, amount and timing of food on blood glucose; list 3 methods for planning meals. Outcome: Progressing Towards Goal  Goal: *Incorporating physical activity into lifestyle  Description: State effect of exercise on blood glucose levels. Outcome: Progressing Towards Goal  Goal: *Developing strategies to promote health/change behavior  Description: Define the ABC's of diabetes; identify appropriate screenings, schedule and personal plan for screenings. Outcome: Progressing Towards Goal  Goal: *Using medications safely  Description: State effect of diabetes medications on diabetes; name diabetes medication taking, action and side effects. Outcome: Progressing Towards Goal  Goal: *Monitoring blood glucose, interpreting and using results  Description: Identify recommended blood glucose targets  and personal targets. Outcome: Progressing Towards Goal  Goal: *Prevention, detection, treatment of acute complications  Description: List symptoms of hyper- and hypoglycemia; describe how to treat low blood sugar and actions for lowering  high blood glucose level. Outcome: Progressing Towards Goal  Goal: *Prevention, detection and treatment of chronic complications  Description: Define the natural course of diabetes and describe the relationship of blood glucose levels to long term complications of diabetes.   Outcome: Progressing Towards Goal  Goal: *Developing strategies to address psychosocial issues  Description: Describe feelings about living with diabetes; identify support needed and support network  Outcome: Progressing Towards Goal  Goal: *Sick day guidelines  Outcome: Progressing Towards Goal     Problem: Patient Education: Go to Patient Education Activity  Goal: Patient/Family Education  Outcome: Progressing Towards Goal     Problem: Pressure Injury - Risk of  Goal: *Prevention of pressure injury  Description: Document Luis Felipe Scale and appropriate interventions in the flowsheet. Outcome: Progressing Towards Goal  Note: Pressure Injury Interventions:  Sensory Interventions: Assess changes in LOC, Avoid rigorous massage over bony prominences, Chair cushion, Check visual cues for pain, Discuss PT/OT consult with provider, Float heels, Keep linens dry and wrinkle-free, Maintain/enhance activity level, Minimize linen layers, Pad between skin to skin, Pressure redistribution bed/mattress (bed type), Turn and reposition approx. every two hours (pillows and wedges if needed)    Moisture Interventions: Absorbent underpads, Apply protective barrier, creams and emollients, Limit adult briefs, Maintain skin hydration (lotion/cream), Minimize layers, Moisture barrier    Activity Interventions: Increase time out of bed, Pressure redistribution bed/mattress(bed type), PT/OT evaluation    Mobility Interventions: Float heels, Pressure redistribution bed/mattress (bed type), Turn and reposition approx.  every two hours(pillow and wedges)    Nutrition Interventions: Document food/fluid/supplement intake, Offer support with meals,snacks and hydration    Friction and Shear Interventions: Feet elevated on foot rest, Lift sheet, Minimize layers                Problem: Patient Education: Go to Patient Education Activity  Goal: Patient/Family Education  Outcome: Progressing Towards Goal     Problem: Nutrition Deficit  Goal: *Optimize nutritional status  Outcome: Progressing Towards Goal     Problem: Patient Education: Go to Patient Education Activity  Goal: Patient/Family Education  Outcome: Progressing Towards Goal     Problem: Patient Education: Go to Patient Education Activity  Goal: Patient/Family Education  Outcome: Progressing Towards Goal     Problem: Patient Education: Go to Patient Education Activity  Goal: Patient/Family Education  Outcome: Progressing Towards Goal     Problem: Nutrition Deficit  Goal: *Optimize nutritional status  Outcome: Progressing Towards Goal

## 2020-11-02 ENCOUNTER — APPOINTMENT (OUTPATIENT)
Dept: INTERVENTIONAL RADIOLOGY/VASCULAR | Age: 75
DRG: 870 | End: 2020-11-02
Attending: INTERNAL MEDICINE
Payer: MEDICARE

## 2020-11-02 LAB
ALBUMIN SERPL-MCNC: 2.4 G/DL (ref 3.5–5)
ALBUMIN/GLOB SERPL: 0.5 {RATIO} (ref 1.1–2.2)
ALP SERPL-CCNC: 668 U/L (ref 45–117)
ALT SERPL-CCNC: 144 U/L (ref 12–78)
ANION GAP SERPL CALC-SCNC: 9 MMOL/L (ref 5–15)
AST SERPL-CCNC: 97 U/L (ref 15–37)
BILIRUB SERPL-MCNC: 0.7 MG/DL (ref 0.2–1)
BUN SERPL-MCNC: 52 MG/DL (ref 6–20)
BUN/CREAT SERPL: 12 (ref 12–20)
CALCIUM SERPL-MCNC: 8.4 MG/DL (ref 8.5–10.1)
CHLORIDE SERPL-SCNC: 99 MMOL/L (ref 97–108)
CO2 SERPL-SCNC: 23 MMOL/L (ref 21–32)
CREAT SERPL-MCNC: 4.31 MG/DL (ref 0.7–1.3)
ERYTHROCYTE [DISTWIDTH] IN BLOOD BY AUTOMATED COUNT: 15.8 % (ref 11.5–14.5)
GLOBULIN SER CALC-MCNC: 4.5 G/DL (ref 2–4)
GLUCOSE BLD STRIP.AUTO-MCNC: 106 MG/DL (ref 65–100)
GLUCOSE BLD STRIP.AUTO-MCNC: 133 MG/DL (ref 65–100)
GLUCOSE BLD STRIP.AUTO-MCNC: 84 MG/DL (ref 65–100)
GLUCOSE BLD STRIP.AUTO-MCNC: 85 MG/DL (ref 65–100)
GLUCOSE SERPL-MCNC: 109 MG/DL (ref 65–100)
HCT VFR BLD AUTO: 32.1 % (ref 36.6–50.3)
HGB BLD-MCNC: 10.1 G/DL (ref 12.1–17)
MCH RBC QN AUTO: 26.8 PG (ref 26–34)
MCHC RBC AUTO-ENTMCNC: 31.5 G/DL (ref 30–36.5)
MCV RBC AUTO: 85.1 FL (ref 80–99)
NRBC # BLD: 0 K/UL (ref 0–0.01)
NRBC BLD-RTO: 0 PER 100 WBC
PLATELET # BLD AUTO: 406 K/UL (ref 150–400)
PMV BLD AUTO: 11.1 FL (ref 8.9–12.9)
POTASSIUM SERPL-SCNC: 3.7 MMOL/L (ref 3.5–5.1)
PROT SERPL-MCNC: 6.9 G/DL (ref 6.4–8.2)
RBC # BLD AUTO: 3.77 M/UL (ref 4.1–5.7)
SERVICE CMNT-IMP: ABNORMAL
SERVICE CMNT-IMP: ABNORMAL
SERVICE CMNT-IMP: NORMAL
SERVICE CMNT-IMP: NORMAL
SODIUM SERPL-SCNC: 131 MMOL/L (ref 136–145)
TROPONIN I SERPL-MCNC: 0.8 NG/ML
WBC # BLD AUTO: 11.2 K/UL (ref 4.1–11.1)

## 2020-11-02 PROCEDURE — 82962 GLUCOSE BLOOD TEST: CPT

## 2020-11-02 PROCEDURE — 74011250637 HC RX REV CODE- 250/637: Performed by: INTERNAL MEDICINE

## 2020-11-02 PROCEDURE — 36415 COLL VENOUS BLD VENIPUNCTURE: CPT

## 2020-11-02 PROCEDURE — 97535 SELF CARE MNGMENT TRAINING: CPT

## 2020-11-02 PROCEDURE — 74011250636 HC RX REV CODE- 250/636: Performed by: STUDENT IN AN ORGANIZED HEALTH CARE EDUCATION/TRAINING PROGRAM

## 2020-11-02 PROCEDURE — 77030010507 HC ADH SKN DERMBND J&J -B

## 2020-11-02 PROCEDURE — 74011250636 HC RX REV CODE- 250/636

## 2020-11-02 PROCEDURE — 74011636637 HC RX REV CODE- 636/637: Performed by: FAMILY MEDICINE

## 2020-11-02 PROCEDURE — C1750 CATH, HEMODIALYSIS,LONG-TERM: HCPCS

## 2020-11-02 PROCEDURE — 97530 THERAPEUTIC ACTIVITIES: CPT

## 2020-11-02 PROCEDURE — 74011000250 HC RX REV CODE- 250: Performed by: STUDENT IN AN ORGANIZED HEALTH CARE EDUCATION/TRAINING PROGRAM

## 2020-11-02 PROCEDURE — 77030002996 HC SUT SLK J&J -A

## 2020-11-02 PROCEDURE — 36558 INSERT TUNNELED CV CATH: CPT

## 2020-11-02 PROCEDURE — 85027 COMPLETE CBC AUTOMATED: CPT

## 2020-11-02 PROCEDURE — 74011250636 HC RX REV CODE- 250/636: Performed by: INTERNAL MEDICINE

## 2020-11-02 PROCEDURE — 84484 ASSAY OF TROPONIN QUANT: CPT

## 2020-11-02 PROCEDURE — C1894 INTRO/SHEATH, NON-LASER: HCPCS

## 2020-11-02 PROCEDURE — 97116 GAIT TRAINING THERAPY: CPT

## 2020-11-02 PROCEDURE — 80053 COMPREHEN METABOLIC PANEL: CPT

## 2020-11-02 PROCEDURE — 65660000000 HC RM CCU STEPDOWN

## 2020-11-02 PROCEDURE — 2709999900 HC NON-CHARGEABLE SUPPLY

## 2020-11-02 PROCEDURE — 74011000250 HC RX REV CODE- 250

## 2020-11-02 RX ORDER — LIDOCAINE HYDROCHLORIDE 20 MG/ML
20 INJECTION, SOLUTION INFILTRATION; PERINEURAL ONCE
Status: CANCELLED | OUTPATIENT
Start: 2020-11-02 | End: 2020-11-02

## 2020-11-02 RX ORDER — HEPARIN SODIUM 1000 [USP'U]/ML
INJECTION, SOLUTION INTRAVENOUS; SUBCUTANEOUS
Status: COMPLETED
Start: 2020-11-02 | End: 2020-11-02

## 2020-11-02 RX ORDER — SODIUM CHLORIDE 9 MG/ML
25 INJECTION, SOLUTION INTRAVENOUS ONCE
Status: COMPLETED | OUTPATIENT
Start: 2020-11-02 | End: 2020-11-02

## 2020-11-02 RX ORDER — CEFAZOLIN SODIUM/WATER 2 G/20 ML
2 SYRINGE (ML) INTRAVENOUS
Status: COMPLETED | OUTPATIENT
Start: 2020-11-02 | End: 2020-11-02

## 2020-11-02 RX ORDER — HEPARIN SODIUM 1000 [USP'U]/ML
10000 INJECTION, SOLUTION INTRAVENOUS; SUBCUTANEOUS ONCE
Status: CANCELLED | OUTPATIENT
Start: 2020-11-02 | End: 2020-11-02

## 2020-11-02 RX ORDER — FENTANYL CITRATE 50 UG/ML
100 INJECTION, SOLUTION INTRAMUSCULAR; INTRAVENOUS
Status: DISCONTINUED | OUTPATIENT
Start: 2020-11-02 | End: 2020-11-02

## 2020-11-02 RX ORDER — MIDAZOLAM HYDROCHLORIDE 1 MG/ML
5 INJECTION, SOLUTION INTRAMUSCULAR; INTRAVENOUS
Status: DISCONTINUED | OUTPATIENT
Start: 2020-11-02 | End: 2020-11-02

## 2020-11-02 RX ORDER — AMIODARONE HYDROCHLORIDE 200 MG/1
200 TABLET ORAL 2 TIMES DAILY
Status: DISCONTINUED | OUTPATIENT
Start: 2020-11-02 | End: 2020-11-04 | Stop reason: HOSPADM

## 2020-11-02 RX ORDER — LIDOCAINE HYDROCHLORIDE 20 MG/ML
INJECTION, SOLUTION INFILTRATION; PERINEURAL
Status: COMPLETED
Start: 2020-11-02 | End: 2020-11-02

## 2020-11-02 RX ADMIN — SODIUM CHLORIDE 25 ML/HR: 900 INJECTION, SOLUTION INTRAVENOUS at 16:12

## 2020-11-02 RX ADMIN — HEPARIN SODIUM 5000 UNITS: 5000 INJECTION INTRAVENOUS; SUBCUTANEOUS at 22:10

## 2020-11-02 RX ADMIN — METOPROLOL TARTRATE 100 MG: 25 TABLET, FILM COATED ORAL at 10:10

## 2020-11-02 RX ADMIN — Medication 10 ML: at 21:15

## 2020-11-02 RX ADMIN — METOPROLOL TARTRATE 100 MG: 25 TABLET, FILM COATED ORAL at 22:08

## 2020-11-02 RX ADMIN — HEPARIN SODIUM 3800 UNITS: 1000 INJECTION INTRAVENOUS; SUBCUTANEOUS at 17:29

## 2020-11-02 RX ADMIN — CEFAZOLIN SODIUM 2 G: 300 INJECTION, POWDER, LYOPHILIZED, FOR SOLUTION INTRAVENOUS at 16:12

## 2020-11-02 RX ADMIN — MIDAZOLAM HYDROCHLORIDE 0.5 MG: 1 INJECTION, SOLUTION INTRAMUSCULAR; INTRAVENOUS at 17:05

## 2020-11-02 RX ADMIN — Medication 10 ML: at 06:38

## 2020-11-02 RX ADMIN — LIDOCAINE HYDROCHLORIDE 20 ML: 20 INJECTION, SOLUTION INFILTRATION; PERINEURAL at 17:05

## 2020-11-02 RX ADMIN — ROSUVASTATIN 40 MG: 40 TABLET, FILM COATED ORAL at 22:08

## 2020-11-02 RX ADMIN — FENTANYL CITRATE 50 MCG: 50 INJECTION, SOLUTION INTRAMUSCULAR; INTRAVENOUS at 17:02

## 2020-11-02 RX ADMIN — HEPARIN SODIUM 5000 UNITS: 5000 INJECTION INTRAVENOUS; SUBCUTANEOUS at 06:37

## 2020-11-02 RX ADMIN — Medication 10 ML: at 14:00

## 2020-11-02 RX ADMIN — INSULIN GLARGINE 17 UNITS: 100 INJECTION, SOLUTION SUBCUTANEOUS at 22:09

## 2020-11-02 RX ADMIN — AMIODARONE HYDROCHLORIDE 200 MG: 200 TABLET ORAL at 19:12

## 2020-11-02 NOTE — PROGRESS NOTES
Problem: Self Care Deficits Care Plan (Adult)  Goal: *Acute Goals and Plan of Care (Insert Text)  Description:   FUNCTIONAL STATUS PRIOR TO ADMISSION: Patient was independent and active without use of DME. Drives. LLE weakness since 2011 when contracted salmonella. HOME SUPPORT: The patient lived with wife but did not require assist.    Occupational Therapy Goals  Initiated 10/26/2020  1. Patient will perform grooming seated EOB with minimal assistance/contact guard assist within 7 day(s). UPGRADE TO STANDING ADLS AT SINK FOR 5 MIN WITH MIN A  2. Patient will perform upper body dressing seated EOB with minimal assistance/contact guard assist within 7 day(s). CONTINUE   3. Patient will perform toilet transfers with maximal assistance within 7 day(s). UPGRADE TO MOD A  4. Patient will perform all aspects of toileting with maximal assistance within 7 day(s). UPGRADE TO MOD A  5. Patient will participate in upper extremity therapeutic exercise/activities with minimal assistance/contact guard assist for 10 minutes within 7 day(s). CONTINUE  6. Patient will utilize energy conservation techniques during functional activities with verbal, visual, and tactile cues within 7 day(s). CONTINUE  7. Patient will perform lower body dressing using compensatory techniques/AE PRN with minimal assistance/verbal cues within 7 days. ADD             Outcome: Not Met     OCCUPATIONAL THERAPY TREATMENT/WEEKLY RE-ASSESSMENT  Patient: Love Carrera (27 y.o. male)  Date: 11/2/2020  Diagnosis: Hydronephrosis of right kidney [N13.30]  UTI (urinary tract infection) [N39.0]  Sepsis ([facility] [address] [A41.9]  Tachycardia [R00.0]  Leukocytosis [D72.829]   <principal problem not specified>  Procedure(s) (LRB):  LEFT HEART CATH / CORONARY ANGIOGRAPHY (N/A) 11 Days Post-Op  Precautions: Fall, Skin  Chart, occupational therapy assessment, plan of care, and goals were reviewed.     ASSESSMENT  Patient continues with skilled OT services and is progressing towards goals. Patient demonstrated increased activity tolerance, standing balance, and functional mobility this session noted during bathroom mobility and standing ADLs; however, continues to require physical assist for all functional mobility and ADLs. Patient continues to be limited by impulsivity (mod cues for safety with transfers and RW use), decreased safety awareness, insight into deficits, LLE/UE weakness, and activity tolerance/endurance. Of note, patient did desaturate to 87% with functional mobility on room air. Vitals were stable and patient recovered quickly while seated with oxygen (2L). Patient continues to be appropriate for inpatient rehab to address above deficits and ensure home safety & fall prevention. Current Level of Function Impacting Discharge (ADLs): max to total A lower body ADLs, min to mod A upper body ADLs     Other factors to consider for discharge: fall risk, lives with wife, below PLOF, impulsivity, on oxygen           PLAN :  Goals have been updated based on progression since last assessment. Patient continues to benefit from skilled intervention to address the above impairments. Continue to follow patient 5 times a week to address goals. Recommend with staff: Recommend with nursing patient to complete as able in order to maintain strength, endurance and independence: ADLs with supervision/setup, OOB to chair 3x/day and mobilizing to the bathroom for toileting with 1-2 assist using oxygen. Thank you for your assistance. Recommend next OT session: toilet transfer, standing ADLs, lower body dressing with AE PRN     Recommendation for discharge: (in order for the patient to meet his/her long term goals)  Therapy 3 hours per day 5-7 days per week, patient can tolerate 3 hrs of therapy     If d/c home, recommending DME below, intensive HHOT/PT, and physical assist of 1-2 for all ADLs, IADLs, and functional mobility for safety.       This discharge recommendation:  Has been made in collaboration with the attending provider and/or case management    IF patient discharges home will need the following DME: AE: long handled bathing, AE: long handled dressing, bedside commode, and shower chair or transfer bench       SUBJECTIVE:   Patient stated I will get it all done.  when asked about main concerns with d/c     OBJECTIVE DATA SUMMARY:   Cognitive/Behavioral Status:  Neurologic State: Alert  Orientation Level: Oriented X4  Cognition: Follows commands  Perception: Appears intact  Perseveration: No perseveration noted  Safety/Judgement: Decreased awareness of need for assistance;Decreased awareness of need for safety; Awareness of environment; Fall prevention;Home safety;Decreased insight into deficits    Functional Mobility and Transfers for ADLs:  Bed Mobility:  Supine to Sit: Minimum assistance  Sit to Supine: (up in chair)    Transfers:  Sit to Stand: Minimum assistance  Functional Transfers  Bathroom Mobility: Minimum assistance  Cues: Tactile cues provided;Verbal cues provided;Visual cues provided(cues for safety and RW managment )  Adaptive Equipment: Walker (comment)  Bed to Chair: Minimum assistance    Balance:  Sitting: Impaired  Sitting - Static: Good (unsupported)  Sitting - Dynamic: Good (unsupported)  Standing: Impaired; Without support  Standing - Static: Fair;Constant support  Standing - Dynamic : Fair;Constant support    ADL Intervention:     Grooming  Grooming Assistance: Contact guard assistance  Position Performed: Standing  Washing Face: Contact guard assistance(with min A for balance and min cues for safety )  Brushing/Combing Hair: Contact guard assistance(min cues for safety )  Cues: Verbal cues provided; Tactile cues provided;Visual cues provided    Lower Body Dressing Assistance  Socks: Maximum assistance; Compensatory technique training(unable to do compensatory technique)  Leg Crossed Method Used: No  Position Performed: Seated edge of bed  Cues: Don;Physical assistance; Tactile cues provided;Verbal cues provided  Adaptive Equipment Used: (reports use of sock aid inconsistently at home)    Cognitive Retraining  Problem Solving: Identifying the problem; Identifying the task  Executive Functions: Executing cognitive plans  Maintains Attention For (Time): 1 minute  Following Commands: Follows one step commands/directions; Awareness of environment  Safety/Judgement: Decreased awareness of need for assistance;Decreased awareness of need for safety; Awareness of environment; Fall prevention;Home safety;Decreased insight into deficits  Cues: Tactile cues provided;Visual cues provided;Verbal cues provided    Pain:  None     Activity Tolerance:   Fair, desaturates with exertion and requires oxygen, requires frequent rest breaks, and observed SOB with activity  Please refer to the flowsheet for vital signs taken during this treatment. After treatment patient left in no apparent distress:   Sitting in chair, Call bell within reach, and Bed / chair alarm activated    COMMUNICATION/COLLABORATION:   The patients plan of care was discussed with: Physical therapist and Registered nurse. Patient educated on deficits of impaired balance, L sided weakness, and decreased safety awareness as it related to his diagnosis of kidney dysfunction and UTI. Patient indicated fair understanding as evidenced by verbalization. Needs reinforcement for safety with functional mobility. Regarding student involvement in patient care:  A student participated in this treatment session. Per CMS Medicare statements and AOTA guidelines I certify that the following was true:  1. I was present and directly observed the entire session. 2. I made all skilled judgments and clinical decisions regarding care. 3. I am the practitioner responsible for assessment, treatment, and documentation.       ZARA Shukla  Time Calculation: 23 mins

## 2020-11-02 NOTE — ROUTINE PROCESS
TRANSFER - OUT REPORT: 
 
Verbal report given to Sosa(name) on Ievtte [place]  being transferred to [facility](unit) for routine progression of care Report consisted of patients Situation, Background, Assessment and  
Recommendations(SBAR). Information from the following report(s) SBAR, Procedure Summary and MAR was reviewed with the receiving nurse. Lines:  
Peripheral IV 10/19/20 Anterior;Left;Proximal Forearm (Active) Site Assessment Clean, dry, & intact 11/02/20 1509 Phlebitis Assessment 0 11/02/20 1509 Infiltration Assessment 0 11/02/20 1509 Dressing Status Clean, dry, & intact 11/02/20 1509 Dressing Type Transparent;Tape 11/02/20 1200 Hub Color/Line Status Flushed;Patent 11/02/20 1509 Action Taken Open ports on tubing capped 11/02/20 1200 Alcohol Cap Used Yes 11/02/20 1509 Opportunity for questions and clarification was provided. Patient transported with: 
 [employer]

## 2020-11-02 NOTE — ROUTINE PROCESS
Bedside shift change report given to Zen Velez (oncoming nurse) by Jordan Hamilton RN (offgoing nurse). Report included the following information SBAR, Kardex, Recent Results, Cardiac Rhythm SR w/ 1degree AVB, PVCs and Dual Neuro Assessment.

## 2020-11-02 NOTE — PROGRESS NOTES
Problem: Mobility Impaired (Adult and Pediatric)  Goal: *Acute Goals and Plan of Care (Insert Text)  Description: FUNCTIONAL STATUS PRIOR TO ADMISSION: Patient was independent and active without use of DME. Driving. Retired. HOME SUPPORT PRIOR TO ADMISSION: The patient lived with wife but did not require assist.    Physical Therapy Goals  Goals re-assessed 11/2/2020   1. Patient will move from supine to sit and sit to supine in bed with CGA within 7 day(s). 2.  Patient will transfer from bed to chair and chair to bed with CGA using the least restrictive device within 7 day(s). 3.  Patient will perform sit to stand with CGA within 7 day(s). 4.  Patient will ambulate with minimal assistance for 50 feet with the least restrictive device within 7 day(s). Initiated 10/25/2020  1. Patient will move from supine to sit and sit to supine  in bed with moderate assistance  within 7 day(s). 2.  Patient will transfer from bed to chair and chair to bed with moderate assistance  using the least restrictive device within 7 day(s). 3.  Patient will perform sit to stand with moderate assistance  within 7 day(s). 4.  Patient will ambulate with moderate assistance  for 5 feet with the least restrictive device within 7 day(s). Outcome: Progressing Towards Goal  PHYSICAL THERAPY TREATMENT: WEEKLY REASSESSMENT  Patient: Moses Metz (77 y.o. male)  Date: 11/2/2020  Primary Diagnosis: Hydronephrosis of right kidney [N13.30]  UTI (urinary tract infection) [N39.0]  Sepsis ([facility] [address] [A41.9]  Tachycardia [R00.0]  Leukocytosis [D72.829]  Procedure(s) (LRB):  LEFT HEART CATH / CORONARY ANGIOGRAPHY (N/A) 11 Days Post-Op   Precautions: Fall, Skin    ASSESSMENT  Patient continues with skilled PT services and is progressing towards goals. Remains limited by generalized weakness, impaired balance, decreased activity tolerance, shortness of breath, decreased safety awareness, and decreased insight into deficits.   Pt able to tolerate walking to/from bathroom with RW this date, however required seated rest break and 2L O2. He required verbal cuing for safety awareness as he started rushing during return from bathroom leading to increased unsteadiness and risk for falls. He ended session seated in a chair with all needs met. Recommending IPR upon discharge. Current Level of Function Impacting Discharge (mobility/balance): Mai for transfers and ambulation     Other factors to consider for discharge: Fall risk, poor insight into deficits, independent baseline          PLAN :  Goals have been updated based on progression since last assessment. Patient continues to benefit from skilled intervention to address the above impairments. Recommendations and Planned Interventions: bed mobility training, transfer training, gait training, therapeutic exercises, neuromuscular re-education, patient and family training/education and therapeutic activities      Frequency/Duration: Patient will be followed by physical therapy:  5 times a week to address goals. Recommendation for discharge: (in order for the patient to meet his/her long term goals)  Therapy 3 hours per day 5-7 days per week    This discharge recommendation:  Has not yet been discussed the attending provider and/or case management    IF patient discharges home will need the following DME: rolling walker         SUBJECTIVE:   Patient stated I already got up 4 times last night.     OBJECTIVE DATA SUMMARY:   HISTORY:    No past medical history on file. No past surgical history on file.     Home Situation  Home Environment: Private residence  # Steps to Enter: 3  Rails to Enter: Yes  Hand Rails : Bilateral  One/Two Story Residence: Two story(but wife plans to move bedroom into living room downstairs)  # of Interior Steps: 13  Interior Rails: Both  Living Alone: No  Support Systems: Family member(s), Spouse/Significant Other/Partner, Child(maryse)  Patient Expects to be Discharged to[de-identified] Private residence  Current DME Used/Available at Home: None  Tub or Shower Type: Shower    EXAMINATION/PRESENTATION/DECISION MAKING:   Critical Behavior:  Neurologic State: Alert, Eyes open spontaneously  Orientation Level: Oriented X4  Cognition: Follows commands  Safety/Judgement: Awareness of environment, Decreased awareness of need for assistance, Decreased awareness of need for safety, Fall prevention  Hearing: Auditory  Auditory Impairment: None    Functional Mobility:  Bed Mobility:  Supine to Sit: Minimum assistance  Sit to Supine: (up in chair)    Transfers:  Sit to Stand: Minimum assistance;Assist x1  Stand to Sit: Minimum assistance;Assist x1  Bed to Chair: Minimum assistance;Assist x1    Balance:   Sitting: Impaired  Sitting - Static: Good (unsupported)  Sitting - Dynamic: Good (unsupported)  Standing: Impaired; With support  Standing - Static: Fair;Constant support  Standing - Dynamic : Fair;Constant support    Ambulation/Gait Training:  Distance (ft): 30 Feet (ft)  Assistive Device: Gait belt;Walker, rolling  Ambulation - Level of Assistance: Minimal assistance;Assist x2  Gait Abnormalities: Decreased step clearance;Shuffling gait(forward flexed posture)  Base of Support: Center of gravity altered  Stance: Left decreased;Right decreased  Speed/Kamla: Slow;Shuffled  Step Length: Right shortened;Left shortened  Swing Pattern: Left asymmetrical;Right asymmetrical    Activity Tolerance:   Fair, required seated rest break and 2L O2  Please refer to the flowsheet for vital signs taken during this treatment. After treatment patient left in no apparent distress:   Sitting in chair, Call bell within reach and Bed / chair alarm activated    COMMUNICATION/EDUCATION:   The patients plan of care was discussed with: Occupational therapist and Registered nurse.      Fall prevention education was provided and the patient/caregiver indicated understanding., Patient/family have participated as able in goal setting and plan of care. and Patient/family agree to work toward stated goals and plan of care.     Thank you for this referral.  Francisco Henriquez, PT, DPT   Time Calculation: 28 mins

## 2020-11-02 NOTE — PROGRESS NOTES
[facility] Cardiology Progress Note    Date of service: 11/2/2020    Subjective:   No c/o this AM, eager for DC. BP has been OK    Objective:    Visit Vitals  BP (!) 140/66 (BP 1 Location: Right arm, BP Patient Position: At rest)   Pulse 62   Temp 98.4 °F (36.9 °C)   Resp 24   Ht 5' 10\" (1.778 m)   Wt 88 kg (194 lb 1.6 oz)   SpO2 97%   BMI 27.85 kg/m²       Physical Exam   Constitutional: He is oriented to person, place, and time. He appears well-developed and well-nourished. HENT:   Head: Normocephalic and atraumatic. Eyes: Conjunctivae are normal. No scleral icterus. Neck: No JVD present. Cardiovascular: Normal rate, regular rhythm and normal heart sounds. Exam reveals no gallop. No murmur heard. Pulmonary/Chest: Effort normal and breath sounds normal. No stridor. No respiratory distress. He has no wheezes. He has no rales. Abdominal: Soft. He exhibits no distension. Musculoskeletal:         General: No deformity or edema. Neurological: He is alert and oriented to person, place, and time. Skin: Skin is warm and dry. Psychiatric: He has a normal mood and affect.  His behavior is normal.       Data reviewed:  Recent Results (from the past 12 hour(s))   GLUCOSE, POC    Collection Time: 11/01/20 10:52 PM   Result Value Ref Range    Glucose (POC) 128 (H) 65 - 100 mg/dL    Performed by Rakesh Vargas    GLUCOSE, POC    Collection Time: 11/02/20  6:28 AM   Result Value Ref Range    Glucose (POC) 106 (H) 65 - 100 mg/dL    Performed by Piper Lucero [facility] [facility], COMPREHENSIVE    Collection Time: 11/02/20  6:29 AM   Result Value Ref Range    Sodium 131 (L) 136 - 145 mmol/L    Potassium 3.7 3.5 - 5.1 mmol/L    Chloride 99 97 - 108 mmol/L    CO2 23 21 - 32 mmol/L    Anion gap 9 5 - 15 mmol/L    Glucose 109 (H) 65 - 100 mg/dL    BUN 52 (H) 6 - 20 MG/DL    Creatinine 4.31 (H) 0.70 - 1.30 MG/DL    BUN/Creatinine ratio 12 12 - 20      GFR est AA 16 (L) >60 ml/min/1.73m2    GFR est non-AA 14 (L) >60 ml/min/1.73m2    Calcium 8.4 (L) 8.5 - 10.1 MG/DL    Bilirubin, total 0.7 0.2 - 1.0 MG/DL    ALT (SGPT) 144 (H) 12 - 78 U/L    AST (SGOT) 97 (H) 15 - 37 U/L    Alk. phosphatase 668 (H) 45 - 117 U/L    Protein, total 6.9 6.4 - 8.2 g/dL    Albumin 2.4 (L) 3.5 - 5.0 g/dL    Globulin 4.5 (H) 2.0 - 4.0 g/dL    A-G Ratio 0.5 (L) 1.1 - 2.2     CBC W/O DIFF    Collection Time: 11/02/20  6:29 AM   Result Value Ref Range    WBC 11.2 (H) 4.1 - 11.1 K/uL    RBC 3.77 (L) 4.10 - 5.70 M/uL    HGB 10.1 (L) 12.1 - 17.0 g/dL    HCT 32.1 (L) 36.6 - 50.3 %    MCV 85.1 80.0 - 99.0 FL    MCH 26.8 26.0 - 34.0 PG    MCHC 31.5 30.0 - 36.5 g/dL    RDW 15.8 (H) 11.5 - 14.5 %    PLATELET 876 (H) 599 - 400 K/uL    MPV 11.1 8.9 - 12.9 FL    NRBC 0.0 0  WBC    ABSOLUTE NRBC 0.00 0.00 - 0.01 K/uL   TROPONIN I    Collection Time: 11/02/20  6:29 AM   Result Value Ref Range    Troponin-I, Qt. 0.80 (H) <0.05 ng/mL       Assessment:  1. CAD s/p CABG with patent LIMA to LAD: stable  2. Cardiomyopathy: EF 40-45%  3. CKD stage IV with JAMAL this admission  4.  PAD: stable    Plan:  Continue ASA and plavix  Continue statin  Continue beta blocker  Decreased amiodarone to 200 BID, would recommend 200 daily upon DC

## 2020-11-02 NOTE — ROUTINE PROCESS
Pt arrives via stretcher to angio department accompanied by transport for permanent dialysis catheter procedure. All assessments completed and consent was reviewed. Education given regarding procedure, conscious sedation, post-procedure care and  management/follow-up. Opportunity for questions  provided and all questions and concerns were addressed.

## 2020-11-02 NOTE — PROGRESS NOTES
Hospitalist Progress Note  Radha Manzano MD  Answering service: [phone] OR [phone] from in house phone      Date of Service:  2020  NAME:  Jose Ugarte  :  1945  MRN:  [id number]    Admission Summary:   Mr Kerline Sánchez is a 69 y/o with a past history of CAD s/p CABG( recent stress test and echo EF 45%), AAA s/p repair, HTN, PAD s/p femoral-femoral bypass, LLE paralysis was transferred from [facility] with abdominal pain at RLQ. CT abdomen/ pelvis without contrast showing right hydronephrosis.  UA revealed UTI, JAMAL on CKD. He was started on antibiotic. On 10/20/2020 code blue was called after patient was noted to have cardiac arrest. CPR and intubated, pressor, heparin gtt started. CRRT started on 10/21. S/p LHC showing severe CAD in all arteries except LIMA-LAD. 10/25 transitioned to HD. extubated 10/25. Feeding on NGT    Interval history / Subjective:      Patient seen and examined this morning. Feels well today, no acute events. Looking forward to getting to rehab. Getting permaCath today. A/w placement. Assessment & Plan:     #. s/p Cardiac arrest: this admission. #. CAD: s/p CABG  #. Ischemic cardiomyopathy  - s/p - [facility] 10/22: Severe native 3V CAD, patent LIMA-LAD, known occluded SVG-RCA and SVG-LCx; moderately elevated LVEDP  - TTE 10/21: LVEF is 40-45%- Cardiology following- no further cardiac work up  - 10/31 had episode of severe resp distress, hypotension, tachyArrhythmia. These are anginal symptoms. Cardiology re-evaluated, trops plateaued. CXR NAD    # Tachyarrhythmias with multifocal ventricular ectopies - controlled on amiodarone   # Acute hypoxic respiratory failure - intubated 10/20, extubated 10/25- on 2L O2 NC  # Leukocytosis possible RLL PNA ? Aspiration - Ceftriaxone - 10 day course  # ARF on CKD, stage IV- due to cardiac arrest- CRRT to HD 10/25- Nephro following   - will need permaCath and HD chair prior to dc to rehab. # Right sided hydronephrosis- Urology following   # Hypokalemia - replace as needed   # Mild hyperglycemia- Keep glucose less than 180 with subcutaneous insulin as needed  # Elevated LFTs: ?shock liver from hypoperfusion due to cardiac arrest - monitor  # Profound generalized weakness/ICU myopathy - PT/OT  # Dysphagia - Improved - off tube feed, removed NGT - Mechanical soft diet      DVT px: SQ UFH   Code: Full   Dispo: A/w Rehab     Hospital Problems  Never Reviewed          Codes Class Noted POA    Leukocytosis ICD-10-CM: R27.066  ICD-9-CM: 288.60  10/19/2020 Unknown        UTI (urinary tract infection) ICD-10-CM: N39.0  ICD-9-CM: 599.0  10/19/2020 Unknown        Tachycardia ICD-10-CM: R00.0  ICD-9-CM: 785.0  10/19/2020 Unknown        Sepsis ([address] ICD-10-CM: A41.9  ICD-9-CM: 038.9, 995.91  10/19/2020 Unknown        Hydronephrosis of right kidney ICD-10-CM: N13.30  ICD-9-CM: 418  10/19/2020 Unknown            Review of Systems:   Pertinent positive mentioned interval hx/HPI. Other systems reviewed and negative. Vital Signs:    Last 24hrs VS reviewed since prior progress note. Most recent are:  Visit Vitals  BP (!) 150/60 (BP 1 Location: Right arm, BP Patient Position: At rest)   Pulse 77   Temp 98.6 °F (37 °C)   Resp 21   Ht 5' 10\" (1.778 m)   Wt 88 kg (194 lb 1.6 oz)   SpO2 97%   BMI 27.85 kg/m²         Intake/Output Summary (Last 24 hours) at 11/2/2020 1248  Last data filed at 11/2/2020 0000  Gross per 24 hour   Intake    Output 500 ml   Net -500 ml        Physical Examination:     Constitutional:  No acute distress, NGT in place        Resp: No wheezing/rhonchi/rales. No accessory muscle use   CV:  Regular rhythm, normal rate, no murmurs,    GI:  Soft, non distended, non tender.     Musculoskeletal:  No edema, warm    Neurologic:  generalized weakness improving        Data Review:      I personally reviewed labs and imaging     Labs:     Recent Labs     11/02/20  0629   WBC 11.2*   HGB 10.1*   HCT 32.1*   *     Recent Labs     11/02/20  0629 11/01/20  0039 10/31/20  0202   * 133* 132*   K 3.7 3.9 3.6   CL 99 99 99   CO2 23 21 23   BUN 52* 76* 75*   CREA 4.31* 5.29* 4.90*   * 89 99   CA 8.4* 8.8 8.6     Recent Labs     11/02/20  0629 11/01/20  0039 10/31/20  0202   * 169* 216*   * 711* 912*   TBILI 0.7 0.7 0.6   TP 6.9 6.7 6.7   ALB 2.4* 2.4* 2.3*   GLOB 4.5* 4.3* 4.4*     No results for input(s): INR, PTP, APTT, INREXT, INREXT in the last 72 hours. No results for input(s): FE, TIBC, PSAT, FERR in the last 72 hours. No results found for: FOL, RBCF   No results for input(s): PH, PCO2, PO2 in the last 72 hours.   Recent Labs     11/02/20  0629 10/31/20  1715 10/31/20  1133   TROIQ 0.80* 1.18* 1.28*     No results found for: CHOL, CHOLX, CHLST, CHOLV, HDL, HDLP, LDL, LDLC, DLDLP, TGLX, TRIGL, TRIGP, CHHD, CHHDX  Lab Results   Component Value Date/Time    Glucose (POC) 85 11/02/2020 11:08 AM    Glucose (POC) 106 (H) 11/02/2020 06:28 AM    Glucose (POC) 128 (H) 11/01/2020 10:52 PM    Glucose (POC) 141 (H) 11/01/2020 04:25 PM    Glucose (POC) 83 11/01/2020 12:05 PM     Lab Results   Component Value Date/Time    Color YELLOW/STRAW 10/21/2020 12:59 AM    Appearance CLOUDY (A) 10/21/2020 12:59 AM    Specific gravity 1.018 10/21/2020 12:59 AM    pH (UA) 5.0 10/21/2020 12:59 AM    Protein 100 (A) 10/21/2020 12:59 AM    Glucose Negative 10/21/2020 12:59 AM    Ketone Negative 10/21/2020 12:59 AM    Bilirubin Negative 10/21/2020 12:59 AM    Urobilinogen 0.2 10/21/2020 12:59 AM    Nitrites Negative 10/21/2020 12:59 AM    Leukocyte Esterase MODERATE (A) 10/21/2020 12:59 AM    Epithelial cells FEW 10/21/2020 12:59 AM    Bacteria Negative 10/21/2020 12:59 AM    WBC 10-20 10/21/2020 12:59 AM    RBC 0-5 10/21/2020 12:59 AM         Medications Reviewed:     Current Facility-Administered Medications   Medication Dose Route Frequency    amiodarone (CORDARONE) tablet 200 mg  200 mg Oral BID    insulin lispro (HUMALOG) injection   SubCUTAneous AC&HS    albumin human 25% (BUMINATE) solution 12.5 g  12.5 g IntraVENous DIALYSIS PRN    clopidogreL (PLAVIX) tablet 75 mg  75 mg Oral DAILY    heparin (porcine) injection 5,000 Units  5,000 Units SubCUTAneous Q8H    metoprolol tartrate (LOPRESSOR) tablet 100 mg  100 mg Per NG tube Q12H    labetaloL (NORMODYNE;TRANDATE) injection 10 mg  10 mg IntraVENous Q2H PRN    dextrose 10% infusion 125-250 mL  125-250 mL IntraVENous PRN    aspirin chewable tablet 81 mg  81 mg Oral DAILY    heparin (porcine) 1,000 unit/mL injection 3,400 Units  3,400 Units IntraVENous DIALYSIS PRN    ondansetron (ZOFRAN) injection 4 mg  4 mg IntraVENous Q4H PRN    albuterol-ipratropium (DUO-NEB) 2.5 MG-0.5 MG/3 ML  3 mL Nebulization Q4H PRN    sodium chloride (NS) flush 5-40 mL  5-40 mL IntraVENous Q8H    polyethylene glycol (MIRALAX) packet 17 g  17 g Oral DAILY PRN    sodium chloride (NS) flush 5-40 mL  5-40 mL IntraVENous PRN    acetaminophen (TYLENOL) tablet 650 mg  650 mg Oral Q6H PRN    Or    acetaminophen (TYLENOL) suppository 650 mg  650 mg Rectal Q6H PRN    glucose chewable tablet 16 g  4 Tab Oral PRN    glucagon (GLUCAGEN) injection 1 mg  1 mg IntraMUSCular PRN    insulin glargine (LANTUS) injection 17 Units  0.2 Units/kg SubCUTAneous QHS    hydrALAZINE (APRESOLINE) 20 mg/mL injection 10 mg  10 mg IntraVENous Q6H PRN    traMADoL (ULTRAM) tablet 50 mg  50 mg Oral Q4H PRN    rosuvastatin (CRESTOR) tablet 40 mg  40 mg Oral QHS    oxyCODONE IR (ROXICODONE) tablet 5 mg  5 mg Oral Q4H PRN    influenza vaccine 2020-21 (6 mos+)(PF) (FLUARIX/FLULAVAL/FLUZONE QUAD) injection 0.5 mL  0.5 mL IntraMUSCular PRIOR TO DISCHARGE     ______________________________________________________________________  EXPECTED LENGTH OF STAY: 4d 19h  ACTUAL LENGTH OF STAY:          Myke Leon MD   Patient has given Verbal permission to discuss medical care with   persons present in the room and and also with contact as listed on face sheet. Please note that this dictation was completed with tsumobi, the computer voice recognition software. Quite often unanticipated grammatical, syntax, homophones, and other interpretive errors are inadvertently transcribed by the computer software. Please disregard these errors. Please excuse any errors that have escaped final proofreading. Thank you.

## 2020-11-02 NOTE — PROGRESS NOTES
Name: Evin Sandhu MRN: [id number]   : 1945 Hospital: [facility] [address]   Date: 2020        IMPRESSION:   · CKD stage 4. The etiology is not clear, but patient is aware of having CKD for at least 9 years. · JAMAL- oligo-anuric, now on iHD. No evidence of GFR recovery  · Hypervolemia- resolved. · UTI- on AB's  · S/P card cath      PLAN:   · HD in AM. If patient is discharged to a rehab he will need a Perm HD catheter before discharge. · Monitor the GFR very cloesy- I's and O's, daily electrolytes. · Check PTH and iron profile. · PT/OT  · Sergio Damon is notified. Subjective/Interval History:   I have reviewed the flowsheet and previous days notes. ROS:Review of systems not obtained due to patient factors. Objective:   Vital Signs:    Visit Vitals  BP (!) 150/60 (BP 1 Location: Right arm, BP Patient Position: At rest)   Pulse 77   Temp 98.6 °F (37 °C)   Resp 21   Ht 5' 10\" (1.778 m)   Wt 88 kg (194 lb 1.6 oz)   SpO2 97%   BMI 27.85 kg/m²       O2 Device: Nasal cannula   O2 Flow Rate (L/min): 2 l/min   Temp (24hrs), Av.4 °F (36.9 °C), Min:98.1 °F (36.7 °C), Max:98.6 °F (37 °C)       Intake/Output:   Last shift:      No intake/output data recorded. Last 3 shifts: 10/31 1901 -  0700  In: -   Out: 1300 [Urine:1300]    Intake/Output Summary (Last 24 hours) at 2020 1123  Last data filed at 2020 0000  Gross per 24 hour   Intake    Output 500 ml   Net -500 ml        Physical Exam:  General:    Awake, alert, sitting up in a reshma. Head:   Normocephalic, without obvious abnormality, atraumatic. Eyes:   Conjunctivae/corneas clear. Nose:  Nares normal. No drainage or sinus tenderness. Throat:    Moist mucosa  Neck:  Supple, symmetrical,  no adenopathy, thyroid: non tender    no carotid bruit and no JVD. Right [place] in place. Lungs:   Diminished to auscultation bilaterally. No Wheezing or Rhonchi. + rales. Chest wall:  No tenderness or deformity.  No Accessory muscle use.  Heart:   Regular rate and rhythm,  no murmur, rub or gallop. Abdomen:   Soft, non-tender. Not distended. Bowel sounds normal. No masses  Extremities: Extremities normal, atraumatic, No cyanosis. 1+ edema. No clubbing  Skin:     Texture, turgor normal. No rashes or lesions. Not Jaundiced  Psych:  Not depressed. Neurologic: Awake and alert      DATA:  Labs:  Recent Labs     11/02/20  0629 11/01/20  0039 10/31/20  0202   * 133* 132*   K 3.7 3.9 3.6   CL 99 99 99   CO2 23 21 23   BUN 52* 76* 75*   CREA 4.31* 5.29* 4.90*   CA 8.4* 8.8 8.6   ALB 2.4* 2.4* 2.3*     Recent Labs     11/02/20  0629   WBC 11.2*   HGB 10.1*   HCT 32.1*   *     No results for input(s): RASHMI, KU, CLU, CREAU in the last 72 hours.     No lab exists for component: PROU    Total time spent with patient:  35 minutes    []       Care Plan discussed with:   Family, Colleague, Nursing, Medical Team    Rebekah Christiansen MD

## 2020-11-02 NOTE — PROGRESS NOTES
Problem: Falls - Risk of  Goal: *Absence of Falls  Description: Document Lesia Vela Fall Risk and appropriate interventions in the flowsheet.   Outcome: Progressing Towards Goal  Note: Fall Risk Interventions:  Mobility Interventions: Communicate number of staff needed for ambulation/transfer, Patient to call before getting OOB    Mentation Interventions: Adequate sleep, hydration, pain control, Bed/chair exit alarm, Reorient patient, Room close to nurse's station    Medication Interventions: Evaluate medications/consider consulting pharmacy, Patient to call before getting OOB    Elimination Interventions: Patient to call for help with toileting needs, Toileting schedule/hourly rounds              Problem: Patient Education: Go to Patient Education Activity  Goal: Patient/Family Education  Outcome: Progressing Towards Goal

## 2020-11-03 LAB
ALBUMIN SERPL-MCNC: 2.4 G/DL (ref 3.5–5)
ALBUMIN/GLOB SERPL: 0.6 {RATIO} (ref 1.1–2.2)
ALP SERPL-CCNC: 548 U/L (ref 45–117)
ALT SERPL-CCNC: 98 U/L (ref 12–78)
ANION GAP SERPL CALC-SCNC: 11 MMOL/L (ref 5–15)
AST SERPL-CCNC: 71 U/L (ref 15–37)
BILIRUB SERPL-MCNC: 0.6 MG/DL (ref 0.2–1)
BUN SERPL-MCNC: 63 MG/DL (ref 6–20)
BUN/CREAT SERPL: 12 (ref 12–20)
CALCIUM SERPL-MCNC: 8.8 MG/DL (ref 8.5–10.1)
CHLORIDE SERPL-SCNC: 100 MMOL/L (ref 97–108)
CO2 SERPL-SCNC: 23 MMOL/L (ref 21–32)
CREAT SERPL-MCNC: 5.05 MG/DL (ref 0.7–1.3)
GLOBULIN SER CALC-MCNC: 4.3 G/DL (ref 2–4)
GLUCOSE BLD STRIP.AUTO-MCNC: 116 MG/DL (ref 65–100)
GLUCOSE BLD STRIP.AUTO-MCNC: 173 MG/DL (ref 65–100)
GLUCOSE BLD STRIP.AUTO-MCNC: 93 MG/DL (ref 65–100)
GLUCOSE BLD STRIP.AUTO-MCNC: 94 MG/DL (ref 65–100)
GLUCOSE SERPL-MCNC: 98 MG/DL (ref 65–100)
POTASSIUM SERPL-SCNC: 3.8 MMOL/L (ref 3.5–5.1)
PROT SERPL-MCNC: 6.7 G/DL (ref 6.4–8.2)
SERVICE CMNT-IMP: ABNORMAL
SERVICE CMNT-IMP: ABNORMAL
SERVICE CMNT-IMP: NORMAL
SERVICE CMNT-IMP: NORMAL
SODIUM SERPL-SCNC: 134 MMOL/L (ref 136–145)

## 2020-11-03 PROCEDURE — 77010033678 HC OXYGEN DAILY

## 2020-11-03 PROCEDURE — 74011636637 HC RX REV CODE- 636/637: Performed by: NURSE PRACTITIONER

## 2020-11-03 PROCEDURE — 80053 COMPREHEN METABOLIC PANEL: CPT

## 2020-11-03 PROCEDURE — 74011250636 HC RX REV CODE- 250/636: Performed by: INTERNAL MEDICINE

## 2020-11-03 PROCEDURE — 36415 COLL VENOUS BLD VENIPUNCTURE: CPT

## 2020-11-03 PROCEDURE — 82962 GLUCOSE BLOOD TEST: CPT

## 2020-11-03 PROCEDURE — 74011636637 HC RX REV CODE- 636/637: Performed by: FAMILY MEDICINE

## 2020-11-03 PROCEDURE — 74011250637 HC RX REV CODE- 250/637: Performed by: INTERNAL MEDICINE

## 2020-11-03 PROCEDURE — 90935 HEMODIALYSIS ONE EVALUATION: CPT

## 2020-11-03 PROCEDURE — 94760 N-INVAS EAR/PLS OXIMETRY 1: CPT

## 2020-11-03 PROCEDURE — 92526 ORAL FUNCTION THERAPY: CPT

## 2020-11-03 PROCEDURE — 65660000000 HC RM CCU STEPDOWN

## 2020-11-03 RX ORDER — CLOPIDOGREL BISULFATE 75 MG/1
75 TABLET ORAL DAILY
Qty: 30 TAB | Refills: 0 | Status: SHIPPED | OUTPATIENT
Start: 2020-11-04 | End: 2020-12-04

## 2020-11-03 RX ORDER — ROSUVASTATIN CALCIUM 40 MG/1
40 TABLET, COATED ORAL
Qty: 30 TAB | Refills: 0 | Status: SHIPPED | OUTPATIENT
Start: 2020-11-03 | End: 2020-12-03

## 2020-11-03 RX ORDER — METOPROLOL TARTRATE 100 MG/1
100 TABLET ORAL EVERY 12 HOURS
Qty: 60 TAB | Refills: 0 | Status: SHIPPED | OUTPATIENT
Start: 2020-11-03 | End: 2020-12-03

## 2020-11-03 RX ORDER — AMIODARONE HYDROCHLORIDE 200 MG/1
200 TABLET ORAL 2 TIMES DAILY
Qty: 60 TAB | Refills: 0 | Status: SHIPPED | OUTPATIENT
Start: 2020-11-03 | End: 2020-12-03

## 2020-11-03 RX ADMIN — AMIODARONE HYDROCHLORIDE 200 MG: 200 TABLET ORAL at 10:22

## 2020-11-03 RX ADMIN — HEPARIN SODIUM 1000 UNITS: 1000 INJECTION INTRAVENOUS; SUBCUTANEOUS at 18:15

## 2020-11-03 RX ADMIN — METOPROLOL TARTRATE 100 MG: 25 TABLET, FILM COATED ORAL at 10:21

## 2020-11-03 RX ADMIN — AMIODARONE HYDROCHLORIDE 200 MG: 200 TABLET ORAL at 18:23

## 2020-11-03 RX ADMIN — ASPIRIN 81 MG: 81 TABLET, CHEWABLE ORAL at 10:22

## 2020-11-03 RX ADMIN — ROSUVASTATIN 40 MG: 40 TABLET, FILM COATED ORAL at 21:47

## 2020-11-03 RX ADMIN — Medication 10 ML: at 21:48

## 2020-11-03 RX ADMIN — HEPARIN SODIUM 5000 UNITS: 5000 INJECTION INTRAVENOUS; SUBCUTANEOUS at 05:49

## 2020-11-03 RX ADMIN — HEPARIN SODIUM 5000 UNITS: 5000 INJECTION INTRAVENOUS; SUBCUTANEOUS at 14:53

## 2020-11-03 RX ADMIN — Medication 10 ML: at 14:53

## 2020-11-03 RX ADMIN — INSULIN GLARGINE 17 UNITS: 100 INJECTION, SOLUTION SUBCUTANEOUS at 21:47

## 2020-11-03 RX ADMIN — HEPARIN SODIUM 5000 UNITS: 5000 INJECTION INTRAVENOUS; SUBCUTANEOUS at 21:49

## 2020-11-03 RX ADMIN — METOPROLOL TARTRATE 100 MG: 25 TABLET, FILM COATED ORAL at 21:47

## 2020-11-03 RX ADMIN — Medication 10 ML: at 05:47

## 2020-11-03 RX ADMIN — INSULIN LISPRO 4 UNITS: 100 INJECTION, SOLUTION INTRAVENOUS; SUBCUTANEOUS at 12:53

## 2020-11-03 RX ADMIN — CLOPIDOGREL BISULFATE 75 MG: 75 TABLET ORAL at 10:21

## 2020-11-03 NOTE — PROGRESS NOTES
Problem: Falls - Risk of  Goal: *Absence of Falls  Description: Document Yomi Deshpande Fall Risk and appropriate interventions in the flowsheet.   11/3/2020 0805 by Antoinette Arias RN  Outcome: Progressing Towards Goal  Note: Fall Risk Interventions:  Mobility Interventions: Assess mobility with egress test, Communicate number of staff needed for ambulation/transfer, OT consult for ADLs, Patient to call before getting OOB, PT Consult for mobility concerns, PT Consult for assist device competence, Strengthening exercises (ROM-active/passive), Utilize walker, cane, or other assistive device, Bed/chair exit alarm    Mentation Interventions: Adequate sleep, hydration, pain control, Bed/chair exit alarm, Door open when patient unattended, Evaluate medications/consider consulting pharmacy, Familiar objects from home, Increase mobility, More frequent rounding, Reorient patient, Room close to nurse's station, Self-releasing belt, Toileting rounds, Update white board    Medication Interventions: Assess postural VS orthostatic hypotension, Bed/chair exit alarm, Evaluate medications/consider consulting pharmacy, Patient to call before getting OOB, Teach patient to arise slowly, Utilize gait belt for transfers/ambulation    Elimination Interventions: Bed/chair exit alarm, Call light in reach, Elevated toilet seat, Patient to call for help with toileting needs, Stay With Me (per policy), Toilet paper/wipes in reach, Toileting schedule/hourly rounds           11/2/2020 2013 by Antoinette Arias, BEHZAD  Outcome: Progressing Towards Goal  Note: Fall Risk Interventions:  Mobility Interventions: Bed/chair exit alarm, Communicate number of staff needed for ambulation/transfer, PT Consult for mobility concerns, PT Consult for assist device competence    Mentation Interventions: Bed/chair exit alarm, Door open when patient unattended, Adequate sleep, hydration, pain control, Increase mobility, More frequent rounding, Reorient patient    Medication Interventions: Bed/chair exit alarm, Evaluate medications/consider consulting pharmacy, Patient to call before getting OOB    Elimination Interventions: Bed/chair exit alarm, Call light in reach, Elevated toilet seat, Toilet paper/wipes in reach              Problem: Pain  Goal: *Control of Pain  Outcome: Progressing Towards Goal     Problem: Breathing Pattern - Ineffective  Goal: *Absence of hypoxia  Outcome: Progressing Towards Goal     Problem: Diabetes Self-Management  Goal: *Incorporating physical activity into lifestyle  Description: State effect of exercise on blood glucose levels. Outcome: Progressing Towards Goal  Goal: *Developing strategies to promote health/change behavior  Description: Define the ABC's of diabetes; identify appropriate screenings, schedule and personal plan for screenings. Outcome: Progressing Towards Goal  Goal: *Using medications safely  Description: State effect of diabetes medications on diabetes; name diabetes medication taking, action and side effects. Outcome: Progressing Towards Goal  Goal: *Monitoring blood glucose, interpreting and using results  Description: Identify recommended blood glucose targets  and personal targets.   Outcome: Progressing Towards Goal

## 2020-11-03 NOTE — ROUTINE PROCESS
Bedside shift change report given to Sosa RN (oncoming nurse) by Amanda Beckham RN (offgoing nurse). Report included the following information SBAR, Kardex, ED Summary, Intake/Output, MAR, Cardiac Rhythm NSR w/1st degree AVB, PVCs and Dual Neuro Assessment.

## 2020-11-03 NOTE — PROCEDURES
[facility]  [phone]    Vitals   Pre   Post   Assessment   Pre   Post     Temp  Temp: 97.7 °F (36.5 °C) (11/03/20 1519)  98.3 LOC  ALERT AND ORIENTED X 4 ALERT AND ORIENTED X 4   HR   Pulse (Heart Rate): (!) 59 (11/03/20 1519) RATE 67, NSR Lungs   2lpm 02 via NC/CLEAR  2LPM VIA NC/ RATE 19   B/P   BP: (!) 145/54 (11/03/20 1519) 142/74 Cardiac   NSR RATE 58  NSR 67   Resp   Resp Rate: 19 (11/03/20 1519) RATE 19 Skin   RUPTURED CYST ON BACK  NO CHANGE   Pain level  Pain Intensity 1: 0 (11/03/20 1400) NO COMPLAINTS Edema  +1 BLE     +1 BLE   Orders:    Duration:   Start:   8953 End:   4020 Total:   3.5 HRS   Dialyzer:   Dialyzer/Set Up Inspection: Faina Gonzalez (11/03/20 1519)   K Bath:   Dialysate K (mEq/L): 3 (11/03/20 1519)   Ca Bath:   Dialysate CA (mEq/L): 2.5 (11/03/20 1519)   Na/Bicarb:   Dialysate NA (mEq/L): 140 (11/03/20 1519)   Target Fluid Removal:   Goal/Amount of Fluid to Remove (mL): 500 mL (11/03/20 1519)   Access     Type & Location:   RIGHT PERMACATH: Dressing CDI. No s/s of infection. Both lumens aspirate & flush well. Running well at .    Labs     Obtained/Reviewed   Critical Results Called   Date when labs were drawn-  Hgb-    HGB   Date Value Ref Range Status   11/02/2020 10.1 (L) 12.1 - 17.0 g/dL Final     K-    Potassium   Date Value Ref Range Status   11/03/2020 3.8 3.5 - 5.1 mmol/L Final     Ca-   Calcium   Date Value Ref Range Status   11/03/2020 8.8 8.5 - 10.1 MG/DL Final     Bun-   BUN   Date Value Ref Range Status   11/03/2020 63 (H) 6 - 20 MG/DL Final     Creat-   Creatinine   Date Value Ref Range Status   11/03/2020 5.05 (H) 0.70 - 1.30 MG/DL Final        Medications/ Blood Products Given     Name   Dose   Route and Time     HEPARIN 1:1000 1.9  ARTERIAL LUMEN DWELL 1850   HEPARIN 1:1000 1.9 ARTERIAL LUMEN DWELL 1850        Blood Volume Processed (BVP):    76.7 Net Fluid   Removed:  500ML   Comments   Time Out Done: 5556  Primary Nurse Rpt Pre: Earla December JOSE RN  Primary Nurse Rpt Post: Tay Renee RN  Pt Education: [address]: ONGOING  Tx Summary:     SBAR received from Primary RN. Pt A&Ox4. Consent signed & on file. 1519: Each catheter limb disinfected per p&p, caps removed, hubs disinfected per p&p. Each lumen aspirated for blood return and flushed with Normal Saline per policy. Labs drawn per request/ order. VSS. Dialysis Tx initiated. 1849: Tx ended. VSS. Each dialysis catheter limb disinfected per p&p, all possible blood returned per p&p, and each dialysis hub disinfected per p&p. Each lumen flushed, post dialysis catheter Heparin dwell instilled per order, and caps applied. Bed locked and in the lowest position, call bell and belongings in reach. SBAR given to Primary, RN. Patient is stable at time of their/ my departure. All Dialysis related medications have been reviewed. Admiting Diagnosis: CHEST PAIN/CARDAIC  Pt's previous clinic-N/A  Consent signed - YES  Hepatitis Status- NEG/SUSC 10/19/20  Machine #- Machine Number: [id number] (11/03/20 8466)  Telemetry status-NSR RATE 58  Pre-dialysis wt. - Pre-Dialysis Weight: 89.4 kg (197 lb 1.5 oz) (11/01/20 1010)

## 2020-11-03 NOTE — PROGRESS NOTES
Problem: Falls - Risk of  Goal: *Absence of Falls  Description: Document Lesia Vela Fall Risk and appropriate interventions in the flowsheet. Outcome: Progressing Towards Goal  Note: Fall Risk Interventions:  Mobility Interventions: Bed/chair exit alarm, Communicate number of staff needed for ambulation/transfer, PT Consult for mobility concerns, PT Consult for assist device competence    Mentation Interventions: Bed/chair exit alarm, Door open when patient unattended, Adequate sleep, hydration, pain control, Increase mobility, More frequent rounding, Reorient patient    Medication Interventions: Bed/chair exit alarm, Evaluate medications/consider consulting pharmacy, Patient to call before getting OOB    Elimination Interventions: Bed/chair exit alarm, Call light in reach, Elevated toilet seat, Toilet paper/wipes in reach              Problem: Breathing Pattern - Ineffective  Goal: *Absence of hypoxia  Outcome: Progressing Towards Goal     Problem: Diabetes Self-Management  Goal: *Using medications safely  Description: State effect of diabetes medications on diabetes; name diabetes medication taking, action and side effects. Outcome: Progressing Towards Goal  Goal: *Monitoring blood glucose, interpreting and using results  Description: Identify recommended blood glucose targets  and personal targets.   Outcome: Progressing Towards Goal

## 2020-11-03 NOTE — DISCHARGE SUMMARY
Discharge Summary       PATIENT ID: Hao Godoy  MRN: [id number]   YOB: 1945    DATE OF ADMISSION: 10/19/2020  1:23 AM    DATE OF DISCHARGE: 11/3/2020   PRIMARY CARE PROVIDER: Unknown, Provider     ATTENDING PHYSICIAN: Alma Desai MD  DISCHARGING PROVIDER: Alma Desai MD    To contact this individual call [phone] and ask the  to page. If unavailable ask to be transferred the Adult Hospitalist Department. CONSULTATIONS: IP CONSULT TO UROLOGY  IP CONSULT TO NEPHROLOGY  IP CONSULT TO PALLIATIVE CARE - PROVIDER  IP CONSULT TO INTERVENTIONAL RADIOLOGY  IP CONSULT TO CARDIOLOGY    PROCEDURES/SURGERIES: Procedure(s):  LEFT HEART CATH / [address] COURSE:   Evaluated and treated for acute renal failure, and had cardiac arrest, survived well, needs HD going forward, and has significant CAD, s/p CABG [facility] showed blocked vein grafts, LIMA patent. Risk of Re-Admission: high  DISCHARGE DIAGNOSES / PLAN:      #. s/p Cardiac arrest: this admission. #. CAD: s/p CABG  #. Ischemic cardiomyopathy  - s/p - [facility] 10/22: Severe native 3V CAD, patent LIMA-LAD, known occluded SVG-RCA and SVG-LCx; moderately elevated LVEDP  - TTE 10/21: LVEF is 40-45%- Cardiology following- no further cardiac work up  - 10/31 had episode of severe resp distress, hypotension, tachyArrhythmia. These are anginal symptoms. Cardiology re-evaluated, trops plateaued. CXR NAD. Dc with amiodarone and metoprolol. F/u with CArdio and nephrology op. HD chair was established by , worked with PT/OT dc to rehab.     # Tachyarrhythmias with multifocal ventricular ectopies - controlled on amiodarone   # Acute hypoxic respiratory failure - intubated 10/20, extubated 10/25- on 2L O2 NC  # Leukocytosis possible RLL PNA ? Aspiration - Ceftriaxone - 10 day course  # ARF on CKD, stage IV- due to cardiac arrest- CRRT to HD 10/25- Nephro following   - had permaCath and HD chair for outpatient dialysis on dc to rehab.     # Right sided hydronephrosis- Urology following. F/u op. # Hypokalemia - replace as needed   # Mild hyperglycemia- Keep glucose less than 180 with subcutaneous insulin as needed  # Elevated LFTs: ?shock liver from hypoperfusion due to cardiac arrest - monitor. stable  # Profound generalized weakness/ICU myopathy - PT/OT  # Dysphagia - Improved - off tube feed, removed NGT - Mechanical soft diet     FOLLOW UP APPOINTMENTS:    Follow-up Information     Follow up With Specialties Details Why Contact Info    Unknown, Provider    Patient not available to ask      Unknown, Provider  In 1 week  Patient not available to ask        In 1 week  nephrology      In 3 weeks  Cardiology         ADDITIONAL CARE RECOMMENDATIONS:  Follow up with PCP , nephrology and cardiology    DIET: Resume previous diet    ACTIVITY: Activity as tolerated    DISCHARGE MEDICATIONS:  Current Discharge Medication List      START taking these medications    Details   amiodarone (CORDARONE) 200 mg tablet Take 1 Tab by mouth two (2) times a day for 30 days. Qty: 60 Tab, Refills: 0      clopidogreL (PLAVIX) 75 mg tab Take 1 Tab by mouth daily for 30 days. Qty: 30 Tab, Refills: 0      metoprolol tartrate (LOPRESSOR) 100 mg IR tablet 1 Tab by Per NG tube route every twelve (12) hours for 30 days. Qty: 60 Tab, Refills: 0      rosuvastatin (CRESTOR) 40 mg tablet Take 1 Tab by mouth nightly for 30 days. Qty: 30 Tab, Refills: 0         CONTINUE these medications which have NOT CHANGED    Details   aspirin (ASPIRIN) 325 mg tablet Take 325 mg by mouth daily. amLODIPine (NORVASC) 10 mg tablet Take 10 mg by mouth daily. Indications: high blood pressure      ergocalciferol (ERGOCALCIFEROL) 1,250 mcg (50,000 unit) capsule Take 50,000 Units by mouth every seven (7) days. multivitamin, tx-iron-ca-min (THERA-M w/ IRON) 9 mg iron-400 mcg tab tablet Take 1 Tab by mouth daily.       oxyCODONE-acetaminophen (Percocet) 5-325 mg per tablet Take 1 Tab by mouth every six (6) hours as needed for Pain. STOP taking these medications       metoprolol succinate (TOPROL-XL) 50 mg XL tablet Comments:   Reason for Stopping:             NOTIFY YOUR PHYSICIAN FOR ANY OF THE FOLLOWING:   Fever over 101 degrees for 24 hours. Chest pain, shortness of breath, fever, chills, nausea, vomiting, diarrhea, change in mentation, falling, weakness, bleeding. Severe pain or pain not relieved by medications. Or, any other signs or symptoms that you may have questions about. DISPOSITION:    Home With:   OT  PT  HH  RN       Long term SNF/Inpatient Rehab   x Independent/assisted living    Hospice    Other:     PATIENT CONDITION AT DISCHARGE:     Functional status    Poor     Deconditioned     Independent      Cognition     Lucid     Forgetful     Dementia      Catheters/lines (plus indication)    Erickson     PICC     PEG     None      Code status     Full code     DNR      PHYSICAL EXAMINATION AT DISCHARGE:  Patient seen and examined at bedside, Condition stable, explained discharge and follow up plans. BP (!) 132/49   Pulse 71   Temp 98.1 °F (36.7 °C)   Resp 19   Ht 5' 10\" (1.778 m)   Wt 87.8 kg (193 lb 9 oz)   SpO2 98%   BMI 27.77 kg/m²   General:  Alert, oriented, No acute distress. Resp:  No accessory muscle use, Good AE.   Neuro:  Grossly normal, no focal neuro deficits, follows commands     CHRONIC MEDICAL DIAGNOSES:  Problem List as of 11/3/2020 Never Reviewed          Codes Class Noted - Resolved    Leukocytosis ICD-10-CM: F63.267  ICD-9-CM: 288.60  10/19/2020 - Present        UTI (urinary tract infection) ICD-10-CM: N39.0  ICD-9-CM: 599.0  10/19/2020 - Present        Tachycardia ICD-10-CM: R00.0  ICD-9-CM: 785.0  10/19/2020 - Present        Sepsis ([address] ICD-10-CM: A41.9  ICD-9-CM: 038.9, 995.91  10/19/2020 - Present        Hydronephrosis of right kidney ICD-10-CM: N13.30  ICD-9-CM: 210  10/19/2020 - Present 37 minutes were spent with the patient on counseling and coordination of care.     Signed:   Catherine Ahumada MD  11/3/2020  12:04 PM

## 2020-11-03 NOTE — DISCHARGE INSTRUCTIONS
Discharge Instructions       PATIENT ID: Hao Godoy  MRN: [id number]   YOB: 1945    DATE OF ADMISSION: 10/19/2020  1:23 AM    DATE OF DISCHARGE: 11/3/2020    PRIMARY CARE PROVIDER: Unknown, Provider     ATTENDING PHYSICIAN: Asia Leonard MD  DISCHARGING PROVIDER: Alma Desai MD    To contact this individual call [phone] and ask the  to page. If unavailable ask to be transferred the Adult Hospitalist Department. DISCHARGE DIAGNOSES s/p cardiac arrest, ARF- started on HD. Severe CAD- s/p CABG, vein grafts blocked. CONSULTATIONS: IP CONSULT TO UROLOGY  IP CONSULT TO NEPHROLOGY  IP CONSULT TO PALLIATIVE CARE - PROVIDER  IP CONSULT TO INTERVENTIONAL RADIOLOGY  IP CONSULT TO CARDIOLOGY    PROCEDURES/SURGERIES: Procedure(s):  LEFT HEART CATH / CORONARY ANGIOGRAPHY    FOLLOW UP APPOINTMENTS:   Follow-up Information     Follow up With Specialties Details Why Contact Info    Unknown, Provider    Patient not available to ask      Unknown, Provider  In 1 week  Patient not available to ask        In 1 week  nephrology      In 3 weeks  Cardiology           ADDITIONAL CARE RECOMMENDATIONS: follow up with PCP and nephrology, cardiology    DIET: Resume previous diet    DISCHARGE MEDICATIONS:  Amiodarone,     · It is important that you take the medication exactly as they are prescribed. · Keep your medication in the bottles provided by the pharmacist and keep a list of the medication names, dosages, and times to be taken in your wallet. · Do not take other medications without consulting your doctor. NOTIFY YOUR PHYSICIAN FOR ANY OF THE FOLLOWING:   Fever over 101 degrees for 24 hours. Chest pain, shortness of breath, fever, chills, nausea, vomiting, diarrhea, change in mentation, falling, weakness, bleeding. Severe pain or pain not relieved by medications. Or, any other signs or symptoms that you may have questions about.   It was our pleasure to help take care of you and we hope you get well very soon.     DISPOSITION:    Home With:   OT  PT  HH  RN       SNF/Inpatient Rehab/LTAC   x Independent/assisted living    Hospice    Other:     CDMP Checked:   Yes x     PROBLEM LIST Updated:  Yes x     Signed:   Rebeka Ann MD  11/3/2020  12:02 PM

## 2020-11-03 NOTE — PROGRESS NOTES
Name: Love Carrera MRN: [id number]   : 1945 Hospital: . [address]   Date: 11/3/2020        IMPRESSION:   · CKD stage 4. The etiology is not clear, but patient is aware of having CKD for at least 9 years. · JAMAL- oligo-anuric, now on iHD. No evidence of GFR recovery. On iHD, TTS schedule  · Hypervolemia- resolved. · UTI- on AB's  · S/P card cath      PLAN:   · HD today 3 pm as per Muna Min. · After discharge patient needs to continue outpatient HD at our HD unit [facility] under the supervision of Dr. Tish Diaz  · PT/OT- going to a rehab  · Muna Min is notified. Subjective/Interval History:   I have reviewed the flowsheet and previous days notes. ROS:Review of systems not obtained due to patient factors. Objective:   Vital Signs:    Visit Vitals  BP (!) 132/49   Pulse 71   Temp 98.1 °F (36.7 °C)   Resp 19   Ht 5' 10\" (1.778 m)   Wt 87.8 kg (193 lb 9 oz)   SpO2 98%   BMI 27.77 kg/m²       O2 Device: Nasal cannula   O2 Flow Rate (L/min): 2 l/min   Temp (24hrs), Av.2 °F (36.8 °C), Min:98 °F (36.7 °C), Max:98.5 °F (36.9 °C)       Intake/Output:   Last shift:      No intake/output data recorded. Last 3 shifts:  1901 -  0700  In: 27.5 [I.V.:27.5]  Out: 500 [Urine:500]    Intake/Output Summary (Last 24 hours) at 11/3/2020 1301  Last data filed at 2020 1718  Gross per 24 hour   Intake 27.5 ml   Output 100 ml   Net -72.5 ml        Physical Exam:  General:    Awake, alert, sitting up in a reshma. Family is at bedside. Head:   Normocephalic, without obvious abnormality, atraumatic. Eyes:   Conjunctivae/corneas clear. Nose:  Nares normal. No drainage or sinus tenderness. Throat:    Moist mucosa  Neck:  Supple, symmetrical,  no adenopathy, thyroid: non tender    no carotid bruit and no JVD. Right Geronimo in place. Lungs:   Diminished to auscultation bilaterally. No Wheezing or Rhonchi. + rales. Chest wall:  No tenderness or deformity. No Accessory muscle use.   Heart: Regular rate and rhythm,  no murmur, rub or gallop. Abdomen:   Soft, non-tender. Not distended. Bowel sounds normal. No masses  Extremities: Extremities normal, atraumatic, No cyanosis. 1+ edema. No clubbing  Skin:     Texture, turgor normal. No rashes or lesions. Not Jaundiced  Psych:  Not depressed. Neurologic: Awake and alert      DATA:  Labs:  Recent Labs     11/03/20  0550 11/02/20  0629 11/01/20  0039   * 131* 133*   K 3.8 3.7 3.9    99 99   CO2 23 23 21   BUN 63* 52* 76*   CREA 5.05* 4.31* 5.29*   CA 8.8 8.4* 8.8   ALB 2.4* 2.4* 2.4*     Recent Labs     11/02/20  0629   WBC 11.2*   HGB 10.1*   HCT 32.1*   *     No results for input(s): RASHMI, KU, CLU, CREAU in the last 72 hours.     No lab exists for component: PROU    Total time spent with patient:  35 minutes    []       Care Plan discussed with:   Family, Colleague, Nursing, Medical Team    Saúl Cadet MD

## 2020-11-03 NOTE — PROGRESS NOTES
[facility] Cardiology Progress Note    Date of service: 11/3/2020    Subjective:   LILI. Sore from cath insertion but o/w no c/o    Objective:    Visit Vitals  BP (!) 132/49   Pulse 71   Temp 98.1 °F (36.7 °C)   Resp 19   Ht 5' 10\" (1.778 m)   Wt 87.8 kg (193 lb 9 oz)   SpO2 98%   BMI 27.77 kg/m²       Physical Exam   Constitutional: He is oriented to person, place, and time. He appears well-developed and well-nourished. HENT:   Head: Normocephalic and atraumatic. Eyes: Conjunctivae are normal. No scleral icterus. Neck: No JVD present. Cardiovascular: Normal rate, regular rhythm and normal heart sounds. Exam reveals no gallop. No murmur heard. Pulmonary/Chest: Effort normal and breath sounds normal. No stridor. No respiratory distress. He has no wheezes. He has no rales. Abdominal: Soft. He exhibits no distension. Musculoskeletal:         General: No deformity or edema. Neurological: He is alert and oriented to person, place, and time. Skin: Skin is warm and dry. Psychiatric: He has a normal mood and affect. His behavior is normal.       Data reviewed:  Recent Results (from the past 12 hour(s))   METABOLIC PANEL, COMPREHENSIVE    Collection Time: 11/03/20  5:50 AM   Result Value Ref Range    Sodium 134 (L) 136 - 145 mmol/L    Potassium 3.8 3.5 - 5.1 mmol/L    Chloride 100 97 - 108 mmol/L    CO2 23 21 - 32 mmol/L    Anion gap 11 5 - 15 mmol/L    Glucose 98 65 - 100 mg/dL    BUN 63 (H) 6 - 20 MG/DL    Creatinine 5.05 (H) 0.70 - 1.30 MG/DL    BUN/Creatinine ratio 12 12 - 20      GFR est AA 14 (L) >60 ml/min/1.73m2    GFR est non-AA 11 (L) >60 ml/min/1.73m2    Calcium 8.8 8.5 - 10.1 MG/DL    Bilirubin, total 0.6 0.2 - 1.0 MG/DL    ALT (SGPT) 98 (H) 12 - 78 U/L    AST (SGOT) 71 (H) 15 - 37 U/L    Alk.  phosphatase 548 (H) 45 - 117 U/L    Protein, total 6.7 6.4 - 8.2 g/dL    Albumin 2.4 (L) 3.5 - 5.0 g/dL    Globulin 4.3 (H) 2.0 - 4.0 g/dL    A-G Ratio 0.6 (L) 1.1 - 2.2     GLUCOSE, POC    Collection Time: 11/03/20  7:29 AM   Result Value Ref Range    Glucose (POC) 94 65 - 100 mg/dL    Performed by Marva Pap        Assessment:  1. CAD s/p CABG with patent LIMA to LAD: stable  2. Cardiomyopathy: EF 40-45%  3. CKD stage IV with JAMAL this admission  4.  PAD: stable    Plan:  Cont current cardiac meds  Amio 200 mg daily at DC

## 2020-11-03 NOTE — PROGRESS NOTES
[facility]  RUR-17% Low     CM met with patient and wife to discuss transition to [facility]. Kimi Cain with [facility] states that there are no beds available today at the [place] location but there might be tomorrow. There is a bed available at the [place] location today. Patient is persistent that he wants a bed at the [facility] as [place] is too far for his wife to travel. Patient and wife informed that HD has been set up with [facility] for MWF@ 3:45. CM to monitor.      Jose Baptiste MS

## 2020-11-03 NOTE — PROGRESS NOTES
Problem: Falls - Risk of  Goal: *Absence of Falls  Description: Document Tabatha Finch Fall Risk and appropriate interventions in the flowsheet. Outcome: Progressing Towards Goal  Note: Fall Risk Interventions:  Mobility Interventions: Assess mobility with egress test, Communicate number of staff needed for ambulation/transfer, OT consult for ADLs, Patient to call before getting OOB, PT Consult for mobility concerns, PT Consult for assist device competence, Strengthening exercises (ROM-active/passive), Utilize walker, cane, or other assistive device, Bed/chair exit alarm    Mentation Interventions: Adequate sleep, hydration, pain control, Bed/chair exit alarm, Door open when patient unattended, Evaluate medications/consider consulting pharmacy, Familiar objects from home, Increase mobility, More frequent rounding, Reorient patient, Room close to nurse's station, Self-releasing belt, Toileting rounds, Update white board    Medication Interventions: Assess postural VS orthostatic hypotension, Bed/chair exit alarm, Evaluate medications/consider consulting pharmacy, Patient to call before getting OOB, Teach patient to arise slowly, Utilize gait belt for transfers/ambulation    Elimination Interventions: Bed/chair exit alarm, Call light in reach, Elevated toilet seat, Patient to call for help with toileting needs, Stay With Me (per policy), Toilet paper/wipes in reach, Toileting schedule/hourly rounds              Problem: Urinary Tract Infection - Adult  Goal: *Absence of infection signs and symptoms  Outcome: Progressing Towards Goal     Problem: Breathing Pattern - Ineffective  Goal: *Absence of hypoxia  Outcome: Progressing Towards Goal  Goal: *Use of effective breathing techniques  Outcome: Progressing Towards Goal     Problem: Pressure Injury - Risk of  Goal: *Prevention of pressure injury  Description: Document Luis Felipe Scale and appropriate interventions in the flowsheet.   Outcome: Progressing Towards Goal  Note: Pressure Injury Interventions:  Sensory Interventions: Assess changes in LOC, Avoid rigorous massage over bony prominences, Check visual cues for pain, Discuss PT/OT consult with provider, Float heels, Keep linens dry and wrinkle-free, Maintain/enhance activity level, Minimize linen layers, Monitor skin under medical devices, Turn and reposition approx. every two hours (pillows and wedges if needed)    Moisture Interventions: Absorbent underpads, Apply protective barrier, creams and emollients, Check for incontinence Q2 hours and as needed, Limit adult briefs, Maintain skin hydration (lotion/cream), Minimize layers, Moisture barrier    Activity Interventions: Assess need for specialty bed, Increase time out of bed, Pressure redistribution bed/mattress(bed type), PT/OT evaluation    Mobility Interventions: Assess need for specialty bed, HOB 30 degrees or less, Pressure redistribution bed/mattress (bed type), PT/OT evaluation, Turn and reposition approx.  every two hours(pillow and wedges)    Nutrition Interventions: Document food/fluid/supplement intake, Offer support with meals,snacks and hydration    Friction and Shear Interventions: Apply protective barrier, creams and emollients, Foam dressings/transparent film/skin sealants, HOB 30 degrees or less, Lift team/patient mobility team, Lift sheet, Minimize layers, Transferring/repositioning devices                Problem: Nutrition Deficit  Goal: *Optimize nutritional status  Outcome: Progressing Towards Goal

## 2020-11-03 NOTE — PROGRESS NOTES
Problem: Dysphagia (Adult)  Goal: *Acute Goals and Plan of Care (Insert Text)  Description: Speech Therapy Goals  Initiated 10/27/2020   1. Patient will tolerate full liquid diet without s/s of aspiration within 7 days   2. Patient will tolerate solid trials without s/s of aspiration within 7 days     Initiated 10/26/2020    1. Patient will participate in swallow re-evaluation within 7 days. MET 10/27/2020   Outcome: Resolved/Met       SPEECH LANGUAGE PATHOLOGY DYSPHAGIA TREATMENT/DISCHARGE  Patient: Jose Ugarte (79 y.o. male)  Date: 11/3/2020  Diagnosis: Hydronephrosis of right kidney [N13.30]  UTI (urinary tract infection) [N39.0]  Sepsis ([address] [A41.9]  Tachycardia [R00.0]  Leukocytosis [D72.829]   <principal problem not specified>  Procedure(s) (LRB):  LEFT HEART CATH / CORONARY ANGIOGRAPHY (N/A) 12 Days Post-Op  Precautions:  Fall, Skin    ASSESSMENT:  Pt with functional oropharyngeal phases of swallow with no overt difficulty with PO, and pt now with functional voicing (now removed from extubation) and thus no significant risk factors for prandial aspiration. Therefore, given pt dentition but functional swallow, recommend PO as outlined below. No further SLP needs anticipated. Will sign-off. Please reconsult should SLP be of any assistance. PLAN:  --Dental soft diet/thin liquids  --General aspiration precautions  --Meds whole with thins  Patient will be discharged from acute skilled speech therapy at this time. Rationale for discharge:  Goals achieved    Discharge Recommendations: To Be Determined     SUBJECTIVE:   Patient stated, \"I told Sosa. She's a great nurse! .     OBJECTIVE:   Cognitive and Communication Status:  Neurologic State: Alert  Orientation Level: Oriented X4  Cognition: Appropriate decision making, Appropriate for age attention/concentration, Appropriate safety awareness    Perception: Appears intact    Perseveration: No perseveration noted    Safety/Judgement: Awareness of environment  Dysphagia Treatment:  Oral Assessment:  Oral Assessment  Labial: No impairment  Dentition: Limited;Natural  Oral Hygiene: oral mucosa moist and clear of secretions  Lingual: No impairment  Velum: No impairment  Mandible: No impairment  P.O. Trials:  Patient Position: upright in bed  Vocal quality prior to P.O.: No impairment  Consistency Presented: Solid; Thin liquid  How Presented: Self-fed/presented;Cup/sip;Spoon;Straw     Bolus Acceptance: No impairment  Bolus Formation/Control: No impairment     Propulsion: No impairment  Oral Residue: None  Initiation of Swallow: No impairment  Laryngeal Elevation: Functional  Aspiration Signs/Symptoms: None  Pharyngeal Phase Characteristics: No impairment, issues, or problems            Oral Phase Severity: No impairment  Pharyngeal Phase Severity : No impairment  Exercises:  Laryngeal Exercises:                                                                                                                                     NOMS:   The NOMS functional outcome measure was used to quantify this patient's level of swallowing impairment. Based on the NOMS, the patient was determined to be at level 7 for swallow function     NOMS Swallowing Levels:  Level 1 (CN): NPO  Level 2 (CM): NPO but takes consistency in therapy  Level 3 (CL): Takes less than 50% of nutrition p.o. and continues with nonoral feedings; and/or safe with mod cues; and/or max diet restriction  Level 4 (CK): Safe swallow but needs mod cues; and/or mod diet restriction; and/or still requires some nonoral feeding/supplements  Level 5 (CJ): Safe swallow with min diet restriction; and/or needs min cues  Level 6 (CI): Independent with p.o.; rare cues; usually self cues; may need to avoid some foods or needs extra time  Level 7 ([address] [place] [address]): Independent for all p.o.  MARIA VICTORIA (2003). National Outcomes Measurement System (NOMS): Adult Speech-Language Pathology User's Guide.        Pain:  Pain Scale 1: Numeric (0 - 10)  Pain Intensity 1: 0       After treatment:   Patient left in no apparent distress in bed, Call bell within reach, and Nursing notified    COMMUNICATION/EDUCATION:   =  The patient's plan of care including recommendations, planned interventions, and recommended diet changes were discussed with: Registered nurse.      Madiha Henderson SLP  Time Calculation: 10 mins

## 2020-11-04 VITALS
OXYGEN SATURATION: 97 % | DIASTOLIC BLOOD PRESSURE: 56 MMHG | SYSTOLIC BLOOD PRESSURE: 153 MMHG | HEART RATE: 58 BPM | BODY MASS INDEX: 27.84 KG/M2 | TEMPERATURE: 98.6 F | HEIGHT: 70 IN | RESPIRATION RATE: 22 BRPM | WEIGHT: 194.5 LBS

## 2020-11-04 LAB
ALBUMIN SERPL-MCNC: 2.4 G/DL (ref 3.5–5)
ALBUMIN/GLOB SERPL: 0.6 {RATIO} (ref 1.1–2.2)
ALP SERPL-CCNC: 528 U/L (ref 45–117)
ALT SERPL-CCNC: 67 U/L (ref 12–78)
ANION GAP SERPL CALC-SCNC: 6 MMOL/L (ref 5–15)
AST SERPL-CCNC: 68 U/L (ref 15–37)
BILIRUB SERPL-MCNC: 0.6 MG/DL (ref 0.2–1)
BUN SERPL-MCNC: 30 MG/DL (ref 6–20)
BUN/CREAT SERPL: 9 (ref 12–20)
CALCIUM SERPL-MCNC: 8.4 MG/DL (ref 8.5–10.1)
CHLORIDE SERPL-SCNC: 102 MMOL/L (ref 97–108)
CO2 SERPL-SCNC: 27 MMOL/L (ref 21–32)
COMMENT, HOLDF: NORMAL
CREAT SERPL-MCNC: 3.25 MG/DL (ref 0.7–1.3)
GLOBULIN SER CALC-MCNC: 4.1 G/DL (ref 2–4)
GLUCOSE BLD STRIP.AUTO-MCNC: 118 MG/DL (ref 65–100)
GLUCOSE BLD STRIP.AUTO-MCNC: 96 MG/DL (ref 65–100)
GLUCOSE SERPL-MCNC: 105 MG/DL (ref 65–100)
POTASSIUM SERPL-SCNC: 3.5 MMOL/L (ref 3.5–5.1)
PROT SERPL-MCNC: 6.5 G/DL (ref 6.4–8.2)
SAMPLES BEING HELD,HOLD: NORMAL
SERVICE CMNT-IMP: ABNORMAL
SERVICE CMNT-IMP: NORMAL
SODIUM SERPL-SCNC: 135 MMOL/L (ref 136–145)

## 2020-11-04 PROCEDURE — 36415 COLL VENOUS BLD VENIPUNCTURE: CPT

## 2020-11-04 PROCEDURE — 74011250636 HC RX REV CODE- 250/636: Performed by: INTERNAL MEDICINE

## 2020-11-04 PROCEDURE — 74011250637 HC RX REV CODE- 250/637: Performed by: INTERNAL MEDICINE

## 2020-11-04 PROCEDURE — 82962 GLUCOSE BLOOD TEST: CPT

## 2020-11-04 PROCEDURE — 77010033678 HC OXYGEN DAILY

## 2020-11-04 PROCEDURE — 80053 COMPREHEN METABOLIC PANEL: CPT

## 2020-11-04 RX ORDER — LORAZEPAM 2 MG/ML
0.5 INJECTION INTRAMUSCULAR ONCE
Status: COMPLETED | OUTPATIENT
Start: 2020-11-04 | End: 2020-11-04

## 2020-11-04 RX ADMIN — CLOPIDOGREL BISULFATE 75 MG: 75 TABLET ORAL at 09:50

## 2020-11-04 RX ADMIN — Medication 10 ML: at 05:43

## 2020-11-04 RX ADMIN — HEPARIN SODIUM 5000 UNITS: 5000 INJECTION INTRAVENOUS; SUBCUTANEOUS at 05:43

## 2020-11-04 RX ADMIN — METOPROLOL TARTRATE 100 MG: 25 TABLET, FILM COATED ORAL at 09:50

## 2020-11-04 RX ADMIN — AMIODARONE HYDROCHLORIDE 200 MG: 200 TABLET ORAL at 09:52

## 2020-11-04 RX ADMIN — LORAZEPAM 0.5 MG: 2 INJECTION INTRAMUSCULAR; INTRAVENOUS at 11:07

## 2020-11-04 RX ADMIN — ASPIRIN 81 MG: 81 TABLET, CHEWABLE ORAL at 09:50

## 2020-11-04 NOTE — PROGRESS NOTES
Bedside RN performed patient education and medication education. Discharge concerns initiated and discussed with patient, including clarification on \"who\" assists the patient at their home and instructions for when the home going patient should call their provider after discharge. Opportunity for questions and clarification was provided. Patient receptive to education: YES  Patient stated: \"I understand. \"  Barriers to Education: None  Diagnosis Education given:  YES    Length of stay: 16  Expected Day of Discharge: 11/4/2020  Ask if they have \"Help at Home\" & add to white board? YES    Education Day #: 16    Medication Education Given:  YES  M in the box Medication name: Plavix    Pt aware of HCAHPS survey: YES    I have reviewed discharge instructions with the patient. The patient verbalized understanding. Patient discharged to [facility]. Report called to Keith Litten.

## 2020-11-04 NOTE — PROGRESS NOTES
Problem: Falls - Risk of  Goal: *Absence of Falls  Description: Document Miky Tuttle Fall Risk and appropriate interventions in the flowsheet. Outcome: Progressing Towards Goal  Note: Fall Risk Interventions:  Mobility Interventions: Communicate number of staff needed for ambulation/transfer, OT consult for ADLs, Patient to call before getting OOB, PT Consult for mobility concerns, PT Consult for assist device competence, Utilize walker, cane, or other assistive device    Mentation Interventions: Adequate sleep, hydration, pain control, Door open when patient unattended, Family/sitter at bedside, Increase mobility, More frequent rounding, Toileting rounds    Medication Interventions: Evaluate medications/consider consulting pharmacy, Patient to call before getting OOB, Teach patient to arise slowly    Elimination Interventions: Call light in reach, Elevated toilet seat, Patient to call for help with toileting needs, Stay With Me (per policy), Toilet paper/wipes in reach, Toileting schedule/hourly rounds, Urinal in reach              Problem: Urinary Tract Infection - Adult  Goal: *Absence of infection signs and symptoms  Outcome: Progressing Towards Goal     Problem: Pain  Goal: *Control of Pain  Outcome: Progressing Towards Goal     Problem: Diabetes Self-Management  Goal: *Disease process and treatment process  Description: Define diabetes and identify own type of diabetes; list 3 options for treating diabetes. Outcome: Progressing Towards Goal  Goal: *Incorporating nutritional management into lifestyle  Description: Describe effect of type, amount and timing of food on blood glucose; list 3 methods for planning meals. Outcome: Progressing Towards Goal  Goal: *Incorporating physical activity into lifestyle  Description: State effect of exercise on blood glucose levels.   Outcome: Progressing Towards Goal  Goal: *Developing strategies to promote health/change behavior  Description: Define the ABC's of diabetes; identify appropriate screenings, schedule and personal plan for screenings. Outcome: Progressing Towards Goal     Problem: Pressure Injury - Risk of  Goal: *Prevention of pressure injury  Description: Document Luis Felipe Scale and appropriate interventions in the flowsheet. Outcome: Progressing Towards Goal  Note: Pressure Injury Interventions:  Sensory Interventions: Chair cushion, Assess need for specialty bed, Check visual cues for pain, Discuss PT/OT consult with provider, Float heels, Keep linens dry and wrinkle-free, Maintain/enhance activity level, Minimize linen layers, Monitor skin under medical devices, Pad between skin to skin, Pressure redistribution bed/mattress (bed type), Turn and reposition approx. every two hours (pillows and wedges if needed)    Moisture Interventions: Apply protective barrier, creams and emollients, Absorbent underpads, Assess need for specialty bed, Check for incontinence Q2 hours and as needed, Minimize layers, Limit adult briefs, Maintain skin hydration (lotion/cream)    Activity Interventions: Chair cushion, Increase time out of bed, Pressure redistribution bed/mattress(bed type), PT/OT evaluation    Mobility Interventions: Chair cushion, Float heels, HOB 30 degrees or less, Pressure redistribution bed/mattress (bed type), PT/OT evaluation, Turn and reposition approx.  every two hours(pillow and wedges)    Nutrition Interventions: Document food/fluid/supplement intake    Friction and Shear Interventions: Feet elevated on foot rest, HOB 30 degrees or less, Lift sheet, Minimize layers

## 2020-11-04 NOTE — PROGRESS NOTES
Follow-up visit with Jovanna Henderson while Lisa Ramos was resting. Offered listening presence and emotional support. Jovanna Henderson shared of events of this morning and plans for patient to go to rehab. Listened to her concerns and affirmed her hopes for the future.  offered spoken prayer at bedside. Assured Jovanna Henderson of ongoing prayers.     Amy Merlinda Kaufmann, Palliative

## 2020-11-04 NOTE — DISCHARGE SUMMARY
Discharge Summary       PATIENT ID: Holley Melo  MRN: [id number]   YOB: 1945    DATE OF ADMISSION: 10/19/2020  1:23 AM    DATE OF DISCHARGE: 11/4/2020   PRIMARY CARE PROVIDER: Unknown, Provider     ATTENDING PHYSICIAN: Marietta Coronel MD  DISCHARGING PROVIDER: Marietta Coronel MD    To contact this individual call [phone] and ask the  to page. If unavailable ask to be transferred the Adult Hospitalist Department. CONSULTATIONS: IP CONSULT TO UROLOGY  IP CONSULT TO NEPHROLOGY  IP CONSULT TO PALLIATIVE CARE - PROVIDER  IP CONSULT TO INTERVENTIONAL RADIOLOGY  IP CONSULT TO CARDIOLOGY    PROCEDURES/SURGERIES: Procedure(s):  LEFT HEART CATH / [address] COURSE:   Evaluated and treated for acute renal failure, and had cardiac arrest, survived well, needs HD going forward, and has significant CAD, s/p CABG [facility] showed blocked vein grafts, LIMA patent. Discharge got deferred by a day as [facility] didn't have bed ready, will dc today 11/4/2020    Risk of Re-Admission: high  DISCHARGE DIAGNOSES / PLAN:      #. s/p Cardiac arrest: this admission. #. CAD: s/p CABG  #. Ischemic cardiomyopathy  - s/p - C 10/22: Severe native 3V CAD, patent LIMA-LAD, known occluded SVG-RCA and SVG-LCx; moderately elevated LVEDP  - TTE 10/21: LVEF is 40-45%- Cardiology following- no further cardiac work up  - 10/31 had episode of severe resp distress, hypotension, tachyArrhythmia. These are anginal symptoms. Cardiology re-evaluated, trops plateaued. CXR NAD. Dc with amiodarone and metoprolol. F/u with CArdio and nephrology op. HD chair was established by , worked with PT/OT dc to rehab.     # Tachyarrhythmias with multifocal ventricular ectopies - controlled on amiodarone   # Acute hypoxic respiratory failure - intubated 10/20, extubated 10/25- on 2L O2 NC  # Leukocytosis possible RLL PNA ? Aspiration - Ceftriaxone - 10 day course  # ARF on CKD, stage IV- due to cardiac arrest- CRRT to HD 10/25- Nephro following   - had permaCath and HD chair for outpatient dialysis on dc to rehab.     # Right sided hydronephrosis- Urology following. F/u op. # Hypokalemia - replace as needed   # Mild hyperglycemia- Keep glucose less than 180 with subcutaneous insulin as needed  # Elevated LFTs: ?shock liver from hypoperfusion due to cardiac arrest - monitor. stable  # Profound generalized weakness/ICU myopathy - PT/OT  # Dysphagia - Improved - off tube feed, removed NGT - Mechanical soft diet     FOLLOW UP APPOINTMENTS:    Follow-up Information     Follow up With Specialties Details Why Contact Info    Unknown, Provider    Patient not available to ask      Unknown, Provider  In 1 week  Patient not available to ask        In 1 week  nephrology      In 3 weeks  Cardiology    [facility] [address] [facility], Dialysis  Inpatient [address] [facility]  [phone]         ADDITIONAL CARE RECOMMENDATIONS:  Follow up with PCP , nephrology and cardiology    DIET: Resume previous diet    ACTIVITY: Activity as tolerated    DISCHARGE MEDICATIONS:  Current Discharge Medication List      START taking these medications    Details   amiodarone (CORDARONE) 200 mg tablet Take 1 Tab by mouth two (2) times a day for 30 days. Qty: 60 Tab, Refills: 0      clopidogreL (PLAVIX) 75 mg tab Take 1 Tab by mouth daily for 30 days. Qty: 30 Tab, Refills: 0      metoprolol tartrate (LOPRESSOR) 100 mg IR tablet 1 Tab by Per NG tube route every twelve (12) hours for 30 days. Qty: 60 Tab, Refills: 0      rosuvastatin (CRESTOR) 40 mg tablet Take 1 Tab by mouth nightly for 30 days. Qty: 30 Tab, Refills: 0         CONTINUE these medications which have NOT CHANGED    Details   aspirin (ASPIRIN) 325 mg tablet Take 325 mg by mouth daily. amLODIPine (NORVASC) 10 mg tablet Take 10 mg by mouth daily.  Indications: high blood pressure ergocalciferol (ERGOCALCIFEROL) 1,250 mcg (50,000 unit) capsule Take 50,000 Units by mouth every seven (7) days. multivitamin, tx-iron-ca-min (THERA-M w/ IRON) 9 mg iron-400 mcg tab tablet Take 1 Tab by mouth daily. oxyCODONE-acetaminophen (Percocet) 5-325 mg per tablet Take 1 Tab by mouth every six (6) hours as needed for Pain. STOP taking these medications       metoprolol succinate (TOPROL-XL) 50 mg XL tablet Comments:   Reason for Stopping:             NOTIFY YOUR PHYSICIAN FOR ANY OF THE FOLLOWING:   Fever over 101 degrees for 24 hours. Chest pain, shortness of breath, fever, chills, nausea, vomiting, diarrhea, change in mentation, falling, weakness, bleeding. Severe pain or pain not relieved by medications. Or, any other signs or symptoms that you may have questions about. DISPOSITION:    Home With:   OT  PT  HH  RN       Long term SNF/Inpatient Rehab   x Independent/assisted living    Hospice    Other:     PATIENT CONDITION AT DISCHARGE:     Functional status    Poor     Deconditioned     Independent      Cognition     Lucid     Forgetful     Dementia      Catheters/lines (plus indication)    Erickson     PICC     PEG     None      Code status     Full code     DNR      PHYSICAL EXAMINATION AT DISCHARGE:  Patient seen and examined at bedside, Condition stable, explained discharge and follow up plans. BP (!) 149/54   Pulse 75   Temp 98.3 °F (36.8 °C)   Resp 21   Ht 5' 10\" (1.778 m)   Wt 88.2 kg (194 lb 8 oz)   SpO2 98%   BMI 27.91 kg/m²   General:  Alert, oriented, No acute distress. Resp:  No accessory muscle use, Good AE.   Neuro:  Grossly normal, no focal neuro deficits, follows commands     CHRONIC MEDICAL DIAGNOSES:  Problem List as of 11/4/2020 Never Reviewed          Codes Class Noted - Resolved    Leukocytosis ICD-10-CM: Y06.948  ICD-9-CM: 288.60  10/19/2020 - Present        UTI (urinary tract infection) ICD-10-CM: N39.0  ICD-9-CM: 599.0  10/19/2020 - Present Tachycardia ICD-10-CM: R00.0  ICD-9-CM: 785.0  10/19/2020 - Present        Sepsis ([address] ICD-10-CM: A41.9  ICD-9-CM: 038.9, 995.91  10/19/2020 - Present        Hydronephrosis of right kidney ICD-10-CM: N13.30  ICD-9-CM: 945  10/19/2020 - Present              37 minutes were spent with the patient on counseling and coordination of care.     Signed:   Jian Souza MD  11/4/2020  12:04 PM

## 2020-11-04 NOTE — PROGRESS NOTES
Bedside shift change report given to BEHZAD De La Rosa (oncoming nurse) by Jazz Wu RN (offgoing nurse). Report included the following information SBAR, Kardex, ED Summary, Procedure Summary, Intake/Output, Recent Results, Cardiac Rhythm SR and Dual Neuro Assessment.

## 2020-11-04 NOTE — PROGRESS NOTES
Bedside and Verbal shift change report given to Sosa RN (oncoming nurse) by Marcia Bañuelos RN (offgoing nurse). Report included the following information SBAR, Kardex, ED Summary, Intake/Output, MAR, Cardiac Rhythm NSR and Dual Neuro Assessment.

## 2020-11-04 NOTE — PROGRESS NOTES
Bedside RN performed patient education and medication education. Discharge concerns initiated and discussed with patient, including clarification on \"who\" assists the patient at their home and instructions for when the home going patient should call their provider after discharge. Opportunity for questions and clarification was provided. Patient receptive to education: YES Patient stated: \"I understand. \" 
Barriers to Education: None Diagnosis Education given:  YES Length of stay: 16 Expected Day of Discharge: 11/4/2020 Ask if they have \"Help at Home\" & add to white board? YES Education Day #: 16 Medication Education Given:  YES 
M in the box Medication name: Amiodarone Pt aware of HCAHPS survey: YES I have reviewed discharge instructions with the patient. g. The patient verbalized understanding. Patient discharged to [facility] via [facility].  Report called to

## 2020-11-04 NOTE — PROGRESS NOTES
Music Therapy Assessment  Alfredo Brennan [id number]     1945  70 y.o.  male    Patient Telephone Number: [phone] (home)   [employer] Affiliation: [facility]   Language: English   Patient Active Problem List    Diagnosis Date Noted    Leukocytosis 10/19/2020    UTI (urinary tract infection) 10/19/2020    Tachycardia 10/19/2020    Sepsis ([address] 10/19/2020    Hydronephrosis of right kidney 10/19/2020        Date: 11/4/2020            Total Time (in minutes): 5          [facility] 6S NEURO-SCI TELE    Mental Status:   [  ] Alert [  ] Caesar Box [  ]  Confused  [  ] Minimally responsive  [  ] Sleeping -N/A    Communication Status: [  ] Impaired Speech [  ] Nonverbal -N/A    Physical Status:   [  ] Oxygen in use  [  ] Hard of Hearing [  ] Vision Impaired  [  ] Ambulatory  [  ] Ambulatory with assistance [  ] Non-ambulatory -N/A    Music Preferences, Background: Country, artists like Warren Stokes. Clinical Problem addressed: N/A: Please see Session Observations below. Goal(s) met in session: N/A: Please see Session Observations below. Physical/Pain management (Scale of 1-10):    Pre-session rating ___________    Post-session rating __________   [  ] Increased relaxation   [  ] Affected breathing patterns  [  ] Decreased muscle tension   [  ] Decreased agitation  [  ] Affected heart rate    [  ] Increased alertness     Emotional/Psychological:  [  ] Increased self-expression   [  ] Decreased aggressive behavior   [  ] Decreased feelings of stress  [  ] Discussed healthy coping skills     [  ] Improved mood    [  ] Decreased withdrawn behavior     Social:  [  ] Decreased feelings of isolation/loneliness [  ] Positive social interaction   [  ] Provided support and/or comfort for family/friends    Spiritual:  [  ] Spiritual support    [  ] Expressed peace  [  ] Expressed malcom    [  ] Discussed beliefs    Techniques Utilized (Check all that apply): N/A: Please see Session Observations below.   [ ] Procedural support MT [  ] Music for relaxation [  ] Patient preferred music  [  ] Carmen analysis  [  ] Song choice  [  ] Music for validation  [  ] Entrainment  [  ] Movement to music [  ] Guided visualization  [  ] Stoney Shakeel  [  ] Patient instrument playing [  ] Giuliana Lee writing  [  ] Behzad Tuttle along   [  ] Lolly Nose  [  ] Sensory stimulation  [  ] Active Listening  [  ] Music for spiritual support [  ] Making of CDs as gifts    Session Observations:  F/up visit; This music therapy intern (MTI) attempted to offer a music therapy session. As MTI was approaching the pt's doorway, MTI noticed the transport team was in the room and the pt was actively being discharged. Will follow if pt is readmitted. Cinda Zavaleta Music Therapy Intern   Spiritual Care Services Dept.    Referral-based service

## 2020-11-04 NOTE — PROGRESS NOTES
Problem: Falls - Risk of  Goal: *Absence of Falls  Description: Document Jhonny Walker Fall Risk and appropriate interventions in the flowsheet. Outcome: Progressing Towards Goal  Note: Fall Risk Interventions:  Mobility Interventions: Bed/chair exit alarm, Communicate number of staff needed for ambulation/transfer, Patient to call before getting OOB, PT Consult for mobility concerns, PT Consult for assist device competence    Mentation Interventions: Bed/chair exit alarm, Door open when patient unattended, Increase mobility, More frequent rounding, Reorient patient    Medication Interventions: Bed/chair exit alarm, Evaluate medications/consider consulting pharmacy, Patient to call before getting OOB    Elimination Interventions: Call light in reach, Patient to call for help with toileting needs, Stay With Me (per policy)              Problem: Urinary Tract Infection - Adult  Goal: *Absence of infection signs and symptoms  Outcome: Progressing Towards Goal     Problem: Pain  Goal: *Control of Pain  Outcome: Progressing Towards Goal     Problem: Breathing Pattern - Ineffective  Goal: *Absence of hypoxia  Outcome: Progressing Towards Goal     Problem: Pressure Injury - Risk of  Goal: *Prevention of pressure injury  Description: Document Luis Felipe Scale and appropriate interventions in the flowsheet. Outcome: Progressing Towards Goal  Note: Pressure Injury Interventions:  Sensory Interventions: Assess changes in LOC, Avoid rigorous massage over bony prominences, Check visual cues for pain, Discuss PT/OT consult with provider, Float heels, Keep linens dry and wrinkle-free, Maintain/enhance activity level, Minimize linen layers, Monitor skin under medical devices, Turn and reposition approx.  every two hours (pillows and wedges if needed)    Moisture Interventions: Absorbent underpads, Apply protective barrier, creams and emollients, Check for incontinence Q2 hours and as needed, Limit adult briefs, Maintain skin hydration (lotion/cream), Minimize layers, Moisture barrier    Activity Interventions: PT/OT evaluation, Pressure redistribution bed/mattress(bed type), Increase time out of bed    Mobility Interventions: Float heels, Assess need for specialty bed, Pressure redistribution bed/mattress (bed type), PT/OT evaluation, Suspension boots    Nutrition Interventions: Document food/fluid/supplement intake, Discuss nutritional consult with provider    Friction and Shear Interventions: Lift sheet, Lift team/patient mobility team, Minimize layers                Problem: Nutrition Deficit  Goal: *Optimize nutritional status  Outcome: Progressing Towards Goal

## 2020-11-04 NOTE — PROGRESS NOTES
[facility]   RUR-21% Moderate    Patient to transition to [place] today. CM notified patient and wife. Will need EMTALA-CM to complete CM portion. Discharge envelope will be located on chart when complete. CM to request [facility] transport for 1400. Medicare pt has received, reviewed, and signed 2nd IM letter informing them of their right to appeal the discharge. Signed copy has been placed on pt bedside chart. Iraida Orantes MS     1:07 Nurse to call report to [phone], accepting physician is Dr. Blayne Mckinnon.      Iraida Orantes MS

## 2020-11-04 NOTE — PROGRESS NOTES
Problem: Falls - Risk of  Goal: *Absence of Falls  Description: Document Analisa Triana Fall Risk and appropriate interventions in the flowsheet. Outcome: Progressing Towards Goal  Note: Fall Risk Interventions:  Mobility Interventions: Communicate number of staff needed for ambulation/transfer, OT consult for ADLs, Patient to call before getting OOB, PT Consult for mobility concerns, PT Consult for assist device competence, Utilize walker, cane, or other assistive device    Mentation Interventions: Adequate sleep, hydration, pain control, Door open when patient unattended, Family/sitter at bedside, Increase mobility, More frequent rounding, Toileting rounds    Medication Interventions: Evaluate medications/consider consulting pharmacy, Patient to call before getting OOB, Teach patient to arise slowly    Elimination Interventions: Call light in reach, Elevated toilet seat, Patient to call for help with toileting needs, Stay With Me (per policy), Toilet paper/wipes in reach, Toileting schedule/hourly rounds, Urinal in reach              Problem: Urinary Tract Infection - Adult  Goal: *Absence of infection signs and symptoms  Outcome: Progressing Towards Goal     Problem: Pain  Goal: *Control of Pain  Outcome: Progressing Towards Goal     Problem: Breathing Pattern - Ineffective  Goal: *Absence of hypoxia  Outcome: Progressing Towards Goal     Problem: Diabetes Self-Management  Goal: *Disease process and treatment process  Description: Define diabetes and identify own type of diabetes; list 3 options for treating diabetes. Outcome: Progressing Towards Goal     Problem: Pressure Injury - Risk of  Goal: *Prevention of pressure injury  Description: Document Luis Felipe Scale and appropriate interventions in the flowsheet.   Outcome: Progressing Towards Goal  Note: Pressure Injury Interventions:  Sensory Interventions: Chair cushion, Assess need for specialty bed, Check visual cues for pain, Discuss PT/OT consult with provider, Float heels, Keep linens dry and wrinkle-free, Maintain/enhance activity level, Minimize linen layers, Monitor skin under medical devices, Pad between skin to skin, Pressure redistribution bed/mattress (bed type), Turn and reposition approx. every two hours (pillows and wedges if needed)    Moisture Interventions: Apply protective barrier, creams and emollients, Absorbent underpads, Assess need for specialty bed, Check for incontinence Q2 hours and as needed, Minimize layers, Limit adult briefs, Maintain skin hydration (lotion/cream)    Activity Interventions: Chair cushion, Increase time out of bed, Pressure redistribution bed/mattress(bed type), PT/OT evaluation    Mobility Interventions: Chair cushion, Float heels, HOB 30 degrees or less, Pressure redistribution bed/mattress (bed type), PT/OT evaluation, Turn and reposition approx.  every two hours(pillow and wedges)    Nutrition Interventions: Document food/fluid/supplement intake    Friction and Shear Interventions: Feet elevated on foot rest, HOB 30 degrees or less, Lift sheet, Minimize layers                Problem: Patient Education: Go to Patient Education Activity  Goal: Patient/Family Education  Outcome: Progressing Towards Goal

## 2020-11-04 NOTE — PROGRESS NOTES
Name: Danton Canavan MRN: [id number]   : 1945 Hospital: [facility] [address]   Date: 2020        IMPRESSION:   · CKD stage 4. The etiology is not clear, but patient is aware of having CKD for at least 9 years. · JAMAL- oligo-anuric, now on iHD. No evidence of GFR recovery. On iHD, TTS schedule  · Hypervolemia- resolved. · UTI- on AB's  · S/P card cath      PLAN:   · Next HD in AM  · After discharge patient will continue outpatient HD at our HD unit [facility] under the supervision of Dr. Sofía White  · PT/OT- going to a rehab  · Baljit Auguste is notified. Subjective/Interval History:   I have reviewed the flowsheet and previous days notes. ROS:Review of systems not obtained due to patient factors. Objective:   Vital Signs:    Visit Vitals  BP (!) 149/54   Pulse 75   Temp 98.3 °F (36.8 °C)   Resp 21   Ht 5' 10\" (1.778 m)   Wt 88.2 kg (194 lb 8 oz)   SpO2 98%   BMI 27.91 kg/m²       O2 Device: Nasal cannula   O2 Flow Rate (L/min): 2 l/min   Temp (24hrs), Av.2 °F (36.8 °C), Min:97.7 °F (36.5 °C), Max:99 °F (37.2 °C)       Intake/Output:   Last shift:      No intake/output data recorded. Last 3 shifts:  1901 -  0700  In: 480 [P.O.:480]  Out: 1100 [Urine:600]    Intake/Output Summary (Last 24 hours) at 2020 1125  Last data filed at 2020 0000  Gross per 24 hour   Intake 240 ml   Output 1100 ml   Net -860 ml        Physical Exam:  General:    Asleep. Family is at bedside. Head:   Normocephalic, without obvious abnormality, atraumatic. Eyes:   Conjunctivae/corneas clear. Nose:  Nares normal. No drainage or sinus tenderness. Throat:    Moist mucosa  Neck:  Supple, symmetrical,  no adenopathy, thyroid: non tender    no carotid bruit and no JVD. Right Geronimo in place. Lungs:   Diminished to auscultation bilaterally. No Wheezing or Rhonchi. + rales. Chest wall:  No tenderness or deformity. No Accessory muscle use.   Heart:   Regular rate and rhythm,  no murmur, rub or gallop. Abdomen:   Soft, non-tender. Not distended. Bowel sounds normal. No masses  Extremities: Extremities normal, atraumatic, No cyanosis. 1+ edema. No clubbing  Skin:     Texture, turgor normal. No rashes or lesions. Not Jaundiced  Psych:  Anxious as per family, prior to falling asleep. DATA:  Labs:  Recent Labs     11/04/20  0112 11/03/20  0550 11/02/20  0629   * 134* 131*   K 3.5 3.8 3.7    100 99   CO2 27 23 23   BUN 30* 63* 52*   CREA 3.25* 5.05* 4.31*   CA 8.4* 8.8 8.4*   ALB 2.4* 2.4* 2.4*     Recent Labs     11/02/20  0629   WBC 11.2*   HGB 10.1*   HCT 32.1*   *     No results for input(s): RASHMI, KU, CLU, CREAU in the last 72 hours.     No lab exists for component: PROU    Total time spent with patient:  35 minutes    []       Care Plan discussed with:   Family, Colleague, Nursing, Medical Team    Maria Dolores Davis MD

## 2020-11-05 ENCOUNTER — HOSPITAL ENCOUNTER (OUTPATIENT)
Dept: LAB | Age: 75
Discharge: HOME | End: 2020-11-05

## 2020-11-05 LAB
ALBUMIN SERPL-MCNC: 2.5 G/DL (ref 3.5–5)
ALBUMIN/GLOB SERPL: 0.6 {RATIO} (ref 1.1–2.2)
ALP SERPL-CCNC: 474 U/L (ref 45–117)
ALT SERPL-CCNC: 54 U/L (ref 12–78)
ANION GAP SERPL CALC-SCNC: 9 MMOL/L (ref 5–15)
AST SERPL W P-5'-P-CCNC: 57 U/L (ref 15–37)
BASOPHILS # BLD: 0.2 K/UL (ref 0–0.1)
BASOPHILS NFR BLD: 2 % (ref 0–1)
BILIRUB SERPL-MCNC: 0.6 MG/DL (ref 0.2–1)
BUN SERPL-MCNC: 41 MG/DL (ref 6–20)
BUN/CREAT SERPL: 10 (ref 12–20)
CA-I BLD-MCNC: 8.4 MG/DL (ref 8.5–10.1)
CHLORIDE SERPL-SCNC: 101 MMOL/L (ref 97–108)
CO2 SERPL-SCNC: 27 MMOL/L (ref 21–32)
CREAT SERPL-MCNC: 4.2 MG/DL (ref 0.7–1.3)
DIFFERENTIAL METHOD BLD: ABNORMAL
EOSINOPHIL # BLD: 0.1 K/UL (ref 0–0.4)
EOSINOPHIL NFR BLD: 1 % (ref 0–7)
ERYTHROCYTE [DISTWIDTH] IN BLOOD BY AUTOMATED COUNT: 16.6 % (ref 11.5–14.5)
GLOBULIN SER CALC-MCNC: 4.3 G/DL (ref 2–4)
GLUCOSE SERPL-MCNC: 114 MG/DL (ref 65–100)
HAV IGM SER QL: NONREACTIVE
HBV SURFACE AG SER QL: <0.1 INDEX
HBV SURFACE AG SER QL: NEGATIVE
HCT VFR BLD AUTO: 30.1 % (ref 36.6–50.3)
HCV AB SER IA-ACNC: 0.07 INDEX
HCV AB SERPL QL IA: NONREACTIVE
HCV COMMENT,HCGAC: NORMAL
HGB BLD-MCNC: 9.4 G/DL (ref 12.1–17)
IMM GRANULOCYTES # BLD AUTO: 0 K/UL (ref 0–0.04)
IMM GRANULOCYTES NFR BLD AUTO: 0 % (ref 0–0.5)
LYMPHOCYTES # BLD: 2.1 K/UL (ref 0.8–3.5)
LYMPHOCYTES NFR BLD: 18 % (ref 12–49)
MCH RBC QN AUTO: 26.8 PG (ref 26–34)
MCHC RBC AUTO-ENTMCNC: 31.2 G/DL (ref 30–36.5)
MCV RBC AUTO: 85.8 FL (ref 80–99)
MONOCYTES # BLD: 1.3 K/UL (ref 0–1)
MONOCYTES NFR BLD: 11 % (ref 5–13)
NEUTS SEG # BLD: 8.3 K/UL (ref 1.8–8)
NEUTS SEG NFR BLD: 68 % (ref 32–75)
NRBC # BLD: 0 K/UL (ref 0–0.01)
NRBC BLD-RTO: 0 PER 100 WBC
PLATELET # BLD AUTO: 469 K/UL (ref 150–400)
PMV BLD AUTO: 10.9 FL (ref 8.9–12.9)
POTASSIUM SERPL-SCNC: 3.5 MMOL/L (ref 3.5–5.1)
PROT SERPL-MCNC: 6.8 G/DL (ref 6.4–8.2)
RBC # BLD AUTO: 3.51 M/UL (ref 4.1–5.7)
SODIUM SERPL-SCNC: 137 MMOL/L (ref 136–145)
VIT B12 SERPL-MCNC: 832 PG/ML (ref 193–986)
WBC # BLD AUTO: 12 K/UL (ref 4.1–11.1)

## 2020-11-05 PROCEDURE — 86803 HEPATITIS C AB TEST: CPT

## 2020-11-05 PROCEDURE — 85025 COMPLETE CBC W/AUTO DIFF WBC: CPT

## 2020-11-05 PROCEDURE — 82607 VITAMIN B-12: CPT

## 2020-11-05 PROCEDURE — 87340 HEPATITIS B SURFACE AG IA: CPT

## 2020-11-05 PROCEDURE — 80053 COMPREHEN METABOLIC PANEL: CPT

## 2020-11-05 PROCEDURE — 86706 HEP B SURFACE ANTIBODY: CPT

## 2020-11-05 PROCEDURE — 86709 HEPATITIS A IGM ANTIBODY: CPT

## 2020-11-05 PROCEDURE — 86704 HEP B CORE ANTIBODY TOTAL: CPT

## 2020-11-06 LAB
HBV CORE AB SERPL QL IA: NEGATIVE
HBV SURFACE AB SER QL: NONREACTIVE
HBV SURFACE AB SER-ACNC: <3.1 MIU/ML

## 2020-11-09 ENCOUNTER — HOSPITAL ENCOUNTER (OUTPATIENT)
Dept: LAB | Age: 75
Discharge: HOME | End: 2020-11-09

## 2020-11-09 LAB
ALBUMIN SERPL-MCNC: 2.8 G/DL (ref 3.5–5)
ANION GAP SERPL CALC-SCNC: 10 MMOL/L (ref 5–15)
ANION GAP SERPL CALC-SCNC: 9 MMOL/L (ref 5–15)
BUN SERPL-MCNC: 35 MG/DL (ref 6–20)
BUN SERPL-MCNC: 38 MG/DL (ref 6–20)
BUN/CREAT SERPL: 8 (ref 12–20)
BUN/CREAT SERPL: 8 (ref 12–20)
CA-I BLD-MCNC: 8.5 MG/DL (ref 8.5–10.1)
CA-I BLD-MCNC: 8.6 MG/DL (ref 8.5–10.1)
CHLORIDE SERPL-SCNC: 101 MMOL/L (ref 97–108)
CHLORIDE SERPL-SCNC: 101 MMOL/L (ref 97–108)
CO2 SERPL-SCNC: 26 MMOL/L (ref 21–32)
CO2 SERPL-SCNC: 27 MMOL/L (ref 21–32)
CREAT SERPL-MCNC: 4.46 MG/DL (ref 0.7–1.3)
CREAT SERPL-MCNC: 4.86 MG/DL (ref 0.7–1.3)
GLUCOSE SERPL-MCNC: 114 MG/DL (ref 65–100)
GLUCOSE SERPL-MCNC: 119 MG/DL (ref 65–100)
PHOSPHATE SERPL-MCNC: 4 MG/DL (ref 2.6–4.7)
POTASSIUM SERPL-SCNC: 3.5 MMOL/L (ref 3.5–5.1)
POTASSIUM SERPL-SCNC: 3.6 MMOL/L (ref 3.5–5.1)
SODIUM SERPL-SCNC: 137 MMOL/L (ref 136–145)
SODIUM SERPL-SCNC: 137 MMOL/L (ref 136–145)

## 2020-11-09 PROCEDURE — 80048 BASIC METABOLIC PNL TOTAL CA: CPT

## 2020-11-09 PROCEDURE — 80069 RENAL FUNCTION PANEL: CPT

## 2020-11-12 ENCOUNTER — HOSPITAL ENCOUNTER (OUTPATIENT)
Dept: LAB | Age: 75
Discharge: HOME | End: 2020-11-12

## 2020-11-12 LAB
ANION GAP SERPL CALC-SCNC: 4 MMOL/L (ref 5–15)
BUN SERPL-MCNC: 17 MG/DL (ref 6–20)
BUN/CREAT SERPL: 6 (ref 12–20)
CA-I BLD-MCNC: 8.5 MG/DL (ref 8.5–10.1)
CHLORIDE SERPL-SCNC: 101 MMOL/L (ref 97–108)
CO2 SERPL-SCNC: 30 MMOL/L (ref 21–32)
CREAT SERPL-MCNC: 2.7 MG/DL (ref 0.7–1.3)
GLUCOSE SERPL-MCNC: 101 MG/DL (ref 65–100)
HCT VFR BLD AUTO: 30.2 % (ref 36.6–50.3)
HGB BLD-MCNC: 9.5 G/DL (ref 12.1–17)
POTASSIUM SERPL-SCNC: 3.5 MMOL/L (ref 3.5–5.1)
SODIUM SERPL-SCNC: 135 MMOL/L (ref 136–145)

## 2020-11-12 PROCEDURE — 85018 HEMOGLOBIN: CPT

## 2020-11-12 PROCEDURE — 80048 BASIC METABOLIC PNL TOTAL CA: CPT

## 2020-11-14 ENCOUNTER — HOSPITAL ENCOUNTER (OUTPATIENT)
Dept: LAB | Age: 75
Discharge: HOME | End: 2020-11-14

## 2020-11-14 LAB
BASOPHILS # BLD: 0.1 K/UL (ref 0–0.1)
BASOPHILS NFR BLD: 1 % (ref 0–1)
DIFFERENTIAL METHOD BLD: ABNORMAL
EOSINOPHIL # BLD: 0.2 K/UL (ref 0–0.4)
EOSINOPHIL NFR BLD: 3 % (ref 0–7)
ERYTHROCYTE [DISTWIDTH] IN BLOOD BY AUTOMATED COUNT: 17.9 % (ref 11.5–14.5)
HCT VFR BLD AUTO: 30.2 % (ref 36.6–50.3)
HGB BLD-MCNC: 9.4 G/DL (ref 12.1–17)
IMM GRANULOCYTES # BLD AUTO: 0 K/UL (ref 0–0.04)
IMM GRANULOCYTES NFR BLD AUTO: 0 % (ref 0–0.5)
LYMPHOCYTES # BLD: 1.8 K/UL (ref 0.8–3.5)
LYMPHOCYTES NFR BLD: 24 % (ref 12–49)
MCH RBC QN AUTO: 27.4 PG (ref 26–34)
MCHC RBC AUTO-ENTMCNC: 31.1 G/DL (ref 30–36.5)
MCV RBC AUTO: 88 FL (ref 80–99)
MONOCYTES # BLD: 0.6 K/UL (ref 0–1)
MONOCYTES NFR BLD: 8 % (ref 5–13)
NEUTS SEG # BLD: 5 K/UL (ref 1.8–8)
NEUTS SEG NFR BLD: 64 % (ref 32–75)
PLATELET # BLD AUTO: 295 K/UL (ref 150–400)
PMV BLD AUTO: 11.4 FL (ref 8.9–12.9)
RBC # BLD AUTO: 3.43 M/UL (ref 4.1–5.7)
WBC # BLD AUTO: 7.7 K/UL (ref 4.1–11.1)

## 2020-11-14 PROCEDURE — 85025 COMPLETE CBC W/AUTO DIFF WBC: CPT

## 2020-11-16 ENCOUNTER — HOSPITAL ENCOUNTER (OUTPATIENT)
Dept: LAB | Age: 75
Discharge: HOME | End: 2020-11-16

## 2020-11-16 LAB
ALBUMIN SERPL-MCNC: 2.5 G/DL (ref 3.5–5)
ANION GAP SERPL CALC-SCNC: 8 MMOL/L (ref 5–15)
BASOPHILS # BLD: 0.1 K/UL (ref 0–0.1)
BASOPHILS NFR BLD: 1 % (ref 0–1)
BUN SERPL-MCNC: 26 MG/DL (ref 6–20)
BUN/CREAT SERPL: 6 (ref 12–20)
CA-I BLD-MCNC: 8.6 MG/DL (ref 8.5–10.1)
CHLORIDE SERPL-SCNC: 102 MMOL/L (ref 97–108)
CO2 SERPL-SCNC: 27 MMOL/L (ref 21–32)
CREAT SERPL-MCNC: 4.19 MG/DL (ref 0.7–1.3)
DIFFERENTIAL METHOD BLD: ABNORMAL
EOSINOPHIL # BLD: 0.4 K/UL (ref 0–0.4)
EOSINOPHIL NFR BLD: 4 % (ref 0–7)
ERYTHROCYTE [DISTWIDTH] IN BLOOD BY AUTOMATED COUNT: 17.9 % (ref 11.5–14.5)
GLUCOSE SERPL-MCNC: 94 MG/DL (ref 65–100)
HCT VFR BLD AUTO: 29.6 % (ref 36.6–50.3)
HGB BLD-MCNC: 9.3 G/DL (ref 12.1–17)
IMM GRANULOCYTES # BLD AUTO: 0 K/UL (ref 0–0.04)
IMM GRANULOCYTES NFR BLD AUTO: 0 % (ref 0–0.5)
LYMPHOCYTES # BLD: 2.1 K/UL (ref 0.8–3.5)
LYMPHOCYTES NFR BLD: 26 % (ref 12–49)
MCH RBC QN AUTO: 27.4 PG (ref 26–34)
MCHC RBC AUTO-ENTMCNC: 31.4 G/DL (ref 30–36.5)
MCV RBC AUTO: 87.3 FL (ref 80–99)
MONOCYTES # BLD: 1 K/UL (ref 0–1)
MONOCYTES NFR BLD: 8 % (ref 5–13)
NEUTS SEG # BLD: 5 K/UL (ref 1.8–8)
NEUTS SEG NFR BLD: 61 % (ref 32–75)
PHOSPHATE SERPL-MCNC: 3.3 MG/DL (ref 2.6–4.7)
PLATELET # BLD AUTO: 282 K/UL (ref 150–400)
PMV BLD AUTO: 11 FL (ref 8.9–12.9)
POTASSIUM SERPL-SCNC: 3.5 MMOL/L (ref 3.5–5.1)
RBC # BLD AUTO: 3.39 M/UL (ref 4.1–5.7)
SODIUM SERPL-SCNC: 137 MMOL/L (ref 136–145)
WBC # BLD AUTO: 8.2 K/UL (ref 4.1–11.1)

## 2020-11-16 PROCEDURE — 80069 RENAL FUNCTION PANEL: CPT

## 2020-11-16 PROCEDURE — 85025 COMPLETE CBC W/AUTO DIFF WBC: CPT

## 2022-03-19 PROBLEM — R00.0 TACHYCARDIA: Status: ACTIVE | Noted: 2020-10-19

## 2022-03-19 PROBLEM — N13.30 HYDRONEPHROSIS OF RIGHT KIDNEY: Status: ACTIVE | Noted: 2020-10-19

## 2022-03-19 PROBLEM — D72.829 LEUKOCYTOSIS: Status: ACTIVE | Noted: 2020-10-19

## 2022-03-19 PROBLEM — N39.0 UTI (URINARY TRACT INFECTION): Status: ACTIVE | Noted: 2020-10-19

## 2022-03-20 PROBLEM — A41.9 SEPSIS (HCC): Status: ACTIVE | Noted: 2020-10-19

## 2023-04-08 NOTE — Clinical Note
Bilateral groin prepped with ChloraPrep and draped.
Catheter inserted.   OTW
Catheter used: Pigtail (straight). Catheter inserted.
Catheter used: Pigtail (straight). Catheter removed.
Catheter used: Right 4. Catheter inserted.
Catheter used: Right 4. Catheter removed.
Contrast: Isovue. Contrast concentration: 370. Amount: 21 mL.
Diagnostic wire inserted.
History and physical documented and up to date, allergies reviewed, lab results reviewed, pre-procedure education provided, patient verbalized understanding of procedure, procedural consent signed and patient is NPO.
ID band present and verified.
LVEDP OBTAINED
Mallampati: Class III - soft palate, base of uvula visible. ASA: Class 4 - patient with severe systemic disease that is a constant threat to life.
Multiple views of the saphenous vein graft to the left anterior descending obtained using power injection.
No specimen collected. Estimated Blood Loss: <30 mL.
Physician has arrived.
Right femoral artery. Accessed successfully. using ultrasound guidance.
Sheath #1: Closed using manual compression. Site secured by Tegaderm. Pressure held for: 10 minutes.
Sheath #1: Dressed using 4 X 4 and transparent dressing. Site: clean, dry, & intact, no bleeding and no hematoma.
Sheath #1: Dressed using 4 X 4 and transparent dressing. Site: clean, dry, & intact, no bleeding and no hematoma.
Sheath #1: Presents as clean, dry, & intact, no bleeding and no hematoma.
Sheath #1: Sheath: inserted. Sheath inserted/placed in the right femoral  artery.
TRANSFER - OUT REPORT:     Verbal report given to: Le Richards. Report consisted of patient's Situation, Background, Assessment and   Recommendations(SBAR). Opportunity for questions and clarification was provided. Patient transported with a Registered Nurse, [address] [place] [address] / Patient Care Tech, Monitor, Oxygen and Respiratory Therapist.   Patient transported to: ICU 16.
None known

## 2023-05-17 RX ORDER — ASPIRIN 325 MG
325 TABLET ORAL DAILY
COMMUNITY

## 2023-05-17 RX ORDER — AMLODIPINE BESYLATE 10 MG/1
10 TABLET ORAL DAILY
COMMUNITY

## 2023-05-17 RX ORDER — OXYCODONE HYDROCHLORIDE AND ACETAMINOPHEN 5; 325 MG/1; MG/1
1 TABLET ORAL EVERY 6 HOURS PRN
COMMUNITY

## 2023-05-17 RX ORDER — ERGOCALCIFEROL 1.25 MG/1
50000 CAPSULE ORAL
COMMUNITY

## (undated) DEVICE — ANGIOGRAPHIC CATHETER: Brand: IMPULSE™

## (undated) DEVICE — KIT HND CTRL 3 W STPCOCK ROT END 54IN PREM HI PRSS TBNG AT

## (undated) DEVICE — ANGIOGRAPHY KIT

## (undated) DEVICE — MICROPUNCTURE INTRODUCER SET SILHOUETTE TRANSITIONLESS WITH STAINLESS STEEL WIRE GUIDE: Brand: MICROPUNCTURE

## (undated) DEVICE — PINNACLE INTRODUCER SHEATH: Brand: PINNACLE

## (undated) DEVICE — INTENDED FOR TISSUE SEPARATION, AND OTHER PROCEDURES THAT REQUIRE A SHARP SURGICAL BLADE TO PUNCTURE OR CUT.: Brand: BARD-PARKER ®  SAFETY SCALPED

## (undated) DEVICE — KIT MFLD ISOLATN NACL CNTRST PRT TBNG SPIK W/ PRSS TRNSDUC

## (undated) DEVICE — KIT MED IMAG CNTRST AGNT W/ IOPAMIDOL REUSE

## (undated) DEVICE — PACK PROCEDURE SURG HRT CATH

## (undated) DEVICE — DRAPE PRB US TRNSDCR 6X96IN --

## (undated) DEVICE — GOWN,NON-REINFORCED,XXL: Brand: MEDLINE